# Patient Record
Sex: FEMALE | Race: WHITE | NOT HISPANIC OR LATINO | Employment: FULL TIME | URBAN - METROPOLITAN AREA
[De-identification: names, ages, dates, MRNs, and addresses within clinical notes are randomized per-mention and may not be internally consistent; named-entity substitution may affect disease eponyms.]

---

## 2017-01-09 ENCOUNTER — TELEPHONE (OUTPATIENT)
Dept: FAMILY MEDICINE | Facility: CLINIC | Age: 57
End: 2017-01-09

## 2017-01-09 NOTE — TELEPHONE ENCOUNTER
RN tried to contact patient, but NA. Left message  To please call us back, [phone]. YOu may ask for any triage nurse. ...................MAURY Plummer

## 2017-01-09 NOTE — TELEPHONE ENCOUNTER
Reason for call: Symptom   Symptom or request: lower abdominal pain    Duration (how long have symptoms been present): off and on for months, getting more constant   Have you been treated for this before? No    Additional comments: feels like menstrual cramps     Phone Number Pt can be reached at: Home number on file [phone] (home)  Best Time: any  Can we leave a detailed message on this number? YES

## 2017-01-09 NOTE — TELEPHONE ENCOUNTER
": 1960  PHONE #'s: [phone] (home) [phone] (work)    PRESENTING PROBLEM:  Intermittent abdominal pains for several months, but now getting more constant.     NURSING ASSESSMENT  Description:   Starts in the front, by the ovary area. It gets pretty severe  Right now. It is making me sick to my stomach.  It feels like really bad period cramps. \" Its the whole bottom of my pubic area. \"  Onset/duration:  3 months ago.  Precip. factors:  Etiology,   Assoc. Sx:   Past month to 10 days, pain is getting worse, starting to bother me. I have bumps all over me on my back , stomach and chest.   Improves/worsens Sx:  to 8 /10  Sx specific meds:  Ibuprofen intermittent  LMP/preg/breast feeding: \"   NO monthly periods for years. This feels like really really bad menstrual cramps. \"   Last exam/Tx:  Has NOT been seen for this.     RECOMMENDED DISPOSITION:  See in 24 hours - scheduled to see a female provider, Selena Jacobo CNP at 2:45 PM tomorrow.   Will comply with recommendation: YES   If further questions/concerns or if Sx do not improve, worsen or new Sx develop, call your PCP or [facility] as soon as possible.    NOTES:  Disposition was determined by the first positive assessment question, therefore all previous assessment questions were negative.  Informed to check provider manual or call insurance company to assure coverage.    Guideline used: Rash, Adult   / Abdominal Pain, Adult  Telephone Triage Protocols for Nurses, Fifth Edition, Juju Ordonez RN    "

## 2017-01-10 ENCOUNTER — OFFICE VISIT (OUTPATIENT)
Dept: FAMILY MEDICINE | Facility: CLINIC | Age: 57
End: 2017-01-10
Payer: COMMERCIAL

## 2017-01-10 VITALS
WEIGHT: 168 LBS | TEMPERATURE: 98.2 F | SYSTOLIC BLOOD PRESSURE: 128 MMHG | HEART RATE: 90 BPM | DIASTOLIC BLOOD PRESSURE: 80 MMHG | BODY MASS INDEX: 31.23 KG/M2

## 2017-01-10 DIAGNOSIS — D72.829 LEUKOCYTOSIS, UNSPECIFIED TYPE: ICD-10-CM

## 2017-01-10 DIAGNOSIS — R10.2 PELVIC PAIN IN FEMALE: Primary | ICD-10-CM

## 2017-01-10 LAB
ALBUMIN SERPL-MCNC: 3.8 G/DL (ref 3.4–5)
ALBUMIN UR-MCNC: NEGATIVE MG/DL
ALP SERPL-CCNC: 91 U/L (ref 40–150)
ALT SERPL W P-5'-P-CCNC: 39 U/L (ref 0–50)
ANION GAP SERPL CALCULATED.3IONS-SCNC: 7 MMOL/L (ref 3–14)
APPEARANCE UR: CLEAR
AST SERPL W P-5'-P-CCNC: 37 U/L (ref 0–45)
BASOPHILS # BLD AUTO: 0.1 10E9/L (ref 0–0.2)
BASOPHILS NFR BLD AUTO: 0.3 %
BILIRUB SERPL-MCNC: 0.9 MG/DL (ref 0.2–1.3)
BILIRUB UR QL STRIP: NEGATIVE
BUN SERPL-MCNC: 7 MG/DL (ref 7–30)
CALCIUM SERPL-MCNC: 9 MG/DL (ref 8.5–10.1)
CHLORIDE SERPL-SCNC: 97 MMOL/L (ref 94–109)
CO2 SERPL-SCNC: 30 MMOL/L (ref 20–32)
COLOR UR AUTO: YELLOW
CREAT SERPL-MCNC: 0.71 MG/DL (ref 0.52–1.04)
CRP SERPL-MCNC: 95.2 MG/L (ref 0–8)
DIFFERENTIAL METHOD BLD: ABNORMAL
EOSINOPHIL # BLD AUTO: 0 10E9/L (ref 0–0.7)
EOSINOPHIL NFR BLD AUTO: 0.3 %
ERYTHROCYTE [DISTWIDTH] IN BLOOD BY AUTOMATED COUNT: 12.1 % (ref 10–15)
GFR SERPL CREATININE-BSD FRML MDRD: 86 ML/MIN/1.7M2
GLUCOSE SERPL-MCNC: 132 MG/DL (ref 70–99)
GLUCOSE UR STRIP-MCNC: NEGATIVE MG/DL
HCT VFR BLD AUTO: 40.7 % (ref 35–47)
HGB BLD-MCNC: 13.6 G/DL (ref 11.7–15.7)
HGB UR QL STRIP: NEGATIVE
IMM GRANULOCYTES # BLD: 0.1 10E9/L (ref 0–0.4)
IMM GRANULOCYTES NFR BLD: 0.3 %
KETONES UR STRIP-MCNC: NEGATIVE MG/DL
LEUKOCYTE ESTERASE UR QL STRIP: NEGATIVE
LYMPHOCYTES # BLD AUTO: 2.3 10E9/L (ref 0.8–5.3)
LYMPHOCYTES NFR BLD AUTO: 14.6 %
MCH RBC QN AUTO: 33.2 PG (ref 26.5–33)
MCHC RBC AUTO-ENTMCNC: 33.4 G/DL (ref 31.5–36.5)
MCV RBC AUTO: 99 FL (ref 78–100)
MONOCYTES # BLD AUTO: 1.4 10E9/L (ref 0–1.3)
MONOCYTES NFR BLD AUTO: 8.6 %
NEUTROPHILS # BLD AUTO: 12.1 10E9/L (ref 1.6–8.3)
NEUTROPHILS NFR BLD AUTO: 75.9 %
NITRATE UR QL: NEGATIVE
PH UR STRIP: 6 PH (ref 5–7)
PLATELET # BLD AUTO: 275 10E9/L (ref 150–450)
POTASSIUM SERPL-SCNC: 3.5 MMOL/L (ref 3.4–5.3)
PROT SERPL-MCNC: 8.4 G/DL (ref 6.8–8.8)
RBC # BLD AUTO: 4.1 10E12/L (ref 3.8–5.2)
SODIUM SERPL-SCNC: 134 MMOL/L (ref 133–144)
SP GR UR STRIP: 1.01 (ref 1–1.03)
URN SPEC COLLECT METH UR: NORMAL
UROBILINOGEN UR STRIP-ACNC: 0.2 EU/DL (ref 0.2–1)
WBC # BLD AUTO: 15.9 10E9/L (ref 4–11)

## 2017-01-10 PROCEDURE — 36415 COLL VENOUS BLD VENIPUNCTURE: CPT | Performed by: NURSE PRACTITIONER

## 2017-01-10 PROCEDURE — 85025 COMPLETE CBC W/AUTO DIFF WBC: CPT | Performed by: NURSE PRACTITIONER

## 2017-01-10 PROCEDURE — 99214 OFFICE O/P EST MOD 30 MIN: CPT | Performed by: NURSE PRACTITIONER

## 2017-01-10 PROCEDURE — 80053 COMPREHEN METABOLIC PANEL: CPT | Performed by: NURSE PRACTITIONER

## 2017-01-10 PROCEDURE — 81003 URINALYSIS AUTO W/O SCOPE: CPT | Performed by: NURSE PRACTITIONER

## 2017-01-10 PROCEDURE — 86140 C-REACTIVE PROTEIN: CPT | Performed by: NURSE PRACTITIONER

## 2017-01-10 NOTE — PROGRESS NOTES
SUBJECTIVE:                                                    Caro Palmer is a 56 year old female who presents to clinic today for the following health issues:      ABDOMINAL PAIN     Onset: worse over the last couple weeks     Description:   Character: Cramping, like bad menstrual cramps  Location: suprapubic region  Radiation: None    Intensity: moderate    Progression of Symptoms:  same    Accompanying Signs & Symptoms:  Fever/Chills?: YES, per pt  Gas/Bloating: YES- at first, none now  Nausea: YES  Vomitting: YES  Diarrhea?: no   Constipation:no   Dysuria or Hematuria: no    History:   Trauma: no   Previous similar pain: YES- has been having this for years, but it would be better, now more frequent   Previous tests done: none    Precipitating factors:   Does the pain change with:     Food: no      BM: no     Urination: no     Alleviating factors:  Ibuprofen, helps pain, not sx    Therapies Tried and outcome: none    LMP:  not applicable       Menstrual cramps and would come and go. Over the past 6 weeks of this became more severe, they were much more intense the past 2-3 weeks, and she states over the past week the cramping has become constant. She has been unable to be at work because of this persistent pain.She describes the pain as feeling like menstrual cramps, directly over the suprapubic area, and radiating through to her back. She is postmenopausal for years. She also states this feels like a fullness to the lower abdomen that would be relieved if she could have a bowel movement or pass flatus. She goes into the bathroom, but doesn't really have any results. She does have normal regular bowel movements, no constipation or diarrhea, no blood or mucus in the stools. She denies dysuria, hematuria, urgency or frequency. She states her urine is clear. She denies vaginal discharge or abnormal bleeding. She has been unable to sleep at night because of the persistent cramping that continues through the  "nighttime. The last couple days she states she has felt \"bluish \", and has had chills. She's had no vomiting or diarrhea.    She had a well exam within normal Pap smear last June.  She has a family history of pancreatic cancer and her sister had hysterectomy for an unknown reason. In June she had screening for ovarian and pancreatic cancer, tumor markers were negative. Her history is significant for abnormal Pap smear 1999,, LEEP procedure was performed for severe dysplasia. Subsequent Pap smears, performed annually, have been negative.    She had a colonoscopy performed in 2012  Which revealed diverticulosis.        Problem list and histories reviewed & adjusted, as indicated.  Additional history: as documented    BP Readings from Last 3 Encounters:   01/10/17 128/80   07/19/16 138/85   06/17/16 120/80    Wt Readings from Last 3 Encounters:   01/10/17 168 lb (76.204 kg)   06/17/16 160 lb (72.576 kg)   02/27/12 155 lb (70.308 kg)                  Problem list, Medication list, Allergies, and Medical/Social/Surgical histories reviewed in [facility] and updated as appropriate.    ROS:  Constitutional, HEENT, cardiovascular, pulmonary, gi and gu systems are negative, except as otherwise noted.    OBJECTIVE:                                                    /80 mmHg  Pulse 90  Temp(Src) 98.2  F (36.8  C) (Tympanic)  Wt 168 lb (76.204 kg)  LMP 11/22/2012  Body mass index is 31.23 kg/(m^2).  GENERAL: Well-nourished, well-hydrated. She appears quite anxious.  NECK: no adenopathy, no asymmetry, masses, or scars and thyroid normal to palpation  RESP: lungs clear to auscultation - no rales, rhonchi or wheezes  CV: regular rate and rhythm, normal S1 S2, no S3 or S4, no murmur, click or rub, no peripheral edema and peripheral pulses strong  ABDOMEN: Soft, diffusely tender across the lower abdomen. No epigastric or upper quadrant tenderness to palpation. Bowel sounds present throughout. No guarding or rebound tenderness. No " masses, no organomegaly palpated   (female): Normal external genitalia, BUS. Bimanual exam reveals tenderness directly over the suprapubic area, and into the right lower quadrant of the abdomen. Some tenderness to a lesser extent in the left lower quadrant. No masses palpated. Assessment is somewhat limited due to  SKIN: Dry eczematous type appearing rash on mid back and lumbar region on the left extending toward the buttocks.  NEURO: Normal strength and tone, mentation intact and speech normal    Diagnostic Test Results:  Results for orders placed or performed in visit on 01/10/17 (from the past 24 hour(s))   CBC with platelets and differential   Result Value Ref Range    WBC 15.9 (H) 4.0 - 11.0 10e9/L    RBC Count 4.10 3.8 - 5.2 10e12/L    Hemoglobin 13.6 11.7 - 15.7 g/dL    Hematocrit 40.7 35.0 - 47.0 %    MCV 99 78 - 100 fl    MCH 33.2 (H) 26.5 - 33.0 pg    MCHC 33.4 31.5 - 36.5 g/dL    RDW 12.1 10.0 - 15.0 %    Platelet Count 275 150 - 450 10e9/L    Diff Method Automated Method     % Neutrophils 75.9 %    % Lymphocytes 14.6 %    % Monocytes 8.6 %    % Eosinophils 0.3 %    % Basophils 0.3 %    % Immature Granulocytes 0.3 %    Absolute Neutrophil 12.1 (H) 1.6 - 8.3 10e9/L    Absolute Lymphocytes 2.3 0.8 - 5.3 10e9/L    Absolute Monocytes 1.4 (H) 0.0 - 1.3 10e9/L    Absolute Eosinophils 0.0 0.0 - 0.7 10e9/L    Absolute Basophils 0.1 0.0 - 0.2 10e9/L    Abs Immature Granulocytes 0.1 0 - 0.4 10e9/L   Comprehensive metabolic panel   Result Value Ref Range    Sodium 134 133 - 144 mmol/L    Potassium 3.5 3.4 - 5.3 mmol/L    Chloride 97 94 - 109 mmol/L    Carbon Dioxide 30 20 - 32 mmol/L    Anion Gap 7 3 - 14 mmol/L    Glucose 132 (H) 70 - 99 mg/dL    Urea Nitrogen 7 7 - 30 mg/dL    Creatinine 0.71 0.52 - 1.04 mg/dL    GFR Estimate 86 >60 mL/min/1.7m2    GFR Estimate If Black >90   GFR Calc   >60 mL/min/1.7m2    Calcium 9.0 8.5 - 10.1 mg/dL    Bilirubin Total 0.9 0.2 - 1.3 mg/dL    Albumin 3.8 3.4 -  5.0 g/dL    Protein Total 8.4 6.8 - 8.8 g/dL    Alkaline Phosphatase 91 40 - 150 U/L    ALT 39 0 - 50 U/L    AST 37 0 - 45 U/L   CRP, inflammation   Result Value Ref Range    CRP Inflammation 95.2 (H) 0.0 - 8.0 mg/L   *UA reflex to Microscopic and Culture ([facility] (except [place] and [place])   Result Value Ref Range    Color Urine Yellow     Appearance Urine Clear     Glucose Urine Negative NEG mg/dL    Bilirubin Urine Negative NEG    Ketones Urine Negative NEG mg/dL    Specific Gravity Urine 1.010 1.003 - 1.035    Blood Urine Negative NEG    pH Urine 6.0 5.0 - 7.0 pH    Protein Albumin Urine Negative NEG mg/dL    Urobilinogen Urine 0.2 0.2 - 1.0 EU/dL    Nitrite Urine Negative NEG    Leukocyte Esterase Urine Negative NEG    Source Unspecified Urine         ASSESSMENT/PLAN:                                                      Problem List Items Addressed This Visit     None      Visit Diagnoses     Pelvic pain in female    -  Primary     Relevant Orders     CBC with platelets and differential (Completed)     Comprehensive metabolic panel (Completed)     CRP, inflammation (Completed)     *UA reflex to Microscopic and Culture ([facility] (except [place] and [place]) (Completed)     CT Abdomen Pelvis w Contrast     Elevated WBC count         Relevant Orders     CT Abdomen Pelvis w Contrast            Lab results reviewed with patient. She is scheduled for a CT of Abdomen and pelvis with contrast Early tomorrow morning. Further follow-up pending. If she has increasing abdominal pain or fever, present to ED.    ARIANNA Deluna [facility]

## 2017-01-10 NOTE — NURSING NOTE
"Chief Complaint   Patient presents with     Abdominal Pain     pelvic cramps       Initial /80 mmHg  Pulse 90  Temp(Src) 98.2  F (36.8  C) (Tympanic)  Wt 160 lb (72.576 kg)  LMP 11/22/2012 Estimated body mass index is 29.75 kg/(m^2) as calculated from the following:    Height as of 6/17/16: 5' 1.5\" (1.562 m).    Weight as of this encounter: 160 lb (72.576 kg).  BP completed using cuff size: large  "

## 2017-01-10 NOTE — MR AVS SNAPSHOT
After Visit Summary   1/10/2017    Caro Palmer    MRN: [id number]           Patient Information     Date Of Birth          1960        Visit Information        Provider Department      1/10/2017 2:45 PM Selena Jacobo APRN [facility]        Today's Diagnoses     Pelvic pain in female    -  1     Elevated WBC count            Follow-ups after your visit        Your next 10 appointments already scheduled     Jan 11, 2017  8:30 AM   CT ABDOMEN PELVIS W CONTRAST with PHCT1   [facility] CT Scan ([facility])    [address] [place] [id number] [phone]           Please bring any scans or X-rays taken at other hospitals, if similar tests were done. Also bring a list of your medicines, including vitamins, minerals and over-the-counter drugs. It is safest to leave personal items at home.  Be sure to tell your doctor:   If you have any allergies.   If there s any chance you are pregnant.   If you are breastfeeding.   If you have any special needs.  You may have contrast for this exam. To prepare:   Do not eat or drink for 2 hours before your exam. If you need to take medicine, you may take it with small sips of water. (We may ask you to take liquid medicine as well.)   The day before your exam, drink extra fluids at least six 8-ounce glasses (unless your doctor tells you to restrict your fluids).  Patients over 70 or patients with diabetes or kidney problems:   If you haven t had a blood test (creatinine test) within the last 30 days, go to your clinic or Diagnostic Imaging Department for this test.  If you have diabetes:   If your kidney function is normal, continue taking your metformin (Avandamet, Glucophage, Glucovance, Metaglip) on the day of your exam.   If your kidney function is abnormal, wait 48 hours before restarting this medicine.  You will have oral contrast for this exam:   You will drink the contrast at home. Get this from  "your clinic or Diagnostic Imaging Department. Please follow the directions given.  Please wear loose clothing, such as a sweat suit or jogging clothes. Avoid snaps, zippers and other metal. We may ask you to undress and put on a hospital gown.  If you have any questions, please call the Imaging Department where you will have your exam.              Future tests that were ordered for you today     Open Future Orders        Priority Expected Expires Ordered    CT Abdomen Pelvis w Contrast Routine  1/10/2018 1/10/2017            Who to contact     If you have questions or need follow up information about today's clinic visit or your schedule please contact [facility] directly at [phone].  Normal or non-critical lab and imaging results will be communicated to you by [employer], letter or phone within 4 business days after the clinic has received the results. If you do not hear from us within 7 days, please contact the clinic through [employer] or phone. If you have a critical or abnormal lab result, we will notify you by phone as soon as possible.  Submit refill requests through [facility] or call your pharmacy and they will forward the refill request to us. Please allow 3 business days for your refill to be completed.          Additional Information About Your Visit        [employer] Information     [facility] lets you send messages to your doctor, view your test results, renew your prescriptions, schedule appointments and more. To sign up, go to [email or web address] . Click on \"Log in\" on the left side of the screen, which will take you to the Welcome page. Then click on \"Sign up Now\" on the right side of the page.     You will be asked to enter the access code listed below, as well as some personal information. Please follow the directions to create your username and password.     Your access code is: [email or web address]  Expires: 4/10/2017  4:09 PM     Your access code will  in 90 days. If you need help or a " new code, please call your [facility] clinic or [phone].        Care EveryWhere ID     This is your Care EveryWhere ID. This could be used by other organizations to access your [facility] medical records  [id number]        Your Vitals Were     Pulse Temperature Last Period             90 98.2  F (36.8  C) (Tympanic) 11/22/2012          Blood Pressure from Last 3 Encounters:   01/10/17 128/80   07/19/16 138/85   06/17/16 120/80    Weight from Last 3 Encounters:   01/10/17 168 lb (76.204 kg)   06/17/16 160 lb (72.576 kg)   02/27/12 155 lb (70.308 kg)              We Performed the Following     *UA reflex to Microscopic and Culture ([facility] (except [place] and [place])     CBC with platelets and differential     Comprehensive metabolic panel     CRP, inflammation        Primary Care Provider Office Phone # Fax #    Gregory G Schoen, [phone]       [facility] [address] [place] [address]        Thank you!     Thank you for choosing [facility]  for your care. Our goal is always to provide you with excellent care. Hearing back from our patients is one way we can continue to improve our services. Please take a few minutes to complete the written survey that you may receive in the mail after your visit with us. Thank you!             Your Updated Medication List - Protect others around you: Learn how to safely use, store and throw away your medicines at www.disposemymeds.org.          This list is accurate as of: 1/10/17  4:09 PM.  Always use your most recent med list.                   Brand Name Dispense Instructions for use    ALPRAZolam 0.25 MG tablet    XANAX    30 tablet    Take 1 tablet (0.25 mg) by mouth 3 times daily as needed for anxiety       aspirin 81 MG tablet     100    ONE DAILY       Chlorophyll 10 MG Tabs      15 mg daily       fish oil-omega-3 fatty acids 1000 MG capsule      Take 2 g by mouth 3 times  daily.       flaxseed oil 1000 MG Caps      Take 1,000 mg by mouth 2 times daily.       Gelatin 650 MG Caps      2 daily       GLUCOSAMINE CHONDRO COMPLEX OR      x 2 daily       losartan-hydrochlorothiazide 50-12.5 MG per tablet    HYZAAR    30 tablet    TAKE ONE TABLET BY MOUTH EVERY DAY       Lysine 1000 MG Tabs      Take 1,000 mg by mouth daily.       ONE DAILY PLUS IRON Tabs          OVER-THE-COUNTER      daily. Citracal 500 D and Calcium 600       vitamin E 400 UNIT capsule     3 MONTHS    ONE CAPSULE DAILY

## 2017-01-11 ENCOUNTER — TELEPHONE (OUTPATIENT)
Dept: FAMILY MEDICINE | Facility: CLINIC | Age: 57
End: 2017-01-11

## 2017-01-11 ENCOUNTER — HOSPITAL ENCOUNTER (OUTPATIENT)
Dept: CT IMAGING | Facility: CLINIC | Age: 57
DRG: 392 | End: 2017-01-11
Attending: NURSE PRACTITIONER
Payer: COMMERCIAL

## 2017-01-11 ENCOUNTER — HOSPITAL ENCOUNTER (INPATIENT)
Facility: CLINIC | Age: 57
LOS: 4 days | Discharge: HOME | DRG: 392 | End: 2017-01-15
Attending: FAMILY MEDICINE | Admitting: FAMILY MEDICINE
Payer: COMMERCIAL

## 2017-01-11 DIAGNOSIS — R10.2 PELVIC PAIN IN FEMALE: ICD-10-CM

## 2017-01-11 DIAGNOSIS — K57.20 DIVERTICULITIS OF LARGE INTESTINE WITH ABSCESS WITHOUT BLEEDING: Primary | ICD-10-CM

## 2017-01-11 DIAGNOSIS — D72.829 LEUKOCYTOSIS, UNSPECIFIED TYPE: ICD-10-CM

## 2017-01-11 PROBLEM — K57.80 DIVERTICULITIS OF INTESTINE WITH ABSCESS: Status: ACTIVE | Noted: 2017-01-11

## 2017-01-11 LAB
ALBUMIN SERPL-MCNC: 3.6 G/DL (ref 3.4–5)
ALP SERPL-CCNC: 80 U/L (ref 40–150)
ALT SERPL W P-5'-P-CCNC: 34 U/L (ref 0–50)
ANION GAP SERPL CALCULATED.3IONS-SCNC: 11 MMOL/L (ref 3–14)
AST SERPL W P-5'-P-CCNC: 35 U/L (ref 0–45)
BASOPHILS # BLD AUTO: 0.1 10E9/L (ref 0–0.2)
BASOPHILS NFR BLD AUTO: 0.5 %
BILIRUB SERPL-MCNC: 0.8 MG/DL (ref 0.2–1.3)
BUN SERPL-MCNC: 7 MG/DL (ref 7–30)
CALCIUM SERPL-MCNC: 9 MG/DL (ref 8.5–10.1)
CHLORIDE SERPL-SCNC: 104 MMOL/L (ref 94–109)
CO2 SERPL-SCNC: 28 MMOL/L (ref 20–32)
CREAT SERPL-MCNC: 0.62 MG/DL (ref 0.52–1.04)
CRP SERPL-MCNC: 111 MG/L (ref 0–8)
DIFFERENTIAL METHOD BLD: ABNORMAL
EOSINOPHIL # BLD AUTO: 0.1 10E9/L (ref 0–0.7)
EOSINOPHIL NFR BLD AUTO: 0.8 %
ERYTHROCYTE [DISTWIDTH] IN BLOOD BY AUTOMATED COUNT: 11.9 % (ref 10–15)
GFR SERPL CREATININE-BSD FRML MDRD: ABNORMAL ML/MIN/1.7M2
GLUCOSE SERPL-MCNC: 103 MG/DL (ref 70–99)
HCT VFR BLD AUTO: 37.4 % (ref 35–47)
HGB BLD-MCNC: 12.5 G/DL (ref 11.7–15.7)
IMM GRANULOCYTES # BLD: 0 10E9/L (ref 0–0.4)
IMM GRANULOCYTES NFR BLD: 0.1 %
LACTATE BLD-SCNC: 0.7 MMOL/L (ref 0.7–2.1)
LYMPHOCYTES # BLD AUTO: 1.7 10E9/L (ref 0.8–5.3)
LYMPHOCYTES NFR BLD AUTO: 16.9 %
MCH RBC QN AUTO: 33.3 PG (ref 26.5–33)
MCHC RBC AUTO-ENTMCNC: 33.4 G/DL (ref 31.5–36.5)
MCV RBC AUTO: 100 FL (ref 78–100)
MONOCYTES # BLD AUTO: 1 10E9/L (ref 0–1.3)
MONOCYTES NFR BLD AUTO: 10.4 %
NEUTROPHILS # BLD AUTO: 7 10E9/L (ref 1.6–8.3)
NEUTROPHILS NFR BLD AUTO: 71.3 %
PLATELET # BLD AUTO: 239 10E9/L (ref 150–450)
POTASSIUM SERPL-SCNC: 3.8 MMOL/L (ref 3.4–5.3)
PROT SERPL-MCNC: 8.1 G/DL (ref 6.8–8.8)
RBC # BLD AUTO: 3.75 10E12/L (ref 3.8–5.2)
SODIUM SERPL-SCNC: 143 MMOL/L (ref 133–144)
WBC # BLD AUTO: 9.8 10E9/L (ref 4–11)

## 2017-01-11 PROCEDURE — 12000000 ZZH R&B MED SURG/OB

## 2017-01-11 PROCEDURE — 87081 CULTURE SCREEN ONLY: CPT | Performed by: FAMILY MEDICINE

## 2017-01-11 PROCEDURE — 99254 IP/OBS CNSLTJ NEW/EST MOD 60: CPT | Performed by: SPECIALIST

## 2017-01-11 PROCEDURE — 74177 CT ABD & PELVIS W/CONTRAST: CPT

## 2017-01-11 PROCEDURE — 85025 COMPLETE CBC W/AUTO DIFF WBC: CPT | Performed by: FAMILY MEDICINE

## 2017-01-11 PROCEDURE — 86140 C-REACTIVE PROTEIN: CPT | Performed by: FAMILY MEDICINE

## 2017-01-11 PROCEDURE — 25000125 ZZHC RX 250: Performed by: FAMILY MEDICINE

## 2017-01-11 PROCEDURE — 25500064 ZZH RX 255 OP 636: Performed by: RADIOLOGY

## 2017-01-11 PROCEDURE — 99223 1ST HOSP IP/OBS HIGH 75: CPT | Mod: AI | Performed by: FAMILY MEDICINE

## 2017-01-11 PROCEDURE — 25800025 ZZH RX 258: Performed by: FAMILY MEDICINE

## 2017-01-11 PROCEDURE — 80053 COMPREHEN METABOLIC PANEL: CPT | Performed by: FAMILY MEDICINE

## 2017-01-11 PROCEDURE — 83605 ASSAY OF LACTIC ACID: CPT | Performed by: FAMILY MEDICINE

## 2017-01-11 PROCEDURE — 36415 COLL VENOUS BLD VENIPUNCTURE: CPT | Performed by: FAMILY MEDICINE

## 2017-01-11 PROCEDURE — 25000125 ZZHC RX 250: Performed by: RADIOLOGY

## 2017-01-11 RX ORDER — ONDANSETRON 2 MG/ML
4 INJECTION INTRAMUSCULAR; INTRAVENOUS EVERY 6 HOURS PRN
Status: DISCONTINUED | OUTPATIENT
Start: 2017-01-11 | End: 2017-01-15 | Stop reason: HOSPADM

## 2017-01-11 RX ORDER — KETOROLAC TROMETHAMINE 30 MG/ML
30 INJECTION, SOLUTION INTRAMUSCULAR; INTRAVENOUS EVERY 6 HOURS PRN
Status: DISCONTINUED | OUTPATIENT
Start: 2017-01-11 | End: 2017-01-15 | Stop reason: HOSPADM

## 2017-01-11 RX ORDER — NALOXONE HYDROCHLORIDE 0.4 MG/ML
.1-.4 INJECTION, SOLUTION INTRAMUSCULAR; INTRAVENOUS; SUBCUTANEOUS
Status: DISCONTINUED | OUTPATIENT
Start: 2017-01-11 | End: 2017-01-15 | Stop reason: HOSPADM

## 2017-01-11 RX ORDER — ONDANSETRON 4 MG/1
4 TABLET, ORALLY DISINTEGRATING ORAL EVERY 6 HOURS PRN
Status: DISCONTINUED | OUTPATIENT
Start: 2017-01-11 | End: 2017-01-15 | Stop reason: HOSPADM

## 2017-01-11 RX ORDER — LORAZEPAM 2 MG/ML
0.5 INJECTION INTRAMUSCULAR EVERY 6 HOURS PRN
Status: DISCONTINUED | OUTPATIENT
Start: 2017-01-11 | End: 2017-01-14

## 2017-01-11 RX ORDER — DIPHENHYDRAMINE HYDROCHLORIDE 50 MG/ML
25 INJECTION INTRAMUSCULAR; INTRAVENOUS EVERY 6 HOURS PRN
Status: DISCONTINUED | OUTPATIENT
Start: 2017-01-11 | End: 2017-01-15 | Stop reason: HOSPADM

## 2017-01-11 RX ORDER — PROCHLORPERAZINE MALEATE 5 MG
5-10 TABLET ORAL EVERY 6 HOURS PRN
Status: DISCONTINUED | OUTPATIENT
Start: 2017-01-11 | End: 2017-01-15 | Stop reason: HOSPADM

## 2017-01-11 RX ORDER — PROCHLORPERAZINE 25 MG
25 SUPPOSITORY, RECTAL RECTAL EVERY 12 HOURS PRN
Status: DISCONTINUED | OUTPATIENT
Start: 2017-01-11 | End: 2017-01-15 | Stop reason: HOSPADM

## 2017-01-11 RX ORDER — MORPHINE SULFATE 2 MG/ML
1-2 INJECTION, SOLUTION INTRAMUSCULAR; INTRAVENOUS EVERY 4 HOURS PRN
Status: DISCONTINUED | OUTPATIENT
Start: 2017-01-11 | End: 2017-01-15 | Stop reason: HOSPADM

## 2017-01-11 RX ORDER — DEXTROSE MONOHYDRATE, SODIUM CHLORIDE, AND POTASSIUM CHLORIDE 50; 1.49; 4.5 G/1000ML; G/1000ML; G/1000ML
INJECTION, SOLUTION INTRAVENOUS CONTINUOUS
Status: DISCONTINUED | OUTPATIENT
Start: 2017-01-11 | End: 2017-01-14

## 2017-01-11 RX ORDER — IOPAMIDOL 755 MG/ML
100 INJECTION, SOLUTION INTRAVASCULAR ONCE
Status: COMPLETED | OUTPATIENT
Start: 2017-01-11 | End: 2017-01-11

## 2017-01-11 RX ADMIN — TAZOBACTAM SODIUM AND PIPERACILLIN SODIUM 4.5 G: 500; 4 INJECTION, SOLUTION INTRAVENOUS at 23:40

## 2017-01-11 RX ADMIN — SODIUM CHLORIDE 60 ML: 9 INJECTION, SOLUTION INTRAVENOUS at 08:36

## 2017-01-11 RX ADMIN — POTASSIUM CHLORIDE, DEXTROSE MONOHYDRATE AND SODIUM CHLORIDE: 150; 5; 450 INJECTION, SOLUTION INTRAVENOUS at 16:21

## 2017-01-11 RX ADMIN — KETOROLAC TROMETHAMINE 30 MG: 30 INJECTION, SOLUTION INTRAMUSCULAR at 16:24

## 2017-01-11 RX ADMIN — IOPAMIDOL 82 ML: 755 INJECTION, SOLUTION INTRAVENOUS at 08:37

## 2017-01-11 RX ADMIN — ONDANSETRON HYDROCHLORIDE 4 MG: 2 SOLUTION INTRAMUSCULAR; INTRAVENOUS at 16:24

## 2017-01-11 RX ADMIN — TAZOBACTAM SODIUM AND PIPERACILLIN SODIUM 4.5 G: 500; 4 INJECTION, SOLUTION INTRAVENOUS at 17:04

## 2017-01-11 RX ADMIN — MORPHINE SULFATE 2 MG: 2 INJECTION, SOLUTION INTRAMUSCULAR; INTRAVENOUS at 16:25

## 2017-01-11 ASSESSMENT — ACTIVITIES OF DAILY LIVING (ADL)
AMBULATION: 0-->INDEPENDENT
COGNITION: 0 - NO COGNITION ISSUES REPORTED
TOILETING: 0-->INDEPENDENT
FALL_HISTORY_WITHIN_LAST_SIX_MONTHS: NO
SWALLOWING: 0-->SWALLOWS FOODS/LIQUIDS WITHOUT DIFFICULTY
DRESS: 0-->INDEPENDENT
RETIRED_EATING: 0-->INDEPENDENT
BATHING: 0-->INDEPENDENT
RETIRED_COMMUNICATION: 0-->UNDERSTANDS/COMMUNICATES WITHOUT DIFFICULTY
TRANSFERRING: 0-->INDEPENDENT

## 2017-01-11 NOTE — IP AVS SNAPSHOT
[address] [facility]    [address] [place] [address]    Phone:  [phone]                                       After Visit Summary   1/11/2017    Caro Palmer    MRN: [id number]           After Visit Summary Signature Page     I have received my discharge instructions, and my questions have been answered. I have discussed any challenges I see with this plan with the nurse or doctor.    ..........................................................................................................................................  Patient/Patient Representative Signature      ..........................................................................................................................................  Patient Representative Print Name and Relationship to Patient    ..................................................               ................................................  Date                                            Time    ..........................................................................................................................................  Reviewed by Signature/Title    ...................................................              ..............................................  Date                                                            Time

## 2017-01-11 NOTE — IP AVS SNAPSHOT
"    [address] SURGICAL: [phone]                                              INTERAGENCY TRANSFER FORM - LAB / IMAGING / EKG / EMG RESULTS   2017                    Hospital Admission Date: 2017  GIACOMO MONTELONGO   : 1960  Sex: Female        Attending Provider: Dimple Pemberton MD     Allergies:  No Known Drug Allergies    Infection:  None   Service:  HOSPITALIST    Ht:  1.575 m (5' 2\")   Wt:  68 kg (149 lb 14.6 oz)   Admission Wt:  68 kg (149 lb 14.6 oz)    BMI:  27.41 kg/m 2   BSA:  1.72 m 2            Patient PCP Information     Provider PCP Type    Gregory G. Schoen, MD General         Lab Results - 3 Days (01/15/17 - 17)      WBC count [[id number]]  Resulted: 01/15/17 0818, Result status: Final result    Ordering provider: Dimple Pemberton MD  01/15/17 0747 Resulting lab: [facility]    Specimen Information    Type Source Collected On   Blood  01/15/17 0520          Components       Value Reference Range Flag Lab   WBC 8.5 4.0 - 11.0 10e9/L  [facility]            CRP inflammation [[id number]] (Abnormal)  Resulted: 01/15/17 0550, Result status: Final result    Ordering provider: Dimple Pemberton MD  01/15/17 0000 Resulting lab: [facility]    Specimen Information    Type Source Collected On   Blood  01/15/17 0520          Components       Value Reference Range [facility] Lab   CRP Inflammation 40.7 0.0 - 8.0 mg/L H [facility]            CRP inflammation [[id number]] (Abnormal)  Resulted: 17 0552, Result status: Final result    Ordering provider: Dimple Pemberton MD  17 0000 Resulting lab: [facility]    Specimen Information    Type Source Collected On   Blood  17 0525          Components       Value Reference Range [facility]   CRP Inflammation 31.3 0.0 - 8.0 mg/L H [facility]            WBC count [[id number]]  Resulted: 17 0538, Result status: Final " result    Ordering provider: Dimple Pemberton MD  01/14/17 0000 Resulting lab: [facility]    Specimen Information    Type Source Collected On   Blood  01/14/17 0525          Components       Value Reference Range Flag Lab   WBC 5.6 4.0 - 11.0 10e9/L  [facility]            Methicillin resistant staph aureus cult [[id number]]  Resulted: 01/13/17 0916, Result status: Final result    Ordering provider: Dimple Pemberton MD  01/11/17 1939 Resulting lab: [facility]    Specimen Information    Type Source Collected On   Nose  01/11/17 1942          Components       Value Reference Range Flag Lab   Specimen Description Nares   [facility]   Culture Micro No MRSA isolated   [facility]   Micro Report Status FINAL 01/13/2017   [facility]            CRP inflammation [[id number]] (Abnormal)  Resulted: 01/13/17 0600, Result status: Final result    Ordering provider: Dimple Pemberton MD  01/13/17 0000 Resulting lab: [facility]    Specimen Information    Type Source Collected On   Blood  01/13/17 0520          Components       Value Reference Range [facility]   CRP Inflammation 49.1 0.0 - 8.0 mg/L H [facility]            WBC count [[id number]]  Resulted: 01/13/17 0547, Result status: Final result    Ordering provider: Dimple Pemberton MD  01/13/17 0000 Resulting lab: [facility]    Specimen Information    Type Source Collected On   Blood  01/13/17 0520          Components       Value Reference Range [facility]   WBC 5.3 4.0 - 11.0 10e9/L  [facility]            CRP inflammation [[id number]] (Abnormal)  Resulted: 01/12/17 0614, Result status: Final result    Ordering provider: Dimple Pemberton MD  01/12/17 0000 Resulting lab: [facility]    Specimen Information    Type Source Collected On   Blood  01/12/17 0540          Components       Value Reference Range [facility]   CRP Inflammation 84.8 0.0 -  8.0 mg/L H [facility]            CBC with platelets [[id number]] (Abnormal)  Resulted: 01/12/17 0601, Result status: Final result    Ordering provider: Dimple Pemberton MD  01/12/17 0000 Resulting lab: [facility]    Specimen Information    Type Source Collected On   Blood  01/12/17 0540          Components       Value Reference Range [facility]   WBC 7.2 4.0 - 11.0 10e9/L  [facility]   RBC Count 3.64 3.8 - 5.2 10e12/L L [facility]   Hemoglobin 12.0 11.7 - 15.7 g/dL  [facility]   Hematocrit 36.8 35.0 - 47.0 %  [facility]    78 - 100 fl H [facility]   MCH 33.0 26.5 - 33.0 pg  [facility]   MCHC 32.6 31.5 - 36.5 g/dL  [facility]   RDW 11.9 10.0 - 15.0 %  [facility]   Platelet Count 223 150 - 450 10e9/L  [facility]            Testing Performed By     Lab - Abbreviation Name Director Address Valid Date Range    22 - [facility] Unknown [address] [place] [address] 05/08/15 1057 - Present            Unresulted Labs (24h ago through future)    Start       Ordered    01/16/17 0600  Creatinine   AM DRAW,   Routine     Comments:  Last Lab Result: CREATININE (mg/dL)       Date                     Value                 01/11/2017               0.62             ----------    01/15/17 0829      Encounter-Level Documents:     There are no encounter-level documents.      Order-Level Documents:     There are no order-level documents.

## 2017-01-11 NOTE — PROGRESS NOTES
Pt arrived via ambulation from home as a direct admit for ruptured diverticulitis with abcess formation. A&Ox4. VSS. IV placed and med rec completed. Rest of admission deferred to Swati REYES at shift change.  Jovanny Tracey RN

## 2017-01-11 NOTE — H&P
[facility] History and Physical    Caro Palmer MRN# [id number]   Age: 56 year old YOB: 1960     Date of Admission:  January 11, 2017    Home clinic: [facility]  Primary care provider: Schoen, Gregory G          Assessment and Plan:   Assessment/Plan:   Principal Problem:    Diverticulitis of intestine with abscess    Assessment: With a six-month intermittent history of left lower quadrant abdominal pain which became constant one month ago and severe over the past 4-5 days. CT scan has revealed diverticulitis with perforation and abscess formation of 3.5 x 2.4 cm which unfortunately is not amenable to drain placement per radiology. General surgery has evaluated the patient and does not feel at this time that surgical drain if needed, especially as this may be more chronic than acute in nature and patient is showing no signs of sepsis.  White blood cell count yesterday was 15.9 with CRP of 92    Plan:  We'll repeat CBC with differential, CRP, CMP, and obtain a lactic acid. Patient will be made n.p.o. and admitted to full inpatient status as more than 2 minutes are expected at time of this admission. We'll start IV Zosyn, given IV Toradol and morphine as needed for pain, start weight then IV fluids and IV Zofran as needed for nausea. Appreciate general surgery's insight/consultation.      Active Problems:    Leukocytosis    Assessment:  White blood cell count was 15.9 last evening, likely secondary to perforation as above    Plan:  We'll recheck this afternoon as well as tomorrow morning for close monitoring.      Essential hypertension with goal blood pressure less than 140/90    Assessment:  Patient normally on lisinopril/hydrochlorothiazide with current blood pressure well controlled    Plan:  We'll hold while patient is n.p.o. For now will not start IV agent, however would consider IV Vasotec if blood pressures are trending upward prior to patient being allowed to take oral  medication.      Generalized anxiety disorder    Assessment:  Patient uses p.r.n. Xanax every 2-4 days secondary to significant panic    Plan:  We'll offer IV Ativan for panic attacks while patient is n.p.o.      Chronic rhinitis    Assessment:  Chronic and stable, not on any medication with no acute flare    Plan:  No acute intervention needed      VTE:  SCDs  Code Status:  Full Code     Core Measures   Acute MI, CHF, or stroke core measures do not apply for this admission            Chief Complaint:   Patient is a 56-year-old female who presents with left lower quadrant abdominal pain present intermittently for 6 month but constant for the past month and severely worsening for the past 4-5 days    History is obtained from the patient         History of Present Illness:   This patient is a 56 year old  female with a significant past medical history of hypertension who presents with a six-month history of intermittent left lower quadrant abdominal pain that became constant in nature proximally one month ago and severely worsened over the past 4-5 days associated with chills, subjective fever, body aches, and nausea with vomiting ×2 occurring 2 days ago. Patient presented to the clinic yesterday afternoon and at that time was found to have a leukocytosis of 15.9 as well as a CRP in the 90s. CT scan of the abdomen was performed this morning and does reveal diverticulitis with perforated abscess measuring 3.5 x 2.4 cm that is not amenable to IR drainage at this time. Decision was made to admit the patient for medical management and general surgery consultation of this condition.          Past Medical History:     Past Medical History   Diagnosis Date     Papanicolaou smear of cervix with low grade squamous intraepithelial lesion (LGSIL)      Hypertension              Past Surgical History:     Past Surgical History   Procedure Laterality Date     Colposcopy,bx cervix/endocerv curr  10/19/1999      moderate/severe dysplasia     Conization cervix,loop electrd  1999     Colonoscopy  2012     Procedure:COLONOSCOPY; Colonoscopy ; Surgeon:SHIRA ROWLAND; Location:[facility]            Family History:     Family History   Problem Relation Age of Onset     HEART DISEASE Maternal Grandmother       at age 62     HEART DISEASE Mother      heart attack at age 63     CANCER Mother      Family History Negative No family hx of      has no hx of father or his family     CANCER Father      pancrea     DIABETES Father      insulin control     Respiratory Father      Hypertension Father      CANCER Brother      CANCER Mother      pancrea     CANCER Sister      half            Social History:     Social History     Social History     Marital Status:      Spouse Name: Danie     Number of Children: 3     Years of Education: 14     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[employer]     Social History Main Topics     Smoking status: Never Smoker      Smokeless tobacco: Never Used     Alcohol Use: Yes      Comment: 1-2 drinks monthly     Drug Use: No     Sexual Activity:     Partners: Male     Other Topics Concern      Service No     Blood Transfusions No     Caffeine Concern No     Occupational Exposure No     Hobby Hazards No     Sleep Concern No     Stress Concern No     Weight Concern Yes     Special Diet No     Back Care Yes     improving from      Exercise Yes     Bike Helmet No     Seat Belt Yes     Self-Exams Yes     Social History Narrative             Allergies:     Allergies   Allergen Reactions     No Known Drug Allergies              Medications:   1.  Lisinopril hydrochlorothiazide 50/12.5 one tablet daily  2.  Xanax 0.25 mg as needed, patient uses 1 tablet every 2-4 days          Review of Systems:   CONSTITUTIONAL: Positive for subjective fever, chills, and body aches that have occurred intermittently over the past 4-5 days  I: NEGATIVE for worrisome rashes, moles or lesions  E:  "NEGATIVE for vision changes or irritation  E/M: NEGATIVE for ear, mouth and throat problems  R: NEGATIVE for significant cough or SOB  CV: NEGATIVE for chest pain, palpitations or peripheral edema  GI: Positive for nausea, vomiting, left lower quadrant abdominal pain as above  : NEGATIVE for frequency, dysuria, or hematuria  MUSCULOSKELETAL: Positive for body aches intermittently over the past 4-5 days. No muscle weakness or instability noted  N: NEGATIVE for weakness, dizziness or paresthesias  H: NEGATIVE for bleeding problems  P: NEGATIVE for changes in mood or affect          Physical Exam:   Blood pressure 135/65, pulse 88, temperature 96.3  F (35.7  C), temperature source Oral, resp. rate 20, height 1.575 m (5' 2\"), weight 68 kg (149 lb 14.6 oz), last menstrual period 11/22/2012, SpO2 98 %, not currently breastfeeding.  Constitutional:   awake, alert, cooperative, no apparent distress, and appears stated age     Eyes:   Lids and lashes normal, pupils equal, round and reactive to light, extra ocular muscles intact, sclera clear, conjunctiva normal     ENT:   normocepalic, without obvious abnormality, atramatic, sinuses nontender on palpation     Neck:   supple, symmetrical, trachea midline, skin normal, no stridor and no lymphadenopathy     Lungs:   No increased work of breathing, good air exchange, clear to auscultation bilaterally, no crackles or wheezing     Cardiovascular:   Normal apical impulse, regular rate and rhythm, normal S1 and S2, no S3 or S4, and no murmur noted     Abdomen:   Bowel sounds present, abdomen soft and non-distended, does have point tenderness of moderate severity in the left lower quadrant with mild tenderness surrounding it.  No tenderness in other quadrants or in the suprapubic region.  No guarding or rebound tenderness     Musculoskeletal:   no lower extremity pitting edema present     Neurologic:   Awake, alert, oriented to name, place and time.       Skin:   normal skin color, " texture, turgor             Data:   All laboratory and imaging data in the past 24 hours reviewed     Attestation:    I have reviewed today's vital signs, notes, medications, labs and imaging.    Electronically Signed:  Dimple Pemberton MD    Note: Chart documentation done in part with Dragon Voice Recognition software. Although reviewed after completion, some word and grammatical errors may remain.

## 2017-01-11 NOTE — TELEPHONE ENCOUNTER
Caro was seen late yesterday afternoon with lower abdominal pain. She had elevated WBC and CRP. She was scheduled for an abdominal CT this morning.The results were reviewed with the radiologist. She has acute diverticulitis with a diverticular abscess. He states the abscess is not amenable to drainage because it is too small.  Patient was informed of these results, she was also informed usual treatment involved hospitalization with IV antibiotics,NPO status or clear liquid diet, and rest.  She would need to be observed very closely for response to antibiotic therapy.,Hospitalist was contacted regarding admission and treatment. Surgeon was also contacted as to his feelings regarding drainage. He defers to the radiologist's opinion that it is too small, the recommendation of all physicians is hospital admission for IV antibiotics. Patient was contacted by phone, she was advised to come to hospital immediately, that was approximately 10 AM. She stated she was unable to come in at that time, and wants to wait until tomorrow. I called her back, discussed the potential complications including worsening infection and  perforation of abscess.Impressed upon her the importance of coming in for treatment as soon as possible. She has multiple responsibilities that need to be attended to before she can come in. However, she did say she would come in this afternoon between 1:30 and 2 PM.   She verbalizes understanding, and states she will be in later today.    PADMINI Lebron

## 2017-01-11 NOTE — IP AVS SNAPSHOT
"` `     [address] MEDICAL SURGICAL: [phone]                                              INTERAGENCY TRANSFER FORM - NURSING   2017                    Hospital Admission Date: 2017  GIACOMO MONTELONGO   : 1960  Sex: Female        Attending Provider: Dimple Pemberton MD     Allergies:  No Known Drug Allergies    Infection:  None   Service:  HOSPITALIST    Ht:  1.575 m (5' 2\")   Wt:  68 kg (149 lb 14.6 oz)   Admission Wt:  68 kg (149 lb 14.6 oz)    BMI:  27.41 kg/m 2   BSA:  1.72 m 2            Patient PCP Information     Provider PCP Type    Gregory G. Schoen, MD General      Current Code Status     Date Active Code Status Order ID Comments User Context       2017  3:17 PM Full Code [id number]  Dimple Pemberton MD Inpatient       Code Status History     Date Active Date Inactive Code Status Order ID Comments User Context    This patient has a current code status but no historical code status.      Advance Directives        Does patient have a scanned Advance Directive/ACP document in EPIC?           No        Hospital Problems as of 1/15/2017              Priority Class Noted POA    Generalized anxiety disorder   2009 Yes    Chronic rhinitis   2012 Yes    Essential hypertension with goal blood pressure less than 140/90 Medium  2016 Yes    * (Principal)Diverticulitis of intestine with abscess Medium  2017 Yes    Leukocytosis Medium  2017 Yes      Non-Hospital Problems as of 1/15/2017              Priority Class Noted    S/P LEEP (status post loop electrosurgical excision procedure)   Unknown    CARDIOVASCULAR SCREENING; LDL GOAL LESS THAN 160   10/31/2010    Family history of pancreatic cancer   2013      Immunizations     Name Date      Hepatitis A Vac Ped/Adol-2 Dose 07     Hepatitis A Vac Ped/Adol-2 Dose 07     Influenza (IIV3) 10/23/12     Influenza (IIV3) 11     Influenza (IIV3) 10/05/10     Influenza (IIV3) " "09/24/09     Influenza (IIV3) 11/14/08     Influenza Vaccine IM 3yrs+ 4 Valent IIV4 11/29/16     Influenza Vaccine IM 3yrs+ 4 Valent IIV4 11/10/15     Influenza Vaccine IM 3yrs+ 4 Valent IIV4 10/23/14     Influenza Vaccine IM 3yrs+ 4 Valent IIV4 10/08/13     TD (ADULT, 7+) 03/01/05     TDAP (ADACEL AGES 11-64) 05/04/11          END      ASSESSMENT     Discharge Profile Flowsheet     GASTROINTESTINAL (ADULT,PEDIATRIC,OB)     GI Signs/Symptoms  abdominal discomfort 01/15/17 0229    GI WDL  WDL 01/15/17 0931   Passing flatus  yes 01/15/17 0229    Abdominal Appearance  rounded 01/14/17 0114   COMMUNICATION ASSESSMENT      All Quadrants Bowel Sounds  audible and active in all quadrants 01/15/17 0229   Patient's communication style  spoken language (English or Bilingual) 01/11/17 1758    LUQ Palpation  soft/nontender 01/14/17 0114   SKIN      RUQ Palpation  soft/nontender 01/14/17 0114   Inspection  Full 01/15/17 0229    LLQ Palpation  soft/nontender 01/14/17 0114   Skin WDL  WDL 01/15/17 0931    RLQ Palpation  soft/nontender 01/14/17 0114   SAFETY      Last Bowel Movement  01/15/17 01/15/17 0931   Safety WDL  WDL 01/15/17 0931                 Assessment WDL (Within Defined Limits) Definitions           Safety WDL     Effective: 09/28/15    Row Information: <b>WDL Definition:</b> Bed in low position, wheels locked; call light in reach; upper side rails up x 2; ID band on<br> <font color=\"gray\"><i>Item=AS safety wdl>>List=AS safety wdl>>Version=F14</i></font>      Skin WDL     Effective: 09/28/15    Row Information: <b>WDL Definition:</b> Warm; dry; intact; elastic; without discoloration; pressure points without redness<br> <font color=\"gray\"><i>Item=AS skin wdl>>List=AS skin wdl>>Version=F14</i></font>      Vitals     Vital Signs Flowsheet     VITAL SIGNS     Pain Control  fully effective 01/14/17 1228    Temp  96.4  F (35.8  C) 01/15/17 0704   Functioning  can do everything I need to 01/14/17 1228    Temp src  Oral " "01/15/17 0704   Sleep  normal sleep 01/13/17 1345    Resp  16 01/15/17 0704   ANALGESIA SIDE EFFECTS MONITORING      Pulse  90 01/15/17 0704   Side Effects Monitoring: Respiratory Quality  R 01/14/17 1537    Pulse/Heart Rate Source  Monitor 01/15/17 0704   Side Effects Monitoring: Respiratory Depth  N 01/14/17 1537    BP  (!) 155/105 mmHg 01/15/17 0704   Side Effects Monitoring: Sedation Level  1 01/14/17 1537    BP Location  Right arm 01/15/17 0003   HEIGHT AND WEIGHT      OXYGEN THERAPY     Height  1.575 m (5' 2\") 01/11/17 1433    SpO2  96 % 01/15/17 0704   Weight  68 kg (149 lb 14.6 oz) 01/11/17 1433    O2 Device  None (Room air) 01/15/17 0704   BSA (Calculated - sq m)  1.72 01/11/17 1433    PAIN/COMFORT     BMI (Calculated)  27.48 01/11/17 1433    Patient Currently in Pain  denies 01/14/17 1527   DAILY CARE      Preferred Pain Scale  CAPA (Clinically Aligned Pain Assessment) ([facility] Adults Only) 01/14/17 1537   Activity Type  activity adjusted per tolerance 01/15/17 0931    Pain Intervention(s)  Medication (See eMAR) 01/11/17 1748   Activity Level of Assistance  independent 01/15/17 0931    CLINICALLY ALIGNED PAIN ASSESSMENT (CAPA) ([facility] ADULTS ONLY)     POSITIONING      Comfort  negligible pain 01/14/17 1537   Body Position  independently positioning 01/15/17 0931    Change in Pain  getting better 01/14/17 1228   Head of Bed (HOB)  HOB at 15 degrees 01/14/17 0112            Patient Lines/Drains/Airways Status    Active LINES/DRAINS/AIRWAYS     **None**            Patient Lines/Drains/Airways Status    Active PICC/CVC     **None**            Intake/Output Detail Report     Date Intake     Output    Shift P.O. I.V. IV Piggyback Total Total       Noc 01/13/17 2300 - 01/14/17 0659 -- 1004 -- 1004 -- 1004    Day 01/14/17 0700 - 01/14/17 1459 650 -- -- 650 -- 650    Shy 01/14/17 1500 - 01/14/17 2259 -- -- -- -- -- 0    Noc 01/14/17 2300 - 01/15/17 0659 -- -- -- -- -- 0    Day " 01/15/17 0700 - 01/15/17 1459 -- -- -- -- -- 0      Case Management/Discharge Planning     Case Management/Discharge Planning Flowsheet     LIVING ENVIRONMENT     QUESTION TO PATIENT:  Has a member of your family or a partner(now or in the past) intimidated, hurt, manipulated, or controlled you in any way?  no 01/11/17 1759    Lives With  alone 01/11/17 1758   QUESTION TO PATIENT: Do you feel safe going back to the place where you are living?  yes 01/11/17 1759    COPING/STRESS     OBSERVATION: Is there reason to believe there has been maltreatment of a vulnerable adult (ie. Physical/Sexual/Emotional abuse, self neglect, lack of adequate food, shelter, medical care, or financial exploitation)?  no 01/11/17 1759    Major Change/Loss/Stressor  none 01/11/17 1802   (R) MENTAL HEALTH SUICIDE RISK      ABUSE RISK SCREEN     Are you depressed or being treated for depression?  No 01/11/17 1800

## 2017-01-11 NOTE — IP AVS SNAPSHOT
MRN:[id number]                      After Visit Summary   1/11/2017    Caro Palmer    MRN: [id number]           Thank you!     Thank you for choosing [facility] for your care. Our goal is always to provide you with excellent care. Hearing back from our patients is one way we can continue to improve our services. Please take a few minutes to complete the written survey that you may receive in the mail after you visit with us. Thank you!        Patient Information     Date Of Birth          1960        About your hospital stay     You were admitted on:  January 11, 2017 You last received care in the:  [address] [facility]    You were discharged on:  January 15, 2017        Reason for your hospital stay       Infection of your intestine (one of your diverticula) which caused an abscess to form.  You have improved with the antibiotics given to you during this hospitalization and will continue with ongoing antibiotics twice a day for the next 18 days - please do not stop the antibiotics early even if you feel all the way back to normal.  Enjoy going home!!                  Who to Call     For medical emergencies, please call 911.  For non-urgent questions about your medical care, please call your primary care provider or clinic, [phone]          Attending Provider     Provider    Dimple Pemberton MD       Primary Care Provider Office Phone # Fax #    Gregory G Schoen, [phone]       [facility] [address] [place] [address]        After Care Instructions     Activity       Your activity upon discharge: activity as tolerated but make sure to ease back into activity slowly            Diet       Follow this diet upon discharge: Low residue diet                  Follow-up Appointments     Follow-up and recommended labs and tests        Follow up with Selena Le within 7 days for hospital follow- up.  The following  "labs/tests are recommended: CRP inflammation.  Follow up with Dr. Ryan Dailey in 2-3 weeks for follow up of hospitalization and planning for timing of needed colonoscopy - please call on Monday to schedule this appointment.                  Your next 10 appointments already scheduled     Jan 20, 2017  2:00 PM   Office Visit with ARIANNA Deluna CNP   [facility]    [address] [place] [id number] [phone]           Bring a current list of meds and any records pertaining to this visit.  For Physicals, please bring immunization records and any forms needing to be filled out.  Please arrive 10 minutes early to complete paperwork.              Pending Results     No orders found from 1/10/2017 to 1/12/2017.            Statement of Approval     Ordered          01/15/17 0936  I have reviewed and agree with all the recommendations and orders detailed in this document.   EFFECTIVE NOW     Approved and electronically signed by:  Dimple Pemberton MD             Admission Information        Provider Department Dept Phone    1/11/2017 Dimple Pemberton MD [facility] [phone]      Your Vitals Were     Blood Pressure Pulse Temperature Respirations    155/105 mmHg 90 96.4  F (35.8  C) (Oral) 16    Height Weight BMI (Body Mass Index) Pulse Oximetry    1.575 m (5' 2\") 68 kg (149 lb 14.6 oz) 27.41 kg/m2 96%    Last Period             11/22/2012         [employer] Information     [employer] lets you send messages to your doctor, view your test results, renew your prescriptions, schedule appointments and more. To sign up, go to [email or web address] . Click on \"Log in\" on the left side of the screen, which will take you to the Welcome page. Then click on \"Sign up Now\" on the right side of the page.     You will be asked to enter the access code listed below, as well as some personal information. Please follow the directions to " create your username and password.     Your access code is: [email or web address]  Expires: 4/10/2017  4:09 PM     Your access code will  in 90 days. If you need help or a new code, please call your [facility] or [phone].        Care EveryWhere ID     This is your Care EveryWhere ID. This could be used by other organizations to access your [facility] medical records  [id number]           Review of your medicines      START taking        Dose / Directions    amoxicillin-clavulanate 875-125 MG per tablet   Commonly known as:  AUGMENTIN   Indication:  Infection Within the Abdomen   Used for:  Diverticulitis of large intestine with abscess without bleeding        Dose:  1 tablet   Take 1 tablet by mouth every 12 hours for 35 doses   Quantity:  35 tablet   Refills:  0       ibuprofen 400 MG tablet   Commonly known as:  ADVIL/MOTRIN   Used for:  Diverticulitis of large intestine with abscess without bleeding        Dose:  400 mg   Take 1 tablet (400 mg) by mouth every 6 hours as needed for moderate pain   Quantity:  40 tablet   Refills:  0         CONTINUE these medicines which have NOT CHANGED        Dose / Directions    ALPRAZolam 0.25 MG tablet   Commonly known as:  XANAX   Used for:  Generalized anxiety disorder        Dose:  0.25 mg   Take 1 tablet (0.25 mg) by mouth 3 times daily as needed for anxiety   Quantity:  30 tablet   Refills:  0       aspirin 81 MG tablet   Used for:  Family history of malignant neoplasm of gastrointestinal tract, Other general counseling and advice for contraceptive management        ONE DAILY   Quantity:  100   Refills:  3       CHLOROPHYLL PO        Dose:  15 mL   Take 15 mLs by mouth daily   Refills:  0       fish oil-omega-3 fatty acids 1000 MG capsule        Dose:  1 g   Take 1 g by mouth 2 times daily   Refills:  0       flaxseed oil 1000 MG Caps        Dose:  1000 mg   Take 1,000 mg by mouth 3 times daily   Refills:  0       Gelatin 650 MG Caps   Used for:  Other general  counseling and advice for contraceptive management, Family history of malignant neoplasm of gastrointestinal tract        2 daily   Refills:  0       GLUCOSAMINE CHONDRO COMPLEX OR   Used for:  Other general counseling and advice for contraceptive management, Family history of malignant neoplasm of gastrointestinal tract        2 tablets x 2 daily   Refills:  0       losartan-hydrochlorothiazide 50-12.5 MG per tablet   Commonly known as:  HYZAAR   Used for:  Essential hypertension with goal blood pressure less than 140/90        TAKE ONE TABLET BY MOUTH EVERY DAY   Quantity:  30 tablet   Refills:  10       Lysine 1000 MG Tabs        Dose:  1000 mg   Take 1,000 mg by mouth daily.   Refills:  0       ONE DAILY PLUS IRON Tabs        1 tablet daily   Refills:  0       OVER-THE-COUNTER        Dose:  1 tablet   1 tablet daily Citracal 500 D and Calcium 600   Refills:  0       vitamin E 400 UNIT capsule   Used for:  Other general counseling and advice for contraceptive management, Family history of malignant neoplasm of gastrointestinal tract        ONE CAPSULE DAILY   Quantity:  3 MONTHS   Refills:  1 YEAR            Where to get your medicines      These medications were sent to [facility] [place], [place] - [facility]   [address] , [facility] [address]     Phone:  [phone]    - amoxicillin-clavulanate 875-125 MG per tablet  - ibuprofen 400 MG tablet             Protect others around you: Learn how to safely use, store and throw away your medicines at www.disposemymeds.org.             Medication List: This is a list of all your medications and when to take them. Check marks below indicate your daily home schedule. Keep this list as a reference.      Medications           Morning Afternoon Evening Bedtime As Needed    ALPRAZolam 0.25 MG tablet   Commonly known as:  XANAX   Take 1 tablet (0.25 mg) by mouth 3 times daily as needed for anxiety                                    amoxicillin-clavulanate 875-125 MG per tablet   Commonly known as:  AUGMENTIN   Take 1 tablet by mouth every 12 hours for 35 doses   Last time this was given:  1 tablet on 1/15/2017  8:20 AM                                      aspirin 81 MG tablet   ONE DAILY                                   CHLOROPHYLL PO   Take 15 mLs by mouth daily                                   fish oil-omega-3 fatty acids 1000 MG capsule   Take 1 g by mouth 2 times daily                                      flaxseed oil 1000 MG Caps   Take 1,000 mg by mouth 3 times daily                                         Gelatin 650 MG Caps   2 daily                                   GLUCOSAMINE CHONDRO COMPLEX OR   2 tablets x 2 daily                                      ibuprofen 400 MG tablet   Commonly known as:  ADVIL/MOTRIN   Take 1 tablet (400 mg) by mouth every 6 hours as needed for moderate pain   Last time this was given:  400 mg on 1/15/2017  8:20 AM                                   losartan-hydrochlorothiazide 50-12.5 MG per tablet   Commonly known as:  HYZAAR   TAKE ONE TABLET BY MOUTH EVERY DAY                                   Lysine 1000 MG Tabs   Take 1,000 mg by mouth daily.                                   ONE DAILY PLUS IRON Tabs   1 tablet daily                                   OVER-THE-COUNTER   1 tablet daily Citracal 500 D and Calcium 600                                   vitamin E 400 UNIT capsule   ONE CAPSULE DAILY

## 2017-01-11 NOTE — CONSULTS
[facility] Consultation    1/11/2017    Consult requested by Dr. Pemberton    Reason for consultation - perforated diverticulitis    HPI:  This 56 year white female who developed crampy lower abdominal pain 6 months ago.  She states the pain would was was constant and nonradiating worse with movement but that would resolve for several days and return. Then approximate one month ago the pain became constant and nonradiating worse with movement and there was associated fevers and chills. The pain became so severe she saw her PCP yesterday who ordered a CT scan. The CT revealed diverticulitis with a small localized abscess for which she is now admitted. She denies any nausea vomiting fevers chills diarrhea or constipation.  She states she tries to a healthy diet with plenty of water and fiber.  Her last colonoscopy was 5 years ago and revealed only diverticulosis.  There is no family history of colon cancer or polyps. She denies any prior episodes other than what has been occurring for the past 6 months. Currently she is resting comfortably in a chair.      Past Medical History   Diagnosis Date     Papanicolaou smear of cervix with low grade squamous intraepithelial lesion (LGSIL)      Hypertension      Past Surgical History   Procedure Laterality Date     Colposcopy,bx cervix/endocerv curr  10/19/1999     moderate/severe dysplasia     Conization cervix,loop electrd  11/29/1999     Colonoscopy  2/27/2012     Procedure:COLONOSCOPY; Colonoscopy ; Surgeon:SHIRA ROWLAND; Location: GI     Allergies   Allergen Reactions     No Known Drug Allergies      Medications -  Current Facility-Administered Medications   Medication     piperacillin-tazobactam (ZOSYN) intermittent infusion 4.5 g     dextrose 5% and 0.45% NaCl + KCl 20 mEq/L infusion     ketorolac (TORADOL) injection 30 mg     [START ON 1/12/2017] pantoprazole (PROTONIX) 40 mg IV push injection     morphine injection 1-2 mg     LORazepam (ATIVAN) injection 0.5 mg  "    ondansetron (ZOFRAN-ODT) ODT tab 4 mg    Or     ondansetron (ZOFRAN) injection 4 mg     prochlorperazine (COMPAZINE) injection 5-10 mg    Or     prochlorperazine (COMPAZINE) tablet 5-10 mg    Or     prochlorperazine (COMPAZINE) Suppository 25 mg     naloxone (NARCAN) injection 0.1-0.4 mg           Family History   Problem Relation Age of Onset     HEART DISEASE Maternal Grandmother       at age 62     HEART DISEASE Mother      heart attack at age 63     CANCER Mother      Family History Negative No family hx of      has no hx of father or his family     CANCER Father      pancrea     DIABETES Father      insulin control     Respiratory Father      Hypertension Father      CANCER Brother      CANCER Mother      pancrea     CANCER Sister      half     SOCIAL HISTORY:   Social History     Social History     Marital Status:      Spouse Name: Danie     Number of Children: 3     Years of Education: 14     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[employer]     Social History Main Topics     Smoking status: Never Smoker      Smokeless tobacco: Never Used     Alcohol Use: Yes      Comment: 1-2 drinks monthly     Drug Use: No     Sexual Activity:     Partners: Male     Other Topics Concern      Service No     Blood Transfusions No     Caffeine Concern No     Occupational Exposure No     Hobby Hazards No     Sleep Concern No     Stress Concern No     Weight Concern Yes     Special Diet No     Back Care Yes     improving from      Exercise Yes     Bike Helmet No     Seat Belt Yes     Self-Exams Yes     Social History Narrative      ROS: 10 point ROS neg other than the symptoms noted above in the HPI.    PE:  Filed Vitals:    17 1430   BP: 135/65   Pulse: 88   Temp: 96.3  F (35.7  C)   TempSrc: Oral   Resp: 20   Height: 5' 2\" (1.575 m)   Weight: 149 lb 14.6 oz (68 kg)   SpO2: 98%     General: well developed, well nourished WF who appears her stated age  HEENT: NC/AT, EOMI, (-)icterus, " (-)injection  Neck: Supple, No JVD  Chest: CTA  Heart: S1, S2, (-)m/r/g  Abd: Soft, non distended, lower midline and LLQ tenderness without rebound, guarding or peritoneal signs.  Ext; Warm, no edema  Psych: AAOx3  Neuro: No focal deficits    Results for orders placed or performed during the hospital encounter of 01/11/17 (from the past 24 hour(s))   CBC with platelets differential   Result Value Ref Range    WBC 9.8 4.0 - 11.0 10e9/L    RBC Count 3.75 (L) 3.8 - 5.2 10e12/L    Hemoglobin 12.5 11.7 - 15.7 g/dL    Hematocrit 37.4 35.0 - 47.0 %     78 - 100 fl    MCH 33.3 (H) 26.5 - 33.0 pg    MCHC 33.4 31.5 - 36.5 g/dL    RDW 11.9 10.0 - 15.0 %    Platelet Count 239 150 - 450 10e9/L    Diff Method Automated Method     % Neutrophils 71.3 %    % Lymphocytes 16.9 %    % Monocytes 10.4 %    % Eosinophils 0.8 %    % Basophils 0.5 %    % Immature Granulocytes 0.1 %    Absolute Neutrophil 7.0 1.6 - 8.3 10e9/L    Absolute Lymphocytes 1.7 0.8 - 5.3 10e9/L    Absolute Monocytes 1.0 0.0 - 1.3 10e9/L    Absolute Eosinophils 0.1 0.0 - 0.7 10e9/L    Absolute Basophils 0.1 0.0 - 0.2 10e9/L    Abs Immature Granulocytes 0.0 0 - 0.4 10e9/L   Comprehensive metabolic panel   Result Value Ref Range    Sodium 143 133 - 144 mmol/L    Potassium 3.8 3.4 - 5.3 mmol/L    Chloride 104 94 - 109 mmol/L    Carbon Dioxide 28 20 - 32 mmol/L    Anion Gap 11 3 - 14 mmol/L    Glucose 103 (H) 70 - 99 mg/dL    Urea Nitrogen 7 7 - 30 mg/dL    Creatinine 0.62 0.52 - 1.04 mg/dL    GFR Estimate >90  Non  GFR Calc   >60 mL/min/1.7m2    GFR Estimate If Black >90   GFR Calc   >60 mL/min/1.7m2    Calcium 9.0 8.5 - 10.1 mg/dL    Bilirubin Total 0.8 0.2 - 1.3 mg/dL    Albumin 3.6 3.4 - 5.0 g/dL    Protein Total 8.1 6.8 - 8.8 g/dL    Alkaline Phosphatase 80 40 - 150 U/L    ALT 34 0 - 50 U/L    AST 35 0 - 45 U/L   Lactic acid whole blood   Result Value Ref Range    Lactic Acid 0.7 0.7 - 2.1 mmol/L   CRP inflammation   Result  Value Ref Range    CRP Inflammation 111.0 (H) 0.0 - 8.0 mg/L       CT -   CT ABDOMEN AND PELVIS WITH CONTRAST  1/11/2017 8:45 AM     HISTORY:  Low abdominal pain. History of conization. Elevated white  blood count.     TECHNIQUE: Scans obtained from the diaphragm through the pelvis with  oral and IV contrast, 82 mL Isovue 370. Radiation dose for this scan  was reduced using automated exposure control, adjustment of the mA  and/or kV according to patient size, or iterative reconstruction  technique.     COMPARISON:  Pelvic ultrasound dated 6/30/2011.     FINDINGS: Visualized portions of the lung bases and mediastinal  contents are unremarkable.  There are no aggressive osseous lesions.        There is diffuse fatty infiltration of the liver. The liver,  gallbladder, pancreas, spleen, bilateral adrenal glands and right  kidney enhance normally. Low-attenuation lesion posterior cortex left  kidney (image 26 series 2) measuring up to 1.5 cm is most consistent  with a cyst. Left kidney otherwise enhances normally. No  hydronephrosis, nephrolithiasis, hydroureter, or ureteral calculus is  identified. Urinary bladder is mostly decompressed but is otherwise  unremarkable.     The uterus is grossly normal appearance. Ovaries are not definitely  seen. No adnexal mass is identified.     No adenopathy or free air is seen in the peritoneal cavity. No  significant free fluid collections are identified.     There is inflammatory stranding around the sigmoid colon in a region  of multiple diverticula and mild colonic wall thickening. This is most  consistent with sigmoid diverticulitis. There is a small fluid and gas  collection adjacent to the sigmoid colon (images 30-31 series 3 and  images 57-61 series 2) measuring approximately 3.5 x 2.4 x 2.4 cm.  Mildly thickened enhancing wall around fluid and gas collection is  noted. The colon is otherwise of normal caliber. No other findings of  diverticulitis are seen. Appendix is normal  in appearance. Small bowel  is of normal caliber. The stomach is unremarkable.                                                                       IMPRESSION:  1. Acute sigmoid diverticulitis with small diverticular abscess (3.5 x  2.4 x 2.4 cm).  2. Diffuse fatty infiltration of the liver.     I discussed the findings of acute sigmoid diverticulitis with  diverticular abscess to Selena Jacobo on 1/11/2017 at 9 AM.    All imaging studies reviewed by me.    Reviewed Images with radiologist - abscess not amenable to drainage.    Impression/Plan:  This is a 56-year-old lady presenting with Sigmoid diverticulitis with perforation and contained abscess. I suspect she has had a low-grade diverticulitis for the past few months. Last colonoscopy was 5 years ago. I discussed these findings with the patient and she expressed understanding.  The plan at this time is to keep her nothing by mouth and start her on IV antibiotics to allow for resolution. In view of the long-standing diverticulitis and perforation, I recommended she undergo a repeat colonoscopy in 6 weeks with an elective resection soon thereafter assuming her symptoms resolve with antibiotics. Should she not improve she may require a procedure done on this admission. She again expressed understanding. This case was discussed with Dr. Pemberton.    Ryan Dailey MD, FACS

## 2017-01-11 NOTE — IP AVS SNAPSHOT
` `           [address] SURGICAL: [phone]                 INTERAGENCY TRANSFER FORM - NOTES (H&P, Discharge Summary, Consults, Procedures, Therapies)   2017                    Hospital Admission Date: 2017  CARO PALMER   : 1960  Sex: Female        Patient PCP Information     Provider PCP Type    Gregory G. Schoen, MD General         History & Physicals      H&P by Dimple Pemberton MD at 2017  3:59 PM     Author:  Dimple Pemberton MD Service:  Family Medicine Author Type:  Physician    Filed:  2017  6:10 PM Note Time:  2017  3:59 PM Status:  Signed    :  Dimple Pemberton MD (Physician)           [facility] History and Physical    Caro Palmer MRN# [id number]   Age: 56 year old YOB: 1960     Date of Admission:  2017    Home clinic: [facility]  Primary care provider: Schoen, Gregory G          Assessment and Plan:   Assessment/Plan:   Principal Problem:    Diverticulitis of intestine with abscess    Assessment: With a six-month intermittent history of left lower quadrant abdominal pain which became constant one month ago and severe over the past 4-5 days. CT scan has revealed diverticulitis with perforation and abscess formation of 1.5 x 2.4 cm which unfortunately is not amenable to drain placement per radiology. General surgery has evaluated the patient and does not feel at this time that surgical drain if needed, especially as this may be more chronic than acute in nature and patient is showing no signs of sepsis.  White blood cell count yesterday was 15.9 with CRP of 92    Plan:  We'll repeat CBC with differential, CRP, CMP, and obtain a lactic acid. Patient will be made n.p.o. and admitted to full inpatient status as more than 2 minutes are expected at time of this admission. We'll start IV Zosyn, given IV Toradol and morphine as needed for pain, start weight then IV  fluids and IV Zofran as needed for nausea. Appreciate general surgery's insight/consultation.      Active Problems:    Leukocytosis    Assessment:  White blood cell count was 15.9 last evening, likely secondary to perforation as above    Plan:  We'll recheck this afternoon as well as tomorrow morning for close monitoring.      Essential hypertension with goal blood pressure less than 140/90    Assessment:  Patient normally on lisinopril/hydrochlorothiazide with current blood pressure well controlled    Plan:  We'll hold while patient is n.p.o. For now will not start IV agent, however would consider IV Vasotec if blood pressures are trending upward prior to patient being allowed to take oral medication.      Generalized anxiety disorder    Assessment:  Patient uses p.r.n. Xanax every 2-4 days secondary to significant panic    Plan:  We'll offer IV Ativan for panic attacks while patient is n.p.o.      Chronic rhinitis    Assessment:  Chronic and stable, not on any medication with no acute flare    Plan:  No acute intervention needed      VTE:  SCDs  Code Status:  Full Code     Core Measures   Acute MI, CHF, or stroke core measures do not apply for this admission            Chief Complaint:   Patient is a 56-year-old female who presents with left lower quadrant abdominal pain present intermittently for 6 month but constant for the past month and severely worsening for the past 4-5 days    History is obtained from the patient         History of Present Illness:   This patient is a 56 year old  female with a significant past medical history of hypertension who presents with a six-month history of intermittent left lower quadrant abdominal pain that became constant in nature proximally one month ago and severely worsened over the past 4-5 days associated with chills, subjective fever, body aches, and nausea with vomiting ×2 occurring 2 days ago. Patient presented to the clinic yesterday afternoon and at that time  was found to have a leukocytosis of 15.9 as well as a CRP in the 90s. CT scan of the abdomen was performed this morning and does reveal diverticulitis with perforated abscess measuring 3.5 x 2.4 cm that is not amenable to IR drainage at this time. Decision was made to admit the patient for medical management and general surgery consultation of this condition.          Past Medical History:     Past Medical History   Diagnosis Date     Papanicolaou smear of cervix with low grade squamous intraepithelial lesion (LGSIL)      Hypertension              Past Surgical History:     Past Surgical History   Procedure Laterality Date     Colposcopy,bx cervix/endocerv curr  10/19/1999     moderate/severe dysplasia     Conization cervix,loop electrd  1999     Colonoscopy  2012     Procedure:COLONOSCOPY; Colonoscopy ; Surgeon:SHIRA ROWLAND; Location:[facility]            Family History:     Family History   Problem Relation Age of Onset     HEART DISEASE Maternal Grandmother       at age 62     HEART DISEASE Mother      heart attack at age 63     CANCER Mother      Family History Negative No family hx of      has no hx of father or his family     CANCER Father      pancrea     DIABETES Father      insulin control     Respiratory Father      Hypertension Father      CANCER Brother      CANCER Mother      pancrea     CANCER Sister      half            Social History:     Social History     Social History     Marital Status:      Spouse Name: Danie     Number of Children: 3     Years of Education: 14     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[employer]     Social History Main Topics     Smoking status: Never Smoker      Smokeless tobacco: Never Used     Alcohol Use: Yes      Comment: 1-2 drinks monthly     Drug Use: No     Sexual Activity:     Partners: Male     Other Topics Concern      Service No     Blood Transfusions No     Caffeine Concern No     Occupational Exposure No     Hobby Hazards No      "Sleep Concern No     Stress Concern No     Weight Concern Yes     Special Diet No     Back Care Yes     improving from Jan,2011     Exercise Yes     Bike Helmet No     Seat Belt Yes     Self-Exams Yes     Social History Narrative             Allergies:     Allergies   Allergen Reactions     No Known Drug Allergies              Medications:   1.  Lisinopril hydrochlorothiazide 50/12.5 one tablet daily  2.  Xanax 0.25 mg as needed, patient uses 1 tablet every 2-4 days          Review of Systems:   CONSTITUTIONAL: Positive for subjective fever, chills, and body aches that have occurred intermittently over the past 4-5 days  I: NEGATIVE for worrisome rashes, moles or lesions  E: NEGATIVE for vision changes or irritation  E/M: NEGATIVE for ear, mouth and throat problems  R: NEGATIVE for significant cough or SOB  CV: NEGATIVE for chest pain, palpitations or peripheral edema  GI: Positive for nausea, vomiting, left lower quadrant abdominal pain as above  : NEGATIVE for frequency, dysuria, or hematuria  MUSCULOSKELETAL: Positive for body aches intermittently over the past 4-5 days. No muscle weakness or instability noted  N: NEGATIVE for weakness, dizziness or paresthesias  H: NEGATIVE for bleeding problems  P: NEGATIVE for changes in mood or affect          Physical Exam:   Blood pressure 135/65, pulse 88, temperature 96.3  F (35.7  C), temperature source Oral, resp. rate 20, height 1.575 m (5' 2\"), weight 68 kg (149 lb 14.6 oz), last menstrual period 11/22/2012, SpO2 98 %, not currently breastfeeding.  Constitutional:   awake, alert, cooperative, no apparent distress, and appears stated age     Eyes:   Lids and lashes normal, pupils equal, round and reactive to light, extra ocular muscles intact, sclera clear, conjunctiva normal     ENT:   normocepalic, without obvious abnormality, atramatic, sinuses nontender on palpation     Neck:   supple, symmetrical, trachea midline, skin normal, no stridor and no lymphadenopathy "     Lungs:   No increased work of breathing, good air exchange, clear to auscultation bilaterally, no crackles or wheezing     Cardiovascular:   Normal apical impulse, regular rate and rhythm, normal S1 and S2, no S3 or S4, and no murmur noted     Abdomen:   Bowel sounds present, abdomen soft and non-distended, does have point tenderness of moderate severity in the left lower quadrant with mild tenderness surrounding it.  No tenderness in other quadrants or in the suprapubic region.  No guarding or rebound tenderness     Musculoskeletal:   no lower extremity pitting edema present     Neurologic:   Awake, alert, oriented to name, place and time.       Skin:   normal skin color, texture, turgor             Data:   All laboratory and imaging data in the past 24 hours reviewed     Attestation:    I have reviewed today's vital signs, notes, medications, labs and imaging.    Electronically Signed:  Dimple Pemberton MD    Note: Chart documentation done in part with Dragon Voice Recognition software. Although reviewed after completion, some word and grammatical errors may remain.                    Discharge Summaries     No notes of this type exist for this encounter.         Consult Notes      Consults by Ryan Dailey MD at 1/11/2017  4:31 PM     Author:  Ryan Dailey MD Service:  Surgery Author Type:  Physician    Filed:  1/11/2017  4:42 PM Note Time:  1/11/2017  4:31 PM Status:  Signed    :  Ryan Dailey MD (Physician)       Consult Orders:    1. Surgery General IP Consult: Patient to be seen: Routine - within 24 hours; diverticulitis with abscess formation; Consultant may enter orders: Yes [[id number]] ordered by Dimple Pemberton MD at 01/11/17 1517                [facility] Consultation    1/11/2017    Consult requested by Dr. Pemberton    Reason for consultation - perforated diverticulitis    HPI:  This 56 year white female who developed crampy lower abdominal pain 6 months  ago.  She states the pain would was was constant and nonradiating worse with movement but that would resolve for several days and return. Then approximate one month ago the pain became constant and nonradiating worse with movement and there was associated fevers and chills. The pain became so severe she saw her PCP yesterday who ordered a CT scan. The CT revealed diverticulitis with a small localized abscess for which she is now admitted. She denies any nausea vomiting fevers chills diarrhea or constipation.  She states she tries to a healthy diet with plenty of water and fiber.  Her last colonoscopy was 5 years ago and revealed only diverticulosis.  There is no family history of colon cancer or polyps. She denies any prior episodes other than what has been occurring for the past 6 months. Currently she is resting comfortably in a chair.      Past Medical History   Diagnosis Date     Papanicolaou smear of cervix with low grade squamous intraepithelial lesion (LGSIL)      Hypertension      Past Surgical History   Procedure Laterality Date     Colposcopy,bx cervix/endocerv curr  10/19/1999     moderate/severe dysplasia     Conization cervix,loop electrd  11/29/1999     Colonoscopy  2/27/2012     Procedure:COLONOSCOPY; Colonoscopy ; Surgeon:SHIRA ROWLAND; Location: [facility]     Allergies   Allergen Reactions     No Known Drug Allergies      Medications -  Current Facility-Administered Medications   Medication     piperacillin-tazobactam (ZOSYN) intermittent infusion 4.5 g     dextrose 5% and 0.45% NaCl + KCl 20 mEq/L infusion     ketorolac (TORADOL) injection 30 mg     [START ON 1/12/2017] pantoprazole (PROTONIX) 40 mg IV push injection     morphine injection 1-2 mg     LORazepam (ATIVAN) injection 0.5 mg     ondansetron (ZOFRAN-ODT) ODT tab 4 mg    Or     ondansetron (ZOFRAN) injection 4 mg     prochlorperazine (COMPAZINE) injection 5-10 mg    Or     prochlorperazine (COMPAZINE) tablet 5-10 mg    Or     prochlorperazine  "(COMPAZINE) Suppository 25 mg     naloxone (NARCAN) injection 0.1-0.4 mg           Family History   Problem Relation Age of Onset     HEART DISEASE Maternal Grandmother       at age 62     HEART DISEASE Mother      heart attack at age 63     CANCER Mother      Family History Negative No family hx of      has no hx of father or his family     CANCER Father      pancrea     DIABETES Father      insulin control     Respiratory Father      Hypertension Father      CANCER Brother      CANCER Mother      pancrea     CANCER Sister      half     SOCIAL HISTORY:   Social History     Social History     Marital Status:      Spouse Name: Danie     Number of Children: 3     Years of Education: 14     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[employer]     Social History Main Topics     Smoking status: Never Smoker      Smokeless tobacco: Never Used     Alcohol Use: Yes      Comment: 1-2 drinks monthly     Drug Use: No     Sexual Activity:     Partners: Male     Other Topics Concern      Service No     Blood Transfusions No     Caffeine Concern No     Occupational Exposure No     Hobby Hazards No     Sleep Concern No     Stress Concern No     Weight Concern Yes     Special Diet No     Back Care Yes     improving from      Exercise Yes     Bike Helmet No     Seat Belt Yes     Self-Exams Yes     Social History Narrative      ROS: 10 point ROS neg other than the symptoms noted above in the HPI.    PE:  Filed Vitals:    17 1430   BP: 135/65   Pulse: 88   Temp: 96.3  F (35.7  C)   TempSrc: Oral   Resp: 20   Height: 5' 2\" (1.575 m)   Weight: 149 lb 14.6 oz (68 kg)   SpO2: 98%     General: well developed, well nourished WF who appears her stated age  HEENT: NC/AT, EOMI, (-)icterus, (-)injection  Neck: Supple, No JVD  Chest: CTA  Heart: S1, S2, (-)m/r/g  Abd: Soft, non distended, lower midline and LLQ tenderness without rebound, guarding or peritoneal signs.  Ext; Warm, no edema  Psych: AAOx3  Neuro: No " focal deficits    Results for orders placed or performed during the hospital encounter of 01/11/17 (from the past 24 hour(s))   CBC with platelets differential   Result Value Ref Range    WBC 9.8 4.0 - 11.0 10e9/L    RBC Count 3.75 (L) 3.8 - 5.2 10e12/L    Hemoglobin 12.5 11.7 - 15.7 g/dL    Hematocrit 37.4 35.0 - 47.0 %     78 - 100 fl    MCH 33.3 (H) 26.5 - 33.0 pg    MCHC 33.4 31.5 - 36.5 g/dL    RDW 11.9 10.0 - 15.0 %    Platelet Count 239 150 - 450 10e9/L    Diff Method Automated Method     % Neutrophils 71.3 %    % Lymphocytes 16.9 %    % Monocytes 10.4 %    % Eosinophils 0.8 %    % Basophils 0.5 %    % Immature Granulocytes 0.1 %    Absolute Neutrophil 7.0 1.6 - 8.3 10e9/L    Absolute Lymphocytes 1.7 0.8 - 5.3 10e9/L    Absolute Monocytes 1.0 0.0 - 1.3 10e9/L    Absolute Eosinophils 0.1 0.0 - 0.7 10e9/L    Absolute Basophils 0.1 0.0 - 0.2 10e9/L    Abs Immature Granulocytes 0.0 0 - 0.4 10e9/L   Comprehensive metabolic panel   Result Value Ref Range    Sodium 143 133 - 144 mmol/L    Potassium 3.8 3.4 - 5.3 mmol/L    Chloride 104 94 - 109 mmol/L    Carbon Dioxide 28 20 - 32 mmol/L    Anion Gap 11 3 - 14 mmol/L    Glucose 103 (H) 70 - 99 mg/dL    Urea Nitrogen 7 7 - 30 mg/dL    Creatinine 0.62 0.52 - 1.04 mg/dL    GFR Estimate >90  Non  GFR Calc   >60 mL/min/1.7m2    GFR Estimate If Black >90   GFR Calc   >60 mL/min/1.7m2    Calcium 9.0 8.5 - 10.1 mg/dL    Bilirubin Total 0.8 0.2 - 1.3 mg/dL    Albumin 3.6 3.4 - 5.0 g/dL    Protein Total 8.1 6.8 - 8.8 g/dL    Alkaline Phosphatase 80 40 - 150 U/L    ALT 34 0 - 50 U/L    AST 35 0 - 45 U/L   Lactic acid whole blood   Result Value Ref Range    Lactic Acid 0.7 0.7 - 2.1 mmol/L   CRP inflammation   Result Value Ref Range    CRP Inflammation 111.0 (H) 0.0 - 8.0 mg/L       CT -   CT ABDOMEN AND PELVIS WITH CONTRAST  1/11/2017 8:45 AM     HISTORY:  Low abdominal pain. History of conization. Elevated white  blood  count.     TECHNIQUE: Scans obtained from the diaphragm through the pelvis with  oral and IV contrast, 82 mL Isovue 370. Radiation dose for this scan  was reduced using automated exposure control, adjustment of the mA  and/or kV according to patient size, or iterative reconstruction  technique.     COMPARISON:  Pelvic ultrasound dated 6/30/2011.     FINDINGS: Visualized portions of the lung bases and mediastinal  contents are unremarkable.  There are no aggressive osseous lesions.        There is diffuse fatty infiltration of the liver. The liver,  gallbladder, pancreas, spleen, bilateral adrenal glands and right  kidney enhance normally. Low-attenuation lesion posterior cortex left  kidney (image 26 series 2) measuring up to 1.5 cm is most consistent  with a cyst. Left kidney otherwise enhances normally. No  hydronephrosis, nephrolithiasis, hydroureter, or ureteral calculus is  identified. Urinary bladder is mostly decompressed but is otherwise  unremarkable.     The uterus is grossly normal appearance. Ovaries are not definitely  seen. No adnexal mass is identified.     No adenopathy or free air is seen in the peritoneal cavity. No  significant free fluid collections are identified.     There is inflammatory stranding around the sigmoid colon in a region  of multiple diverticula and mild colonic wall thickening. This is most  consistent with sigmoid diverticulitis. There is a small fluid and gas  collection adjacent to the sigmoid colon (images 30-31 series 3 and  images 57-61 series 2) measuring approximately 3.5 x 2.4 x 2.4 cm.  Mildly thickened enhancing wall around fluid and gas collection is  noted. The colon is otherwise of normal caliber. No other findings of  diverticulitis are seen. Appendix is normal in appearance. Small bowel  is of normal caliber. The stomach is unremarkable.                                                                       IMPRESSION:  1. Acute sigmoid diverticulitis with small  diverticular abscess (3.5 x  2.4 x 2.4 cm).  2. Diffuse fatty infiltration of the liver.     I discussed the findings of acute sigmoid diverticulitis with  diverticular abscess to Selena Jacobo on 1/11/2017 at 9 AM.    All imaging studies reviewed by me.    Reviewed Images with radiologist - abscess not amenable to drainage.    Impression/Plan:  This is a 56-year-old lady presenting with Sigmoid diverticulitis with perforation and contained abscess. I suspect she has had a low-grade diverticulitis for the past few months. Last colonoscopy was 5 years ago. I discussed these findings with the patient and she expressed understanding.  The plan at this time is to keep her nothing by mouth and start her on IV antibiotics to allow for resolution. In view of the long-standing diverticulitis and perforation, I recommended she undergo a repeat colonoscopy in 6 weeks with an elective resection soon thereafter assuming her symptoms resolve with antibiotics. Should she not improve she may require a procedure done on this admission. She again expressed understanding. This case was discussed with Dr. Pemberton.    Ryan Dailey MD, FACS                      Progress Notes - Physician (Notes from 01/12/17 through 01/15/17)      Progress Notes by Radha Gonzalez MD at 1/14/2017  2:23 PM     Author:  Radha Gonzalez MD Service:  Surgery Author Type:  Physician    Filed:  1/14/2017  3:15 PM Note Time:  1/14/2017  2:23 PM Status:  Signed    :  Radha Gonzalez MD (Physician)           Caro Palmer is a 56 year old female patient admitted with perforated diverticulitis with small abscess. She has been treated with IV Zosyn. Earlier today she was switched to oral Augmentin because of marked improvement in her overall course. The patient's diet has been advanced to low residue. She complains of some bloating. She is passing must yellow stool and flatus. She denies significant abdominal pain. She is just  "using ibuprofen for pain at this time. Her CRP continues to improve but is not yet normal.    No diagnosis found.  Past Medical History   Diagnosis Date     Papanicolaou smear of cervix with low grade squamous intraepithelial lesion (LGSIL)      Hypertension      No current outpatient prescriptions on file.     Allergies   Allergen Reactions     No Known Drug Allergies      Principal Problem:    Diverticulitis of intestine with abscess  Active Problems:    Essential hypertension with goal blood pressure less than 140/90    Leukocytosis    Generalized anxiety disorder    Chronic rhinitis    Blood pressure 144/77, pulse 79, temperature 97.1  F (36.2  C), temperature source Oral, resp. rate 18, height 1.575 m (5' 2\"), weight 68 kg (149 lb 14.6 oz), last menstrual period 11/22/2012, SpO2 98 %, not currently breastfeeding.    Subjective : Tolerating diet and advance to low residue. Minimal pain.  Objective: /77 mmHg  Pulse 79  Temp(Src) 97.1  F (36.2  C) (Oral)  Resp 18  Ht 1.575 m (5' 2\")  Wt 68 kg (149 lb 14.6 oz)  BMI 27.41 kg/m2  SpO2 98%  LMP 11/22/2012      Abdomen is distended but soft and nontender and without guarding rebound or rigidity.    Assessment & Plan: Improving diverticulitis with perforation. Agree with switch to Augmentin and oral analgesics. If patient tolerates, then plan discharge to home tomorrow on low residue diet and follow up with Dr. Dailey.  Patient is aware that she will need a follow-up colonoscopy. She has no further questions at this time.    Radha Gonzalez  1/14/2017           Progress Notes by Dimple Pemberton MD at 1/14/2017  9:26 AM     Author:  Dimple Pemberton MD Service:  Family Medicine Author Type:  Physician    Filed:  1/14/2017  9:38 AM Note Time:  1/14/2017  9:26 AM Status:  Signed    :  Dimple Pemberton MD (Physician)           [facility] Progress Note          Assessment and Plan:   Assessment:   Principal " "Problem:    Diverticulitis of intestine with abscess    Assessment: patient continues to improve.  Tolerated introduction of clear liquids without difficulty, has been taking ibuprofen but more for headache than abdominal pain and when pain is present in the abdomen is very dull and mild in nature.     Plan: Will advance diet as tolerated to low residual diet and start PO Augmentin for antibiotic - patient will need 3 week total course completion secondary to abscess presence.  Possible discharge later today vs tomorrow depending on patient's progression throughout the day.      Active Problems:    Leukocytosis    Assessment: secondary to diverticulitis with abscess as above, now resolved    Plan: no further monitoring needed      Essential hypertension with goal blood pressure less than 140/90    Assessment: blood pressures trending upward - has been in the 140 systolic range for the last two readings    Plan: Will restart patient's home losartan-HCTZ medication.  Continue to monitor blood pressures for stabilization.       Generalized anxiety disorder    Assessment: chronic and stable    Plan: restart home prn xanax      Chronic rhinitis    Assessment: chronic and stable    Plan: no acute intervention needed         VTE:  SCDs  Code Status:  Full code        Interval History:   Continues to improve.  Vital signs stable.  CRP continues to improve.  Eating clear liquid diet without difficulty and voiding well.  Tolerating medications without significant side effects.  No new concerns today.            Significant Problems:     Past Medical History   Diagnosis Date     Papanicolaou smear of cervix with low grade squamous intraepithelial lesion (LGSIL)      Hypertension             Physical Exam:   Blood pressure 144/77, pulse 79, temperature 97.1  F (36.2  C), temperature source Oral, resp. rate 18, height 1.575 m (5' 2\"), weight 68 kg (149 lb 14.6 oz), last menstrual period 11/22/2012, SpO2 98 %, not currently " breastfeeding.  Constitutional:   awake, alert, cooperative, no apparent distress, and appears stated age     Lungs:   No increased work of breathing, good air exchange, clear to auscultation bilaterally, no crackles or wheezing     Cardiovascular:   Normal apical impulse, regular rate and rhythm, normal S1 and S2, no S3 or S4, and no murmur noted     Abdomen:   Bowel sounds present and normal, abdomen soft and non-tender even with deep palpation.       Musculoskeletal:   no lower extremity pitting edema present     Neurologic:   Awake, alert, oriented to name, place and time.       Skin:   normal skin color, texture, turgor             Data:   All laboratory data reviewed    Attestation:  I have reviewed today's vital signs, notes, medications, labs and imaging.     Electronically Signed:  Dimple Pemberton MD    Note: Chart documentation done in part with Dragon Voice Recognition software. Although reviewed after completion, some word and grammatical errors may remain.          Progress Notes by Radha Gonzalez MD at 1/13/2017 10:02 AM     Author:  Radha Gonzalez MD Service:  Surgery Author Type:  Physician    Filed:  1/13/2017 10:06 AM Note Time:  1/13/2017 10:02 AM Status:  Addendum    :  Radha Gonzalez MD (Physician)      Related Notes: Original Note by Radha Gonzalez MD (Physician) filed at 1/13/2017 10:05 AM         Caro Palmer is a 56 year old female patient admitted with perforated diverticulitis with abscess.  No diagnosis found.  Past Medical History   Diagnosis Date     Papanicolaou smear of cervix with low grade squamous intraepithelial lesion (LGSIL)      Hypertension      No current outpatient prescriptions on file.     Allergies   Allergen Reactions     No Known Drug Allergies      Principal Problem:    Diverticulitis of intestine with abscess  Active Problems:    Essential hypertension with goal blood pressure less than 140/90    Leukocytosis     "Generalized anxiety disorder    Chronic rhinitis    Blood pressure 136/76, pulse 63, temperature 96.4  F (35.8  C), temperature source Oral, resp. rate 18, height 1.575 m (5' 2\"), weight 68 kg (149 lb 14.6 oz), last menstrual period 11/22/2012, SpO2 99 %, not currently breastfeeding.    Subjective: patient states she is feeling better today.  She is passing flatus and normal stool except it is smaller caliber and more frequent. There is no blood or black tarry component.  She is ambulating in hallways.    Objective: Abdomen is soft without guarding, rebound or tenderness today.  There is no mass effect.   CRP is is improved to 49.1  WBC is 5.3  Assessment & Plan:  Improving on IV Zosyn.  Start clear liquids.      Radha Gonzalez  1/13/2017           Progress Notes by Dimple Pemberton MD at 1/13/2017  9:35 AM     Author:  Dmiple Pemberton MD Service:  Family Medicine Author Type:  Physician    Filed:  1/13/2017  9:40 AM Note Time:  1/13/2017  9:35 AM Status:  Signed    :  Dimple Pemberton MD (Physician)           [facility] Progress Note          Assessment and Plan:   Assessment:   Principal Problem:    Diverticulitis of intestine with abscess    Assessment: Patient having ongoing improvement with CRP trending down from 84 to 49 in the past 24 hours. White blood cell count continues to be normal and improved. Patient has had almost complete resolution of her pain other than a dull achy sensation where the sharp pain used to be.    Plan:  Will initiate patient on ice chips this morning and if that goes well will transition to small sips of clears this afternoon. Gen. surgery will see patient this afternoon to see if further dietary advancement is advisable.  Continue with IV Zosyn.  Will recheck CRP tomorrow morning    Active Problems:    Leukocytosis    Assessment:  Secondary to diverticulitis and abscess as above, now resolved    Plan:  Continue to monitor      " "Essential hypertension with goal blood pressure less than 140/90    Assessment:  Blood pressure continues to be well controlled despite oral blood pressure medications being held    Plan:  Continue to monitor, would resume home regimen once patient is tolerating p.o. well.      Generalized anxiety disorder    Assessment:  Stable with patient not requiring any p.r.n. Ativan    Plan:  Continue with p.r.n. IV Ativan and consider transition to p.o. Xanax home dosing as patient is tolerating p.o. intake well      Chronic rhinitis    Assessment:  Chronic and stable, minimal symptoms at this time without medication    Plan:  No further intervention needed         VTE:  SCDs  Code Status:  Full Code        Interval History:   Continues to improve.  Pain is minimal and when it is present it is a very dull ache and patient has not had any further sharp pain - did require some ibuprofen for this dull achy pain as well as a headache.  Vital signs stable.  Voiding well.  Tolerating medications without significant side effects.  No new concerns today.            Significant Problems:     Past Medical History   Diagnosis Date     Papanicolaou smear of cervix with low grade squamous intraepithelial lesion (LGSIL)      Hypertension             Physical Exam:   Blood pressure 136/76, pulse 63, temperature 96.4  F (35.8  C), temperature source Oral, resp. rate 18, height 1.575 m (5' 2\"), weight 68 kg (149 lb 14.6 oz), last menstrual period 11/22/2012, SpO2 99 %, not currently breastfeeding.  Constitutional:   awake, alert, cooperative, no apparent distress, and appears stated age     Lungs:   No increased work of breathing, good air exchange, clear to auscultation bilaterally, no crackles or wheezing     Cardiovascular:   Normal apical impulse, regular rate and rhythm, normal S1 and S2, no S3 or S4, and no murmur noted     Abdomen:    bowel sounds present, abdomen is soft. No tenderness on palpation even with deep palpation in the " "affected area. No guarding or rebound tenderness noted.      Musculoskeletal:   no lower extremity pitting edema present     Neurologic:   Awake, alert, oriented to name, place and time.       Skin:   normal skin color, texture, turgor             Data:   All laboratory data reviewed    Attestation:  I have reviewed today's vital signs, notes, medications, labs and imaging.     Electronically Signed:  Dimple Pemberton MD    Note: Chart documentation done in part with Dragon Voice Recognition software. Although reviewed after completion, some word and grammatical errors may remain.          Progress Notes by Radha Gonzalez MD at 1/12/2017  4:14 PM     Author:  Radha Gonzalez MD Service:  Surgery Author Type:  Physician    Filed:  1/12/2017  4:19 PM Note Time:  1/12/2017  4:14 PM Status:  Addendum    :  Radha Gonzalez MD (Physician)      Related Notes: Original Note by Radha Gonzalez MD (Physician) filed at 1/12/2017  4:17 PM         Caro Palmer is a 56 year old female patient admitted with perforated diverticulitis with small abscess.  .  No diagnosis found.  Past Medical History   Diagnosis Date     Papanicolaou smear of cervix with low grade squamous intraepithelial lesion (LGSIL)      Hypertension      No current outpatient prescriptions on file.     Allergies   Allergen Reactions     No Known Drug Allergies      Principal Problem:    Diverticulitis of intestine with abscess  Active Problems:    Essential hypertension with goal blood pressure less than 140/90    Leukocytosis    Generalized anxiety disorder    Chronic rhinitis    Blood pressure 136/80, pulse 73, temperature 96.7  F (35.9  C), temperature source Oral, resp. rate 19, height 1.575 m (5' 2\"), weight 68 kg (149 lb 14.6 oz), last menstrual period 11/22/2012, SpO2 97 %, not currently breastfeeding.    Subjective: Patient relates that her pain is much improved. She is passing flatus and small stool " smear this am.    Objective: abdomen is soft with minimal tenderness at present.  No rebound guarding or rigidity.  Assessment & Plan:  Improving diverticulitis and abscess on IV Zosyn.  CRP still elevated but WBC normal.  Continue NPO.  May start sips of clears in am if pain continues to improve.  Ambulate in halls.      Radha Gonzalez  1/12/2017    I spent extra time answering questions from patient and family.       Progress Notes by Dimple Pemberton MD at 1/12/2017  3:27 PM     Author:  Dimple Pemberton MD Service:  Family Medicine Author Type:  Physician    Filed:  1/12/2017  4:14 PM Note Time:  1/12/2017  3:27 PM Status:  Signed    :  Dimple Pemberton MD (Physician)           [facility] Progress Note          Assessment and Plan:   Assessment:   Principal Problem:    Diverticulitis of intestine with abscess    Assessment: Patient has having clinical improvement with significant reduction in left lower quadrant abdominal pain. Has also had resolution of leukocytosis and CRP is trending downward from 111 at time of admission down to 84 this morning.     Plan:  We'll continue with n.p.o. status until pain has completely resolved, continue with IV Zosyn and p.r.n. pain medication. Appreciate general surgery's ongoing assistance in management of patient with perforation. Possible ice chips and sips of clears tomorrow if patient continues to improve and leukocytosis remains resolved with CRP showing ongoing improvement.    Active Problems:    Leukocytosis    Assessment: secondary to diverticulitis, resolved    Plan: continue to monitor and ensure ongoing resolution      Essential hypertension with goal blood pressure less than 140/90    Assessment: blood pressures stable, tolerating PO home medication being held    Plan: Will continue to hold lisinopril/HCTZ and monitor.  Restart when patient able to take PO medications.  If needed would consider IV vasotec       "Generalized anxiety disorder    Assessment: stable    Plan: continue prn IV Ativan      Chronic rhinitis    Assessment: chronic and stable - symptoms minimal currently    Plan: no acute intervention needed         VTE:  SCDs  Code Status:  Full code        Interval History:   Starting to improve - left lower quadrant pain much improved, energy better, patient feeling much better.  Vital signs stable.  Voiding well.  Tolerating medications without significant side effects.  No new concerns today.            Significant Problems:     Past Medical History   Diagnosis Date     Papanicolaou smear of cervix with low grade squamous intraepithelial lesion (LGSIL)      Hypertension             Physical Exam:   Blood pressure 124/71, pulse 68, temperature 96.7  F (35.9  C), temperature source Oral, resp. rate 18, height 1.575 m (5' 2\"), weight 68 kg (149 lb 14.6 oz), last menstrual period 11/22/2012, SpO2 98 %, not currently breastfeeding.  Constitutional:   awake, alert, cooperative, no apparent distress, and appears stated age     Lungs:   No increased work of breathing, good air exchange, clear to auscultation bilaterally, no crackles or wheezing     Cardiovascular:   Normal apical impulse, regular rate and rhythm, normal S1 and S2, no S3 or S4, and no murmur noted     Abdomen:   Bowel sounds present, abdomen soft.  Patient does have mild point tenderness on deep palpation of the left lower quadrant but much improved from previous.  No guarding or rebound tenderness noted.     Musculoskeletal:   no lower extremity pitting edema present     Neurologic:   Awake, alert, oriented to name, place and time.       Skin:   normal skin color, texture, turgor             Data:   All laboratory and imaging data in the past 24 hours reviewed    Attestation:  I have reviewed today's vital signs, notes, medications, labs and imaging.     Electronically Signed:  Dimple Pemberton MD    Note: Chart documentation done in part with " [employer] Voice Recognition software. Although reviewed after completion, some word and grammatical errors may remain.                Procedure Notes     No notes of this type exist for this encounter.      Progress Notes - Therapies (Notes from 01/12/17 through 01/15/17)     No notes of this type exist for this encounter.

## 2017-01-11 NOTE — IP AVS SNAPSHOT
` `     [address] MEDICAL SURGICAL: [phone]            Medication Administration Report for Caro Palmer as of 01/15/17 0951   Legend:    Given Hold Not Given Due Canceled Entry Other Actions    Time Time (Time) Time  Time-Action       Inactive    Active    Linked        Medications 01/09/17 01/10/17 01/11/17 01/12/17 01/13/17 01/14/17 01/15/17    ALPRAZolam (XANAX) tablet 0.25 mg  Dose: 0.25 mg Freq: 3 TIMES DAILY PRN Route: PO  PRN Reason: anxiety  Start: 01/14/17 0934   Admin Instructions: Avoid taking with grapefruit juice               amoxicillin-clavulanate (AUGMENTIN) 875-125 MG per tablet 1 tablet  Dose: 1 tablet Freq: EVERY 12 HOURS SCHEDULED Route: PO  Indications of Use: INTRA-ABDOMINAL INFECTION  Start: 01/14/17 0915         0956 (1 tablet)-Given       2005 (1 tablet)-Given        0820 (1 tablet)-Given       [ ] 2000           diphenhydrAMINE (BENADRYL) injection 25 mg  Dose: 25 mg Freq: EVERY 6 HOURS PRN Route: IV  PRN Reason: itching  Start: 01/11/17 1806              hydrochlorothiazide (MICROZIDE) capsule 12.5 mg  Dose: 12.5 mg Freq: DAILY Route: PO  Start: 01/14/17 1000         0956 (12.5 mg)-Given        0820 (12.5 mg)-Given           ibuprofen (ADVIL/MOTRIN) tablet 400 mg  Dose: 400 mg Freq: EVERY 6 HOURS PRN Route: PO  PRN Reasons: moderate pain,other  PRN Comment: headache  Start: 01/13/17 1646        1652 (400 mg)-Given       2246 (400 mg)-Given        0822 (400 mg)-Given       1728 (400 mg)-Given        0005 (400 mg)-Given       0820 (400 mg)-Given           ketorolac (TORADOL) injection 30 mg  Dose: 30 mg Freq: EVERY 6 HOURS PRN Route: IV  PRN Reason: moderate to severe pain  Start: 01/11/17 1553   End: 01/16/17 1552      1624 (30 mg)-Given        0014 (30 mg)-Given       0729 (30 mg)-Given       1322 (30 mg)-Given       1847 (30 mg)-Given        0115 (30 mg)-Given       0650 (30 mg)-Given             losartan (COZAAR) tablet 50 mg  Dose: 50 mg Freq: DAILY Route:  PO  Start: 01/14/17 1000         0956 (50 mg)-Given        0820 (50 mg)-Given           morphine injection 1-2 mg  Dose: 1-2 mg Freq: EVERY 4 HOURS PRN Route: IV  PRN Reason: moderate to severe pain  Start: 01/11/17 1554      1625 (2 mg)-Given               naloxone (NARCAN) injection 0.1-0.4 mg  Dose: 0.1-0.4 mg Freq: EVERY 2 MIN PRN Route: IV  PRN Reason: opioid reversal  Start: 01/11/17 1559   Admin Instructions: For respiratory rate LESS than or EQUAL to 8.  Partial reversal dose:  0.1 mg titrated q 2 minutes for Analgesia Side Effects Monitoring Sedation Level of 3 (frequently drowsy, arousable, drifts to sleep during conversation).Full reversal dose:  0.4 mg bolus for Analgesia Side Effects Monitoring Sedation Level of 4 (somnolent, minimal or no response to stimulation).               ondansetron (ZOFRAN-ODT) ODT tab 4 mg  Dose: 4 mg Freq: EVERY 6 HOURS PRN Route: PO  PRN Reason: nausea  Start: 01/11/17 1557   Admin Instructions: This is Step 1 of nausea and vomiting management.  If nausea not resolved in 15 minutes, go to Step 2 prochlorperazine (COMPAZINE). Do not push through foil backing. Peel back foil and gently remove. Place on tongue immediately. Administration with liquid unnecessary                     Or  ondansetron (ZOFRAN) injection 4 mg  Dose: 4 mg Freq: EVERY 6 HOURS PRN Route: IV  PRN Reasons: nausea,vomiting  Start: 01/11/17 1557   Admin Instructions: This is Step 1 of nausea and vomiting management.  If nausea not resolved in 15 minutes, go to Step 2 prochlorperazine (COMPAZINE).       1624 (4 mg)-Given               pantoprazole (PROTONIX) 40 mg IV push injection  Dose: 40 mg Freq: EVERY MORNING BEFORE BREAKFAST Route: IV  Start: 01/12/17 0730   Admin Instructions: Reconstitute vial with 10mLs Saline and administer IV Push        0652 (40 mg)-Given        0650 (40 mg)-Given        0634 (40 mg)-Given        0820 (40 mg)-Given           prochlorperazine (COMPAZINE) injection 5-10 mg  Dose:  5-10 mg Freq: EVERY 6 HOURS PRN Route: IV  PRN Reasons: nausea,vomiting  Start: 01/11/17 1557   Admin Instructions: This is Step 2 of nausea and vomiting management.   If nausea not resolved in 15 minutes, give metoclopramide (REGLAN) if ordered (step 3 of nausea and vomiting management)              Or  prochlorperazine (COMPAZINE) tablet 5-10 mg  Dose: 5-10 mg Freq: EVERY 6 HOURS PRN Route: PO  PRN Reason: vomiting  Start: 01/11/17 1557   Admin Instructions: This is Step 2 of nausea and vomiting management.   If nausea not resolved in 15 minutes, give metoclopramide (REGLANI) if ordered (step 3 of nausea and vomiting management)              Or  prochlorperazine (COMPAZINE) Suppository 25 mg  Dose: 25 mg Freq: EVERY 12 HOURS PRN Route: RE  PRN Reasons: nausea,vomiting  Start: 01/11/17 1557   Admin Instructions: This is Step 2 of nausea and vomiting management.   If nausea not resolved in 15 minutes, give metoclopramide (REGLAN) if ordered (step 3 of nausea and vomiting management)              Discontinued Medications  Medications 01/09/17 01/10/17 01/11/17 01/12/17 01/13/17 01/14/17 01/15/17         Rate: 50 mL/hr Freq: CONTINUOUS Route: IV  Last Dose: Stopped (01/14/17 0957)  Start: 01/11/17 1530   End: 01/14/17 0934      1621 ( )-New Bag        0014 ( )-New Bag       0802 ( )-New Bag       1643 ( )-New Bag        1005 ( )-New Bag       2246 ( )-New Bag        0635 ( )-New Bag       0934-Med Discontinued  0957-Stopped              Dose: 0.5 mg Freq: EVERY 6 HOURS PRN Route: IV  PRN Reason: other  PRN Comment: anxiety attack  Start: 01/11/17 1557   End: 01/14/17 0934   Admin Instructions: For IV PUSH: Dilute with equal volume of NS.          0934-Med Discontinued          Dose: 1 tablet Freq: DAILY Route: PO  Start: 01/14/17 0945   End: 01/14/17 0944         0944-Med Discontinued          Dose: 4.5 g Freq: EVERY 6 HOURS Route: IV  Indications of Use: INTRA-ABDOMINAL INFECTION  Last Dose: 4.5 g (01/14/17  0402)  Start: 01/11/17 1600   End: 01/14/17 0911      1704 (4.5 g)-New Bag       2340 (4.5 g)-New Bag        0434 (4.5 g)-New Bag       1009 (4.5 g)-New Bag       1708 (4.5 g)-New Bag       2157 (4.5 g)-New Bag        0414 (4.5 g)-New Bag       1005 (4.5 g)-New Bag       1554 (4.5 g)-New Bag       2115 (4.5 g)-New Bag        0402 (4.5 g)-New Bag       0911-Med Discontinued

## 2017-01-12 LAB
CRP SERPL-MCNC: 84.8 MG/L (ref 0–8)
ERYTHROCYTE [DISTWIDTH] IN BLOOD BY AUTOMATED COUNT: 11.9 % (ref 10–15)
HCT VFR BLD AUTO: 36.8 % (ref 35–47)
HGB BLD-MCNC: 12 G/DL (ref 11.7–15.7)
MCH RBC QN AUTO: 33 PG (ref 26.5–33)
MCHC RBC AUTO-ENTMCNC: 32.6 G/DL (ref 31.5–36.5)
MCV RBC AUTO: 101 FL (ref 78–100)
PLATELET # BLD AUTO: 223 10E9/L (ref 150–450)
RBC # BLD AUTO: 3.64 10E12/L (ref 3.8–5.2)
WBC # BLD AUTO: 7.2 10E9/L (ref 4–11)

## 2017-01-12 PROCEDURE — 12000000 ZZH R&B MED SURG/OB

## 2017-01-12 PROCEDURE — 86140 C-REACTIVE PROTEIN: CPT | Performed by: FAMILY MEDICINE

## 2017-01-12 PROCEDURE — 85027 COMPLETE CBC AUTOMATED: CPT | Performed by: FAMILY MEDICINE

## 2017-01-12 PROCEDURE — 36415 COLL VENOUS BLD VENIPUNCTURE: CPT | Performed by: FAMILY MEDICINE

## 2017-01-12 PROCEDURE — 99232 SBSQ HOSP IP/OBS MODERATE 35: CPT | Performed by: FAMILY MEDICINE

## 2017-01-12 PROCEDURE — 25800025 ZZH RX 258: Performed by: FAMILY MEDICINE

## 2017-01-12 PROCEDURE — 25000125 ZZHC RX 250: Performed by: FAMILY MEDICINE

## 2017-01-12 PROCEDURE — 99231 SBSQ HOSP IP/OBS SF/LOW 25: CPT | Performed by: SURGERY

## 2017-01-12 RX ADMIN — TAZOBACTAM SODIUM AND PIPERACILLIN SODIUM 4.5 G: 500; 4 INJECTION, SOLUTION INTRAVENOUS at 04:34

## 2017-01-12 RX ADMIN — KETOROLAC TROMETHAMINE 30 MG: 30 INJECTION, SOLUTION INTRAMUSCULAR at 07:29

## 2017-01-12 RX ADMIN — TAZOBACTAM SODIUM AND PIPERACILLIN SODIUM 4.5 G: 500; 4 INJECTION, SOLUTION INTRAVENOUS at 21:57

## 2017-01-12 RX ADMIN — POTASSIUM CHLORIDE, DEXTROSE MONOHYDRATE AND SODIUM CHLORIDE: 150; 5; 450 INJECTION, SOLUTION INTRAVENOUS at 00:14

## 2017-01-12 RX ADMIN — TAZOBACTAM SODIUM AND PIPERACILLIN SODIUM 4.5 G: 500; 4 INJECTION, SOLUTION INTRAVENOUS at 10:09

## 2017-01-12 RX ADMIN — POTASSIUM CHLORIDE, DEXTROSE MONOHYDRATE AND SODIUM CHLORIDE: 150; 5; 450 INJECTION, SOLUTION INTRAVENOUS at 08:02

## 2017-01-12 RX ADMIN — POTASSIUM CHLORIDE, DEXTROSE MONOHYDRATE AND SODIUM CHLORIDE: 150; 5; 450 INJECTION, SOLUTION INTRAVENOUS at 16:43

## 2017-01-12 RX ADMIN — TAZOBACTAM SODIUM AND PIPERACILLIN SODIUM 4.5 G: 500; 4 INJECTION, SOLUTION INTRAVENOUS at 17:08

## 2017-01-12 RX ADMIN — KETOROLAC TROMETHAMINE 30 MG: 30 INJECTION, SOLUTION INTRAMUSCULAR at 00:14

## 2017-01-12 RX ADMIN — PANTOPRAZOLE SODIUM 40 MG: 40 INJECTION, POWDER, FOR SOLUTION INTRAVENOUS at 06:52

## 2017-01-12 RX ADMIN — KETOROLAC TROMETHAMINE 30 MG: 30 INJECTION, SOLUTION INTRAMUSCULAR at 13:22

## 2017-01-12 RX ADMIN — KETOROLAC TROMETHAMINE 30 MG: 30 INJECTION, SOLUTION INTRAMUSCULAR at 18:47

## 2017-01-12 NOTE — PROGRESS NOTES
"Caro Palmer is a 56 year old female patient admitted with perforated diverticulitis with small abscess.  .  No diagnosis found.  Past Medical History   Diagnosis Date     Papanicolaou smear of cervix with low grade squamous intraepithelial lesion (LGSIL)      Hypertension      No current outpatient prescriptions on file.     Allergies   Allergen Reactions     No Known Drug Allergies      Principal Problem:    Diverticulitis of intestine with abscess  Active Problems:    Essential hypertension with goal blood pressure less than 140/90    Leukocytosis    Generalized anxiety disorder    Chronic rhinitis    Blood pressure 136/80, pulse 73, temperature 96.7  F (35.9  C), temperature source Oral, resp. rate 19, height 1.575 m (5' 2\"), weight 68 kg (149 lb 14.6 oz), last menstrual period 11/22/2012, SpO2 97 %, not currently breastfeeding.    Subjective: Patient relates that her pain is much improved. She is passing flatus and small stool smear this am.    Objective: abdomen is soft with minimal tenderness at present.  No rebound guarding or rigidity.  Assessment & Plan:  Improving diverticulitis and abscess on IV Zosyn.  CRP still elevated but WBC normal.  Continue NPO.  May start sips of clears in am if pain continues to improve.  Ambulate in halls.      Radha Gonzalez  1/12/2017    I spent extra time answering questions from patient and family.  "

## 2017-01-12 NOTE — PLAN OF CARE
Problem: Goal Outcome Summary  Goal: Goal Outcome Summary  Outcome: No Change  VSS. Alert and oriented x 4. Morphine x 1 for pain along with toradol. Pt will remain NPO. IV antibiotics given. Pt up independently. Will continue with plan of care.

## 2017-01-12 NOTE — PLAN OF CARE
Problem: Goal Outcome Summary  Goal: Goal Outcome Summary  Outcome: Improving  VSS on RA.  Afebrile.  A&O x4.  Receiving Toradol for pain control, Pt states tolerable declines Morphine.  NPO.  Zosyn admin every 6hrs.  Independent in room.  SCDs placed.  Will continue POC.

## 2017-01-12 NOTE — PROGRESS NOTES
[facility] Progress Note          Assessment and Plan:   Assessment:   Principal Problem:    Diverticulitis of intestine with abscess    Assessment: Patient has having clinical improvement with significant reduction in left lower quadrant abdominal pain. Has also had resolution of leukocytosis and CRP is trending downward from 111 at time of admission down to 84 this morning.     Plan:  We'll continue with n.p.o. status until pain has completely resolved, continue with IV Zosyn and p.r.n. pain medication. Appreciate general surgery's ongoing assistance in management of patient with perforation. Possible ice chips and sips of clears tomorrow if patient continues to improve and leukocytosis remains resolved with CRP showing ongoing improvement.    Active Problems:    Leukocytosis    Assessment: secondary to diverticulitis, resolved    Plan: continue to monitor and ensure ongoing resolution      Essential hypertension with goal blood pressure less than 140/90    Assessment: blood pressures stable, tolerating PO home medication being held    Plan: Will continue to hold lisinopril/HCTZ and monitor.  Restart when patient able to take PO medications.  If needed would consider IV vasotec      Generalized anxiety disorder    Assessment: stable    Plan: continue prn IV Ativan      Chronic rhinitis    Assessment: chronic and stable - symptoms minimal currently    Plan: no acute intervention needed         VTE:  SCDs  Code Status:  Full code        Interval History:   Starting to improve - left lower quadrant pain much improved, energy better, patient feeling much better.  Vital signs stable.  Voiding well.  Tolerating medications without significant side effects.  No new concerns today.            Significant Problems:     Past Medical History   Diagnosis Date     Papanicolaou smear of cervix with low grade squamous intraepithelial lesion (LGSIL)      Hypertension             Physical Exam:   Blood pressure 124/71,  "pulse 68, temperature 96.7  F (35.9  C), temperature source Oral, resp. rate 18, height 1.575 m (5' 2\"), weight 68 kg (149 lb 14.6 oz), last menstrual period 11/22/2012, SpO2 98 %, not currently breastfeeding.  Constitutional:   awake, alert, cooperative, no apparent distress, and appears stated age     Lungs:   No increased work of breathing, good air exchange, clear to auscultation bilaterally, no crackles or wheezing     Cardiovascular:   Normal apical impulse, regular rate and rhythm, normal S1 and S2, no S3 or S4, and no murmur noted     Abdomen:   Bowel sounds present, abdomen soft.  Patient does have mild point tenderness on deep palpation of the left lower quadrant but much improved from previous.  No guarding or rebound tenderness noted.     Musculoskeletal:   no lower extremity pitting edema present     Neurologic:   Awake, alert, oriented to name, place and time.       Skin:   normal skin color, texture, turgor             Data:   All laboratory and imaging data in the past 24 hours reviewed    Attestation:  I have reviewed today's vital signs, notes, medications, labs and imaging.     Electronically Signed:  Dimple Pemberton MD    Note: Chart documentation done in part with Dragon Voice Recognition software. Although reviewed after completion, some word and grammatical errors may remain.     "

## 2017-01-12 NOTE — PLAN OF CARE
Problem: Goal Outcome Summary  Goal: Goal Outcome Summary  Outcome: Therapy, progress toward functional goals as expected  Pt feeling better.  VSS.  Afebrile. No nausea this shift.  Having lower abdominal pain.  Torodol has been effective to keep this in check.  Pt states does have a very slight H/A but attributes this to her lack of caffeine.  Independent in moving and walking.  Voiding in good amounts.  Passing gqas.  Hypo bowel sounds and is tender in the lower 2 quads.

## 2017-01-13 LAB
BACTERIA SPEC CULT: NORMAL
CRP SERPL-MCNC: 49.1 MG/L (ref 0–8)
MICRO REPORT STATUS: NORMAL
SPECIMEN SOURCE: NORMAL
WBC # BLD AUTO: 5.3 10E9/L (ref 4–11)

## 2017-01-13 PROCEDURE — 99231 SBSQ HOSP IP/OBS SF/LOW 25: CPT | Performed by: SURGERY

## 2017-01-13 PROCEDURE — 86140 C-REACTIVE PROTEIN: CPT | Performed by: FAMILY MEDICINE

## 2017-01-13 PROCEDURE — 36415 COLL VENOUS BLD VENIPUNCTURE: CPT | Performed by: FAMILY MEDICINE

## 2017-01-13 PROCEDURE — 85048 AUTOMATED LEUKOCYTE COUNT: CPT | Performed by: FAMILY MEDICINE

## 2017-01-13 PROCEDURE — 12000000 ZZH R&B MED SURG/OB

## 2017-01-13 PROCEDURE — 99232 SBSQ HOSP IP/OBS MODERATE 35: CPT | Performed by: FAMILY MEDICINE

## 2017-01-13 PROCEDURE — 25000132 ZZH RX MED GY IP 250 OP 250 PS 637: Performed by: FAMILY MEDICINE

## 2017-01-13 PROCEDURE — 25000125 ZZHC RX 250: Performed by: FAMILY MEDICINE

## 2017-01-13 PROCEDURE — 25800025 ZZH RX 258: Performed by: FAMILY MEDICINE

## 2017-01-13 RX ORDER — IBUPROFEN 400 MG/1
400 TABLET, FILM COATED ORAL EVERY 6 HOURS PRN
Status: DISCONTINUED | OUTPATIENT
Start: 2017-01-13 | End: 2017-01-15 | Stop reason: HOSPADM

## 2017-01-13 RX ADMIN — POTASSIUM CHLORIDE, DEXTROSE MONOHYDRATE AND SODIUM CHLORIDE: 150; 5; 450 INJECTION, SOLUTION INTRAVENOUS at 22:46

## 2017-01-13 RX ADMIN — TAZOBACTAM SODIUM AND PIPERACILLIN SODIUM 4.5 G: 500; 4 INJECTION, SOLUTION INTRAVENOUS at 10:05

## 2017-01-13 RX ADMIN — KETOROLAC TROMETHAMINE 30 MG: 30 INJECTION, SOLUTION INTRAMUSCULAR at 01:15

## 2017-01-13 RX ADMIN — KETOROLAC TROMETHAMINE 30 MG: 30 INJECTION, SOLUTION INTRAMUSCULAR at 06:50

## 2017-01-13 RX ADMIN — PANTOPRAZOLE SODIUM 40 MG: 40 INJECTION, POWDER, FOR SOLUTION INTRAVENOUS at 06:50

## 2017-01-13 RX ADMIN — TAZOBACTAM SODIUM AND PIPERACILLIN SODIUM 4.5 G: 500; 4 INJECTION, SOLUTION INTRAVENOUS at 21:15

## 2017-01-13 RX ADMIN — TAZOBACTAM SODIUM AND PIPERACILLIN SODIUM 4.5 G: 500; 4 INJECTION, SOLUTION INTRAVENOUS at 04:14

## 2017-01-13 RX ADMIN — POTASSIUM CHLORIDE, DEXTROSE MONOHYDRATE AND SODIUM CHLORIDE: 150; 5; 450 INJECTION, SOLUTION INTRAVENOUS at 10:05

## 2017-01-13 RX ADMIN — TAZOBACTAM SODIUM AND PIPERACILLIN SODIUM 4.5 G: 500; 4 INJECTION, SOLUTION INTRAVENOUS at 15:54

## 2017-01-13 RX ADMIN — IBUPROFEN 400 MG: 400 TABLET ORAL at 22:46

## 2017-01-13 RX ADMIN — IBUPROFEN 400 MG: 400 TABLET ORAL at 16:52

## 2017-01-13 NOTE — PROGRESS NOTES
Progress Note          Assessment and Plan:   Assessment:   Principal Problem:    Diverticulitis of intestine with abscess    Assessment: Patient having ongoing improvement with CRP trending down from 84 to 49 in the past 24 hours. White blood cell count continues to be normal and improved. Patient has had almost complete resolution of her pain other than a dull achy sensation where the sharp pain used to be.    Plan:  Will initiate patient on ice chips this morning and if that goes well will transition to small sips of clears this afternoon. Gen. surgery will see patient this afternoon to see if further dietary advancement is advisable.  Continue with IV Zosyn.  Will recheck CRP tomorrow morning    Active Problems:    Leukocytosis    Assessment:  Secondary to diverticulitis and abscess as above, now resolved    Plan:  Continue to monitor      Essential hypertension with goal blood pressure less than 140/90    Assessment:  Blood pressure continues to be well controlled despite oral blood pressure medications being held    Plan:  Continue to monitor, would resume home regimen once patient is tolerating p.o. well.      Generalized anxiety disorder    Assessment:  Stable with patient not requiring any p.r.n. Ativan    Plan:  Continue with p.r.n. IV Ativan and consider transition to p.o. Xanax home dosing as patient is tolerating p.o. intake well      Chronic rhinitis    Assessment:  Chronic and stable, minimal symptoms at this time without medication    Plan:  No further intervention needed         VTE:  SCDs  Code Status:  Full Code        Interval History:   Continues to improve.  Pain is minimal and when it is present it is a very dull ache and patient has not had any further sharp pain - did require some ibuprofen for this dull achy pain as well as a headache.  Vital signs stable.  Voiding well.  Tolerating medications without significant side effects.  No new concerns today.             "Significant Problems:     Past Medical History   Diagnosis Date     Papanicolaou smear of cervix with low grade squamous intraepithelial lesion (LGSIL)      Hypertension             Physical Exam:   Blood pressure 136/76, pulse 63, temperature 96.4  F (35.8  C), temperature source Oral, resp. rate 18, height 1.575 m (5' 2\"), weight 68 kg (149 lb 14.6 oz), last menstrual period 11/22/2012, SpO2 99 %, not currently breastfeeding.  Constitutional:   awake, alert, cooperative, no apparent distress, and appears stated age     Lungs:   No increased work of breathing, good air exchange, clear to auscultation bilaterally, no crackles or wheezing     Cardiovascular:   Normal apical impulse, regular rate and rhythm, normal S1 and S2, no S3 or S4, and no murmur noted     Abdomen:    bowel sounds present, abdomen is soft. No tenderness on palpation even with deep palpation in the affected area. No guarding or rebound tenderness noted.      Musculoskeletal:   no lower extremity pitting edema present     Neurologic:   Awake, alert, oriented to name, place and time.       Skin:   normal skin color, texture, turgor             Data:   All laboratory data reviewed    Attestation:  I have reviewed today's vital signs, notes, medications, labs and imaging.     Electronically Signed:  Dimple Pemberton MD    Note: Chart documentation done in part with Dragon Voice Recognition software. Although reviewed after completion, some word and grammatical errors may remain.     "

## 2017-01-13 NOTE — PLAN OF CARE
Problem: Goal Outcome Summary  Goal: Goal Outcome Summary  Outcome: Improving  Vital signs stable, afebrile. Pt reports pain has significantly improved. Toradol given prn. Abd is soft and non-tender. Pt has been ambulating in the hallway independently. Pt is passing gas and had a small BM this am.

## 2017-01-13 NOTE — PLAN OF CARE
Problem: Goal Outcome Summary  Goal: Goal Outcome Summary  Outcome: Improving  VSS on RA.  Afebrile.  A&O x4.  Receiving Toradol for pain control.  Abd soft non tender.  NPO.  Bowel sounds normoactive x4Q.  Zosyn admin every 6hrs.  Independent in room.  SCDs refused tonight.  Will continue POC.

## 2017-01-13 NOTE — PROGRESS NOTES
"Caro Palmer is a 56 year old female patient admitted with perforated diverticulitis with abscess.  No diagnosis found.  Past Medical History   Diagnosis Date     Papanicolaou smear of cervix with low grade squamous intraepithelial lesion (LGSIL)      Hypertension      No current outpatient prescriptions on file.     Allergies   Allergen Reactions     No Known Drug Allergies      Principal Problem:    Diverticulitis of intestine with abscess  Active Problems:    Essential hypertension with goal blood pressure less than 140/90    Leukocytosis    Generalized anxiety disorder    Chronic rhinitis    Blood pressure 136/76, pulse 63, temperature 96.4  F (35.8  C), temperature source Oral, resp. rate 18, height 1.575 m (5' 2\"), weight 68 kg (149 lb 14.6 oz), last menstrual period 11/22/2012, SpO2 99 %, not currently breastfeeding.    Subjective: patient states she is feeling better today.  She is passing flatus and normal stool except it is smaller caliber and more frequent. There is no blood or black tarry component.  She is ambulating in hallways.    Objective: Abdomen is soft without guarding, rebound or tenderness today.  There is no mass effect.   CRP is is improved to 49.1  WBC is 5.3  Assessment & Plan:  Improving on IV Zosyn.  Start clear liquids.      Radha Gonzalez  1/13/2017      "

## 2017-01-13 NOTE — PLAN OF CARE
Problem: Goal Outcome Summary  Goal: Goal Outcome Summary  Outcome: Improving  Patient improving today, diet was advanced to clear liquids and patient tolerating well. Patient has no pain and has not needed any pain medication. Bowel sounds active, patient also had a bowel movement. Abdomen soft and non-tender. Patient took shower and up ambulating halls independently.

## 2017-01-14 LAB
CRP SERPL-MCNC: 31.3 MG/L (ref 0–8)
WBC # BLD AUTO: 5.6 10E9/L (ref 4–11)

## 2017-01-14 PROCEDURE — 25000132 ZZH RX MED GY IP 250 OP 250 PS 637: Performed by: FAMILY MEDICINE

## 2017-01-14 PROCEDURE — 86140 C-REACTIVE PROTEIN: CPT | Performed by: FAMILY MEDICINE

## 2017-01-14 PROCEDURE — 36415 COLL VENOUS BLD VENIPUNCTURE: CPT | Performed by: FAMILY MEDICINE

## 2017-01-14 PROCEDURE — 25000125 ZZHC RX 250: Performed by: FAMILY MEDICINE

## 2017-01-14 PROCEDURE — 12000000 ZZH R&B MED SURG/OB

## 2017-01-14 PROCEDURE — 85048 AUTOMATED LEUKOCYTE COUNT: CPT | Performed by: FAMILY MEDICINE

## 2017-01-14 PROCEDURE — 99231 SBSQ HOSP IP/OBS SF/LOW 25: CPT | Performed by: SURGERY

## 2017-01-14 PROCEDURE — 99232 SBSQ HOSP IP/OBS MODERATE 35: CPT | Performed by: FAMILY MEDICINE

## 2017-01-14 PROCEDURE — 25800025 ZZH RX 258: Performed by: FAMILY MEDICINE

## 2017-01-14 RX ORDER — LOSARTAN POTASSIUM AND HYDROCHLOROTHIAZIDE 12.5; 5 MG/1; MG/1
1 TABLET ORAL DAILY
Status: DISCONTINUED | OUTPATIENT
Start: 2017-01-14 | End: 2017-01-14

## 2017-01-14 RX ORDER — HYDROCHLOROTHIAZIDE 12.5 MG/1
12.5 CAPSULE ORAL DAILY
Status: DISCONTINUED | OUTPATIENT
Start: 2017-01-14 | End: 2017-01-15 | Stop reason: HOSPADM

## 2017-01-14 RX ORDER — LOSARTAN POTASSIUM 50 MG/1
50 TABLET ORAL DAILY
Status: DISCONTINUED | OUTPATIENT
Start: 2017-01-14 | End: 2017-01-15 | Stop reason: HOSPADM

## 2017-01-14 RX ORDER — ALPRAZOLAM 0.25 MG
0.25 TABLET ORAL 3 TIMES DAILY PRN
Status: DISCONTINUED | OUTPATIENT
Start: 2017-01-14 | End: 2017-01-15 | Stop reason: HOSPADM

## 2017-01-14 RX ADMIN — LOSARTAN POTASSIUM 50 MG: 50 TABLET, FILM COATED ORAL at 09:56

## 2017-01-14 RX ADMIN — IBUPROFEN 400 MG: 400 TABLET ORAL at 17:28

## 2017-01-14 RX ADMIN — POTASSIUM CHLORIDE, DEXTROSE MONOHYDRATE AND SODIUM CHLORIDE: 150; 5; 450 INJECTION, SOLUTION INTRAVENOUS at 06:35

## 2017-01-14 RX ADMIN — PANTOPRAZOLE SODIUM 40 MG: 40 INJECTION, POWDER, FOR SOLUTION INTRAVENOUS at 06:34

## 2017-01-14 RX ADMIN — TAZOBACTAM SODIUM AND PIPERACILLIN SODIUM 4.5 G: 500; 4 INJECTION, SOLUTION INTRAVENOUS at 04:02

## 2017-01-14 RX ADMIN — IBUPROFEN 400 MG: 400 TABLET ORAL at 08:22

## 2017-01-14 RX ADMIN — AMOXICILLIN AND CLAVULANATE POTASSIUM 1 TABLET: 875; 125 TABLET, FILM COATED ORAL at 20:05

## 2017-01-14 RX ADMIN — HYDROCHLOROTHIAZIDE 12.5 MG: 12.5 CAPSULE ORAL at 09:56

## 2017-01-14 RX ADMIN — AMOXICILLIN AND CLAVULANATE POTASSIUM 1 TABLET: 875; 125 TABLET, FILM COATED ORAL at 09:56

## 2017-01-14 NOTE — PROGRESS NOTES
"[facility] Progress Note          Assessment and Plan:   Assessment:   Principal Problem:    Diverticulitis of intestine with abscess    Assessment: patient continues to improve.  Tolerated introduction of clear liquids without difficulty, has been taking ibuprofen but more for headache than abdominal pain and when pain is present in the abdomen is very dull and mild in nature.     Plan: Will advance diet as tolerated to low residual diet and start PO Augmentin for antibiotic - patient will need 3 week total course completion secondary to abscess presence.  Possible discharge later today vs tomorrow depending on patient's progression throughout the day.      Active Problems:    Leukocytosis    Assessment: secondary to diverticulitis with abscess as above, now resolved    Plan: no further monitoring needed      Essential hypertension with goal blood pressure less than 140/90    Assessment: blood pressures trending upward - has been in the 140 systolic range for the last two readings    Plan: Will restart patient's home losartan-HCTZ medication.  Continue to monitor blood pressures for stabilization.       Generalized anxiety disorder    Assessment: chronic and stable    Plan: restart home prn xanax      Chronic rhinitis    Assessment: chronic and stable    Plan: no acute intervention needed         VTE:  SCDs  Code Status:  Full code        Interval History:   Continues to improve.  Vital signs stable.  CRP continues to improve.  Eating clear liquid diet without difficulty and voiding well.  Tolerating medications without significant side effects.  No new concerns today.            Significant Problems:     Past Medical History   Diagnosis Date     Papanicolaou smear of cervix with low grade squamous intraepithelial lesion (LGSIL)      Hypertension             Physical Exam:   Blood pressure 144/77, pulse 79, temperature 97.1  F (36.2  C), temperature source Oral, resp. rate 18, height 1.575 m (5' 2\"), weight " 68 kg (149 lb 14.6 oz), last menstrual period 11/22/2012, SpO2 98 %, not currently breastfeeding.  Constitutional:   awake, alert, cooperative, no apparent distress, and appears stated age     Lungs:   No increased work of breathing, good air exchange, clear to auscultation bilaterally, no crackles or wheezing     Cardiovascular:   Normal apical impulse, regular rate and rhythm, normal S1 and S2, no S3 or S4, and no murmur noted     Abdomen:   Bowel sounds present and normal, abdomen soft and non-tender even with deep palpation.       Musculoskeletal:   no lower extremity pitting edema present     Neurologic:   Awake, alert, oriented to name, place and time.       Skin:   normal skin color, texture, turgor             Data:   All laboratory data reviewed    Attestation:  I have reviewed today's vital signs, notes, medications, labs and imaging.     Electronically Signed:  Dimple Pemberton MD    Note: Chart documentation done in part with Dragon Voice Recognition software. Although reviewed after completion, some word and grammatical errors may remain.

## 2017-01-14 NOTE — PROGRESS NOTES
"Caro Palmer is a 56 year old female patient admitted with perforated diverticulitis with small abscess. She has been treated with IV Zosyn. Earlier today she was switched to oral Augmentin because of marked improvement in her overall course. The patient's diet has been advanced to low residue. She complains of some bloating. She is passing must yellow stool and flatus. She denies significant abdominal pain. She is just using ibuprofen for pain at this time. Her CRP continues to improve but is not yet normal.    No diagnosis found.  Past Medical History   Diagnosis Date     Papanicolaou smear of cervix with low grade squamous intraepithelial lesion (LGSIL)      Hypertension      No current outpatient prescriptions on file.     Allergies   Allergen Reactions     No Known Drug Allergies      Principal Problem:    Diverticulitis of intestine with abscess  Active Problems:    Essential hypertension with goal blood pressure less than 140/90    Leukocytosis    Generalized anxiety disorder    Chronic rhinitis    Blood pressure 144/77, pulse 79, temperature 97.1  F (36.2  C), temperature source Oral, resp. rate 18, height 1.575 m (5' 2\"), weight 68 kg (149 lb 14.6 oz), last menstrual period 11/22/2012, SpO2 98 %, not currently breastfeeding.    Subjective : Tolerating diet and advance to low residue. Minimal pain.  Objective: /77 mmHg  Pulse 79  Temp(Src) 97.1  F (36.2  C) (Oral)  Resp 18  Ht 1.575 m (5' 2\")  Wt 68 kg (149 lb 14.6 oz)  BMI 27.41 kg/m2  SpO2 98%  LMP 11/22/2012      Abdomen is distended but soft and nontender and without guarding rebound or rigidity.    Assessment & Plan: Improving diverticulitis with perforation. Agree with switch to Augmentin and oral analgesics. If patient tolerates, then plan discharge to home tomorrow on low residue diet and follow up with Dr. Dailey.  Patient is aware that she will need a follow-up colonoscopy. She has no further questions at this time.    Radha MARTINES" Carlos  1/14/2017

## 2017-01-14 NOTE — PLAN OF CARE
Problem: Goal Outcome Summary  Goal: Goal Outcome Summary  Outcome: No Change  Patients vitals stable on room air. Patient ambulating the halls independently. Patient is passing gas. Patient had a BM today. Patients bowels are active and present in all quadrants. Patients lungs clear. Alert and oriented x4. Denies pain except for a slight headache. Ibuprofen administered to help this; good relief after medication administration.

## 2017-01-14 NOTE — PLAN OF CARE
Problem: Goal Outcome Summary  Goal: Goal Outcome Summary  S-(situation): end of shift summary    B-(background): diverticulitis with abscess    A-(assessment): Pt A&Ox4, denies pain, up independently in room to use bathroom and walk around.  Bowel sounds active, did have a stool on evening shift.    R-(recommendations): Continue to watch for pain and monitor VS.

## 2017-01-14 NOTE — PLAN OF CARE
Problem: Goal Outcome Summary  Goal: Goal Outcome Summary  Outcome: Improving  Pt having mild discomfort.  Taking IB and this has been effective.  Pt is A&O.  VSS.  Afebrile.  Diet changed and will assess pain following diet.  Has not had any increase in pain on her clear liquids.  Has bowel sounds in all quads and tederness had decreased.

## 2017-01-15 VITALS
SYSTOLIC BLOOD PRESSURE: 155 MMHG | WEIGHT: 149.91 LBS | DIASTOLIC BLOOD PRESSURE: 105 MMHG | RESPIRATION RATE: 16 BRPM | HEART RATE: 90 BPM | HEIGHT: 62 IN | TEMPERATURE: 96.4 F | OXYGEN SATURATION: 96 % | BODY MASS INDEX: 27.59 KG/M2

## 2017-01-15 LAB
CRP SERPL-MCNC: 40.7 MG/L (ref 0–8)
WBC # BLD AUTO: 8.5 10E9/L (ref 4–11)

## 2017-01-15 PROCEDURE — 36415 COLL VENOUS BLD VENIPUNCTURE: CPT | Performed by: FAMILY MEDICINE

## 2017-01-15 PROCEDURE — 86140 C-REACTIVE PROTEIN: CPT | Performed by: FAMILY MEDICINE

## 2017-01-15 PROCEDURE — 25000125 ZZHC RX 250: Performed by: FAMILY MEDICINE

## 2017-01-15 PROCEDURE — 25000132 ZZH RX MED GY IP 250 OP 250 PS 637: Performed by: FAMILY MEDICINE

## 2017-01-15 PROCEDURE — 99239 HOSP IP/OBS DSCHRG MGMT >30: CPT | Performed by: FAMILY MEDICINE

## 2017-01-15 PROCEDURE — 85048 AUTOMATED LEUKOCYTE COUNT: CPT | Performed by: FAMILY MEDICINE

## 2017-01-15 RX ORDER — IBUPROFEN 400 MG/1
400 TABLET, FILM COATED ORAL EVERY 6 HOURS PRN
Qty: 40 TABLET | Refills: 0 | Status: SHIPPED | OUTPATIENT
Start: 2017-01-15 | End: 2021-09-29

## 2017-01-15 RX ADMIN — LOSARTAN POTASSIUM 50 MG: 50 TABLET, FILM COATED ORAL at 08:20

## 2017-01-15 RX ADMIN — HYDROCHLOROTHIAZIDE 12.5 MG: 12.5 CAPSULE ORAL at 08:20

## 2017-01-15 RX ADMIN — IBUPROFEN 400 MG: 400 TABLET ORAL at 08:20

## 2017-01-15 RX ADMIN — IBUPROFEN 400 MG: 400 TABLET ORAL at 00:05

## 2017-01-15 RX ADMIN — PANTOPRAZOLE SODIUM 40 MG: 40 INJECTION, POWDER, FOR SOLUTION INTRAVENOUS at 08:20

## 2017-01-15 RX ADMIN — AMOXICILLIN AND CLAVULANATE POTASSIUM 1 TABLET: 875; 125 TABLET, FILM COATED ORAL at 08:20

## 2017-01-15 NOTE — PLAN OF CARE
Problem: Goal Outcome Summary  Goal: Goal Outcome Summary  Outcome: Improving  Tolerating diet without pain in abd.  Has had ibuprofen per request for slight ache in abd. Ambulated briskly in halls last evening.

## 2017-01-15 NOTE — PROGRESS NOTES
S-(situation): Patient discharged to home via ambulatory    B-(background): diverticulitis with abscess    A-(assessment): tolerating low residue diet without nausea or increase in pain, pain controlled on ibuprofen, moves independently, multiple BM's    R-(recommendations): Discharge instructions reviewed with patient. Listed belongings gathered and returned to patient.          Discharge Nursing Criteria:     Care Plan and Patient education resolved: Yes    New Medications- pt has been educated about purpose and side effects: Yes    Vaccines  Pneumonia Vaccine verified at discharge: Yes  Influenza status verified at discharge:  Yes    MISC  Prescriptions if needed, hard copies sent with patient  NA  Home and hospital aquired medications returned to patient: Yes  Medication Bin checked and emptied on discharge Yes  Patient reports post-discharge pain management plan is effective: Yes

## 2017-01-15 NOTE — DISCHARGE SUMMARY
[facility] Discharge Summary    Caro Palmer MRN# [id number]   Age: 56 year old YOB: 1960     Date of Admission:  1/11/2017  Date of Discharge::  1/15/2017  Admitting Physician:  Dimple Pemberton MD  Discharge Physician:  Dimple Pemberton MD    Home clinic: [facility]          Admission Diagnoses:   Diverticulitis with abcess  Diverticulitis of intestine with abscess          Discharge Diagnosis:   Principal Problem:    Diverticulitis of intestine with abscess    Assessment:  Patient presented with a approximate 6 month history of intermittent left lower quadrant abdominal pain, which had progressively worse over the past month with discovery of diverticulitis with abscess formation that was not amenable to draining. Patient has responded well to IV antibiotics and n.p.o. status during this hospitalization and her diet has been advanced without difficulty with leukocytosis resolved, pain resolved, and patient tolerating oral antibiotic without side effect.    Plan:  Patient will be discharged with ongoing Augmentin. Given the chronicity of patient's symptoms as well as abscess formation, general surgery has recommended at least a three-week course of total antibiotics as well as close follow-up. Patient will need to have a colonoscopy performed following resolution of this infection.  Patient will continue to use ibuprofen as needed for Augusto E Suarez discomfort in the affected region and will follow up with her primary care provider within one week.    Active Problems:    Leukocytosis    Assessment:  Secondary to infection as above, resolved    Plan:  No further monitoring is needed      Essential hypertension with goal blood pressure less than 140/90    Assessment:  Oral antihypertensives were held during this hospitalization initially secondary to n.p.o. status. These were restarted when able. Patient's blood pressures have been slightly elevated but  trending downward on the day of discharge    Plan:  Continue home regimen without change at time of discharge      Generalized anxiety disorder    Assessment:  Chronic and stable    Plan:  Discharge without changed home regimen      Chronic rhinitis    Assessment:  Chronic and currently overall asymptomatic    Plan:  No treatment is needed at time of discharge            Procedures:   No procedures performed during this admission          Medications Prior to Admission:     Prescriptions prior to admission   Medication Sig Dispense Refill Last Dose     CHLOROPHYLL PO Take 15 mLs by mouth daily        ALPRAZolam (XANAX) 0.25 MG tablet Take 1 tablet (0.25 mg) by mouth 3 times daily as needed for anxiety 30 tablet 0 Past Week at Unknown time     losartan-hydrochlorothiazide (HYZAAR) 50-12.5 MG per tablet TAKE ONE TABLET BY MOUTH EVERY DAY 30 tablet 10 1/11/2017 at Unknown time     Lysine 1000 MG TABS Take 1,000 mg by mouth daily.   1/11/2017 at Unknown time     fish oil-omega-3 fatty acids (FISH OIL) 1000 MG capsule Take 1 g by mouth 2 times daily    1/11/2017 at Unknown time     Flaxseed, Linseed, (FLAXSEED OIL) 1000 MG CAPS Take 1,000 mg by mouth 3 times daily    1/11/2017 at Unknown time     OVER-THE-COUNTER 1 tablet daily Citracal 500 D and Calcium 600   1/11/2017 at Unknown time     ONE DAILY PLUS IRON PO TABS 1 tablet daily   1/11/2017 at Unknown time     GLUCOSAMINE CHONDRO COMPLEX OR 2 tablets x 2 daily  0 1/11/2017 at Unknown time     VITAMIN E 400 UNIT OR CAPS ONE CAPSULE DAILY 3 MONTHS 1 YEAR 1/11/2017 at Unknown time     GELATIN 650 MG OR CAPS 2 daily  0 1/11/2017 at Unknown time     ASPIRIN 81 MG OR TABS ONE DAILY 100 3 1/11/2017 at 0800             Discharge Medications:     Current Discharge Medication List      START taking these medications    Details   ibuprofen (ADVIL/MOTRIN) 400 MG tablet Take 1 tablet (400 mg) by mouth every 6 hours as needed for moderate pain  Qty: 40 tablet, Refills: 0     Associated Diagnoses: Diverticulitis of large intestine with abscess without bleeding      amoxicillin-clavulanate (AUGMENTIN) 875-125 MG per tablet Take 1 tablet by mouth every 12 hours for 35 doses  Qty: 35 tablet, Refills: 0    Associated Diagnoses: Diverticulitis of large intestine with abscess without bleeding         CONTINUE these medications which have NOT CHANGED    Details   CHLOROPHYLL PO Take 15 mLs by mouth daily      ALPRAZolam (XANAX) 0.25 MG tablet Take 1 tablet (0.25 mg) by mouth 3 times daily as needed for anxiety  Qty: 30 tablet, Refills: 0    Comments: [facility]  Associated Diagnoses: Generalized anxiety disorder      losartan-hydrochlorothiazide (HYZAAR) 50-12.5 MG per tablet TAKE ONE TABLET BY MOUTH EVERY DAY  Qty: 30 tablet, Refills: 10    Associated Diagnoses: Essential hypertension with goal blood pressure less than 140/90      Lysine 1000 MG TABS Take 1,000 mg by mouth daily.      fish oil-omega-3 fatty acids (FISH OIL) 1000 MG capsule Take 1 g by mouth 2 times daily       Flaxseed, Linseed, (FLAXSEED OIL) 1000 MG CAPS Take 1,000 mg by mouth 3 times daily       OVER-THE-COUNTER 1 tablet daily Citracal 500 D and Calcium 600      ONE DAILY PLUS IRON PO TABS 1 tablet daily      GLUCOSAMINE CHONDRO COMPLEX OR 2 tablets x 2 daily  Refills: 0    Associated Diagnoses: Other general counseling and advice for contraceptive management; Family history of malignant neoplasm of gastrointestinal tract      VITAMIN E 400 UNIT OR CAPS ONE CAPSULE DAILY  Qty: 3 MONTHS, Refills: 1 YEAR    Associated Diagnoses: Other general counseling and advice for contraceptive management; Family history of malignant neoplasm of gastrointestinal tract      GELATIN 650 MG OR CAPS 2 daily  Refills: 0    Associated Diagnoses: Other general counseling and advice for contraceptive management; Family history of malignant neoplasm of gastrointestinal tract      ASPIRIN 81 MG OR TABS ONE DAILY  Qty: 100, Refills: 3    Associated  Diagnoses: Family history of malignant neoplasm of gastrointestinal tract; Other general counseling and advice for contraceptive management                   Consultations:   Consultation during this admission received from general surgery, Dr. Dailey and Dr. Gonzalez          Brief History of Illness:   Patient is a 56-year-old female who presents with a six-month history of intermittent left lower quadrant abdominal pain which became constant in nature one month prior to admission and became severe in the 4-5 days prior to presentation to the clinic. In the clinic the patient was found to have a white blood cell count of 15.9 with a CRP of 92 and CT scan revealing diverticulitis with perforation and abscess formation at 3.5 x 2.4 x 2.4 centimeters that was not amenable to draining.  Decision was made to admit the patient for IV antibiotics and ongoing monitoring            Hospital Course:   During the patient's hospital course she was placed on IV Zosyn, N.p.o. and monitored closely. She did have resolution of her leukocytosis and significant improvement in her CRP. General surgery was involved and assisted with management during this hospitalization secondary to abscess formation. As patient improved her diet was reinitiated and patient tolerated advancement without worsening symptoms. She is transitioned to oral Augmentin without difficulty and is discharged home in stable condition.         Physical Exam:   Vitals were reviewed  All vitals stable  Constitutional:   awake, alert, cooperative, no apparent distress, and appears stated age     Lungs:   No increased work of breathing, good air exchange, clear to auscultation bilaterally, no crackles or wheezing     Cardiovascular:   Normal apical impulse, regular rate and rhythm, normal S1 and S2, no S3 or S4, and no murmur noted     Abdomen:   Normal bowel sounds, soft, non-distended, non-tender even on deep palpation, no masses palpated, no hepatosplenomegally      Musculoskeletal:   no lower extremity pitting edema present     Neurologic:   Awake, alert, oriented to name, place and time.       Skin:   normal skin color, texture, turgor              Discharge Instructions and Follow-Up:   Discharge diet: Low residue   Discharge activity: Activity as tolerated   Discharge follow-up: Follow up with PCP or partner within 7 days  Follow up with Dr. Dailey in 2-3 weeks to discuss timing of future colonoscopy           Discharge Disposition:   Discharged to home      Attestation:  I have reviewed today's vital signs, notes, medications, labs and imaging.    More than 30 minutes was spent on this discharge.      Dimple Pemberton MD    Note: Chart documentation done in part with Dragon Voice Recognition software. Although reviewed after completion, some word and grammatical errors may remain.

## 2017-01-17 ENCOUNTER — TELEPHONE (OUTPATIENT)
Dept: FAMILY MEDICINE | Facility: CLINIC | Age: 57
End: 2017-01-17

## 2017-01-19 NOTE — PROGRESS NOTES
"Caro Palmer  Gender: female  : 1960  [address]   [facility] [address] [phone] (home) [phone] (work)    Medical Record: [id number]  Pharmacy:    [facility] [place] - [place], [place] - [address] [place]  - [place], [place] - [address]  Primary Care Provider: Schoen, Gregory G    Parent's names are: Data Unavailable (mother) and Data Unavailable (father).      [facility]      Discharge Phone Call:  Key Words/Key Times      Introduction - AIDET (Acknowledge, Introduce, Duration, Explanation)      Empathy-   We are calling to see how you are since your recent stay in the hospital?     Call back COMMENTS:       Clinical Questions -  (f/u appts, medication side effects/purpose, ability to care for self at home) \"For your safety, it is important to us that you understand the purpose and side effects of your medications, can you tell me what your new medications are?\"     Call back COMMENTS:       Staff Recognition -  We like to recognize staff and physicians who have done an excellent job.  Do you remember any people from your care team that you would like recognize?     Call back COMMENTS:       Very Good Care -  We want to provide very good care to all patients.  How was your care?     Call back COMMENTS:       Opportunities for Improvement -  Our goal is to be the best.  Do you have any suggestions for things that we could improve upon?     Call back COMMENTS:       Thank You       17 1630: 1st attempt discharge call back. No answer, no message left.        "

## 2017-01-20 ENCOUNTER — OFFICE VISIT (OUTPATIENT)
Dept: FAMILY MEDICINE | Facility: CLINIC | Age: 57
End: 2017-01-20
Payer: COMMERCIAL

## 2017-01-20 VITALS — DIASTOLIC BLOOD PRESSURE: 80 MMHG | TEMPERATURE: 98.2 F | HEART RATE: 80 BPM | SYSTOLIC BLOOD PRESSURE: 136 MMHG

## 2017-01-20 DIAGNOSIS — K57.20 DIVERTICULITIS OF LARGE INTESTINE WITH ABSCESS WITHOUT BLEEDING: Primary | ICD-10-CM

## 2017-01-20 DIAGNOSIS — F41.1 GENERALIZED ANXIETY DISORDER: Primary | ICD-10-CM

## 2017-01-20 PROBLEM — Z71.89 ADVANCED CARE PLANNING/COUNSELING DISCUSSION: Status: ACTIVE | Noted: 2017-01-20

## 2017-01-20 LAB
ALBUMIN UR-MCNC: NEGATIVE MG/DL
APPEARANCE UR: CLEAR
BASOPHILS # BLD AUTO: 0.1 10E9/L (ref 0–0.2)
BASOPHILS NFR BLD AUTO: 1 %
BILIRUB UR QL STRIP: NEGATIVE
COLOR UR AUTO: YELLOW
CRP SERPL-MCNC: 18.5 MG/L (ref 0–8)
DIFFERENTIAL METHOD BLD: NORMAL
EOSINOPHIL # BLD AUTO: 0.1 10E9/L (ref 0–0.7)
EOSINOPHIL NFR BLD AUTO: 1.5 %
GLUCOSE UR STRIP-MCNC: NEGATIVE MG/DL
HGB UR QL STRIP: NEGATIVE
IMM GRANULOCYTES # BLD: 0 10E9/L (ref 0–0.4)
IMM GRANULOCYTES NFR BLD: 0.1 %
KETONES UR STRIP-MCNC: NEGATIVE MG/DL
LEUKOCYTE ESTERASE UR QL STRIP: ABNORMAL
LYMPHOCYTES # BLD AUTO: 1.8 10E9/L (ref 0.8–5.3)
LYMPHOCYTES NFR BLD AUTO: 18.6 %
MONOCYTES # BLD AUTO: 0.9 10E9/L (ref 0–1.3)
MONOCYTES NFR BLD AUTO: 9.3 %
NEUTROPHILS # BLD AUTO: 6.5 10E9/L (ref 1.6–8.3)
NEUTROPHILS NFR BLD AUTO: 69.5 %
NITRATE UR QL: NEGATIVE
NON-SQ EPI CELLS #/AREA URNS LPF: ABNORMAL /LPF
PH UR STRIP: 5.5 PH (ref 5–7)
RBC #/AREA URNS AUTO: ABNORMAL /HPF (ref 0–2)
SP GR UR STRIP: 1.01 (ref 1–1.03)
URN SPEC COLLECT METH UR: ABNORMAL
UROBILINOGEN UR STRIP-ACNC: 0.2 EU/DL (ref 0.2–1)
WBC # BLD AUTO: 9.4 10E9/L (ref 4–11)
WBC #/AREA URNS AUTO: ABNORMAL /HPF (ref 0–2)

## 2017-01-20 PROCEDURE — 36415 COLL VENOUS BLD VENIPUNCTURE: CPT | Performed by: NURSE PRACTITIONER

## 2017-01-20 PROCEDURE — 85048 AUTOMATED LEUKOCYTE COUNT: CPT | Performed by: NURSE PRACTITIONER

## 2017-01-20 PROCEDURE — 85004 AUTOMATED DIFF WBC COUNT: CPT | Performed by: NURSE PRACTITIONER

## 2017-01-20 PROCEDURE — 81001 URINALYSIS AUTO W/SCOPE: CPT | Performed by: NURSE PRACTITIONER

## 2017-01-20 PROCEDURE — 99495 TRANSJ CARE MGMT MOD F2F 14D: CPT | Performed by: NURSE PRACTITIONER

## 2017-01-20 PROCEDURE — 86140 C-REACTIVE PROTEIN: CPT | Performed by: NURSE PRACTITIONER

## 2017-01-20 RX ORDER — ALPRAZOLAM 0.25 MG
0.25 TABLET ORAL 3 TIMES DAILY PRN
Qty: 30 TABLET | Refills: 0 | Status: SHIPPED | OUTPATIENT
Start: 2017-01-20 | End: 2017-04-13

## 2017-01-20 NOTE — NURSING NOTE
"Chief Complaint   Patient presents with     Hospital F/U       Initial /80 mmHg  Pulse 80  Temp(Src) 98.2  F (36.8  C) (Tympanic)  Wt   LMP 11/22/2012 Estimated body mass index is 27.41 kg/(m^2) as calculated from the following:    Height as of 1/11/17: 5' 2\" (1.575 m).    Weight as of 1/11/17: 149 lb 14.6 oz (68 kg).  BP completed using cuff size: large  "

## 2017-01-20 NOTE — MR AVS SNAPSHOT
"              After Visit Summary   1/20/2017    Caro Palmer    MRN: [id number]           Patient Information     Date Of Birth          1960        Visit Information        Provider Department      1/20/2017 2:00 PM Selena Jacobo APRN CNP [facility]        Today's Diagnoses     Diverticulitis of large intestine with abscess without bleeding    -  1        Follow-ups after your visit        Your next 10 appointments already scheduled     Jan 30, 2017  2:00 PM   New Visit with Ryan Dailey MD   [facility] ([facility])    [address] [place] [address] [phone]              Who to contact     If you have questions or need follow up information about today's clinic visit or your schedule please contact [facility] directly at [phone].  Normal or non-critical lab and imaging results will be communicated to you by [facility] [place] [facility] [place], letter or phone within 4 business days after the clinic has received the results. If you do not hear from us within 7 days, please contact the clinic through MyChart or phone. If you have a critical or abnormal lab result, we will notify you by phone as soon as possible.  Submit refill requests through [employer] or call your pharmacy and they will forward the refill request to us. Please allow 3 business days for your refill to be completed.          Additional Information About Your Visit        MyChart Information     [employer] lets you send messages to your doctor, view your test results, renew your prescriptions, schedule appointments and more. To sign up, go to [email or web address] . Click on \"Log in\" on the left side of the screen, which will take you to the Welcome page. Then click on \"Sign up Now\" on the right side of the page.     You will be asked to enter the access code listed below, as well as some personal information. Please follow the directions to create your username and " password.     Your access code is: [email or web address]  Expires: 4/10/2017  4:09 PM     Your access code will  in 90 days. If you need help or a new code, please call your [facility] clinic or [phone].        Care EveryWhere ID     This is your Care EveryWhere ID. This could be used by other organizations to access your [facility] medical records  [id number]        Your Vitals Were     Pulse Temperature Last Period             80 98.2  F (36.8  C) (Tympanic) 2012          Blood Pressure from Last 3 Encounters:   17 136/80   01/15/17 155/105   01/10/17 128/80    Weight from Last 3 Encounters:   17 149 lb 14.6 oz (68 kg)   01/10/17 168 lb (76.204 kg)   16 160 lb (72.576 kg)              We Performed the Following     *UA reflex to Microscopic and Culture ([facility] (except [place] and [place])     CRP, inflammation     Urine Microscopic     WBC with Diff          Where to get your medicines      Some of these will need a paper prescription and others can be bought over the counter.  Ask your nurse if you have questions.     Bring a paper prescription for each of these medications    - ALPRAZolam 0.25 MG tablet       Primary Care Provider Office Phone # Fax #    Gregory G Schoen, [phone]       [facility] [address] [place] [address]        Thank you!     Thank you for choosing [facility]  for your care. Our goal is always to provide you with excellent care. Hearing back from our patients is one way we can continue to improve our services. Please take a few minutes to complete the written survey that you may receive in the mail after your visit with us. Thank you!             Your Updated Medication List - Protect others around you: Learn how to safely use, store and throw away your medicines at www.disposemymeds.org.          This list is accurate as of: 17  3:56 PM.  Always use your most  recent med list.                   Brand Name Dispense Instructions for use    ALPRAZolam 0.25 MG tablet    XANAX    30 tablet    Take 1 tablet (0.25 mg) by mouth 3 times daily as needed for anxiety       amoxicillin-clavulanate 875-125 MG per tablet    AUGMENTIN    35 tablet    Take 1 tablet by mouth every 12 hours for 35 doses       aspirin 81 MG tablet     100    ONE DAILY       CHLOROPHYLL PO      Take 15 mLs by mouth daily       fish oil-omega-3 fatty acids 1000 MG capsule      Take 1 g by mouth 2 times daily       flaxseed oil 1000 MG Caps      Take 1,000 mg by mouth 3 times daily       Gelatin 650 MG Caps      2 daily       GLUCOSAMINE CHONDRO COMPLEX OR      2 tablets x 2 daily       ibuprofen 400 MG tablet    ADVIL/MOTRIN    40 tablet    Take 1 tablet (400 mg) by mouth every 6 hours as needed for moderate pain       losartan-hydrochlorothiazide 50-12.5 MG per tablet    HYZAAR    30 tablet    TAKE ONE TABLET BY MOUTH EVERY DAY       Lysine 1000 MG Tabs      Take 1,000 mg by mouth daily.       ONE DAILY PLUS IRON Tabs      1 tablet daily       OVER-THE-COUNTER      1 tablet daily Citracal 500 D and Calcium 600       vitamin E 400 UNIT capsule     3 MONTHS    ONE CAPSULE DAILY

## 2017-01-20 NOTE — PROGRESS NOTES
"  SUBJECTIVE:                                                    Caro Palmer is a 56 year old female who presents to clinic today for the following health issues:          Hospital Follow-up Visit:    Hospital/Nursing Home/IP Rehab Facility: [facility]  Date of Admission: 1/11/17  Date of Discharge: 1/15/17  Reason(s) for Admission: diverticulitis, abcess            Problems taking medications regularly:  None       Medication changes since discharge: None.        Problems adhering to non-medication therapy:  None    Summary of hospitalization:  [facility] discharge summary reviewed  Caro was hospitalized 1/11 for diverticulitis with abscess. She responded well to IV antibiotics and n.p.o. Status. She was discharged on oral Augmentin. WBC was normal at discharge, CRP  Was still elevated at 40, But improved from initial reading  Diagnostic Tests/Treatments reviewed.  Follow up needed: Primary care, will need colonoscopy in 2-3 weeks  Other Healthcare Providers Involved in Patient s Care:         Specialist appointment - 1/30 with Dr. Dailey to discuss scheduling colonoscopy  Update since discharge: She states she has been feeling much better. She had a very busy week, she did go back to work. 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 This morning she has noted some lower abdominal cramping, more of the \"gnawing  \"discomfort. She states this is new. She also reports having some problems with her hemorrhoids, and is using an over-the-counter product. She developed cold sores, states she typically gets these at times of illness or stress. She treated those with over-the-counter lysine and they are much better.  She is following a low residue diet, has been taking her Augmentin every 12 hours. She has ibuprofen which she can take every 6 hours, had been taking it every 8, but  With this cramping she feels she may need to increase it again.Stools have been " soft, regular. She's had no diarrhea,, no blood or mucus in the stools.     Post Discharge Medication Reconciliation: discharge medications reconciled, continue medications without change.  Plan of care communicated with patient     Coding guidelines for this visit:  Type of Medical   Decision Making Face-to-Face Visit       within 7 Days of discharge Face-to-Face Visit        within 14 days of discharge   Moderate Complexity [id number]   High Complexity 35135 [id number]              Problem list and histories reviewed & adjusted, as indicated.  Additional history: as documented    BP Readings from Last 3 Encounters:   01/20/17 136/80   01/15/17 155/105   01/10/17 128/80    Wt Readings from Last 3 Encounters:   01/11/17 149 lb 14.6 oz (68 kg)   01/10/17 168 lb (76.204 kg)   06/17/16 160 lb (72.576 kg)                  Labs reviewed in EPIC  Problem list, Medication list, Allergies, and Medical/Social/Surgical histories reviewed in [facility] and updated as appropriate.    ROS:  Constitutional, HEENT, cardiovascular, pulmonary, gi and gu systems are negative, except as otherwise noted.    OBJECTIVE:                                                    /80 mmHg  Pulse 80  Temp(Src) 98.2  F (36.8  C) (Tympanic)  Wt   LMP 11/22/2012  There is no weight on file to calculate BMI.  GENERAL: healthy, alert and no distress  EYES: Eyes grossly normal to inspection, PERRL and conjunctivae and sclerae normal  NECK: no adenopathy, no asymmetry, masses, or scars and thyroid normal to palpation  RESP: lungs clear to auscultation - no rales, rhonchi or wheezes  CV: regular rate and rhythm, normal S1 S2, no S3 or S4, no murmur, click or rub, no peripheral edema and peripheral pulses strong  ABDOMEN: soft, nontender, no hepatosplenomegaly, no masses and bowel sounds normal  MS: no gross musculoskeletal defects noted, no edema  SKIN: no suspicious lesions or rashes  NEURO: Normal strength and tone, mentation intact and speech  normal  PSYCH: mentation appears normal, affect normal/bright and anxious    Diagnostic Test Results:  Results for orders placed or performed in visit on 01/20/17 (from the past 24 hour(s))   WBC with Diff   Result Value Ref Range    WBC 9.4 4.0 - 11.0 10e9/L    Diff Method Automated Method     % Neutrophils 69.5 %    % Lymphocytes 18.6 %    % Monocytes 9.3 %    % Eosinophils 1.5 %    % Basophils 1.0 %    % Immature Granulocytes 0.1 %    Absolute Neutrophil 6.5 1.6 - 8.3 10e9/L    Absolute Lymphocytes 1.8 0.8 - 5.3 10e9/L    Absolute Monocytes 0.9 0.0 - 1.3 10e9/L    Absolute Eosinophils 0.1 0.0 - 0.7 10e9/L    Absolute Basophils 0.1 0.0 - 0.2 10e9/L    Abs Immature Granulocytes 0.0 0 - 0.4 10e9/L   CRP, inflammation   Result Value Ref Range    CRP Inflammation 18.5 (H) 0.0 - 8.0 mg/L   *UA reflex to Microscopic and Culture ([facility] (except [place] and [place])   Result Value Ref Range    Color Urine Yellow     Appearance Urine Clear     Glucose Urine Negative NEG mg/dL    Bilirubin Urine Negative NEG    Ketones Urine Negative NEG mg/dL    Specific Gravity Urine 1.015 1.003 - 1.035    Blood Urine Negative NEG    pH Urine 5.5 5.0 - 7.0 pH    Protein Albumin Urine Negative NEG mg/dL    Urobilinogen Urine 0.2 0.2 - 1.0 EU/dL    Nitrite Urine Negative NEG    Leukocyte Esterase Urine Trace (A) NEG    Source Unspecified Urine    Urine Microscopic   Result Value Ref Range    WBC Urine 2-5 (A) 0 - 2 /HPF    RBC Urine O - 2 0 - 2 /HPF    Squamous Epithelial /LPF Urine Few FEW /LPF        ASSESSMENT/PLAN:                                                      Problem List Items Addressed This Visit        Medium    Diverticulitis of intestine with abscess - Primary    Relevant Orders    *UA reflex to Microscopic and Culture ([facility] (except [place] and [place]) (Completed)    WBC with Diff (Completed)    CRP, inflammation (Completed)    Urine Microscopic  (Completed)           Urinalysis was obtained to rule out possible UTI as recent for the lower abdominal cramping. This is negative  WBC was checked, and it remains well within normal limits  CRP has decreased from 40-18.5    I suspect her cramping may be related to her excessive activity this week. Advised to rest more, avoid lifting, avoid standing for extended periods of time.  Continue low residue diet,  Drink adequate fluids  Continue Augmentin, she will be staying on  Antibiotic therapy for 2-3 weeks  Has appointment scheduled 1/30  To discuss colonoscopy  Follow-up in clinic with primary care in 2 weeks, sooner  If having any concerns    ARIANNA Deluna [facility]

## 2017-01-20 NOTE — TELEPHONE ENCOUNTER
Xanax 0.25mg      Last Written Prescription Date:  12/08/16  Last Fill Quantity: 30,   # refills: 0  Last Office Visit with [facility], [facility] or [facility] prescribing provider: 01/10/17  Future Office visit:    Next 5 appointments (look out 90 days)     Jan 20, 2017  2:00 PM   Office Visit with ARIANNA Deluna CNP   [facility]    [address] [place] [id number] [phone]                   Routing refill request to provider for review/approval because:  Drug not on the [facility], [facility] or [facility] refill protocol or controlled substance    Thank you -  Kelli Ang, Pharmacy Technician  [facility]  On Behalf Of [facility]

## 2017-01-24 NOTE — PROGRESS NOTES
"Caro Palmer  Gender: female  : 1960  [address]   [facility] [address] [phone] (home) [phone] (work)    Medical Record: [id number]  Pharmacy:    [facility] - [place], [place] - [address] [place]  - [place], [place] - [address]  Primary Care Provider: Schoen, Gregory G    Parent's names are: Data Unavailable (mother) and Data Unavailable (father).      [facility]      Discharge Phone Call:  Key Words/Key Times      Introduction - AIDET (Acknowledge, Introduce, Duration, Explanation)      Empathy-   We are calling to see how you are since your recent stay in the hospital?     Call back COMMENTS:       Clinical Questions -  (f/u appts, medication side effects/purpose, ability to care for self at home) \"For your safety, it is important to us that you understand the purpose and side effects of your medications, can you tell me what your new medications are?\"     Call back COMMENTS:       Staff Recognition -  We like to recognize staff and physicians who have done an excellent job.  Do you remember any people from your care team that you would like recognize?     Call back COMMENTS:       Very Good Care -  We want to provide very good care to all patients.  How was your care?     Call back COMMENTS:       Opportunities for Improvement -  Our goal is to be the best.  Do you have any suggestions for things that we could improve upon?     Call back COMMENTS:       Thank You       1st attempt made, no answer. Antonieta Patrick RN  "

## 2017-01-27 NOTE — PROGRESS NOTES
"Caro Palmer  Gender: female  : 1960  [address]   [facility] [address] [phone] (home) [phone] (work)    Medical Record: [id number]  Pharmacy:    [facility] - [place], [place] - [address] [place]  - [place], [place] - [address]  Primary Care Provider: Schoen, Gregory G    Parent's names are: Data Unavailable (mother) and Data Unavailable (father).      [facility]      Discharge Phone Call:  Key Words/Key Times      Introduction - AIDET (Acknowledge, Introduce, Duration, Explanation)      Empathy-   We are calling to see how you are since your recent stay in the hospital?     Call back COMMENTS:       Clinical Questions -  (f/u appts, medication side effects/purpose, ability to care for self at home) \"For your safety, it is important to us that you understand the purpose and side effects of your medications, can you tell me what your new medications are?\"     Call back COMMENTS:       Staff Recognition -  We like to recognize staff and physicians who have done an excellent job.  Do you remember any people from your care team that you would like recognize?     Call back COMMENTS:       Very Good Care -  We want to provide very good care to all patients.  How was your care?     Call back COMMENTS:       Opportunities for Improvement -  Our goal is to be the best.  Do you have any suggestions for things that we could improve upon?     Call back COMMENTS:       Thank You       Call back 2017 1st attempt no answer and no message left.   "

## 2017-01-30 ENCOUNTER — OFFICE VISIT (OUTPATIENT)
Dept: SURGERY | Facility: CLINIC | Age: 57
End: 2017-01-30
Payer: COMMERCIAL

## 2017-01-30 VITALS — TEMPERATURE: 93.9 F | HEART RATE: 119 BPM | OXYGEN SATURATION: 96 %

## 2017-01-30 DIAGNOSIS — K57.21 DIVERTICULITIS OF LARGE INTESTINE WITH ABSCESS WITH BLEEDING: Primary | ICD-10-CM

## 2017-01-30 PROCEDURE — 99213 OFFICE O/P EST LOW 20 MIN: CPT | Performed by: SPECIALIST

## 2017-01-30 RX ORDER — METRONIDAZOLE 500 MG/1
1000 TABLET ORAL 3 TIMES DAILY
Qty: 6 TABLET | Refills: 0 | Status: SHIPPED | OUTPATIENT
Start: 2017-01-30 | End: 2017-02-28

## 2017-01-30 RX ORDER — NEOMYCIN SULFATE 500 MG/1
1000 TABLET ORAL 3 TIMES DAILY
Qty: 6 TABLET | Refills: 0 | Status: ON HOLD | OUTPATIENT
Start: 2017-01-30 | End: 2017-03-28

## 2017-01-30 NOTE — PROGRESS NOTES
F/U from hospital      Subjective:  Pt is feeling much better. Pain resolved. She is finishing abx course.    Objective:  B/P: Data Unavailable, T: 93.9, P: 119, R: Data Unavailable  Abd: soft, non tender, non distended    WBC      9.4   1/20/2017  RBC     3.64   1/12/2017  HGB     12.0   1/12/2017  HCT     36.8   1/12/2017  No components found with this name: mct  MCV      101   1/12/2017  MCH     33.0   1/12/2017  MCHC     32.6   1/12/2017  RDW     11.9   1/12/2017  PLT      223   1/12/2017    Assessment/Plan:  Pt is a 55 yo lady with multiple episodes of diverticulitis.  Recently she had a localized perforation with small abscess.  She is much improved.  The plan is for a repeat colonoscopy followed by a lap assisted sigmoid resection.  The procedure, risks, benefits and alternatives were discussed and she agrees to proceed.  She will be schedule in the near future.    Ryan Dailey MD, FACS

## 2017-01-30 NOTE — NURSING NOTE
[facility] Surgical Services    Caro Palmer has been given the following teaching information:  Before Your Surgery booklet  [place]ilan: Understanding Laproscopic Colorectal Surgery  Instructions for Showering or Bathing before Surgery  Request for surgery faxed to Fern at [facility]  Pre-op antibotics sent to Fern at [facility] to mail to patient with pre-op packet

## 2017-01-30 NOTE — PROGRESS NOTES
"Caro Palmer  Gender: female  : 1960  [address]   [facility] [address] [phone] (home) [phone] (work)    Medical Record: [id number]  Pharmacy:    [facility] - [place], [place] - [address] [place]  - [place], [place] - [address]  Primary Care Provider: Schoen, Gregory G    Parent's names are: Data Unavailable (mother) and Data Unavailable (father).      [facility]      Discharge Phone Call:  Key Words/Key Times      Introduction - AIDET (Acknowledge, Introduce, Duration, Explanation)      Empathy-   We are calling to see how you are since your recent stay in the hospital?     Call back COMMENTS:       Clinical Questions -  (f/u appts, medication side effects/purpose, ability to care for self at home) \"For your safety, it is important to us that you understand the purpose and side effects of your medications, can you tell me what your new medications are?\"     Call back COMMENTS:       Staff Recognition -  We like to recognize staff and physicians who have done an excellent job.  Do you remember any people from your care team that you would like recognize?     Call back COMMENTS:       Very Good Care -  We want to provide very good care to all patients.  How was your care?     Call back COMMENTS:       Opportunities for Improvement -  Our goal is to be the best.  Do you have any suggestions for things that we could improve upon?     Call back COMMENTS:       Thank You           2nd attempt, no answer, message left.  Jeffery 17 @1403        "

## 2017-01-30 NOTE — NURSING NOTE
"    Chief Complaint   Patient presents with     RECHECK     follow-up from hospital       Initial Pulse 119  Temp(Src) 93.9  F (34.4  C) (Temporal)  SpO2 96%  LMP 11/22/2012 Estimated body mass index is 27.41 kg/(m^2) as calculated from the following:    Height as of 1/11/17: 5' 2\" (1.575 m).    Weight as of 1/11/17: 149 lb 14.6 oz (68 kg)..  BP completed using cuff size: NA (Not Taken)    Patient declined weight      Caity Lucero CMA  (AAMA)      "

## 2017-02-02 ENCOUNTER — TELEPHONE (OUTPATIENT)
Dept: SURGERY | Facility: CLINIC | Age: 57
End: 2017-02-02

## 2017-02-02 NOTE — TELEPHONE ENCOUNTER
Surgery Scheduled    Date of Surgery 03/01/17 Time of Surgery 9:00am  Procedure: Colonoscopy  Hospital/Surgical Facility: [facility]  Surgeon: Dr Dailey  Type of Anesthesia Anticipated: MAC  Pre-Op: 02/28/17 with Selena Jacobo   Post-Op: patient to schedule with Dr Dailey  Pre-Certification -to be completed  Consent Signed -to be completed  Hospital Stay -same day procedure    Surgery Packet (and/or) Colonscopy Prep (was given/or mailed) to patient. Patient was also instructed to arrive 1 hour(s) prior to surgery.  Patient understood and agrees to the plan.      Fern Esquivel  Specialty

## 2017-02-02 NOTE — TELEPHONE ENCOUNTER
Surgery Scheduled    Date of Surgery 03/23/17 Time of Surgery 7:30am  Procedure: Laparoscopic Assisted Sigmoid Resection  Hospital/Surgical Facility: Dr Dailey  Surgeon: Dr Dailey w/Dr Devyn Linn assisting  Type of Anesthesia Anticipated: General  Pre-Op: 02/28/17 with Selena Jacobo   Post-Op: 03/30/17 with Dr Dailey  Pre-Certification -to be completed  Consent Signed -to be completed  Hospital Stay -AM admit    Surgery Packet,Colonscopy Prep & Antibiotic Rx's mailed to patient. Patient was also instructed to arrive 1 1/2 hour(s) prior to surgery.  Patient understood and agrees to the plan.      Fern Esquivel  Specialty

## 2017-02-28 ENCOUNTER — OFFICE VISIT (OUTPATIENT)
Dept: FAMILY MEDICINE | Facility: CLINIC | Age: 57
End: 2017-02-28
Payer: COMMERCIAL

## 2017-02-28 ENCOUNTER — ANESTHESIA EVENT (OUTPATIENT)
Dept: GASTROENTEROLOGY | Facility: CLINIC | Age: 57
DRG: 330 | End: 2017-02-28
Payer: COMMERCIAL

## 2017-02-28 VITALS
HEART RATE: 102 BPM | RESPIRATION RATE: 16 BRPM | SYSTOLIC BLOOD PRESSURE: 132 MMHG | DIASTOLIC BLOOD PRESSURE: 74 MMHG | WEIGHT: 169.4 LBS | BODY MASS INDEX: 30.98 KG/M2 | OXYGEN SATURATION: 98 % | TEMPERATURE: 97.8 F

## 2017-02-28 DIAGNOSIS — Z01.818 PREOP GENERAL PHYSICAL EXAM: Primary | ICD-10-CM

## 2017-02-28 DIAGNOSIS — K57.80 DIVERTICULITIS OF INTESTINE WITH ABSCESS WITHOUT BLEEDING, UNSPECIFIED PART OF INTESTINAL TRACT: ICD-10-CM

## 2017-02-28 PROCEDURE — 93000 ELECTROCARDIOGRAM COMPLETE: CPT | Performed by: NURSE PRACTITIONER

## 2017-02-28 PROCEDURE — 99214 OFFICE O/P EST MOD 30 MIN: CPT | Performed by: NURSE PRACTITIONER

## 2017-02-28 ASSESSMENT — PAIN SCALES - GENERAL: PAINLEVEL: NO PAIN (0)

## 2017-02-28 NOTE — PROGRESS NOTES
[address] [address]  [phone]  Dept: [phone]    PRE-OP EVALUATION:  Today's date: 2017    Caro Palmer (: 1960) presents for pre-operative evaluation assessment as requested by Dr. Dailey.  She requires evaluation and anesthesia risk assessment prior to undergoing surgery/procedure for treatment of Diverticulitis.  Proposed procedure: Colonoscopy 3/1/17       laparoscopic assisted sigmoid resection, ureteral stent placement 3/23/17                                                                                                                                                                                       Dr. Ashlie Linn  Date of Surgery/ Procedure: 3/1/17 19   and 3/23/17  Time of Surgery/ Procedure: 1130 a.m.   And    7:30 a.m.  Hospital/Surgical Facility: [facility]  Primary Physician: Schoen, Gregory G  Type of Anesthesia Anticipated: to be determined    Patient has a Health Care Directive or Living Will:  NO    1. NO - Do you have a history of heart attack, stroke, stent, bypass or surgery on an artery in the head, neck, heart or legs?  2. NO - Do you ever have any pain or discomfort in your chest?  3. NO - Do you have a history of  Heart Failure?  4. NO - Are you troubled by shortness of breath when: walking on the level, up a slight hill or at night?  5. NO - Do you currently have a cold, bronchitis or other respiratory infection?  6. NO - Do you have a cough, shortness of breath or wheezing?  7. NO - Do you sometimes get pains in the calves of your legs when you walk?  8. NO - Do you or anyone in your family have previous history of blood clots?  9. NO - Do you or does anyone in your family have a serious bleeding problem such as prolonged bleeding following surgeries or cuts?  10. YES - Have you ever had problems with anemia or been told  to take iron pills? When she was pregnant  11. NO - Have you had any abnormal blood loss such as black, tarry or bloody stools, or abnormal vaginal bleeding?  12. NO - Have you ever had a blood transfusion?  13. NO - Have you or any of your relatives ever had problems with anesthesia?  14. NO - Do you have sleep apnea, excessive snoring or daytime drowsiness?  15. NO - Do you have any prosthetic heart valves?  16. NO - Do you have prosthetic joints?  17. NO - Is there any chance that you may be pregnant?      HPI:                                                      Brief HPI related to upcoming procedure: History of diverticulitis with abscess in January.      See problem list for active medical problems.  Problems all longstanding and stable, except as noted/documented.  See ROS for pertinent symptoms related to these conditions.                                                                                                  .    MEDICAL HISTORY:                                                      Patient Active Problem List    Diagnosis Date Noted     Advanced care planning/counseling discussion 01/20/2017     Priority: Medium     Diverticulitis of intestine with abscess 01/11/2017     Priority: Medium     Leukocytosis 01/11/2017     Priority: Medium     Essential hypertension with goal blood pressure less than 140/90 06/17/2016     Priority: Medium     Family history of pancreatic cancer 05/21/2013     Priority: Medium     Chronic rhinitis 11/08/2012     Priority: Medium     CARDIOVASCULAR SCREENING; LDL GOAL LESS THAN 160 10/31/2010     Priority: Medium     Generalized anxiety disorder 04/27/2009     Priority: Medium     S/P LEEP (status post loop electrosurgical excision procedure)      Priority: Medium     1999 LEEP Mod/severe dysplasia  Plan: cotest at 12 and 24 mo.  NIL paps annually from  [id number] 4/24/08 NIL/neg HR HPV  NIL paps annually from 2009 - 2013 5/22/14 NIL/neg HR HPV.   6/17/16 NIL/neg HR  HPV. Plan: cotest in 3 years        Past Medical History   Diagnosis Date     Diverticulitis      Hypertension      Papanicolaou smear of cervix with low grade squamous intraepithelial lesion (LGSIL)      Past Surgical History   Procedure Laterality Date     Colposcopy,bx cervix/endocerv curr  10/19/1999     moderate/severe dysplasia     Conization cervix,loop electrd  11/29/1999     Colonoscopy  2/27/2012     Procedure:COLONOSCOPY; Colonoscopy ; Surgeon:SHIRA ROWLAND; Location: GI     Colonoscopy N/A 3/1/2017     Procedure: COLONOSCOPY;  Surgeon: Ryan Dailey MD;  Location:  GI     Current Outpatient Prescriptions   Medication Sig Dispense Refill     ALPRAZolam (XANAX) 0.25 MG tablet Take 1 tablet (0.25 mg) by mouth 3 times daily as needed for anxiety 30 tablet 0     CHLOROPHYLL PO Take 15 mLs by mouth daily       losartan-hydrochlorothiazide (HYZAAR) 50-12.5 MG per tablet TAKE ONE TABLET BY MOUTH EVERY DAY 30 tablet 10     Lysine 1000 MG TABS Take 1,000 mg by mouth daily.       fish oil-omega-3 fatty acids (FISH OIL) 1000 MG capsule Take 1 g by mouth 2 times daily        Flaxseed, Linseed, (FLAXSEED OIL) 1000 MG CAPS Take 1,000 mg by mouth 3 times daily        OVER-THE-COUNTER 1 tablet daily Citracal 500 D and Calcium 600       ONE DAILY PLUS IRON PO TABS 1 tablet daily       GLUCOSAMINE CHONDRO COMPLEX OR 2 tablets x 2 daily  0     VITAMIN E 400 UNIT OR CAPS ONE CAPSULE DAILY 3 MONTHS 1 YEAR     GELATIN 650 MG OR CAPS 2 daily  0     ASPIRIN 81 MG OR TABS ONE DAILY 100 3     neomycin (MYCIFRADIN) 500 MG tablet Take 2 tablets (1,000 mg) by mouth 3 times daily Take 1000 mg 3 times daily.  Take at 1700, 1800 and 2200 day before surgery (Patient not taking: Reported on 2/28/2017) 6 tablet 0     ibuprofen (ADVIL/MOTRIN) 400 MG tablet Take 1 tablet (400 mg) by mouth every 6 hours as needed for moderate pain (Patient not taking: Reported on 2/28/2017) 40 tablet 0     OTC products: None, except as noted  above    Allergies   Allergen Reactions     No Known Drug Allergies       Latex Allergy: NO    Social History   Substance Use Topics     Smoking status: Never Smoker     Smokeless tobacco: Never Used     Alcohol use 0.0 oz/week     0 Standard drinks or equivalent per week      Comment: 1-2 drinks monthly     History   Drug Use No       REVIEW OF SYSTEMS:                                                    C: NEGATIVE for fever, chills, change in weight  I: NEGATIVE for worrisome rashes, moles or lesions  E: NEGATIVE for vision changes or irritation  E/M: NEGATIVE for ear, mouth and throat problems  R: NEGATIVE for significant cough or SOB  CV: NEGATIVE for chest pain, palpitations or peripheral edema  GI: POSITIVE for mild diffuse abdominal discomfort. NEGATIVE for significant pain, nausea or vomiting, diarrhea or constipation, bloody stools  : NEGATIVE for frequency, dysuria, or hematuria  M: NEGATIVE for significant arthralgias or myalgia  N: NEGATIVE for weakness, dizziness or paresthesias  E: NEGATIVE for temperature intolerance, skin/hair changes  H: NEGATIVE for bleeding problems  P: NEGATIVE for changes in mood or affect    EXAM:                                                    /74 (BP Location: Left arm, Patient Position: Chair, Cuff Size: Adult Regular)  Pulse 102  Temp 97.8  F (36.6  C) (Temporal)  Resp 16  Wt 169 lb 6.4 oz (76.8 kg)  LMP 11/22/2012  SpO2 98%  Breastfeeding? No  BMI 30.98 kg/m2    GENERAL APPEARANCE: healthy, alert and no distress     EYES: EOMI, PERRL     HENT: ear canals and TM's normal and nose and mouth without ulcers or lesions     NECK: no adenopathy, no asymmetry, masses, or scars and thyroid normal to palpation     RESP: lungs clear to auscultation - no rales, rhonchi or wheezes     CV: regular rates and rhythm, normal S1 S2, no S3 or S4 and no murmur, click or rub     ABDOMEN: Bowel sounds present in all quadrants. No bruit auscultated. No masses, no hepatosplenomegaly.  Mild generalized tenderness to palpation     MS: extremities normal- no gross deformities noted, no evidence of inflammation in joints, FROM in all extremities.     SKIN: no suspicious lesions or rashes     NEURO: Normal strength and tone, sensory exam grossly normal, mentation intact and speech normal     PSYCH: mentation appears normal. and affect normal/bright     LYMPHATICS: No axillary, cervical, or supraclavicular nodes    DIAGNOSTICS:                                                    EKG: appears normal, NSR, normal axis, normal intervals, no acute ST/T changes c/w ischemia, no LVH by voltage criteria, prolonged QT    Recent Labs   Lab Test  01/12/17   0540  01/11/17   1520  01/10/17   1516   08/27/13   1337   HGB  12.0  12.5  13.6   < >  12.7   PLT  223  239  275   < >  233   INR   --    --    --    --   1.01   NA   --   143  134   < >   --    POTASSIUM   --   3.8  3.5   < >   --    CR   --   0.62  0.71   < >   --     < > = values in this interval not displayed.        IMPRESSION:                                                    Reason for surgery/procedure: Colonoscopy 3/1/17       laparoscopic assisted sigmoid resection, ureteral stent placement 3/23/17                                                                                                              Dr. Ashlie Linn    Diagnosis/reason for consult: No medical contraindication noted to proceeding with procedures as planned     The proposed surgical procedure is considered INTERMEDIATE risk.    REVISED CARDIAC RISK INDEX  The patient has the following serious cardiovascular risks for perioperative complications such as (MI, PE, VFib and 3  AV Block):  No serious cardiac risks  INTERPRETATION: 0 risks: Class I (very low risk - 0.4% complication rate)    The patient has the following additional risks for perioperative complications:  No identified additional risks      ICD-10-CM    1.  Preop general physical exam Z01.818 EKG 12-lead complete w/read - Clinics   2. Diverticulitis of intestine with abscess without bleeding, unspecified part of intestinal tract K57.80        RECOMMENDATIONS:                                                      --Patient is to take all scheduled medications on the day of surgery EXCEPT for modifications listed below.    Anticoagulant or Antiplatelet Medication Use  ASPIRIN: Discontinue ASA 7-10 days prior to procedure to reduce bleeding risk.  It should be resumed post-operatively.  Over-the-counter supplements: Discontinue supplements 10 days prior to procedure        ACE Inhibitor or Angiotensin Receptor Blocker (ARB) Use  Ace inhibitor or Angiotensin Receptor Blocker (ARB) and should HOLD this medication for the 24 hours prior to surgery.      APPROVAL GIVEN to proceed with proposed procedure, without further diagnostic evaluation       Signed Electronically by: ARIANNA Deluna CNP    Copy of this evaluation report is provided to requesting physician.    [facility] Preop Guidelines

## 2017-02-28 NOTE — PATIENT INSTRUCTIONS
Before Your Surgery      Call your surgeon if there is any change in your health. This includes signs of a cold or flu (such as a sore throat, runny nose, cough, rash or fever).    Do not smoke, drink alcohol or take over the counter medicine (unless your surgeon or primary care doctor tells you to) for the 24 hours before and after surgery.    If you take prescribed drugs: Follow your doctor s orders about which medicines to take and which to stop until after surgery.    Eating and drinking prior to surgery: follow the instructions from your surgeon    Take a shower or bath the night before surgery. Use the soap your surgeon gave you to gently clean your skin. If you do not have soap from your surgeon, use your regular soap. Do not shave or scrub the surgery site.  Wear clean pajamas and have clean sheets on your bed.     Hold supplements 10 days prior to surgery  Hold aspirin 10 days prior to surgery  Hold BP medication day of surgery    May resume all post-op

## 2017-02-28 NOTE — MR AVS SNAPSHOT
After Visit Summary   2/28/2017    Caro Palmer    MRN: [id number]           Patient Information     Date Of Birth          1960        Visit Information        Provider Department      2/28/2017 3:45 PM Selena Jacobo APRN [facility]        Today's Diagnoses     Preop general physical exam    -  1      Care Instructions      Before Your Surgery      Call your surgeon if there is any change in your health. This includes signs of a cold or flu (such as a sore throat, runny nose, cough, rash or fever).    Do not smoke, drink alcohol or take over the counter medicine (unless your surgeon or primary care doctor tells you to) for the 24 hours before and after surgery.    If you take prescribed drugs: Follow your doctor s orders about which medicines to take and which to stop until after surgery.    Eating and drinking prior to surgery: follow the instructions from your surgeon    Take a shower or bath the night before surgery. Use the soap your surgeon gave you to gently clean your skin. If you do not have soap from your surgeon, use your regular soap. Do not shave or scrub the surgery site.  Wear clean pajamas and have clean sheets on your bed.     Hold supplements 10 days prior to surgery  Hold aspirin 10 days prior to surgery  Hold BP medication day of surgery    May resume all post-op        Follow-ups after your visit        Your next 10 appointments already scheduled     Mar 01, 2017   Procedure with Ryan Dailey MD   [facility] Endoscopy ([facility])    [address] [place] [address]   [phone]            Mar 23, 2017   Procedure with Ryan Dailey MD   [facility] Periop Services ([facility])    [address] [place] [address]   [phone]           From [address]: Exit at [address] on [address] side of [place]. Turn right on [address]. Turn left at stoplight on [address]  [address]. [facility] will be in view two blocks ahead            Mar 30, 2017 11:00 AM CDT   Return Visit with Ryan Dailey MD   [facility] ([facility])    [address]  [place] [address] [phone]              Who to contact     If you have questions or need follow up information about today's clinic visit or your schedule please contact [facility] directly at [phone].  Normal or non-critical lab and imaging results will be communicated to you by MyChart, letter or phone within 4 business days after the clinic has received the results. If you do not hear from us within 7 days, please contact the clinic through [employer] or phone. If you have a critical or abnormal lab result, we will notify you by phone as soon as possible.  Submit refill requests through [employer] or call your pharmacy and they will forward the refill request to us. Please allow 3 business days for your refill to be completed.          Additional Information About Your Visit        [employer] Information     [employer] lets you send messages to your doctor, view your test results, renew your prescriptions, schedule appointments and more. To sign up, go to [email or web address] . Click on \"Log in\" on the left side of the screen, which will take you to the Welcome page. Then click on \"Sign up Now\" on the right side of the page.     You will be asked to enter the access code listed below, as well as some personal information. Please follow the directions to create your username and password.     Your access code is: [email or web address]  Expires: 4/10/2017  4:09 PM     Your access code will  in 90 days. If you need help or a new code, please call your [facility] or [phone].        Care EveryWhere ID     This is your Care EveryWhere ID. This could be used by other organizations to access your [place] medical records  [id number]        Your Vitals Were     Pulse " Temperature Respirations Last Period Pulse Oximetry Breastfeeding?    102 97.8  F (36.6  C) (Temporal) 16 11/22/2012 98% No    BMI (Body Mass Index)                   30.98 kg/m2            Blood Pressure from Last 3 Encounters:   02/28/17 132/74   01/20/17 136/80   01/15/17 (!) 155/105    Weight from Last 3 Encounters:   02/28/17 169 lb 6.4 oz (76.8 kg)   01/11/17 149 lb 14.6 oz (68 kg)   01/10/17 168 lb (76.2 kg)              Today, you had the following     No orders found for display       Primary Care Provider Office Phone # Fax #    Gregory G Schoen, [phone]       [facility] [address] [place] [address]        Thank you!     Thank you for choosing [facility]  for your care. Our goal is always to provide you with excellent care. Hearing back from our patients is one way we can continue to improve our services. Please take a few minutes to complete the written survey that you may receive in the mail after your visit with us. Thank you!             Your Updated Medication List - Protect others around you: Learn how to safely use, store and throw away your medicines at www.disposemymeds.org.          This list is accurate as of: 2/28/17  4:28 PM.  Always use your most recent med list.                   Brand Name Dispense Instructions for use    ALPRAZolam 0.25 MG tablet    XANAX    30 tablet    Take 1 tablet (0.25 mg) by mouth 3 times daily as needed for anxiety       aspirin 81 MG tablet     100    ONE DAILY       CHLOROPHYLL PO      Take 15 mLs by mouth daily       fish oil-omega-3 fatty acids 1000 MG capsule      Take 1 g by mouth 2 times daily       flaxseed oil 1000 MG Caps      Take 1,000 mg by mouth 3 times daily       Gelatin 650 MG Caps      2 daily       GLUCOSAMINE CHONDRO COMPLEX OR      2 tablets x 2 daily       ibuprofen 400 MG tablet    ADVIL/MOTRIN    40 tablet    Take 1 tablet (400 mg) by mouth every 6 hours as needed for moderate  pain       losartan-hydrochlorothiazide 50-12.5 MG per tablet    HYZAAR    30 tablet    TAKE ONE TABLET BY MOUTH EVERY DAY       Lysine 1000 MG Tabs      Take 1,000 mg by mouth daily.       neomycin 500 MG tablet    MYCIFRADIN    6 tablet    Take 2 tablets (1,000 mg) by mouth 3 times daily Take 1000 mg 3 times daily.  Take at 1700, 1800 and 2200 day before surgery       ONE DAILY PLUS IRON Tabs      1 tablet daily       OVER-THE-COUNTER      1 tablet daily Citracal 500 D and Calcium 600       vitamin E 400 UNIT capsule     3 MONTHS    ONE CAPSULE DAILY

## 2017-02-28 NOTE — NURSING NOTE
"Chief Complaint   Patient presents with     Pre-Op Exam       Initial /74 (BP Location: Left arm, Patient Position: Chair, Cuff Size: Adult Regular)  Pulse 102  Temp 97.8  F (36.6  C) (Temporal)  Resp 16  Wt 169 lb 6.4 oz (76.8 kg)  LMP 11/22/2012  SpO2 98%  Breastfeeding? No  BMI 30.98 kg/m2 Estimated body mass index is 30.98 kg/(m^2) as calculated from the following:    Height as of 1/11/17: 5' 2\" (1.575 m).    Weight as of this encounter: 169 lb 6.4 oz (76.8 kg).  Medication Reconciliation: complete     Health Maintenance Due   Topic Date Due     HEPATITIS C SCREENING  08/24/1979     Haider Whipple CMA      "

## 2017-03-01 ENCOUNTER — ANESTHESIA (OUTPATIENT)
Dept: GASTROENTEROLOGY | Facility: CLINIC | Age: 57
DRG: 330 | End: 2017-03-01
Payer: COMMERCIAL

## 2017-03-01 ENCOUNTER — HOSPITAL ENCOUNTER (OUTPATIENT)
Facility: CLINIC | Age: 57
Discharge: HOME | End: 2017-03-01
Attending: SPECIALIST | Admitting: SPECIALIST
Payer: COMMERCIAL

## 2017-03-01 ENCOUNTER — HOSPITAL ENCOUNTER (OUTPATIENT)
Dept: GENERAL RADIOLOGY | Facility: CLINIC | Age: 57
DRG: 330 | End: 2017-03-01
Attending: SPECIALIST | Admitting: SPECIALIST
Payer: COMMERCIAL

## 2017-03-01 VITALS
RESPIRATION RATE: 16 BRPM | OXYGEN SATURATION: 98 % | DIASTOLIC BLOOD PRESSURE: 79 MMHG | TEMPERATURE: 97.7 F | SYSTOLIC BLOOD PRESSURE: 138 MMHG

## 2017-03-01 DIAGNOSIS — K57.32 DIVERTICULITIS OF COLON: ICD-10-CM

## 2017-03-01 DIAGNOSIS — Z98.890 S/P COLONOSCOPY: ICD-10-CM

## 2017-03-01 DIAGNOSIS — K57.32 DIVERTICULITIS OF COLON: Primary | ICD-10-CM

## 2017-03-01 LAB — COLONOSCOPY: NORMAL

## 2017-03-01 PROCEDURE — 45378 DIAGNOSTIC COLONOSCOPY: CPT | Performed by: SPECIALIST

## 2017-03-01 PROCEDURE — 25800025 ZZH RX 258: Performed by: NURSE ANESTHETIST, CERTIFIED REGISTERED

## 2017-03-01 PROCEDURE — 25000128 H RX IP 250 OP 636: Performed by: NURSE ANESTHETIST, CERTIFIED REGISTERED

## 2017-03-01 PROCEDURE — 40000940 XR ABDOMEN 1 VW

## 2017-03-01 PROCEDURE — 40000296 ZZH STATISTIC ENDO RECOVERY CLASS 1:2 FIRST HOUR: Performed by: SPECIALIST

## 2017-03-01 PROCEDURE — 40000297 ZZH STATISTIC ENDO RECOVERY CLASS 1:2 EACH ADDL HOUR: Performed by: SPECIALIST

## 2017-03-01 PROCEDURE — 25000125 ZZHC RX 250: Performed by: NURSE ANESTHETIST, CERTIFIED REGISTERED

## 2017-03-01 PROCEDURE — 45378 DIAGNOSTIC COLONOSCOPY: CPT | Mod: 53 | Performed by: SPECIALIST

## 2017-03-01 PROCEDURE — 45330 DIAGNOSTIC SIGMOIDOSCOPY: CPT | Performed by: SPECIALIST

## 2017-03-01 RX ORDER — SODIUM CHLORIDE 9 MG/ML
INJECTION, SOLUTION INTRAVENOUS CONTINUOUS
Status: DISCONTINUED | OUTPATIENT
Start: 2017-03-01 | End: 2017-03-01 | Stop reason: HOSPADM

## 2017-03-01 RX ORDER — SODIUM CHLORIDE, SODIUM LACTATE, POTASSIUM CHLORIDE, CALCIUM CHLORIDE 600; 310; 30; 20 MG/100ML; MG/100ML; MG/100ML; MG/100ML
INJECTION, SOLUTION INTRAVENOUS CONTINUOUS
Status: DISCONTINUED | OUTPATIENT
Start: 2017-03-01 | End: 2017-03-01 | Stop reason: HOSPADM

## 2017-03-01 RX ORDER — LIDOCAINE 40 MG/G
CREAM TOPICAL
Status: DISCONTINUED | OUTPATIENT
Start: 2017-03-01 | End: 2017-03-01 | Stop reason: HOSPADM

## 2017-03-01 RX ORDER — NALOXONE HYDROCHLORIDE 0.4 MG/ML
.1-.4 INJECTION, SOLUTION INTRAMUSCULAR; INTRAVENOUS; SUBCUTANEOUS
Status: DISCONTINUED | OUTPATIENT
Start: 2017-03-01 | End: 2017-03-01 | Stop reason: HOSPADM

## 2017-03-01 RX ORDER — FLUMAZENIL 0.1 MG/ML
0.2 INJECTION, SOLUTION INTRAVENOUS
Status: DISCONTINUED | OUTPATIENT
Start: 2017-03-01 | End: 2017-03-01 | Stop reason: HOSPADM

## 2017-03-01 RX ORDER — PROPOFOL 10 MG/ML
INJECTION, EMULSION INTRAVENOUS PRN
Status: DISCONTINUED | OUTPATIENT
Start: 2017-03-01 | End: 2017-03-01

## 2017-03-01 RX ADMIN — PROPOFOL 30 MG: 10 INJECTION, EMULSION INTRAVENOUS at 12:51

## 2017-03-01 RX ADMIN — PROPOFOL 30 MG: 10 INJECTION, EMULSION INTRAVENOUS at 12:41

## 2017-03-01 RX ADMIN — PROPOFOL 40 MG: 10 INJECTION, EMULSION INTRAVENOUS at 12:30

## 2017-03-01 RX ADMIN — GLUCAGON HYDROCHLORIDE 1 MG: KIT at 12:43

## 2017-03-01 RX ADMIN — PROPOFOL 30 MG: 10 INJECTION, EMULSION INTRAVENOUS at 12:38

## 2017-03-01 RX ADMIN — PROPOFOL 30 MG: 10 INJECTION, EMULSION INTRAVENOUS at 12:32

## 2017-03-01 RX ADMIN — PROPOFOL 50 MG: 10 INJECTION, EMULSION INTRAVENOUS at 12:35

## 2017-03-01 RX ADMIN — SODIUM CHLORIDE, POTASSIUM CHLORIDE, SODIUM LACTATE AND CALCIUM CHLORIDE: 600; 310; 30; 20 INJECTION, SOLUTION INTRAVENOUS at 10:45

## 2017-03-01 NOTE — IP AVS SNAPSHOT
[facility] Endoscopy    [address] [address]    Phone:  [phone]                                       After Visit Summary   3/1/2017    Caro Palmer    MRN: [id number]           After Visit Summary Signature Page     I have received my discharge instructions, and my questions have been answered. I have discussed any challenges I see with this plan with the nurse or doctor.    ..........................................................................................................................................  Patient/Patient Representative Signature      ..........................................................................................................................................  Patient Representative Print Name and Relationship to Patient    ..................................................               ................................................  Date                                            Time    ..........................................................................................................................................  Reviewed by Signature/Title    ...................................................              ..............................................  Date                                                            Time

## 2017-03-01 NOTE — ANESTHESIA POSTPROCEDURE EVALUATION
Patient: Caro Palmer    Procedure(s):  limited colonoscopy to 30-40 centimeters - Wound Class: II-Clean Contaminated    Diagnosis:diverticulitus with perforation  Diagnosis Additional Information: No value filed.    Anesthesia Type:  MAC    Note:  Anesthesia Post Evaluation    Patient location during evaluation: Bedside  Patient participation: Able to fully participate in evaluation  Level of consciousness: awake and alert  Pain management: adequate  Airway patency: patent  Cardiovascular status: acceptable  Respiratory status: acceptable  Hydration status: acceptable  PONV: none     Anesthetic complications: None          Last vitals:  Vitals:    03/01/17 1047   BP: 145/84   Resp: 16   Temp: 97.7  F (36.5  C)   SpO2: 97%         Electronically Signed By: ARIANNA Cerrato CRNA  March 1, 2017  1:03 PM

## 2017-03-01 NOTE — DISCHARGE INSTRUCTIONS
Endoscopy Discharge Instructions  Dr. Dailey    The procedure you have just completed was: Colonoscopy    TO AVOID COMPLICATIONS, TAKE SPECIAL NOTE THE ITEMS BELOW.     Do not drive a car or use mechanized equipment for 24 hours, because medications may cause drowsiness or slow reflexes.  You may resume normal activity tomorrow.  Do not drink alcoholic beverages for 24 hours.       Call  at [phone] if:     ? Any bleeding exceeding one tablespoon occurs.     ? If any unusual chest or abdominal pain develops.     ? You develop a fever of 101   or more.     ? You develop shortness of breath.     ? You have any further questions.      Diet: Regular (after Barium enema)     Medications: Usual           Signed:__________________________ Signed: ________________________ 3/1/2017   (Patient or responsible party)    (Witness)      _________________________________________________________  Physician Signature     3/1/2017 1:55 PM

## 2017-03-01 NOTE — ANESTHESIA CARE TRANSFER NOTE
Patient: Caro Palmer    Procedure(s):  limited colonoscopy to 30-40 centimeters - Wound Class: II-Clean Contaminated    Diagnosis: diverticulitus with perforation  Diagnosis Additional Information: No value filed.    Anesthesia Type:   MAC     Note:  Airway :Room Air  Patient transferred to:Phase II        Vitals: (Last set prior to Anesthesia Care Transfer)    CRNA VITALS  3/1/2017 1226 - 3/1/2017 1302      3/1/2017             SpO2: 100 %                Electronically Signed By: ARIANNA Cerrato CRNA  March 1, 2017  1:02 PM

## 2017-03-01 NOTE — ADDENDUM NOTE
Addendum  created 03/01/17 1419 by Genaro Walker APRN CRNA    Anesthesia Review and Sign - Ready for Procedure

## 2017-03-01 NOTE — IP AVS SNAPSHOT
MRN:[id number]                      After Visit Summary   3/1/2017    Caro Palmer    MRN: [id number]           Thank you!     Thank you for choosing [facility] for your care. Our goal is always to provide you with excellent care. Hearing back from our patients is one way we can continue to improve our services. Please take a few minutes to complete the written survey that you may receive in the mail after you visit with us. Thank you!        Patient Information     Date Of Birth          1960        About your hospital stay     You were admitted on:  March 1, 2017 You last received care in the:  [facility]    You were discharged on:  March 1, 2017       Who to Call     For medical emergencies, please call 911.  For non-urgent questions about your medical care, please call your primary care provider or clinic, [phone]  For questions related to your surgery, please call your surgery clinic        Attending Provider     Provider Specialty    Ryan Dailey MD General Surgery       Primary Care Provider Office Phone # Fax #    Gregory G Schoen, [phone]       [address] [address]        After Care Instructions     Discharge Instructions       Resume pre procedure diet            Discharge Instructions       Restart home medications.                  Your next 10 appointments already scheduled     Mar 01, 2017  2:00 PM CST   XR COLON AIR CONTRAST with PHXR1, PH RAD   [facility] ([facility])    [address] [place] [address] [phone]           Your exam will take about one hour. If you think you might be pregnant, tell your doctor before your exam.  Your bowel (insides) must be empty to get a clear picture. Follow these guidelines:   The night before your exam:Take 10 ounces magnesium citrate at 6 p.m. the night before your exam.   Take 3 Dulcolax tablets  at 8 p.m. Swallow the tablets whole, do not chew or crush them. Do not take within 1 hour of antacids.   1 Dulcolax rectal suppository. Take this the morning of your exam if your stools are not clear by this time. If your stools are clear, you don t need to take this. Do not use a suppository if you are having an air contrast colon.   Follow a  non-residue diet  for at least 48 hours. This means no fruits, vegetables, nuts, seeds or whole grains.   Do not eat, chew gum or smoke for 8 hours before your exam.   Keep drinking clear liquids until 2 hours before your exam. Clear liquids include water, clear juice, black coffee or clear tea without milk, Gatorade, clear soda.   Please bring a list of your current medicines to your exam. (Include vitamins, minerals and over-the-counter medicines.) Leave your valuables at home.  Please call the Imaging Department at your exam site with any questions.            Mar 23, 2017   Procedure with Ryan Dailey MD   [facility] ([facility]    [address] [place] [address]   [phone]           From [facility] [address]: Exit at [address] on south side of [place]. Turn right on [address]. Turn left at stoplight on [address]. [facility] will be in view two blocks ahead            Mar 30, 2017 11:00 AM CDT   Return Visit with Ryan Dailey MD   [facility] ([facility])    [address]  [facility] [address] [phone]              Future tests that were ordered for you     XR Colon Air contrast                 Further instructions from your care team           Endoscopy Discharge Instructions  Dr. Dailey    The procedure you have just completed was: Colonoscopy    TO AVOID COMPLICATIONS, TAKE SPECIAL NOTE THE ITEMS BELOW.     Do not drive a car or use mechanized equipment for 24 hours, because medications may cause drowsiness or slow reflexes.  You may resume  "normal activity tomorrow.  Do not drink alcoholic beverages for 24 hours.       Call  at [phone] if:     ? Any bleeding exceeding one tablespoon occurs.     ? If any unusual chest or abdominal pain develops.     ? You develop a fever of 101   or more.     ? You develop shortness of breath.     ? You have any further questions.      Diet: Regular (after Barium enema)     Medications: Usual           Signed:__________________________ Signed: ________________________ 3/1/2017   (Patient or responsible party)    (Witness)      _________________________________________________________  Physician Signature     3/1/2017 1:55 PM           Pending Results     No orders found from 2017 to 3/2/2017.            Admission Information     Date & Time Provider Department Dept. Phone    3/1/2017 Ryan Dailey MD [facility] [phone]      Your Vitals Were     Blood Pressure Temperature Respirations Last Period Pulse Oximetry       138/79 97.7  F (36.5  C) (Oral) 16 2012 98%       MyChart Information     iChart[facility]art lets you send messages to your doctor, view your test results, renew your prescriptions, schedule appointments and more. To sign up, go to [email or web address] . Click on \"Log in\" on the left side of the screen, which will take you to the Welcome page. Then click on \"Sign up Now\" on the right side of the page.     You will be asked to enter the access code listed below, as well as some personal information. Please follow the directions to create your username and password.     Your access code is: [email or web address]  Expires: 4/10/2017  4:09 PM     Your access code will  in 90 days. If you need help or a new code, please call your [facility] clinic or [phone].        Care EveryWhere ID     This is your Care EveryWhere ID. This could be used by other organizations to access your [facility] medical records  [id number]           Review of your medicines      CONTINUE these " medicines which have NOT CHANGED        Dose / Directions    ALPRAZolam 0.25 MG tablet   Commonly known as:  XANAX   Used for:  Generalized anxiety disorder        Dose:  0.25 mg   Take 1 tablet (0.25 mg) by mouth 3 times daily as needed for anxiety   Quantity:  30 tablet   Refills:  0       aspirin 81 MG tablet   Used for:  Family history of malignant neoplasm of gastrointestinal tract, Other general counseling and advice for contraceptive management        ONE DAILY   Quantity:  100   Refills:  3       CHLOROPHYLL PO        Dose:  15 mL   Take 15 mLs by mouth daily   Refills:  0       fish oil-omega-3 fatty acids 1000 MG capsule        Dose:  1 g   Take 1 g by mouth 2 times daily   Refills:  0       flaxseed oil 1000 MG Caps        Dose:  1000 mg   Take 1,000 mg by mouth 3 times daily   Refills:  0       Gelatin 650 MG Caps   Used for:  Other general counseling and advice for contraceptive management, Family history of malignant neoplasm of gastrointestinal tract        2 daily   Refills:  0       GLUCOSAMINE CHONDRO COMPLEX OR   Used for:  Other general counseling and advice for contraceptive management, Family history of malignant neoplasm of gastrointestinal tract        2 tablets x 2 daily   Refills:  0       ibuprofen 400 MG tablet   Commonly known as:  ADVIL/MOTRIN   Used for:  Diverticulitis of large intestine with abscess without bleeding        Dose:  400 mg   Take 1 tablet (400 mg) by mouth every 6 hours as needed for moderate pain   Quantity:  40 tablet   Refills:  0       losartan-hydrochlorothiazide 50-12.5 MG per tablet   Commonly known as:  HYZAAR   Used for:  Essential hypertension with goal blood pressure less than 140/90        TAKE ONE TABLET BY MOUTH EVERY DAY   Quantity:  30 tablet   Refills:  10       Lysine 1000 MG Tabs        Dose:  1000 mg   Take 1,000 mg by mouth daily.   Refills:  0       neomycin 500 MG tablet   Commonly known as:  MYCIFRADIN   Used for:  Diverticulitis of large  intestine with abscess with bleeding        Dose:  1000 mg   Take 2 tablets (1,000 mg) by mouth 3 times daily Take 1000 mg 3 times daily.  Take at 1700, 1800 and 2200 day before surgery   Quantity:  6 tablet   Refills:  0       ONE DAILY PLUS IRON Tabs        1 tablet daily   Refills:  0       OVER-THE-COUNTER        Dose:  1 tablet   1 tablet daily Citracal 500 D and Calcium 600   Refills:  0       vitamin E 400 UNIT capsule   Used for:  Other general counseling and advice for contraceptive management, Family history of malignant neoplasm of gastrointestinal tract        ONE CAPSULE DAILY   Quantity:  3 MONTHS   Refills:  1 YEAR                Protect others around you: Learn how to safely use, store and throw away your medicines at [email or web address].             Medication List: This is a list of all your medications and when to take them. Check marks below indicate your daily home schedule. Keep this list as a reference.      Medications           Morning Afternoon Evening Bedtime As Needed    ALPRAZolam 0.25 MG tablet   Commonly known as:  XANAX   Take 1 tablet (0.25 mg) by mouth 3 times daily as needed for anxiety                                aspirin 81 MG tablet   ONE DAILY                                CHLOROPHYLL PO   Take 15 mLs by mouth daily                                fish oil-omega-3 fatty acids 1000 MG capsule   Take 1 g by mouth 2 times daily                                flaxseed oil 1000 MG Caps   Take 1,000 mg by mouth 3 times daily                                Gelatin 650 MG Caps   2 daily                                GLUCOSAMINE CHONDRO COMPLEX OR   2 tablets x 2 daily                                ibuprofen 400 MG tablet   Commonly known as:  ADVIL/MOTRIN   Take 1 tablet (400 mg) by mouth every 6 hours as needed for moderate pain                                losartan-hydrochlorothiazide 50-12.5 MG per tablet   Commonly known as:  HYZAAR   TAKE ONE TABLET BY MOUTH EVERY DAY                                 Lysine 1000 MG Tabs   Take 1,000 mg by mouth daily.                                neomycin 500 MG tablet   Commonly known as:  MYCIFRADIN   Take 2 tablets (1,000 mg) by mouth 3 times daily Take 1000 mg 3 times daily.  Take at 1700, 1800 and 2200 day before surgery                                ONE DAILY PLUS IRON Tabs   1 tablet daily                                OVER-THE-COUNTER   1 tablet daily Citracal 500 D and Calcium 600                                vitamin E 400 UNIT capsule   ONE CAPSULE DAILY

## 2017-03-01 NOTE — PROVIDER NOTIFICATION
Discussed discharge instructions.  Patient taken to x-ray via wheelchair for Barium Enema.  Patient and Kulwant MARY stated understanding for further testing and denied having any questions.

## 2017-03-01 NOTE — H&P (VIEW-ONLY)
[address] [address]  [phone]  Dept: [phone]    PRE-OP EVALUATION:  Today's date: 2017    Caro Palmer (: 1960) presents for pre-operative evaluation assessment as requested by Dr. Dailey.  She requires evaluation and anesthesia risk assessment prior to undergoing surgery/procedure for treatment of Diverticulitis.  Proposed procedure: Colonoscopy 3/1/17       laparoscopic assisted sigmoid resection, ureteral stent placement 3/23/17                                                                                                                                                                                       Dr. Ashlie Linn  Date of Surgery/ Procedure: 3/1/17 19   and 3/23/17  Time of Surgery/ Procedure: 1130 a.m.   And    7:30 a.m.  Hospital/Surgical Facility: [facility]  Primary Physician: Schoen, Gregory G  Type of Anesthesia Anticipated: to be determined    Patient has a Health Care Directive or Living Will:  NO    1. NO - Do you have a history of heart attack, stroke, stent, bypass or surgery on an artery in the head, neck, heart or legs?  2. NO - Do you ever have any pain or discomfort in your chest?  3. NO - Do you have a history of  Heart Failure?  4. NO - Are you troubled by shortness of breath when: walking on the level, up a slight hill or at night?  5. NO - Do you currently have a cold, bronchitis or other respiratory infection?  6. NO - Do you have a cough, shortness of breath or wheezing?  7. NO - Do you sometimes get pains in the calves of your legs when you walk?  8. NO - Do you or anyone in your family have previous history of blood clots?  9. NO - Do you or does anyone in your family have a serious bleeding problem such as prolonged bleeding following surgeries or cuts?  10. YES - Have you ever had problems with anemia or been told  to take iron pills? When she was pregnant  11. NO - Have you had any abnormal blood loss such as black, tarry or bloody stools, or abnormal vaginal bleeding?  12. NO - Have you ever had a blood transfusion?  13. NO - Have you or any of your relatives ever had problems with anesthesia?  14. NO - Do you have sleep apnea, excessive snoring or daytime drowsiness?  15. NO - Do you have any prosthetic heart valves?  16. NO - Do you have prosthetic joints?  17. NO - Is there any chance that you may be pregnant?      HPI:                                                      Brief HPI related to upcoming procedure: History of diverticulitis with abscess in January.      See problem list for active medical problems.  Problems all longstanding and stable, except as noted/documented.  See ROS for pertinent symptoms related to these conditions.                                                                                                  .    MEDICAL HISTORY:                                                      Patient Active Problem List    Diagnosis Date Noted     Advanced care planning/counseling discussion 01/20/2017     Priority: Medium     Diverticulitis of intestine with abscess 01/11/2017     Priority: Medium     Leukocytosis 01/11/2017     Priority: Medium     Essential hypertension with goal blood pressure less than 140/90 06/17/2016     Priority: Medium     Family history of pancreatic cancer 05/21/2013     Priority: Medium     Chronic rhinitis 11/08/2012     Priority: Medium     CARDIOVASCULAR SCREENING; LDL GOAL LESS THAN 160 10/31/2010     Priority: Medium     Generalized anxiety disorder 04/27/2009     Priority: Medium     S/P LEEP (status post loop electrosurgical excision procedure)      Priority: Medium     1999 LEEP Mod/severe dysplasia  Plan: cotest at 12 and 24 mo.  NIL paps annually from  [id number] 4/24/08 NIL/neg HR HPV  NIL paps annually from 2009 - 2013 5/22/14 NIL/neg HR HPV.   6/17/16 NIL/neg HR  HPV. Plan: cotest in 3 years        Past Medical History   Diagnosis Date     Hypertension      Papanicolaou smear of cervix with low grade squamous intraepithelial lesion (LGSIL)      Past Surgical History   Procedure Laterality Date     Colposcopy,bx cervix/endocerv curr  10/19/1999     moderate/severe dysplasia     Conization cervix,loop electrd  11/29/1999     Colonoscopy  2/27/2012     Procedure:COLONOSCOPY; Colonoscopy ; Surgeon:SHIRA ROWLAND; Location: GI     Current Outpatient Prescriptions   Medication Sig Dispense Refill     metroNIDAZOLE (FLAGYL) 500 MG tablet Take 2 tablets (1,000 mg) by mouth 3 times daily Take 1000 mg 3 times daily.  Take at 1700, 1800 and 2200 day before surgery 6 tablet 0     ALPRAZolam (XANAX) 0.25 MG tablet Take 1 tablet (0.25 mg) by mouth 3 times daily as needed for anxiety 30 tablet 0     CHLOROPHYLL PO Take 15 mLs by mouth daily       losartan-hydrochlorothiazide (HYZAAR) 50-12.5 MG per tablet TAKE ONE TABLET BY MOUTH EVERY DAY 30 tablet 10     Lysine 1000 MG TABS Take 1,000 mg by mouth daily.       fish oil-omega-3 fatty acids (FISH OIL) 1000 MG capsule Take 1 g by mouth 2 times daily        Flaxseed, Linseed, (FLAXSEED OIL) 1000 MG CAPS Take 1,000 mg by mouth 3 times daily        OVER-THE-COUNTER 1 tablet daily Citracal 500 D and Calcium 600       ONE DAILY PLUS IRON PO TABS 1 tablet daily       GLUCOSAMINE CHONDRO COMPLEX OR 2 tablets x 2 daily  0     VITAMIN E 400 UNIT OR CAPS ONE CAPSULE DAILY 3 MONTHS 1 YEAR     GELATIN 650 MG OR CAPS 2 daily  0     ASPIRIN 81 MG OR TABS ONE DAILY 100 3     neomycin (MYCIFRADIN) 500 MG tablet Take 2 tablets (1,000 mg) by mouth 3 times daily Take 1000 mg 3 times daily.  Take at 1700, 1800 and 2200 day before surgery (Patient not taking: Reported on 2/28/2017) 6 tablet 0     ibuprofen (ADVIL/MOTRIN) 400 MG tablet Take 1 tablet (400 mg) by mouth every 6 hours as needed for moderate pain (Patient not taking: Reported on 2/28/2017) 40 tablet  0     OTC products: None, except as noted above    Allergies   Allergen Reactions     No Known Drug Allergies       Latex Allergy: NO    Social History   Substance Use Topics     Smoking status: Never Smoker     Smokeless tobacco: Never Used     Alcohol use 0.0 oz/week     0 Standard drinks or equivalent per week      Comment: 1-2 drinks monthly     History   Drug Use No       REVIEW OF SYSTEMS:                                                    C: NEGATIVE for fever, chills, change in weight  I: NEGATIVE for worrisome rashes, moles or lesions  E: NEGATIVE for vision changes or irritation  E/M: NEGATIVE for ear, mouth and throat problems  R: NEGATIVE for significant cough or SOB  CV: NEGATIVE for chest pain, palpitations or peripheral edema  GI: POSITIVE for mild diffuse abdominal discomfort. NEGATIVE for significant pain, nausea or vomiting, diarrhea or constipation, bloody stools  : NEGATIVE for frequency, dysuria, or hematuria  M: NEGATIVE for significant arthralgias or myalgia  N: NEGATIVE for weakness, dizziness or paresthesias  E: NEGATIVE for temperature intolerance, skin/hair changes  H: NEGATIVE for bleeding problems  P: NEGATIVE for changes in mood or affect    EXAM:                                                    /74 (BP Location: Left arm, Patient Position: Chair, Cuff Size: Adult Regular)  Pulse 102  Temp 97.8  F (36.6  C) (Temporal)  Resp 16  Wt 169 lb 6.4 oz (76.8 kg)  LMP 11/22/2012  SpO2 98%  Breastfeeding? No  BMI 30.98 kg/m2    GENERAL APPEARANCE: healthy, alert and no distress     EYES: EOMI, PERRL     HENT: ear canals and TM's normal and nose and mouth without ulcers or lesions     NECK: no adenopathy, no asymmetry, masses, or scars and thyroid normal to palpation     RESP: lungs clear to auscultation - no rales, rhonchi or wheezes     CV: regular rates and rhythm, normal S1 S2, no S3 or S4 and no murmur, click or rub     ABDOMEN: Bowel sounds present in all quadrants. No bruit  auscultated. No masses, no hepatosplenomegaly. Mild generalized tenderness to palpation     MS: extremities normal- no gross deformities noted, no evidence of inflammation in joints, FROM in all extremities.     SKIN: no suspicious lesions or rashes     NEURO: Normal strength and tone, sensory exam grossly normal, mentation intact and speech normal     PSYCH: mentation appears normal. and affect normal/bright     LYMPHATICS: No axillary, cervical, or supraclavicular nodes    DIAGNOSTICS:                                                    EKG: appears normal, NSR, normal axis, normal intervals, no acute ST/T changes c/w ischemia, no LVH by voltage criteria, prolonged QT    Recent Labs   Lab Test  01/12/17   0540  01/11/17   1520  01/10/17   1516   08/27/13   1337   HGB  12.0  12.5  13.6   < >  12.7   PLT  223  239  275   < >  233   INR   --    --    --    --   1.01   NA   --   143  134   < >   --    POTASSIUM   --   3.8  3.5   < >   --    CR   --   0.62  0.71   < >   --     < > = values in this interval not displayed.        IMPRESSION:                                                    Reason for surgery/procedure: Colonoscopy 3/1/17       laparoscopic assisted sigmoid resection, ureteral stent placement 3/23/17                                                                                                              Dr. Ashlie Linn    Diagnosis/reason for consult: No medical contraindication noted to proceeding with procedures as planned     The proposed surgical procedure is considered INTERMEDIATE risk.    REVISED CARDIAC RISK INDEX  The patient has the following serious cardiovascular risks for perioperative complications such as (MI, PE, VFib and 3  AV Block):  No serious cardiac risks  INTERPRETATION: 0 risks: Class I (very low risk - 0.4% complication rate)    The patient has the following additional risks for perioperative complications:  No  identified additional risks    No diagnosis found.    RECOMMENDATIONS:                                                      --Patient is to take all scheduled medications on the day of surgery EXCEPT for modifications listed below.    Anticoagulant or Antiplatelet Medication Use  ASPIRIN: Discontinue ASA 7-10 days prior to procedure to reduce bleeding risk.  It should be resumed post-operatively.  Over-the-counter supplements: Discontinue supplements 10 days prior to procedure        ACE Inhibitor or Angiotensin Receptor Blocker (ARB) Use  Ace inhibitor or Angiotensin Receptor Blocker (ARB) and should HOLD this medication for the 24 hours prior to surgery.      APPROVAL GIVEN to proceed with proposed procedure, without further diagnostic evaluation       Signed Electronically by: ARIANNA Deluna CNP    Copy of this evaluation report is provided to requesting physician.    [facility] Preop Guidelines

## 2017-03-01 NOTE — ANESTHESIA PREPROCEDURE EVALUATION
Anesthesia Evaluation     . Pt has had prior anesthetic. Type: General and MAC      ROS/MED HX    ENT/Pulmonary: Comment: Chronic rhinitis      Neurologic:  - neg neurologic ROS     Cardiovascular:     (+) hypertension-range: goal < 130/80, ---. : . . . :. .       METS/Exercise Tolerance:     Hematologic:  - neg hematologic  ROS       Musculoskeletal:  - neg musculoskeletal ROS       GI/Hepatic: Comment: Mild abdominal pain without N/V    (+) bowel prep,       Renal/Genitourinary:  - ROS Renal section negative       Endo:  - neg endo ROS       Psychiatric:     (+) psychiatric history anxiety      Infectious Disease:  - neg infectious disease ROS       Malignancy:      - no malignancy   Other: Comment: Menopause    (+) No chance of pregnancy C-spine cleared: N/A, no H/O Chronic Pain,no other significant disability   - neg other ROS           Physical Exam  Normal systems: cardiovascular, pulmonary and dental    Airway   Mallampati: II  TM distance: <3 FB  Neck ROM: full    Dental     Cardiovascular   Rhythm and rate: regular and normal      Pulmonary    breath sounds clear to auscultation                    Anesthesia Plan      History & Physical Review  History and physical reviewed and following examination; no interval change.    ASA Status:  2 .    NPO Status:  > 8 hours    Plan for MAC with Propofol induction. Maintenance will be TIVA.  Reason for MAC:  Deep or markedly invasive procedure (G8)  PONV prophylaxis:  Ondansetron (or other 5HT-3) and Dexamethasone or Solumedrol       Postoperative Care  Postoperative pain management:  IV analgesics.      Consents  Anesthetic plan, risks, benefits and alternatives discussed with:  Patient.  Use of blood products discussed: No .   .                          .

## 2017-03-14 ENCOUNTER — HOSPITAL ENCOUNTER (OUTPATIENT)
Dept: GENERAL RADIOLOGY | Facility: CLINIC | Age: 57
Discharge: HOME | End: 2017-03-14
Attending: SPECIALIST | Admitting: SPECIALIST
Payer: COMMERCIAL

## 2017-03-14 DIAGNOSIS — K57.32 DIVERTICULITIS OF COLON: ICD-10-CM

## 2017-03-14 DIAGNOSIS — Z98.890 S/P COLONOSCOPY: ICD-10-CM

## 2017-03-14 PROCEDURE — 74280 X-RAY XM COLON 2CNTRST STD: CPT | Mod: TC

## 2017-03-21 NOTE — H&P (VIEW-ONLY)
[address] [address]  [phone]  Dept: [phone]    PRE-OP EVALUATION:  Today's date: 2017    Caro Palmer (: 1960) presents for pre-operative evaluation assessment as requested by Dr. Dailey.  She requires evaluation and anesthesia risk assessment prior to undergoing surgery/procedure for treatment of Diverticulitis.  Proposed procedure: Colonoscopy 3/1/17       laparoscopic assisted sigmoid resection, ureteral stent placement 3/23/17                                                                                                                                                                                       Dr. Ashlie Linn  Date of Surgery/ Procedure: 3/1/17 19   and 3/23/17  Time of Surgery/ Procedure: 1130 a.m.   And    7:30 a.m.  Hospital/Surgical Facility: [facility]  Primary Physician: Schoen, Gregory G  Type of Anesthesia Anticipated: to be determined    Patient has a Health Care Directive or Living Will:  NO    1. NO - Do you have a history of heart attack, stroke, stent, bypass or surgery on an artery in the head, neck, heart or legs?  2. NO - Do you ever have any pain or discomfort in your chest?  3. NO - Do you have a history of  Heart Failure?  4. NO - Are you troubled by shortness of breath when: walking on the level, up a slight hill or at night?  5. NO - Do you currently have a cold, bronchitis or other respiratory infection?  6. NO - Do you have a cough, shortness of breath or wheezing?  7. NO - Do you sometimes get pains in the calves of your legs when you walk?  8. NO - Do you or anyone in your family have previous history of blood clots?  9. NO - Do you or does anyone in your family have a serious bleeding problem such as prolonged bleeding following surgeries or cuts?  10. YES - Have you ever had problems with anemia or been told  to take iron pills? When she was pregnant  11. NO - Have you had any abnormal blood loss such as black, tarry or bloody stools, or abnormal vaginal bleeding?  12. NO - Have you ever had a blood transfusion?  13. NO - Have you or any of your relatives ever had problems with anesthesia?  14. NO - Do you have sleep apnea, excessive snoring or daytime drowsiness?  15. NO - Do you have any prosthetic heart valves?  16. NO - Do you have prosthetic joints?  17. NO - Is there any chance that you may be pregnant?      HPI:                                                      Brief HPI related to upcoming procedure: History of diverticulitis with abscess in January.      See problem list for active medical problems.  Problems all longstanding and stable, except as noted/documented.  See ROS for pertinent symptoms related to these conditions.                                                                                                  .    MEDICAL HISTORY:                                                      Patient Active Problem List    Diagnosis Date Noted     Advanced care planning/counseling discussion 01/20/2017     Priority: Medium     Diverticulitis of intestine with abscess 01/11/2017     Priority: Medium     Leukocytosis 01/11/2017     Priority: Medium     Essential hypertension with goal blood pressure less than 140/90 06/17/2016     Priority: Medium     Family history of pancreatic cancer 05/21/2013     Priority: Medium     Chronic rhinitis 11/08/2012     Priority: Medium     CARDIOVASCULAR SCREENING; LDL GOAL LESS THAN 160 10/31/2010     Priority: Medium     Generalized anxiety disorder 04/27/2009     Priority: Medium     S/P LEEP (status post loop electrosurgical excision procedure)      Priority: Medium     1999 LEEP Mod/severe dysplasia  Plan: cotest at 12 and 24 mo.  NIL paps annually from  [id number] 4/24/08 NIL/neg HR HPV  NIL paps annually from 2009 - 2013 5/22/14 NIL/neg HR HPV.   6/17/16 NIL/neg HR  HPV. Plan: cotest in 3 years        Past Medical History   Diagnosis Date     Diverticulitis      Hypertension      Papanicolaou smear of cervix with low grade squamous intraepithelial lesion (LGSIL)      Past Surgical History   Procedure Laterality Date     Colposcopy,bx cervix/endocerv curr  10/19/1999     moderate/severe dysplasia     Conization cervix,loop electrd  11/29/1999     Colonoscopy  2/27/2012     Procedure:COLONOSCOPY; Colonoscopy ; Surgeon:SHIRA ROWLAND; Location: GI     Colonoscopy N/A 3/1/2017     Procedure: COLONOSCOPY;  Surgeon: Ryan Dailey MD;  Location:  GI     Current Outpatient Prescriptions   Medication Sig Dispense Refill     ALPRAZolam (XANAX) 0.25 MG tablet Take 1 tablet (0.25 mg) by mouth 3 times daily as needed for anxiety 30 tablet 0     CHLOROPHYLL PO Take 15 mLs by mouth daily       losartan-hydrochlorothiazide (HYZAAR) 50-12.5 MG per tablet TAKE ONE TABLET BY MOUTH EVERY DAY 30 tablet 10     Lysine 1000 MG TABS Take 1,000 mg by mouth daily.       fish oil-omega-3 fatty acids (FISH OIL) 1000 MG capsule Take 1 g by mouth 2 times daily        Flaxseed, Linseed, (FLAXSEED OIL) 1000 MG CAPS Take 1,000 mg by mouth 3 times daily        OVER-THE-COUNTER 1 tablet daily Citracal 500 D and Calcium 600       ONE DAILY PLUS IRON PO TABS 1 tablet daily       GLUCOSAMINE CHONDRO COMPLEX OR 2 tablets x 2 daily  0     VITAMIN E 400 UNIT OR CAPS ONE CAPSULE DAILY 3 MONTHS 1 YEAR     GELATIN 650 MG OR CAPS 2 daily  0     ASPIRIN 81 MG OR TABS ONE DAILY 100 3     neomycin (MYCIFRADIN) 500 MG tablet Take 2 tablets (1,000 mg) by mouth 3 times daily Take 1000 mg 3 times daily.  Take at 1700, 1800 and 2200 day before surgery (Patient not taking: Reported on 2/28/2017) 6 tablet 0     ibuprofen (ADVIL/MOTRIN) 400 MG tablet Take 1 tablet (400 mg) by mouth every 6 hours as needed for moderate pain (Patient not taking: Reported on 2/28/2017) 40 tablet 0     OTC products: None, except as noted  above    Allergies   Allergen Reactions     No Known Drug Allergies       Latex Allergy: NO    Social History   Substance Use Topics     Smoking status: Never Smoker     Smokeless tobacco: Never Used     Alcohol use 0.0 oz/week     0 Standard drinks or equivalent per week      Comment: 1-2 drinks monthly     History   Drug Use No       REVIEW OF SYSTEMS:                                                    C: NEGATIVE for fever, chills, change in weight  I: NEGATIVE for worrisome rashes, moles or lesions  E: NEGATIVE for vision changes or irritation  E/M: NEGATIVE for ear, mouth and throat problems  R: NEGATIVE for significant cough or SOB  CV: NEGATIVE for chest pain, palpitations or peripheral edema  GI: POSITIVE for mild diffuse abdominal discomfort. NEGATIVE for significant pain, nausea or vomiting, diarrhea or constipation, bloody stools  : NEGATIVE for frequency, dysuria, or hematuria  M: NEGATIVE for significant arthralgias or myalgia  N: NEGATIVE for weakness, dizziness or paresthesias  E: NEGATIVE for temperature intolerance, skin/hair changes  H: NEGATIVE for bleeding problems  P: NEGATIVE for changes in mood or affect    EXAM:                                                    /74 (BP Location: Left arm, Patient Position: Chair, Cuff Size: Adult Regular)  Pulse 102  Temp 97.8  F (36.6  C) (Temporal)  Resp 16  Wt 169 lb 6.4 oz (76.8 kg)  LMP 11/22/2012  SpO2 98%  Breastfeeding? No  BMI 30.98 kg/m2    GENERAL APPEARANCE: healthy, alert and no distress     EYES: EOMI, PERRL     HENT: ear canals and TM's normal and nose and mouth without ulcers or lesions     NECK: no adenopathy, no asymmetry, masses, or scars and thyroid normal to palpation     RESP: lungs clear to auscultation - no rales, rhonchi or wheezes     CV: regular rates and rhythm, normal S1 S2, no S3 or S4 and no murmur, click or rub     ABDOMEN: Bowel sounds present in all quadrants. No bruit auscultated. No masses, no hepatosplenomegaly.  Mild generalized tenderness to palpation     MS: extremities normal- no gross deformities noted, no evidence of inflammation in joints, FROM in all extremities.     SKIN: no suspicious lesions or rashes     NEURO: Normal strength and tone, sensory exam grossly normal, mentation intact and speech normal     PSYCH: mentation appears normal. and affect normal/bright     LYMPHATICS: No axillary, cervical, or supraclavicular nodes    DIAGNOSTICS:                                                    EKG: appears normal, NSR, normal axis, normal intervals, no acute ST/T changes c/w ischemia, no LVH by voltage criteria, prolonged QT    Recent Labs   Lab Test  01/12/17   0540  01/11/17   1520  01/10/17   1516   08/27/13   1337   HGB  12.0  12.5  13.6   < >  12.7   PLT  223  239  275   < >  233   INR   --    --    --    --   1.01   NA   --   143  134   < >   --    POTASSIUM   --   3.8  3.5   < >   --    CR   --   0.62  0.71   < >   --     < > = values in this interval not displayed.        IMPRESSION:                                                    Reason for surgery/procedure: Colonoscopy 3/1/17       laparoscopic assisted sigmoid resection, ureteral stent placement 3/23/17                                                                                                              Dr. Ashlie Linn    Diagnosis/reason for consult: No medical contraindication noted to proceeding with procedures as planned     The proposed surgical procedure is considered INTERMEDIATE risk.    REVISED CARDIAC RISK INDEX  The patient has the following serious cardiovascular risks for perioperative complications such as (MI, PE, VFib and 3  AV Block):  No serious cardiac risks  INTERPRETATION: 0 risks: Class I (very low risk - 0.4% complication rate)    The patient has the following additional risks for perioperative complications:  No identified additional risks      ICD-10-CM    1.  Preop general physical exam Z01.818 EKG 12-lead complete w/read - Clinics   2. Diverticulitis of intestine with abscess without bleeding, unspecified part of intestinal tract K57.80        RECOMMENDATIONS:                                                      --Patient is to take all scheduled medications on the day of surgery EXCEPT for modifications listed below.    Anticoagulant or Antiplatelet Medication Use  ASPIRIN: Discontinue ASA 7-10 days prior to procedure to reduce bleeding risk.  It should be resumed post-operatively.  Over-the-counter supplements: Discontinue supplements 10 days prior to procedure        ACE Inhibitor or Angiotensin Receptor Blocker (ARB) Use  Ace inhibitor or Angiotensin Receptor Blocker (ARB) and should HOLD this medication for the 24 hours prior to surgery.      APPROVAL GIVEN to proceed with proposed procedure, without further diagnostic evaluation       Signed Electronically by: ARIANNA Deluna CNP    Copy of this evaluation report is provided to requesting physician.    [facility] Preop Guidelines

## 2017-03-23 ENCOUNTER — ANESTHESIA (OUTPATIENT)
Dept: SURGERY | Facility: CLINIC | Age: 57
DRG: 330 | End: 2017-03-23
Payer: COMMERCIAL

## 2017-03-23 ENCOUNTER — ANESTHESIA EVENT (OUTPATIENT)
Dept: SURGERY | Facility: CLINIC | Age: 57
DRG: 330 | End: 2017-03-23
Payer: COMMERCIAL

## 2017-03-23 ENCOUNTER — HOSPITAL ENCOUNTER (INPATIENT)
Facility: CLINIC | Age: 57
LOS: 5 days | Discharge: HOME | DRG: 330 | End: 2017-03-28
Attending: SPECIALIST | Admitting: SPECIALIST
Payer: COMMERCIAL

## 2017-03-23 DIAGNOSIS — R11.0 NAUSEA: ICD-10-CM

## 2017-03-23 DIAGNOSIS — K57.20 DIVERTICULITIS OF LARGE INTESTINE WITH ABSCESS WITHOUT BLEEDING: Primary | ICD-10-CM

## 2017-03-23 DIAGNOSIS — G89.18 ACUTE POST-OPERATIVE PAIN: ICD-10-CM

## 2017-03-23 PROBLEM — Z90.49 S/P PARTIAL RESECTION OF COLON: Status: ACTIVE | Noted: 2017-03-23

## 2017-03-23 LAB
CREAT SERPL-MCNC: 0.64 MG/DL (ref 0.52–1.04)
GFR SERPL CREATININE-BSD FRML MDRD: NORMAL ML/MIN/1.7M2
PLATELET # BLD AUTO: 232 10E9/L (ref 150–450)

## 2017-03-23 PROCEDURE — 25000128 H RX IP 250 OP 636: Performed by: SPECIALIST

## 2017-03-23 PROCEDURE — 36415 COLL VENOUS BLD VENIPUNCTURE: CPT | Performed by: SPECIALIST

## 2017-03-23 PROCEDURE — 44145 PARTIAL REMOVAL OF COLON: CPT | Performed by: SPECIALIST

## 2017-03-23 PROCEDURE — 44145 PARTIAL REMOVAL OF COLON: CPT | Mod: 80 | Performed by: SURGERY

## 2017-03-23 PROCEDURE — 88307 TISSUE EXAM BY PATHOLOGIST: CPT | Performed by: SPECIALIST

## 2017-03-23 PROCEDURE — 25000125 ZZHC RX 250: Performed by: SPECIALIST

## 2017-03-23 PROCEDURE — 44139 MOBILIZATION OF COLON: CPT | Mod: 80 | Performed by: SURGERY

## 2017-03-23 PROCEDURE — 25000128 H RX IP 250 OP 636

## 2017-03-23 PROCEDURE — 44139 MOBILIZATION OF COLON: CPT | Performed by: SPECIALIST

## 2017-03-23 PROCEDURE — 85049 AUTOMATED PLATELET COUNT: CPT | Performed by: SPECIALIST

## 2017-03-23 PROCEDURE — 0DJD4ZZ INSPECTION OF LOWER INTESTINAL TRACT, PERCUTANEOUS ENDOSCOPIC APPROACH: ICD-10-PCS | Performed by: SPECIALIST

## 2017-03-23 PROCEDURE — S0074 INJECTION, CEFOTETAN DISODIU: HCPCS | Performed by: SPECIALIST

## 2017-03-23 PROCEDURE — 25000125 ZZHC RX 250

## 2017-03-23 PROCEDURE — 0DJD8ZZ INSPECTION OF LOWER INTESTINAL TRACT, VIA NATURAL OR ARTIFICIAL OPENING ENDOSCOPIC: ICD-10-PCS | Performed by: SPECIALIST

## 2017-03-23 PROCEDURE — 71000014 ZZH RECOVERY PHASE 1 LEVEL 2 FIRST HR: Performed by: SPECIALIST

## 2017-03-23 PROCEDURE — 25800025 ZZH RX 258: Performed by: SPECIALIST

## 2017-03-23 PROCEDURE — C1875 STENT, COATED/COV W/O DEL SY: HCPCS | Performed by: SPECIALIST

## 2017-03-23 PROCEDURE — 40000306 ZZH STATISTIC PRE PROC ASSESS II: Performed by: SPECIALIST

## 2017-03-23 PROCEDURE — 82565 ASSAY OF CREATININE: CPT | Performed by: SPECIALIST

## 2017-03-23 PROCEDURE — 36000093 ZZH SURGERY LEVEL 4 1ST 30 MIN: Performed by: SPECIALIST

## 2017-03-23 PROCEDURE — C1769 GUIDE WIRE: HCPCS | Performed by: SPECIALIST

## 2017-03-23 PROCEDURE — 25000564 ZZH DESFLURANE, EA 15 MIN: Performed by: SPECIALIST

## 2017-03-23 PROCEDURE — 25000132 ZZH RX MED GY IP 250 OP 250 PS 637: Performed by: SPECIALIST

## 2017-03-23 PROCEDURE — 27210794 ZZH OR GENERAL SUPPLY STERILE: Performed by: SPECIALIST

## 2017-03-23 PROCEDURE — 37000009 ZZH ANESTHESIA TECHNICAL FEE, EACH ADDTL 15 MIN: Performed by: SPECIALIST

## 2017-03-23 PROCEDURE — 36000063 ZZH SURGERY LEVEL 4 EA 15 ADDTL MIN: Performed by: SPECIALIST

## 2017-03-23 PROCEDURE — 0DBN0ZZ EXCISION OF SIGMOID COLON, OPEN APPROACH: ICD-10-PCS | Performed by: SPECIALIST

## 2017-03-23 PROCEDURE — 27110028 ZZH OR GENERAL SUPPLY NON-STERILE: Performed by: SPECIALIST

## 2017-03-23 PROCEDURE — 88307 TISSUE EXAM BY PATHOLOGIST: CPT | Mod: 26 | Performed by: SPECIALIST

## 2017-03-23 PROCEDURE — 25800025 ZZH RX 258: Performed by: NURSE ANESTHETIST, CERTIFIED REGISTERED

## 2017-03-23 PROCEDURE — 37000008 ZZH ANESTHESIA TECHNICAL FEE, 1ST 30 MIN: Performed by: SPECIALIST

## 2017-03-23 PROCEDURE — 12000007 ZZH R&B INTERMEDIATE

## 2017-03-23 RX ORDER — SODIUM CHLORIDE, SODIUM LACTATE, POTASSIUM CHLORIDE, CALCIUM CHLORIDE 600; 310; 30; 20 MG/100ML; MG/100ML; MG/100ML; MG/100ML
500 INJECTION, SOLUTION INTRAVENOUS CONTINUOUS
Status: DISCONTINUED | OUTPATIENT
Start: 2017-03-23 | End: 2017-03-23 | Stop reason: HOSPADM

## 2017-03-23 RX ORDER — ONDANSETRON 4 MG/1
4 TABLET, ORALLY DISINTEGRATING ORAL EVERY 30 MIN PRN
Status: DISCONTINUED | OUTPATIENT
Start: 2017-03-23 | End: 2017-03-23 | Stop reason: HOSPADM

## 2017-03-23 RX ORDER — BUPIVACAINE HYDROCHLORIDE AND EPINEPHRINE 2.5; 5 MG/ML; UG/ML
INJECTION, SOLUTION INFILTRATION; PERINEURAL PRN
Status: DISCONTINUED | OUTPATIENT
Start: 2017-03-23 | End: 2017-03-23 | Stop reason: HOSPADM

## 2017-03-23 RX ORDER — ONDANSETRON 2 MG/ML
4 INJECTION INTRAMUSCULAR; INTRAVENOUS EVERY 6 HOURS PRN
Status: DISCONTINUED | OUTPATIENT
Start: 2017-03-23 | End: 2017-03-28 | Stop reason: HOSPADM

## 2017-03-23 RX ORDER — SODIUM CHLORIDE, SODIUM LACTATE, POTASSIUM CHLORIDE, CALCIUM CHLORIDE 600; 310; 30; 20 MG/100ML; MG/100ML; MG/100ML; MG/100ML
INJECTION, SOLUTION INTRAVENOUS CONTINUOUS
Status: DISCONTINUED | OUTPATIENT
Start: 2017-03-23 | End: 2017-03-23 | Stop reason: HOSPADM

## 2017-03-23 RX ORDER — ACETAMINOPHEN 325 MG/1
975 TABLET ORAL ONCE
Status: COMPLETED | OUTPATIENT
Start: 2017-03-23 | End: 2017-03-23

## 2017-03-23 RX ORDER — PROPOFOL 10 MG/ML
INJECTION, EMULSION INTRAVENOUS PRN
Status: DISCONTINUED | OUTPATIENT
Start: 2017-03-23 | End: 2017-03-23

## 2017-03-23 RX ORDER — NALOXONE HYDROCHLORIDE 0.4 MG/ML
.1-.4 INJECTION, SOLUTION INTRAMUSCULAR; INTRAVENOUS; SUBCUTANEOUS
Status: DISCONTINUED | OUTPATIENT
Start: 2017-03-23 | End: 2017-03-28 | Stop reason: HOSPADM

## 2017-03-23 RX ORDER — DEXTROSE MONOHYDRATE, SODIUM CHLORIDE, AND POTASSIUM CHLORIDE 50; 1.49; 4.5 G/1000ML; G/1000ML; G/1000ML
INJECTION, SOLUTION INTRAVENOUS CONTINUOUS
Status: DISCONTINUED | OUTPATIENT
Start: 2017-03-23 | End: 2017-03-28 | Stop reason: HOSPADM

## 2017-03-23 RX ORDER — MEPERIDINE HYDROCHLORIDE 50 MG/ML
12.5 INJECTION INTRAMUSCULAR; INTRAVENOUS; SUBCUTANEOUS EVERY 5 MIN PRN
Status: DISCONTINUED | OUTPATIENT
Start: 2017-03-23 | End: 2017-03-23 | Stop reason: HOSPADM

## 2017-03-23 RX ORDER — DIPHENHYDRAMINE HYDROCHLORIDE 50 MG/ML
25 INJECTION INTRAMUSCULAR; INTRAVENOUS EVERY 6 HOURS PRN
Status: DISCONTINUED | OUTPATIENT
Start: 2017-03-23 | End: 2017-03-28 | Stop reason: HOSPADM

## 2017-03-23 RX ORDER — LIDOCAINE 40 MG/G
CREAM TOPICAL
Status: DISCONTINUED | OUTPATIENT
Start: 2017-03-23 | End: 2017-03-28 | Stop reason: HOSPADM

## 2017-03-23 RX ORDER — CEFOTETAN DISODIUM 1 G/10ML
1 INJECTION, POWDER, FOR SOLUTION INTRAMUSCULAR; INTRAVENOUS SEE ADMIN INSTRUCTIONS
Status: DISCONTINUED | OUTPATIENT
Start: 2017-03-23 | End: 2017-03-23 | Stop reason: HOSPADM

## 2017-03-23 RX ORDER — DEXAMETHASONE SODIUM PHOSPHATE 10 MG/ML
INJECTION, SOLUTION INTRAMUSCULAR; INTRAVENOUS PRN
Status: DISCONTINUED | OUTPATIENT
Start: 2017-03-23 | End: 2017-03-23

## 2017-03-23 RX ORDER — HYDRALAZINE HYDROCHLORIDE 20 MG/ML
10 INJECTION INTRAMUSCULAR; INTRAVENOUS EVERY 4 HOURS PRN
Status: DISCONTINUED | OUTPATIENT
Start: 2017-03-23 | End: 2017-03-28 | Stop reason: HOSPADM

## 2017-03-23 RX ORDER — ONDANSETRON 2 MG/ML
INJECTION INTRAMUSCULAR; INTRAVENOUS PRN
Status: DISCONTINUED | OUTPATIENT
Start: 2017-03-23 | End: 2017-03-23

## 2017-03-23 RX ORDER — LIDOCAINE 40 MG/G
CREAM TOPICAL
Status: DISCONTINUED | OUTPATIENT
Start: 2017-03-23 | End: 2017-03-23 | Stop reason: HOSPADM

## 2017-03-23 RX ORDER — ONDANSETRON 2 MG/ML
4 INJECTION INTRAMUSCULAR; INTRAVENOUS EVERY 30 MIN PRN
Status: DISCONTINUED | OUTPATIENT
Start: 2017-03-23 | End: 2017-03-23 | Stop reason: HOSPADM

## 2017-03-23 RX ORDER — CEFOTETAN DISODIUM 1 G/10ML
1 INJECTION, POWDER, FOR SOLUTION INTRAMUSCULAR; INTRAVENOUS
Status: COMPLETED | OUTPATIENT
Start: 2017-03-23 | End: 2017-03-23

## 2017-03-23 RX ORDER — LIDOCAINE HYDROCHLORIDE 20 MG/ML
INJECTION, SOLUTION INFILTRATION; PERINEURAL PRN
Status: DISCONTINUED | OUTPATIENT
Start: 2017-03-23 | End: 2017-03-23

## 2017-03-23 RX ORDER — FENTANYL CITRATE 50 UG/ML
INJECTION, SOLUTION INTRAMUSCULAR; INTRAVENOUS PRN
Status: DISCONTINUED | OUTPATIENT
Start: 2017-03-23 | End: 2017-03-23

## 2017-03-23 RX ORDER — ALBUTEROL SULFATE 0.83 MG/ML
2.5 SOLUTION RESPIRATORY (INHALATION) EVERY 4 HOURS PRN
Status: DISCONTINUED | OUTPATIENT
Start: 2017-03-23 | End: 2017-03-23 | Stop reason: HOSPADM

## 2017-03-23 RX ORDER — FENTANYL CITRATE 50 UG/ML
25-50 INJECTION, SOLUTION INTRAMUSCULAR; INTRAVENOUS
Status: DISCONTINUED | OUTPATIENT
Start: 2017-03-23 | End: 2017-03-23 | Stop reason: HOSPADM

## 2017-03-23 RX ADMIN — FENTANYL CITRATE 50 MCG: 50 INJECTION, SOLUTION INTRAMUSCULAR; INTRAVENOUS at 08:17

## 2017-03-23 RX ADMIN — FENTANYL CITRATE 50 MCG: 50 INJECTION, SOLUTION INTRAMUSCULAR; INTRAVENOUS at 07:33

## 2017-03-23 RX ADMIN — HYDROMORPHONE HYDROCHLORIDE 0.5 MG: 1 INJECTION, SOLUTION INTRAMUSCULAR; INTRAVENOUS; SUBCUTANEOUS at 09:05

## 2017-03-23 RX ADMIN — CEFOTETAN DISODIUM 1 G: 1 INJECTION, POWDER, FOR SOLUTION INTRAMUSCULAR; INTRAVENOUS at 07:50

## 2017-03-23 RX ADMIN — PROPOFOL 150 MG: 10 INJECTION, EMULSION INTRAVENOUS at 07:44

## 2017-03-23 RX ADMIN — LIDOCAINE HYDROCHLORIDE 80 MG: 20 INJECTION, SOLUTION INFILTRATION; PERINEURAL at 07:44

## 2017-03-23 RX ADMIN — HYDROMORPHONE HYDROCHLORIDE 0.5 MG: 1 INJECTION, SOLUTION INTRAMUSCULAR; INTRAVENOUS; SUBCUTANEOUS at 09:11

## 2017-03-23 RX ADMIN — FENTANYL CITRATE 50 MCG: 50 INJECTION, SOLUTION INTRAMUSCULAR; INTRAVENOUS at 07:54

## 2017-03-23 RX ADMIN — FENTANYL CITRATE 50 MCG: 50 INJECTION, SOLUTION INTRAMUSCULAR; INTRAVENOUS at 07:44

## 2017-03-23 RX ADMIN — POTASSIUM CHLORIDE, DEXTROSE MONOHYDRATE AND SODIUM CHLORIDE: 150; 5; 450 INJECTION, SOLUTION INTRAVENOUS at 13:29

## 2017-03-23 RX ADMIN — DEXAMETHASONE SODIUM PHOSPHATE 10 MG: 10 INJECTION, SOLUTION INTRAMUSCULAR; INTRAVENOUS at 07:55

## 2017-03-23 RX ADMIN — PANTOPRAZOLE SODIUM 40 MG: 40 INJECTION, POWDER, FOR SOLUTION INTRAVENOUS at 13:29

## 2017-03-23 RX ADMIN — SODIUM CHLORIDE, SODIUM LACTATE, POTASSIUM CHLORIDE, CALCIUM CHLORIDE: 600; 310; 30; 20 INJECTION, SOLUTION INTRAVENOUS at 07:33

## 2017-03-23 RX ADMIN — SODIUM CHLORIDE, SODIUM LACTATE, POTASSIUM CHLORIDE, CALCIUM CHLORIDE: 600; 310; 30; 20 INJECTION, SOLUTION INTRAVENOUS at 09:30

## 2017-03-23 RX ADMIN — SODIUM CHLORIDE, SODIUM LACTATE, POTASSIUM CHLORIDE, CALCIUM CHLORIDE: 600; 310; 30; 20 INJECTION, SOLUTION INTRAVENOUS at 08:02

## 2017-03-23 RX ADMIN — ACETAMINOPHEN 975 MG: 325 TABLET ORAL at 06:31

## 2017-03-23 RX ADMIN — HYDROMORPHONE HYDROCHLORIDE: 10 INJECTION, SOLUTION INTRAMUSCULAR; INTRAVENOUS; SUBCUTANEOUS at 12:30

## 2017-03-23 RX ADMIN — ROCURONIUM BROMIDE 50 MG: 10 INJECTION INTRAVENOUS at 07:44

## 2017-03-23 RX ADMIN — POTASSIUM CHLORIDE, DEXTROSE MONOHYDRATE AND SODIUM CHLORIDE: 150; 5; 450 INJECTION, SOLUTION INTRAVENOUS at 21:12

## 2017-03-23 RX ADMIN — ROCURONIUM BROMIDE 10 MG: 10 INJECTION INTRAVENOUS at 09:18

## 2017-03-23 RX ADMIN — MIDAZOLAM HYDROCHLORIDE 2 MG: 1 INJECTION, SOLUTION INTRAMUSCULAR; INTRAVENOUS at 07:33

## 2017-03-23 RX ADMIN — FENTANYL CITRATE 50 MCG: 50 INJECTION, SOLUTION INTRAMUSCULAR; INTRAVENOUS at 08:44

## 2017-03-23 RX ADMIN — SODIUM CHLORIDE, SODIUM LACTATE, POTASSIUM CHLORIDE, CALCIUM CHLORIDE: 600; 310; 30; 20 INJECTION, SOLUTION INTRAVENOUS at 10:55

## 2017-03-23 RX ADMIN — PHENYLEPHRINE HYDROCHLORIDE 200 MCG: 10 INJECTION, SOLUTION INTRAMUSCULAR; INTRAVENOUS; SUBCUTANEOUS at 10:35

## 2017-03-23 RX ADMIN — ONDANSETRON 4 MG: 2 INJECTION INTRAMUSCULAR; INTRAVENOUS at 07:55

## 2017-03-23 NOTE — OP NOTE
DATE OF PROCEDURE:  3/23/2017      PREOPERATIVE DIAGNOSIS:  Multiple attacks of diverticulitis with colonic stricture.      POSTOPERATIVE DIAGNOSIS:  Multiple attacks of diverticulitis with colonic stricture.      PROCEDURE:   1.  Attempted laparoscopic sigmoid colectomy.   2.  Open low anterior resection.   3.  Mobilization of splenic flexure.      SURGEON:  Ryan Dailey M.D.      FIRST ASSISTANT:  Mo Shepard M.D.      SECOND ASSISTANT:  Maggi Lamb MS3.      ANESTHESIA:  General by endotracheal tube.      INDICATIONS FOR PROCEDURE:  Ms. Caro Palmer is a 56-year-old lady who originally presented to the hospital about 2-3 months ago after having 5 attacks of diverticulitis.  She had a perforation on her most recent admission which was managed nonoperatively.  An attempt preop was made at colonoscopy which was unsuccessful.  Subsequent  reattempt at barium enema revealed a colonic stricture.  She did have a normal colonoscopy 5 years ago other than diverticulosis and for this reason, it was elected to take her to the operating room for a sigmoid resection.      OPERATIVE FINDINGS:  A thickened sigmoid colon down to near the peritoneal reflection.  A shortened mesentery with adhesions.  The stents were palpated in the ureters.      DETAILS OF PROCEDURE:  The patient was taken to the operating room and placed on the table in supine position.  After induction of general anesthesia, the abdomen was prepped and draped in sterile fashion.  A small supraumbilical incision was then made; a Veress needle was inserted in the abdomen and the abdomen was insufflated to 15 mmHg pressure.  A 5 mm supraumbilical trocar was then placed and generalized inspection of the abdomen; a thickened sigmoid colon right at the pelvic rim was readily seen.  We then placed a right lower quadrant 5 mm trocar and a left upper quadrant 5 mm trocar.  The colon was grasped.  We attempted to create a plane going down to free up the colon from  the pelvic wall; it was found to be densely adhesed and thickened.  We also placed a fourth left lower quadrant trocar in an effort to aid in mobilization.  The flashing ureteral stent was seen during this dissection, we did mobilize all superior all the way to the splenic flexure laparoscopically.  At this point it was felt the colon could not be safely dissected off the surrounding tissue so it was elected to convert to an open procedure.        This incision was made from just above the umbilicus at the superior port site all the way inferiorly to just above the pubis.  Subcutaneous tissue was opened using cautery.  Fascia was opened using cautery.  An Omni retractor was then placed.  The bowel was packed out of the way.  We then using a LigaSure device began mobilizing the colon with a combination of blunt and finger dissection inferiorly, we found an area proximally that was healthy colon and this was stapled off with a MARYELLEN 80 stapler and we continued to take the colon off its mesentery all the way down to the pelvic brim.  Some of the peritoneal reflection at the pelvic brim was taken down.  At that point we arrived at healthy colon; a contour stapler was then fired across this and the colon was then removed as specimen and submitted.  We also identified some further proximal disease and another approximately 15 cm of colon was then taken off using a MARYELLEN stapler and a LigaSure device and submitted as specimen.  We then mobilized the colon up around the splenic flexure, freeing it up and it came down to the pelvis in a tension-free fashion.  After adequately mobilizing the colon and freeing it up from the gastric colonic ligament this was done with LigaSure device.  Again we rechecked and the colon came right down to the rectal stump again with no tension. The bowel clamp was then placed across the colon; the staple line was removed using a combination of cautery and Ocampo scissors.  The 29 EEA was selected;   the anvil was placed into the colon and sewn in place using a 2-0 Prolene in a pursestring fashion.  After securing the anvil in place, Dr. Shepard went below and passed the stapler up through the rectum and the point was then passed through the staple line and connected to the anvil; it was then closed the stapler was fired as per the instructions and removed.  Both rings of the colon were intact.  The proximal bowel was then clamped and a rigid sigmoidoscopy was then carried out and some gas was insufflated into the colon.  There was no evidence of any leak with fluid in the pelvis and Dr. Shepard was able to visualize the staple line and it looked intact.  All retractors were removed.  The abdomen was then copiously irrigated out.  All fluid was suctioned out the fascia was then closed using a running #1 PDS.  The subcutaneous tissue was reapproximated with 3-0 Vicryl.  The skin was closed using a running 4-0 Monocryl subcuticular stitch.  Steri-Strips and sterile dressings were applied followed by removal of ureteral stents.       The patient was then taken from the operating room to the recovery room in stable condition to be admitted to the floor.         JASON BOWMAN MD             D: 2017 10:58   T: 2017 12:19   MT: [id number]      Name:     GIACOMO MONTELONGO   MRN:      [id number]        Account:        [id number]   :      1960           Procedure Date: 2017      Document: [id number]

## 2017-03-23 NOTE — OR NURSING
Transfer from  PACU to Room 253  Transferred to bed via glyder mat    S: 55 y/o female  S/P open colectomy       Anesthesia Type:  General       Surgeon:  Dr. Dailey       Allergies:  See Medication Reconciliation Record       DNR: no    B:  Pertinent Medical History: HTN, anxiety          Surgical History:  See EHR    A:  EBL: 50        IVF:  3100        UOP:  About 100cc not emptied in PACU.  Red in OR, clearing in tubing, still dark pink        NPO:  __x_Yes except ice chips ___No         Vomiting:  ___Yes _x__No         Drainage: none        Skin Integrity: abd dressing CDI        RFO: __x_Yes___No Jackman catheter and NG tube        SSI Patient?  _x__Yes___No         Brace/sling/equipment:  _x__Yes___No Abdominal binder         See PACU record for ongoing assessment, vital signs and pain assessment.    R: Post-Op vitals and assessments as ordered/indicated per patient's condition.       Follow Post-Op orders and notify Physician prn.       Continue to involve patient/family in plan of care and discharge planning.       Reinforce Pre-Operative education.       Implement skin safety interventions as appropriate.    Report given to YOLIS Carolina/S MAURY

## 2017-03-23 NOTE — ANESTHESIA CARE TRANSFER NOTE
Patient: Caro Palmer    Procedure(s):  ATTEMPTED laparoscopic assisted sigmoid resection, Converted to Open Low anterior Colectomy ureteral stent placement, Cystoscopy - Wound Class: II-Clean Contaminated   - Wound Class: II-Clean Contaminated   - Wound Class: II-Clean Contaminated    Diagnosis: diverticulitis  Diagnosis Additional Information: No value filed.    Anesthesia Type:   General, ETT     Note:  Airway :Room Air  Patient transferred to:PACU        Vitals: (Last set prior to Anesthesia Care Transfer)    CRNA VITALS  3/23/2017 1125 - 3/23/2017 1206      3/23/2017             SpO2: 97 %                Electronically Signed By: Lenard Messer  March 23, 2017  12:06 PM

## 2017-03-23 NOTE — OP NOTE
PROCEDURE:  Cystoscopy, placement of bilateral lighted ureteral catheters.      DATE OF PROCEDURE:  2017      SURGEON:   Eamon Goodwin M.D.      PREOPERATIVE DIAGNOSIS:  Diverticulitis and colonic stenosis.      POSTOPERATIVE DIAGNOSIS:  Diverticulitis and colonic stenosis.      OPERATIVE NOTE:  Informed consent was obtained from Ms. Giacomo Palmer for all procedures.   She was brought to the operating room and given general anesthesia, placed in lithotomy position.  Sterile prep and drape were applied and surgical timeout was taken.        Cystoscope was introduced into the bladder and revealed no unusual findings.  Right and left lighted ureteral catheters were placed without difficulty.  Scope was removed leaving the catheters in place.  Jackman catheter was then placed into the bladder; balloon was inflated with 10 mL sterile water.  The ureteral catheters were secured to the Jackman catheter using tape and then the fiberoptic portion of the catheter was placed up the catheter without difficulty and these were secured with tape as well.  She tolerated this portion of the procedure well.  The ureteral catheters can be removed at the end of the case if all goes well.      ESTIMATED BLOOD LOSS:  No blood loss and no counts.  No specimens.         EAMON GOODWIN MD             D: 2017 08:35   T: 2017 10:34   MT: [id number]      Name:     GIACOMO PALMER   MRN:      [id number]        Account:        [id number]   :      1960           Procedure Date: 2017      Document: [id number]       cc: Eamon Goodwin MD

## 2017-03-23 NOTE — IP AVS SNAPSHOT
MRN:[id number]                      After Visit Summary   3/23/2017    Caro Palmer    MRN: [id number]           Thank you!     Thank you for choosing [facility] for your care. Our goal is always to provide you with excellent care. Hearing back from our patients is one way we can continue to improve our services. Please take a few minutes to complete the written survey that you may receive in the mail after you visit with us. Thank you!        Patient Information     Date Of Birth          1960        About your hospital stay     You were admitted on:  March 23, 2017 You last received care in the:  [address] [facility]    You were discharged on:  March 28, 2017        Reason for your hospital stay       Pt is s/p open colon resection                  Who to Call     For medical emergencies, please call 911.  For non-urgent questions about your medical care, please call your primary care provider or clinic, [phone]  For questions related to your surgery, please call your surgery clinic        Attending Provider     Provider Specialty    Ryan Dailey MD General Surgery       Primary Care Provider Office Phone # Fax #    Gregory G Schoen, [phone]       [facility] [address] [place] [address]         When to contact your care team       Call your primary doctor if you have any of the following: temperature greater than 101 or increasing pain.                  After Care Instructions     Activity       Your activity upon discharge: no lifting, or strenuous exercise for 2 weeks.  No lifiting over 20 lbs.  May shower.            Diet       Follow this diet upon discharge: Orders Placed regular            Wound care and dressings       Instructions to care for your wound at home: ice to area for comfort, keep wound clean and dry and may get incision wet in shower but do not soak or scrub.                  Follow-up  Appointments     Follow-up and recommended labs and tests        Follow up with me,  Ryan Dailey, within 7-10 days. to evaluate after surgery.  No follow up labs or test are needed.                  Your next 10 appointments already scheduled     Apr 10, 2017  3:00 PM CDT   [facility]    [address] [place] [id number]   [phone]              Pending Results     No orders found from 3/21/2017 to 3/24/2017.            Statement of Approval     Ordered          03/28/17 1213  I have reviewed and agree with all the recommendations and orders detailed in this document.  EFFECTIVE NOW     Approved and electronically signed by:  Ryan Dailey MD             Admission Information     Date & Time Provider Department Dept. Phone    3/23/2017 Ryan Dailey MD [address] [facility] [phone]      Your Vitals Were     Blood Pressure Pulse Temperature Respirations Last Period Pulse Oximetry    119/74 (BP Location: Left arm) 73 96.7  F (35.9  C) (Oral) 14 11/22/2012 95%      MyChart Information     [employer] gives you secure access to your electronic health record. If you see a primary care provider, you can also send messages to your care team and make appointments. If you have questions, please call your primary care clinic.  If you do not have a primary care provider, please call [phone] and they will assist you.        Care EveryWhere ID     This is your Care EveryWhere ID. This could be used by other organizations to access your [facility] medical records  [id number]           Review of your medicines      START taking        Dose / Directions    ondansetron 4 MG tablet   Commonly known as:  ZOFRAN   Used for:  Nausea        Dose:  4 mg   Take 1 tablet (4 mg) by mouth every 8 hours as needed for nausea   Quantity:  20 tablet   Refills:  1       oxyCODONE-acetaminophen 5-325 MG per tablet    Commonly known as:  PERCOCET   Used for:  Acute post-operative pain        Dose:  1-2 tablet   Take 1-2 tablets by mouth every 4 hours as needed for moderate to severe pain   Quantity:  30 tablet   Refills:  0         CONTINUE these medicines which have NOT CHANGED        Dose / Directions    ALPRAZolam 0.25 MG tablet   Commonly known as:  XANAX   Used for:  Generalized anxiety disorder        Dose:  0.25 mg   Take 1 tablet (0.25 mg) by mouth 3 times daily as needed for anxiety   Quantity:  30 tablet   Refills:  0       aspirin 81 MG tablet   Used for:  Family history of malignant neoplasm of gastrointestinal tract, Other general counseling and advice for contraceptive management        ONE DAILY   Quantity:  100   Refills:  3       CHLOROPHYLL PO        Dose:  15 mL   Take 15 mLs by mouth daily   Refills:  0       fish oil-omega-3 fatty acids 1000 MG capsule        Dose:  1 g   Take 1 g by mouth 2 times daily   Refills:  0       flaxseed oil 1000 MG Caps        Dose:  1000 mg   Take 1,000 mg by mouth 3 times daily   Refills:  0       Gelatin 650 MG Caps   Used for:  Other general counseling and advice for contraceptive management, Family history of malignant neoplasm of gastrointestinal tract        2 daily   Refills:  0       GLUCOSAMINE CHONDRO COMPLEX OR   Used for:  Other general counseling and advice for contraceptive management, Family history of malignant neoplasm of gastrointestinal tract        2 tablets x 2 daily   Refills:  0       ibuprofen 400 MG tablet   Commonly known as:  ADVIL/MOTRIN   Used for:  Diverticulitis of large intestine with abscess without bleeding        Dose:  400 mg   Take 1 tablet (400 mg) by mouth every 6 hours as needed for moderate pain   Quantity:  40 tablet   Refills:  0       losartan-hydrochlorothiazide 50-12.5 MG per tablet   Commonly known as:  HYZAAR   Used for:  Essential hypertension with goal blood pressure less than 140/90        TAKE ONE TABLET BY MOUTH EVERY DAY    Quantity:  30 tablet   Refills:  10       Lysine 1000 MG Tabs        Dose:  1000 mg   Take 1,000 mg by mouth daily.   Refills:  0       OVER-THE-COUNTER        Dose:  1 tablet   1 tablet daily Citracal 500 D and Calcium 600   Refills:  0       vitamin E 400 UNIT capsule   Used for:  Other general counseling and advice for contraceptive management, Family history of malignant neoplasm of gastrointestinal tract        ONE CAPSULE DAILY   Quantity:  3 MONTHS   Refills:  1 YEAR         STOP taking     neomycin 500 MG tablet   Commonly known as:  MYCIFRADIN                Where to get your medicines      Some of these will need a paper prescription and others can be bought over the counter. Ask your nurse if you have questions.     Bring a paper prescription for each of these medications     ondansetron 4 MG tablet    oxyCODONE-acetaminophen 5-325 MG per tablet                Protect others around you: Learn how to safely use, store and throw away your medicines at www.disposemymeds.org.             Medication List: This is a list of all your medications and when to take them. Check marks below indicate your daily home schedule. Keep this list as a reference.      Medications           Morning Afternoon Evening Bedtime As Needed    ALPRAZolam 0.25 MG tablet   Commonly known as:  XANAX   Take 1 tablet (0.25 mg) by mouth 3 times daily as needed for anxiety                                   aspirin 81 MG tablet   ONE DAILY                                   CHLOROPHYLL PO   Take 15 mLs by mouth daily                                   fish oil-omega-3 fatty acids 1000 MG capsule   Take 1 g by mouth 2 times daily                                      flaxseed oil 1000 MG Caps   Take 1,000 mg by mouth 3 times daily                                         Gelatin 650 MG Caps   2 daily                                GLUCOSAMINE CHONDRO COMPLEX OR   2 tablets x 2 daily                                      ibuprofen 400 MG tablet    Commonly known as:  ADVIL/MOTRIN   Take 1 tablet (400 mg) by mouth every 6 hours as needed for moderate pain                                   losartan-hydrochlorothiazide 50-12.5 MG per tablet   Commonly known as:  HYZAAR   TAKE ONE TABLET BY MOUTH EVERY DAY                                   Lysine 1000 MG Tabs   Take 1,000 mg by mouth daily.                                   ondansetron 4 MG tablet   Commonly known as:  ZOFRAN   Take 1 tablet (4 mg) by mouth every 8 hours as needed for nausea                                   OVER-THE-COUNTER   1 tablet daily Citracal 500 D and Calcium 600                                   oxyCODONE-acetaminophen 5-325 MG per tablet   Commonly known as:  PERCOCET   Take 1-2 tablets by mouth every 4 hours as needed for moderate to severe pain   Last time this was given:  2 tablets on 3/28/2017 12:48 PM                                   vitamin E 400 UNIT capsule   ONE CAPSULE DAILY                                             More Information        Patient Education    Oxycodone Hydrochloride Oral capsule    Oxycodone Hydrochloride Oral solution    Oxycodone Hydrochloride Oral tablet    Oxycodone Hydrochloride Oral tablet [Abuse Deterrent]    Oxycodone Hydrochloride Oral tablet, extended-release  Oxycodone Hydrochloride Oral tablet  What is this medicine?  OXYCODONE (ox i KOE done) is a pain reliever. It is used to treat moderate to severe pain.  This medicine may be used for other purposes; ask your health care provider or pharmacist if you have questions.  What should I tell my health care provider before I take this medicine?  They need to know if you have any of these conditions:    [place]'s disease    brain tumor    drug abuse or addiction    head injury    heart disease    if you frequently drink alcohol containing drinks    kidney disease or problems going to the bathroom    liver disease    lung disease, asthma, or breathing problems    mental problems    an unusual or  allergic reaction to oxycodone, codeine, hydrocodone, morphine, other medicines, foods, dyes, or preservatives    pregnant or trying to get pregnant    breast-feeding  How should I use this medicine?  Take this medicine by mouth with a glass of water. Follow the directions on the prescription label. You can take it with or without food. If it upsets your stomach, take it with food. Take your medicine at regular intervals. Do not take it more often than directed. Do not stop taking except on your doctor's advice.  Some brands of this medicine, like Oxecta, have special instructions. Ask your doctor or pharmacist if these directions are for you: Do not cut, crush or chew this medicine. Swallow only one tablet at a time. Do not wet, soak, or lick the tablet before you take it.  Talk to your pediatrician regarding the use of this medicine in children. Special care may be needed.  Overdosage: If you think you have taken too much of this medicine contact a poison control center or emergency room at once.  NOTE: This medicine is only for you. Do not share this medicine with others.  What if I miss a dose?  If you miss a dose, take it as soon as you can. If it is almost time for your next dose, take only that dose. Do not take double or extra doses.  What may interact with this medicine?    alcohol    antihistamines    certain medicines used for nausea like chlorpromazine, droperidol    erythromycin    ketoconazole    medicines for depression, anxiety, or psychotic disturbances    medicines for sleep    muscle relaxants    naloxone    naltrexone    narcotic medicines (opiates) for pain    nilotinib    phenobarbital    phenytoin    rifampin    ritonavir    voriconazole  This list may not describe all possible interactions. Give your health care provider a list of all the medicines, herbs, non-prescription drugs, or dietary supplements you use. Also tell them if you smoke, drink alcohol, or use illegal drugs. Some items may  interact with your medicine.  What should I watch for while using this medicine?  Tell your doctor or health care professional if your pain does not go away, if it gets worse, or if you have new or a different type of pain. You may develop tolerance to the medicine. Tolerance means that you will need a higher dose of the medicine for pain relief. Tolerance is normal and is expected if you take this medicine for a long time.  Do not suddenly stop taking your medicine because you may develop a severe reaction. Your body becomes used to the medicine. This does NOT mean you are addicted. Addiction is a behavior related to getting and using a drug for a non-medical reason. If you have pain, you have a medical reason to take pain medicine. Your doctor will tell you how much medicine to take. If your doctor wants you to stop the medicine, the dose will be slowly lowered over time to avoid any side effects.  You may get drowsy or dizzy when you first start taking this medicine or change doses. Do not drive, use machinery, or do anything that may be dangerous until you know how the medicine affects you. Stand or sit up slowly.  There are different types of narcotic medicines (opiates) for pain. If you take more than one type at the same time, you may have more side effects. Give your health care provider a list of all medicines you use. Your doctor will tell you how much medicine to take. Do not take more medicine than directed. Call emergency for help if you have problems breathing.  This medicine will cause constipation. Try to have a bowel movement at least every 2 to 3 days. If you do not have a bowel movement for 3 days, call your doctor or health care professional.  Your mouth may get dry. Drinking water, chewing sugarless gum, or sucking on hard candy may help. See your dentist every 6 months.  What side effects may I notice from receiving this medicine?  Side effects that you should report to your doctor or health  care professional as soon as possible:    allergic reactions like skin rash, itching or hives, swelling of the face, lips, or tongue    breathing problems    confusion    feeling faint or lightheaded, falls    trouble passing urine or change in the amount of urine    unusually weak or tired  Side effects that usually do not require medical attention (report to your doctor or health care professional if they continue or are bothersome):    constipation    dry mouth    itching    nausea, vomiting    upset stomach  This list may not describe all possible side effects. Call your doctor for medical advice about side effects. You may report side effects to FDA at 1-439-FDA-7946.  Where should I keep my medicine?  Keep out of the reach of children. This medicine can be abused. Keep your medicine in a safe place to protect it from theft. Do not share this medicine with anyone. Selling or giving away this medicine is dangerous and against the law.  Store at room temperature between 15 and 30 degrees C (59 and 86 degrees F). Protect from light. Keep container tightly closed.  This medicine may cause accidental overdose and death if it is taken by other adults, children, or pets. Flush any unused medicine down the toilet to reduce the chance of harm. Do not use the medicine after the expiration date.  NOTE: This sheet is a summary. It may not cover all possible information. If you have questions about this medicine, talk to your doctor, pharmacist, or health care provider.  NOTE:This sheet is a summary. It may not cover all possible information. If you have questions about this medicine, talk to your doctor, pharmacist, or health care provider. Copyright  2016 Gold Standard                Patient Education    Dextrose, Ondansetron Hydrochloride Solution for injection    Ondansetron Hydrochloride Oral solution    Ondansetron Hydrochloride Oral tablet    Ondansetron Hydrochloride Solution for injection    Ondansetron  Hydrochloride, Sodium Chloride Solution for injection    Ondansetron Oral disintegrating tablet    Ondansetron Oral dissolving film  Ondansetron Hydrochloride Oral tablet  What is this medicine?  ONDANSETRON (on DAN se annette) is used to treat nausea and vomiting caused by chemotherapy. It is also used to prevent or treat nausea and vomiting after surgery.  This medicine may be used for other purposes; ask your health care provider or pharmacist if you have questions.  What should I tell my health care provider before I take this medicine?  They need to know if you have any of these conditions:    heart disease    history of irregular heartbeat    liver disease    low levels of magnesium or potassium in the blood    an unusual or allergic reaction to ondansetron, granisetron, other medicines, foods, dyes, or preservatives    pregnant or trying to get pregnant    breast-feeding  How should I use this medicine?  Take this medicine by mouth with a glass of water. Follow the directions on your prescription label. Take your doses at regular intervals. Do not take your medicine more often than directed.  Talk to your pediatrician regarding the use of this medicine in children. Special care may be needed.  Overdosage: If you think you have taken too much of this medicine contact a poison control center or emergency room at once.  NOTE: This medicine is only for you. Do not share this medicine with others.  What if I miss a dose?  If you miss a dose, take it as soon as you can. If it is almost time for your next dose, take only that dose. Do not take double or extra doses.  What may interact with this medicine?  Do not take this medicine with any of the following medications:    apomorphine    certain medicines for fungal infections like fluconazole, itraconazole, ketoconazole, posaconazole, voriconazole    cisapride    dofetilide    dronedarone    pimozide    thioridazine    ziprasidone  This medicine may also interact with  the following medications:    carbamazepine    certain medicines for depression, anxiety, or psychotic disturbances    fentanyl    linezolid    MAOIs like Carbex, Eldepryl, Marplan, Nardil, and Parnate    methylene blue (injected into a vein)    other medicines that prolong the QT interval (cause an abnormal heart rhythm)    phenytoin    rifampicin    tramadol  This list may not describe all possible interactions. Give your health care provider a list of all the medicines, herbs, non-prescription drugs, or dietary supplements you use. Also tell them if you smoke, drink alcohol, or use illegal drugs. Some items may interact with your medicine.  What should I watch for while using this medicine?  Check with your doctor or health care professional right away if you have any sign of an allergic reaction.  What side effects may I notice from receiving this medicine?  Side effects that you should report to your doctor or health care professional as soon as possible:    allergic reactions like skin rash, itching or hives, swelling of the face, lips or tongue    breathing problems    confusion    dizziness    fast or irregular heartbeat    feeling faint or lightheaded, falls    fever and chills    loss of balance or coordination    seizures    sweating    swelling of the hands or feet    tightness in the chest    tremors    unusually weak or tired  Side effects that usually do not require medical attention (report to your doctor or health care professional if they continue or are bothersome):    constipation or diarrhea    headache  This list may not describe all possible side effects. Call your doctor for medical advice about side effects. You may report side effects to FDA at 6-014-FDA-9430.  Where should I keep my medicine?  Keep out of the reach of children.  Store between 2 and 30 degrees C (36 and 86 degrees F). Throw away any unused medicine after the expiration date.  NOTE:This sheet is a summary. It may not cover  all possible information. If you have questions about this medicine, talk to your doctor, pharmacist, or health care provider. Copyright  2016 Gold Standard

## 2017-03-23 NOTE — ANESTHESIA PREPROCEDURE EVALUATION
Anesthesia Evaluation     . Pt has had prior anesthetic. Type: General and MAC    No history of anesthetic complications          ROS/MED HX    ENT/Pulmonary: Comment: Chronic rhinitis - neg pulmonary ROS     Neurologic:  - neg neurologic ROS     Cardiovascular:     (+) hypertension-range: goal < 130/80, ---. : . . . :. .       METS/Exercise Tolerance:     Hematologic:  - neg hematologic  ROS       Musculoskeletal:  - neg musculoskeletal ROS       GI/Hepatic: Comment: Mild abdominal pain without N/V    (+) bowel prep,       Renal/Genitourinary:  - ROS Renal section negative       Endo:  - neg endo ROS       Psychiatric:     (+) psychiatric history anxiety      Infectious Disease:  - neg infectious disease ROS       Malignancy:      - no malignancy   Other: Comment: Menopause    (+) No chance of pregnancy C-spine cleared: N/A, no H/O Chronic Pain,no other significant disability   - neg other ROS                 Physical Exam  Normal systems: cardiovascular and pulmonary    Airway   Mallampati: I  TM distance: >3 FB  Neck ROM: full    Dental   Comment: Upper front caps and posterior crowns.  All intact    Cardiovascular   Rhythm and rate: regular and normal      Pulmonary    breath sounds clear to auscultation                    Anesthesia Plan      History & Physical Review  History and physical reviewed and following examination; no interval change.    ASA Status:  2 .    NPO Status:  > 8 hours    Plan for General, ETT and Periph. Nerve Block for postop pain with Intravenous induction. Maintenance will be Inhalation.    PONV prophylaxis:  Ondansetron (or other 5HT-3) and Dexamethasone or Solumedrol  Will do bilateral post-op TAP block      Postoperative Care  Postoperative pain management:  IV analgesics and Oral pain medications.      Consents  Anesthetic plan, risks, benefits and alternatives discussed with:  Patient.  Use of blood products discussed: Yes.   Use of blood products discussed with Patient.   Consented to blood products.  .                          .

## 2017-03-23 NOTE — BRIEF OP NOTE
[facility] Brief Operative Note    Pre-operative diagnosis: Multiple attacks of diverticulitis with colonic stricture   Post-operative diagnosis Same   Procedure: 1) Attempted laparoscopic sigmoid colectomy  2) Open Low anterior resection  3) Mobilization of splenic flexure   Surgeon: Ryan Dailey MD, FACS   Assistants(s): Teresa Campo MD, FACS  Maggi Lamb MS3   Estimated blood loss: Less than 100 ml    Specimens: Sigmoid colon   Findings: Thickened sigmoid mariusz to peritoneal reflection, shortened mesentery with adhesions.   Stents palpated in the ureters       Ryan Dailey MD, FACS    #[id number]

## 2017-03-23 NOTE — BRIEF OP NOTE
[facility] Brief Operative Note    Pre-operative diagnosis: diverticulitis   Post-operative diagnosis: Same   Procedure: Cystoscopy, placement bilateral lighted ureteral catheters   Surgeon: Eamon Linn MD, MD   Assistant(s): None   Anesthesia: General endotracheal anesthesia   Estimated blood loss: None   Total IV fluids: (See anesthesia record)   Blood transfusion: No transfusion was given during surgery   Total urine output: (See anesthesia record)   Drains: Jackman catheter   Specimens: None   Implants: None   Findings: See dictation   Complications: None   Condition: Stable   Comments: See dictated operative report for full details.    Eamon Linn MD

## 2017-03-24 LAB
ANION GAP SERPL CALCULATED.3IONS-SCNC: 10 MMOL/L (ref 3–14)
BUN SERPL-MCNC: 5 MG/DL (ref 7–30)
CALCIUM SERPL-MCNC: 8.1 MG/DL (ref 8.5–10.1)
CHLORIDE SERPL-SCNC: 108 MMOL/L (ref 94–109)
CO2 SERPL-SCNC: 25 MMOL/L (ref 20–32)
CREAT SERPL-MCNC: 0.54 MG/DL (ref 0.52–1.04)
ERYTHROCYTE [DISTWIDTH] IN BLOOD BY AUTOMATED COUNT: 12.2 % (ref 10–15)
GFR SERPL CREATININE-BSD FRML MDRD: ABNORMAL ML/MIN/1.7M2
GLUCOSE SERPL-MCNC: 214 MG/DL (ref 70–99)
HCT VFR BLD AUTO: 33.2 % (ref 35–47)
HGB BLD-MCNC: 10.7 G/DL (ref 11.7–15.7)
MCH RBC QN AUTO: 31.8 PG (ref 26.5–33)
MCHC RBC AUTO-ENTMCNC: 32.2 G/DL (ref 31.5–36.5)
MCV RBC AUTO: 99 FL (ref 78–100)
PLATELET # BLD AUTO: 220 10E9/L (ref 150–450)
POTASSIUM SERPL-SCNC: 4.2 MMOL/L (ref 3.4–5.3)
RBC # BLD AUTO: 3.36 10E12/L (ref 3.8–5.2)
SODIUM SERPL-SCNC: 143 MMOL/L (ref 133–144)
WBC # BLD AUTO: 13.6 10E9/L (ref 4–11)

## 2017-03-24 PROCEDURE — 25000128 H RX IP 250 OP 636: Performed by: SPECIALIST

## 2017-03-24 PROCEDURE — 25000125 ZZHC RX 250: Performed by: SPECIALIST

## 2017-03-24 PROCEDURE — 25800025 ZZH RX 258: Performed by: SPECIALIST

## 2017-03-24 PROCEDURE — 36415 COLL VENOUS BLD VENIPUNCTURE: CPT | Performed by: SPECIALIST

## 2017-03-24 PROCEDURE — 12000000 ZZH R&B MED SURG/OB

## 2017-03-24 PROCEDURE — 85027 COMPLETE CBC AUTOMATED: CPT | Performed by: SPECIALIST

## 2017-03-24 PROCEDURE — 80048 BASIC METABOLIC PNL TOTAL CA: CPT | Performed by: SPECIALIST

## 2017-03-24 RX ADMIN — METOPROLOL TARTRATE 5 MG: 5 INJECTION INTRAVENOUS at 09:49

## 2017-03-24 RX ADMIN — POTASSIUM CHLORIDE, DEXTROSE MONOHYDRATE AND SODIUM CHLORIDE: 150; 5; 450 INJECTION, SOLUTION INTRAVENOUS at 19:44

## 2017-03-24 RX ADMIN — ENOXAPARIN SODIUM 40 MG: 40 INJECTION SUBCUTANEOUS at 09:49

## 2017-03-24 RX ADMIN — METOPROLOL TARTRATE 5 MG: 5 INJECTION INTRAVENOUS at 13:41

## 2017-03-24 RX ADMIN — HYDROMORPHONE HYDROCHLORIDE: 10 INJECTION, SOLUTION INTRAMUSCULAR; INTRAVENOUS; SUBCUTANEOUS at 14:06

## 2017-03-24 RX ADMIN — HYDROMORPHONE HYDROCHLORIDE: 10 INJECTION, SOLUTION INTRAMUSCULAR; INTRAVENOUS; SUBCUTANEOUS at 06:21

## 2017-03-24 RX ADMIN — HYDROMORPHONE HYDROCHLORIDE: 10 INJECTION, SOLUTION INTRAMUSCULAR; INTRAVENOUS; SUBCUTANEOUS at 21:50

## 2017-03-24 RX ADMIN — DIPHENHYDRAMINE HYDROCHLORIDE 25 MG: 50 INJECTION, SOLUTION INTRAMUSCULAR; INTRAVENOUS at 22:03

## 2017-03-24 RX ADMIN — POTASSIUM CHLORIDE, DEXTROSE MONOHYDRATE AND SODIUM CHLORIDE: 150; 5; 450 INJECTION, SOLUTION INTRAVENOUS at 11:44

## 2017-03-24 RX ADMIN — PANTOPRAZOLE SODIUM 40 MG: 40 INJECTION, POWDER, FOR SOLUTION INTRAVENOUS at 13:41

## 2017-03-24 RX ADMIN — METOPROLOL TARTRATE 5 MG: 5 INJECTION INTRAVENOUS at 19:35

## 2017-03-24 RX ADMIN — POTASSIUM CHLORIDE, DEXTROSE MONOHYDRATE AND SODIUM CHLORIDE: 150; 5; 450 INJECTION, SOLUTION INTRAVENOUS at 04:22

## 2017-03-24 NOTE — PLAN OF CARE
Problem: Goal Outcome Summary  Goal: Goal Outcome Summary  Outcome: Improving  Pt.had 600ccs of bloody urine with clots.Her lungs are clear and she is independent with doing her incentive spirometer and is able to get up to 1500ml.Pt.had 200ccs of clear drainage.Pt.has some shadowed drainage on her dressing that has been outlined.Pt.dangled and stood at bedside tonight did have some dizziness.Her pain is under good control with the dilaudid PCA.Her blood pressure initially on my shift was elevated but has been fine now.

## 2017-03-24 NOTE — PROGRESS NOTES
Surgery POD #1      Subjective:  Pt is feeling ok.  Pain controlled, ambulating.    No flatus      Objective:  Vitals:    03/24/17 0300 03/24/17 0410 03/24/17 0619 03/24/17 0726   BP:    112/62   Pulse:  78 90 78   Resp: 15 15 15 16   Temp:    96.5  F (35.8  C)   TempSrc:    Oral   SpO2: 95% 94% 96% 96%       Intake/Output Summary (Last 24 hours) at 03/24/17 1252  Last data filed at 03/24/17 0829   Gross per 24 hour   Intake          2336.58 ml   Output             2600 ml   Net          -263.42 ml     U/O 2050 - clearing    Abd: soft, non distended    Results for orders placed or performed during the hospital encounter of 03/23/17 (from the past 24 hour(s))   Platelet count   Result Value Ref Range    Platelet Count 232 150 - 450 10e9/L   Creatinine   Result Value Ref Range    Creatinine 0.64 0.52 - 1.04 mg/dL    GFR Estimate >90  Non  GFR Calc   >60 mL/min/1.7m2    GFR Estimate If Black >90   GFR Calc   >60 mL/min/1.7m2   CBC with platelets   Result Value Ref Range    WBC 13.6 (H) 4.0 - 11.0 10e9/L    RBC Count 3.36 (L) 3.8 - 5.2 10e12/L    Hemoglobin 10.7 (L) 11.7 - 15.7 g/dL    Hematocrit 33.2 (L) 35.0 - 47.0 %    MCV 99 78 - 100 fl    MCH 31.8 26.5 - 33.0 pg    MCHC 32.2 31.5 - 36.5 g/dL    RDW 12.2 10.0 - 15.0 %    Platelet Count 220 150 - 450 10e9/L   Basic metabolic panel   Result Value Ref Range    Sodium 143 133 - 144 mmol/L    Potassium 4.2 3.4 - 5.3 mmol/L    Chloride 108 94 - 109 mmol/L    Carbon Dioxide 25 20 - 32 mmol/L    Anion Gap 10 3 - 14 mmol/L    Glucose 214 (H) 70 - 99 mg/dL    Urea Nitrogen 5 (L) 7 - 30 mg/dL    Creatinine 0.54 0.52 - 1.04 mg/dL    GFR Estimate >90  Non  GFR Calc   >60 mL/min/1.7m2    GFR Estimate If Black >90   GFR Calc   >60 mL/min/1.7m2    Calcium 8.1 (L) 8.5 - 10.1 mg/dL     Assessment/Plan:  Pt s/p open LAR - doing well.   Still with ileus.  Cont OOB, Ambulate.  Jackman out tomorrow.  Await resolution of  ileus.    Ryan Dailey MD, FACS

## 2017-03-24 NOTE — ANESTHESIA POSTPROCEDURE EVALUATION
Patient: Caro Palmer    Procedure(s):  ATTEMPTED laparoscopic assisted sigmoid resection, Converted to Open Low anterior Colectomy ureteral stent placement, Cystoscopy - Wound Class: II-Clean Contaminated   - Wound Class: II-Clean Contaminated   - Wound Class: II-Clean Contaminated    Diagnosis:diverticulitis  Diagnosis Additional Information: No value filed.    Anesthesia Type:  General, ETT    Note:  Anesthesia Post Evaluation    Patient location during evaluation: Floor  Patient participation: Able to fully participate in evaluation  Level of consciousness: awake and alert  Pain management: adequate  Airway patency: patent  Cardiovascular status: blood pressure returned to baseline  Respiratory status: nasal cannula  Hydration status: balanced  PONV: none     Anesthetic complications: None    Comments: Patient was resting in bed. Stated her pain was well controlled throughout the evening.  Is feeling well and plans on walking today with physical therapy.  She denies any anesthesia concerns at this time.         Last vitals:  Vitals:    03/24/17 0410 03/24/17 0619 03/24/17 0726   BP:   112/62   Pulse: 78 90 78   Resp: 15 15 16   Temp:   96.5  F (35.8  C)   SpO2: 94% 96% 96%         Electronically Signed By: ARIANNA Esparza CRNA  March 24, 2017  8:39 AM

## 2017-03-24 NOTE — PROGRESS NOTES
"Reason for Assessment:  Nutrition education regarding low fiber/ low residue diet.   Diet History:   Pt has been following a low fiber diet for ~2 1/2 months (since January 14th) so she is familiar with the diet. She stated she is really missing the raw fruit and vegetables though. Pt knows that she must adhere to this diet for her body to properly heal. Pt asked how long she will need to be on this diet and if it is a \"forever thing.\" I said usually this diet is temporary, but does depend on the individual person on the timeframe for following it.  Nutrition Diagnosis:  Food- and nutrition-related knowledge deficit r/t limited adherence to past nutrition-related recommendations AEB need for low fiber diet education.  Interventions:  Provided instruction on low fiber/low residue diet.  Provided handouts low fiber diet.  Goals:   Patient will verbalize understanding of low fiber diet.  Follow-up:    Patient to ask any further nutrition-related questions before discharge.  In addition, pt may request outpatient RD appointment.      Radha Alicea RD, LD  Clinical Dietitian  [phone]    "

## 2017-03-24 NOTE — PLAN OF CARE
Problem: Goal Outcome Summary  Goal: Goal Outcome Summary  Outcome: Improving  Pt is alert and oriented. Vitals stable, afebrile. Pain is controlled with Dilaudid PCA. Pt is using IS independently. Pt ambulated in hallway x3. Bowel sounds present x4. No flatus. Abd is soft. Dressing shows minimal drainage, drainage marked. Abdominal binder on. Pt has good urine output. Urine is light red and clear. Pt is tolerating ice chips well. No nausea. NG showing clear output.

## 2017-03-24 NOTE — PROGRESS NOTES
Patient is alert, oriented, and cooperative.   Repositions herself in bed, requires little assistance.   NG remains to LIS, 150 cc out overnight, mostly clear fluids, patient is tolerating ICE Chips. LUQ/RUQ with very faint tones.  Denies nausea.    Remains on RA, Capnography stable and WNL through the night  . IS [id number] independent.  UOP adequate, small clots noted, color is deep orange.   Patient's VS WNL.  She offers no questions concerns or complaints.

## 2017-03-25 LAB
ANION GAP SERPL CALCULATED.3IONS-SCNC: 8 MMOL/L (ref 3–14)
BUN SERPL-MCNC: 4 MG/DL (ref 7–30)
CALCIUM SERPL-MCNC: 7.7 MG/DL (ref 8.5–10.1)
CHLORIDE SERPL-SCNC: 108 MMOL/L (ref 94–109)
CO2 SERPL-SCNC: 27 MMOL/L (ref 20–32)
CREAT SERPL-MCNC: 0.58 MG/DL (ref 0.52–1.04)
ERYTHROCYTE [DISTWIDTH] IN BLOOD BY AUTOMATED COUNT: 12.4 % (ref 10–15)
GFR SERPL CREATININE-BSD FRML MDRD: ABNORMAL ML/MIN/1.7M2
GLUCOSE SERPL-MCNC: 124 MG/DL (ref 70–99)
HCT VFR BLD AUTO: 31 % (ref 35–47)
HGB BLD-MCNC: 9.9 G/DL (ref 11.7–15.7)
MCH RBC QN AUTO: 32.5 PG (ref 26.5–33)
MCHC RBC AUTO-ENTMCNC: 31.9 G/DL (ref 31.5–36.5)
MCV RBC AUTO: 102 FL (ref 78–100)
PLATELET # BLD AUTO: 185 10E9/L (ref 150–450)
POTASSIUM SERPL-SCNC: 4.1 MMOL/L (ref 3.4–5.3)
RBC # BLD AUTO: 3.05 10E12/L (ref 3.8–5.2)
SODIUM SERPL-SCNC: 143 MMOL/L (ref 133–144)
WBC # BLD AUTO: 9.9 10E9/L (ref 4–11)

## 2017-03-25 PROCEDURE — 25000128 H RX IP 250 OP 636: Performed by: SPECIALIST

## 2017-03-25 PROCEDURE — 25800025 ZZH RX 258: Performed by: SPECIALIST

## 2017-03-25 PROCEDURE — 12000000 ZZH R&B MED SURG/OB

## 2017-03-25 PROCEDURE — 85027 COMPLETE CBC AUTOMATED: CPT | Performed by: SPECIALIST

## 2017-03-25 PROCEDURE — 36415 COLL VENOUS BLD VENIPUNCTURE: CPT | Performed by: SPECIALIST

## 2017-03-25 PROCEDURE — 25000125 ZZHC RX 250: Performed by: SPECIALIST

## 2017-03-25 PROCEDURE — 80048 BASIC METABOLIC PNL TOTAL CA: CPT | Performed by: SPECIALIST

## 2017-03-25 RX ADMIN — METOPROLOL TARTRATE 5 MG: 5 INJECTION INTRAVENOUS at 15:14

## 2017-03-25 RX ADMIN — POTASSIUM CHLORIDE, DEXTROSE MONOHYDRATE AND SODIUM CHLORIDE: 150; 5; 450 INJECTION, SOLUTION INTRAVENOUS at 11:20

## 2017-03-25 RX ADMIN — POTASSIUM CHLORIDE, DEXTROSE MONOHYDRATE AND SODIUM CHLORIDE: 150; 5; 450 INJECTION, SOLUTION INTRAVENOUS at 19:35

## 2017-03-25 RX ADMIN — METOPROLOL TARTRATE 5 MG: 5 INJECTION INTRAVENOUS at 19:57

## 2017-03-25 RX ADMIN — HYDROMORPHONE HYDROCHLORIDE: 10 INJECTION, SOLUTION INTRAMUSCULAR; INTRAVENOUS; SUBCUTANEOUS at 06:26

## 2017-03-25 RX ADMIN — METOPROLOL TARTRATE 5 MG: 5 INJECTION INTRAVENOUS at 02:33

## 2017-03-25 RX ADMIN — METOPROLOL TARTRATE 5 MG: 5 INJECTION INTRAVENOUS at 07:51

## 2017-03-25 RX ADMIN — DIPHENHYDRAMINE HYDROCHLORIDE 25 MG: 50 INJECTION, SOLUTION INTRAMUSCULAR; INTRAVENOUS at 13:09

## 2017-03-25 RX ADMIN — HYDROMORPHONE HYDROCHLORIDE: 10 INJECTION, SOLUTION INTRAMUSCULAR; INTRAVENOUS; SUBCUTANEOUS at 15:17

## 2017-03-25 RX ADMIN — PANTOPRAZOLE SODIUM 40 MG: 40 INJECTION, POWDER, FOR SOLUTION INTRAVENOUS at 15:14

## 2017-03-25 RX ADMIN — HYDROMORPHONE HYDROCHLORIDE: 10 INJECTION, SOLUTION INTRAMUSCULAR; INTRAVENOUS; SUBCUTANEOUS at 22:36

## 2017-03-25 RX ADMIN — ENOXAPARIN SODIUM 40 MG: 40 INJECTION SUBCUTANEOUS at 10:09

## 2017-03-25 RX ADMIN — DIPHENHYDRAMINE HYDROCHLORIDE 25 MG: 50 INJECTION, SOLUTION INTRAMUSCULAR; INTRAVENOUS at 20:08

## 2017-03-25 RX ADMIN — DIPHENHYDRAMINE HYDROCHLORIDE 25 MG: 50 INJECTION, SOLUTION INTRAMUSCULAR; INTRAVENOUS at 06:38

## 2017-03-25 NOTE — PLAN OF CARE
Problem: Goal Outcome Summary  Goal: Goal Outcome Summary  Outcome: Improving  VSS on RA. Afebrile.  A&O x4.  Pain is controlled with Dilaudid PCA.  Pt turning in bed independently, continues to complain of some abdominal discomfort.  Abdomen is soft.  Dressing is marked with scant drainage, marked with no change.  Abd binder on.  Bowel sounds present x4, faint in the bilat lower quadrants.  NG to low-intermittent suction, yielding 75ml clear output on evenings.  Lung sounds are clear, using IS independently and often.  Pt has good urine output from ruvalcaba. Urine is pink/clear. Pt is tolerating ice chips well. No nausea. Pt did complain of feeling itchy, two doses of IV Benadryl admin.

## 2017-03-25 NOTE — PLAN OF CARE
Problem: Pain, Acute (Adult)  Goal: Identify Related Risk Factors and Signs and Symptoms  Related risk factors and signs and symptoms are identified upon initiation of Human Response Clinical Practice Guideline (CPG)  Outcome: Improving  Note that pt has had a good day,  Up walking in the hallway with SBA, minimal discomfort and uses occasional doses of dilaudid/PCA.  Jackman discontinued and pt is voiding, NG clamped this moring at 10am and discontine at 3pm this afternoon.  Pt denies nausea.  Pt has been up in recliner chair most of the day.  Abd binder in place.  Pt is using the IS as directed.  BS hypo but present, no flatus at this time.  Javier ice chips.

## 2017-03-25 NOTE — PROGRESS NOTES
POD #2 S/P open LAR    Pt doing well, pain controlled with PCA, pt ambulating. Jackman d/c'd this morning and pt has voided 350cc since its removal. Urine is clear yellow per nursing. Pt denies any N/V. NGT output 75 cc overnight so NGT clamped. Pt had no N/V with NGT clamped and after 5 hrs NGT output when placed back on suction was less than 10cc so NGT removed.     Physical exam:  Vitals: /87 (BP Location: Left arm)  Pulse 82  Temp 98  F (36.7  C) (Oral)  Resp 16  LMP 11/22/2012  SpO2 96%  BMI= There is no height or weight on file to calculate BMI.    Constitutional: healthy, alert and no distress  Eyes: Pupils round and equal, no icterus   ENT: Mucous membranes moist  Respiratory:  Non-labored respiration  Gastrointestinal: Abdomen soft, BS hypoactive but present in all 4 quadrants No masses, organomegaly, dressing in place along with abdominal binder  Musculoskeletal: No gross deformity  Neurologic: No gross focal deficits  Psychiatric: mentation appears normal and affect normal/bright  Hematologic/Lymphatic/Immunologic: No bruising noted  Skin: No lesions, rashes, erythema or jaundice noted    Labs show:  Hemoglobin 9.9  Ca 7.7    Assessment:  Doing well post operatively    ICD-10-CM    1. Diverticulitis of large intestine with abscess without bleeding K57.20 Platelet count     Creatinine     CBC with platelets     Basic metabolic panel     CBC with platelets     Basic metabolic panel     CANCELED: Glucose     Plan: Cont NPO  Cont PCA (use is minimal)  Cont ambulation and IS use  Repeat labs in am      Justin Garcia,

## 2017-03-26 LAB
ANION GAP SERPL CALCULATED.3IONS-SCNC: 9 MMOL/L (ref 3–14)
BUN SERPL-MCNC: 3 MG/DL (ref 7–30)
CALCIUM SERPL-MCNC: 8.2 MG/DL (ref 8.5–10.1)
CHLORIDE SERPL-SCNC: 105 MMOL/L (ref 94–109)
CO2 SERPL-SCNC: 28 MMOL/L (ref 20–32)
CREAT SERPL-MCNC: 0.65 MG/DL (ref 0.52–1.04)
ERYTHROCYTE [DISTWIDTH] IN BLOOD BY AUTOMATED COUNT: 12.1 % (ref 10–15)
GFR SERPL CREATININE-BSD FRML MDRD: ABNORMAL ML/MIN/1.7M2
GLUCOSE SERPL-MCNC: 128 MG/DL (ref 70–99)
HCT VFR BLD AUTO: 32.2 % (ref 35–47)
HGB BLD-MCNC: 10.5 G/DL (ref 11.7–15.7)
MCH RBC QN AUTO: 32.4 PG (ref 26.5–33)
MCHC RBC AUTO-ENTMCNC: 32.6 G/DL (ref 31.5–36.5)
MCV RBC AUTO: 99 FL (ref 78–100)
PLATELET # BLD AUTO: 213 10E9/L (ref 150–450)
POTASSIUM SERPL-SCNC: 4 MMOL/L (ref 3.4–5.3)
RBC # BLD AUTO: 3.24 10E12/L (ref 3.8–5.2)
SODIUM SERPL-SCNC: 142 MMOL/L (ref 133–144)
WBC # BLD AUTO: 9.5 10E9/L (ref 4–11)

## 2017-03-26 PROCEDURE — 25000125 ZZHC RX 250: Performed by: SPECIALIST

## 2017-03-26 PROCEDURE — 85027 COMPLETE CBC AUTOMATED: CPT | Performed by: SURGERY

## 2017-03-26 PROCEDURE — 25000132 ZZH RX MED GY IP 250 OP 250 PS 637: Performed by: SPECIALIST

## 2017-03-26 PROCEDURE — 25800025 ZZH RX 258: Performed by: SPECIALIST

## 2017-03-26 PROCEDURE — 80048 BASIC METABOLIC PNL TOTAL CA: CPT | Performed by: SURGERY

## 2017-03-26 PROCEDURE — 25000128 H RX IP 250 OP 636: Performed by: SPECIALIST

## 2017-03-26 PROCEDURE — 12000000 ZZH R&B MED SURG/OB

## 2017-03-26 RX ORDER — PANTOPRAZOLE SODIUM 40 MG/1
40 TABLET, DELAYED RELEASE ORAL EVERY MORNING
Status: DISCONTINUED | OUTPATIENT
Start: 2017-03-27 | End: 2017-03-28 | Stop reason: HOSPADM

## 2017-03-26 RX ORDER — HYDROCHLOROTHIAZIDE 12.5 MG/1
12.5 CAPSULE ORAL DAILY
Status: DISCONTINUED | OUTPATIENT
Start: 2017-03-26 | End: 2017-03-28 | Stop reason: HOSPADM

## 2017-03-26 RX ORDER — LOSARTAN POTASSIUM AND HYDROCHLOROTHIAZIDE 12.5; 5 MG/1; MG/1
1 TABLET ORAL DAILY
Status: DISCONTINUED | OUTPATIENT
Start: 2017-03-26 | End: 2017-03-26

## 2017-03-26 RX ORDER — LOSARTAN POTASSIUM 50 MG/1
50 TABLET ORAL DAILY
Status: DISCONTINUED | OUTPATIENT
Start: 2017-03-26 | End: 2017-03-28 | Stop reason: HOSPADM

## 2017-03-26 RX ADMIN — METOPROLOL TARTRATE 5 MG: 5 INJECTION INTRAVENOUS at 08:59

## 2017-03-26 RX ADMIN — LOSARTAN POTASSIUM 50 MG: 50 TABLET, FILM COATED ORAL at 14:20

## 2017-03-26 RX ADMIN — METOPROLOL TARTRATE 5 MG: 5 INJECTION INTRAVENOUS at 02:04

## 2017-03-26 RX ADMIN — POTASSIUM CHLORIDE, DEXTROSE MONOHYDRATE AND SODIUM CHLORIDE: 150; 5; 450 INJECTION, SOLUTION INTRAVENOUS at 02:56

## 2017-03-26 RX ADMIN — POTASSIUM CHLORIDE, DEXTROSE MONOHYDRATE AND SODIUM CHLORIDE: 150; 5; 450 INJECTION, SOLUTION INTRAVENOUS at 11:15

## 2017-03-26 RX ADMIN — HYDROMORPHONE HYDROCHLORIDE: 10 INJECTION, SOLUTION INTRAMUSCULAR; INTRAVENOUS; SUBCUTANEOUS at 23:30

## 2017-03-26 RX ADMIN — HYDROCHLOROTHIAZIDE 12.5 MG: 12.5 CAPSULE ORAL at 14:20

## 2017-03-26 RX ADMIN — HYDROMORPHONE HYDROCHLORIDE: 10 INJECTION, SOLUTION INTRAMUSCULAR; INTRAVENOUS; SUBCUTANEOUS at 06:24

## 2017-03-26 RX ADMIN — ENOXAPARIN SODIUM 40 MG: 40 INJECTION SUBCUTANEOUS at 11:03

## 2017-03-26 RX ADMIN — DIPHENHYDRAMINE HYDROCHLORIDE 25 MG: 50 INJECTION, SOLUTION INTRAMUSCULAR; INTRAVENOUS at 03:02

## 2017-03-26 RX ADMIN — HYDROMORPHONE HYDROCHLORIDE: 10 INJECTION, SOLUTION INTRAMUSCULAR; INTRAVENOUS; SUBCUTANEOUS at 14:21

## 2017-03-26 RX ADMIN — POTASSIUM CHLORIDE, DEXTROSE MONOHYDRATE AND SODIUM CHLORIDE: 150; 5; 450 INJECTION, SOLUTION INTRAVENOUS at 20:17

## 2017-03-26 RX ADMIN — DIPHENHYDRAMINE HYDROCHLORIDE 25 MG: 50 INJECTION, SOLUTION INTRAMUSCULAR; INTRAVENOUS at 20:17

## 2017-03-26 NOTE — PROGRESS NOTES
POD #3 S/P open LAR    Pt doing well, pain controlled with PCA, pt ambulating. Pt voiding, pt denies any N/V. No BM or flatus, small amount of rectal discharge this am. Pt had an episode of feeling light headed earlier today after taking IV metoprolol.   Physical exam:  Vitals: /74 (BP Location: Left arm)  Pulse 72  Temp 97  F (36.1  C) (Oral)  Resp 16  LMP 11/22/2012  SpO2 95%  BMI= There is no height or weight on file to calculate BMI.    Constitutional: healthy, alert and no distress  Eyes: Pupils round and equal, no icterus   ENT: Mucous membranes moist  Respiratory:  Non-labored respiration  Gastrointestinal: Abdomen soft, BS hypoactive but present in all 4 quadrants No masses, organomegaly, dressing in place along with abdominal binder  Musculoskeletal: No gross deformity  Neurologic: No gross focal deficits  Psychiatric: mentation appears normal and affect normal/bright  Hematologic/Lymphatic/Immunologic: No bruising noted  Skin: No lesions, rashes, erythema or jaundice noted    Labs show:  Hemoglobin 10.5  Ca 8.2    Assessment:  Doing well post operatively    ICD-10-CM    1. Diverticulitis of large intestine with abscess without bleeding K57.20 Platelet count     Creatinine     CBC with platelets     Basic metabolic panel     CBC with platelets     Basic metabolic panel     CBC with platelets     Basic metabolic panel     CANCELED: Glucose     CANCELED: Platelet count     Plan: Advance to sips, may take PO medications  Cont PCA (use is minimal)  Cont ambulation and IS use  Repeat labs in am      Justin Garcia,

## 2017-03-26 NOTE — PLAN OF CARE
Problem: Goal Outcome Summary  Goal: Goal Outcome Summary  Outcome: Improving  VSS on RA. Afebrile. A&O x4. Pain is controlled with Dilaudid PCA. Pt turning in bed independently, continues to complain of some abdominal discomfort, states its getting better.  Abdomen is soft. Dressing is marked with scant drainage, marked with no change. Abd binder on. Bowel sounds present x4, faint in the bilat lower quadrants.  Lung sounds are clear, using IS independently and often. Pt up ambulating in halls x1 and to/from BR frequently, voiding clear yellow urine. Pt is tolerating ice chips well. No nausea. Pt did complain of feeling itchy, IV Benadryl admin with relief.

## 2017-03-26 NOTE — PLAN OF CARE
Problem: Pain, Acute (Adult)  Goal: Identify Related Risk Factors and Signs and Symptoms  Related risk factors and signs and symptoms are identified upon initiation of Human Response Clinical Practice Guideline (CPG)   Outcome: Improving  Note that pt states that she is feeling better today.  Up in the hallways, denies nausea, vdg and did have some discolored liquid out rectally-sm amts.   Pt did have an episode of dizziness early today during one of her walks VSS.  Drsg removed, no drainage and pt is in the shower.  Good pain control with PCA.  Javier sips of clear liquid.

## 2017-03-26 NOTE — PLAN OF CARE
Problem: Goal Outcome Summary  Goal: Goal Outcome Summary  Outcome: Improving  Pain controlled with dilaudid PCA. Ambulating in hallway. Tolerating sips of clear liquids. Active bowel sounds, not passing gas.

## 2017-03-27 PROCEDURE — 25000132 ZZH RX MED GY IP 250 OP 250 PS 637: Performed by: SPECIALIST

## 2017-03-27 PROCEDURE — 12000000 ZZH R&B MED SURG/OB

## 2017-03-27 PROCEDURE — 25800025 ZZH RX 258: Performed by: SPECIALIST

## 2017-03-27 PROCEDURE — 25000128 H RX IP 250 OP 636: Performed by: SPECIALIST

## 2017-03-27 PROCEDURE — 25000132 ZZH RX MED GY IP 250 OP 250 PS 637: Performed by: SURGERY

## 2017-03-27 RX ORDER — HYDROMORPHONE HYDROCHLORIDE 1 MG/ML
.3-.5 INJECTION, SOLUTION INTRAMUSCULAR; INTRAVENOUS; SUBCUTANEOUS
Status: DISCONTINUED | OUTPATIENT
Start: 2017-03-27 | End: 2017-03-28 | Stop reason: HOSPADM

## 2017-03-27 RX ORDER — OXYCODONE AND ACETAMINOPHEN 5; 325 MG/1; MG/1
1-2 TABLET ORAL EVERY 4 HOURS PRN
Status: DISCONTINUED | OUTPATIENT
Start: 2017-03-27 | End: 2017-03-28 | Stop reason: HOSPADM

## 2017-03-27 RX ADMIN — PANTOPRAZOLE SODIUM 40 MG: 40 TABLET, DELAYED RELEASE ORAL at 07:00

## 2017-03-27 RX ADMIN — OXYCODONE HYDROCHLORIDE AND ACETAMINOPHEN 1 TABLET: 5; 325 TABLET ORAL at 23:19

## 2017-03-27 RX ADMIN — OXYCODONE HYDROCHLORIDE AND ACETAMINOPHEN 2 TABLET: 5; 325 TABLET ORAL at 12:04

## 2017-03-27 RX ADMIN — HYDROCHLOROTHIAZIDE 12.5 MG: 12.5 CAPSULE ORAL at 11:29

## 2017-03-27 RX ADMIN — OXYCODONE HYDROCHLORIDE AND ACETAMINOPHEN 1 TABLET: 5; 325 TABLET ORAL at 18:40

## 2017-03-27 RX ADMIN — LOSARTAN POTASSIUM 50 MG: 50 TABLET, FILM COATED ORAL at 07:51

## 2017-03-27 RX ADMIN — POTASSIUM CHLORIDE, DEXTROSE MONOHYDRATE AND SODIUM CHLORIDE: 150; 5; 450 INJECTION, SOLUTION INTRAVENOUS at 13:03

## 2017-03-27 RX ADMIN — ENOXAPARIN SODIUM 40 MG: 40 INJECTION SUBCUTANEOUS at 11:28

## 2017-03-27 RX ADMIN — LOSARTAN POTASSIUM 50 MG: 50 TABLET, FILM COATED ORAL at 11:28

## 2017-03-27 RX ADMIN — HYDROMORPHONE HYDROCHLORIDE: 10 INJECTION, SOLUTION INTRAMUSCULAR; INTRAVENOUS; SUBCUTANEOUS at 06:12

## 2017-03-27 RX ADMIN — DIPHENHYDRAMINE HYDROCHLORIDE 25 MG: 50 INJECTION, SOLUTION INTRAMUSCULAR; INTRAVENOUS at 15:36

## 2017-03-27 RX ADMIN — HYDROCHLOROTHIAZIDE 12.5 MG: 12.5 CAPSULE ORAL at 07:51

## 2017-03-27 RX ADMIN — DIPHENHYDRAMINE HYDROCHLORIDE 25 MG: 50 INJECTION, SOLUTION INTRAMUSCULAR; INTRAVENOUS at 23:20

## 2017-03-27 NOTE — PLAN OF CARE
Problem: Goal Outcome Summary  Goal: Goal Outcome Summary  Outcome: Improving  Patient has had a fairly good day, has been up in halls walking at least three times, switched over from PCA pain pump to oral percocet.  Did initially try 2 tabs of percocet, she did get a hot flash with it so suggested next time to try 1 tablet and see if that helps with not having a hot flash.  Steri-strips on incisions, dried drainage noted but nothing fresh.  BM and passing gas today.  Tolerating clear liquids, advance as tolerated per order.  Plan to possibly discharge to home tomorrow if the rest of today goes well.

## 2017-03-27 NOTE — PLAN OF CARE
Problem: Goal Outcome Summary  Goal: Goal Outcome Summary  Outcome: Improving  VSS on RA. Afebrile. A&O x4. Pain is controlled with Dilaudid PCA. Pt turning in bed independently, continues to complain of some incisional pain. Abdomen is soft. Incision has scant dried drainage on steri strips.  Abd binder on. Bowel sounds present x4 normoactive. Pt reports 2 small bms today- loose.  Lung sounds are clear, using IS independently and often. Pt up ambulating in halls x1 and to/from BR frequently, voiding clear yellow urine. Pt is tolerating clear liquids well. No nausea. Pt did complain of feeling itchy, IV Benadryl admin with relief.

## 2017-03-27 NOTE — PROGRESS NOTES
Surgery POD #4      Subjective:  Pt feels good.  (+)BM and (+)Flatus.  Javier clears      Objective:    Vitals:    03/26/17 1057 03/26/17 1555 03/26/17 2333 03/27/17 0745   BP: 137/74 113/75 109/59 130/66   BP Location: Left arm  Left arm Left arm   Pulse: 72 73 70 74   Resp: 16 20 16 16   Temp: 97  F (36.1  C) 96.6  F (35.9  C) 96.5  F (35.8  C) 97  F (36.1  C)   TempSrc: Oral Oral Oral Oral   SpO2: 95% 95% 94% 96%       Intake/Output Summary (Last 24 hours) at 03/27/17 1205  Last data filed at 03/27/17 0900   Gross per 24 hour   Intake             3227 ml   Output              600 ml   Net             2627 ml     Abd: soft, non distended, incision clean    No results found for this or any previous visit (from the past 24 hour(s)).      Assessment/Plan:  Pt s/p LAR - doing well.  Ileus resolving.  Transition to oral pain meds.  Decrease IVF.  OOB, Ambulate.  Advance diet as tolerated.  Poss DC tomorrow.    Ryan Dailey MD, FACS

## 2017-03-28 VITALS
OXYGEN SATURATION: 95 % | RESPIRATION RATE: 14 BRPM | TEMPERATURE: 96.7 F | DIASTOLIC BLOOD PRESSURE: 74 MMHG | HEART RATE: 73 BPM | SYSTOLIC BLOOD PRESSURE: 119 MMHG

## 2017-03-28 LAB — COPATH REPORT: NORMAL

## 2017-03-28 PROCEDURE — 25000132 ZZH RX MED GY IP 250 OP 250 PS 637: Performed by: SPECIALIST

## 2017-03-28 PROCEDURE — 25000132 ZZH RX MED GY IP 250 OP 250 PS 637: Performed by: SURGERY

## 2017-03-28 PROCEDURE — 25000128 H RX IP 250 OP 636: Performed by: SPECIALIST

## 2017-03-28 RX ORDER — OXYCODONE AND ACETAMINOPHEN 5; 325 MG/1; MG/1
1-2 TABLET ORAL EVERY 4 HOURS PRN
Qty: 30 TABLET | Refills: 0 | Status: SHIPPED | OUTPATIENT
Start: 2017-03-28 | End: 2017-08-08

## 2017-03-28 RX ORDER — ONDANSETRON 4 MG/1
4 TABLET, FILM COATED ORAL EVERY 8 HOURS PRN
Qty: 20 TABLET | Refills: 1 | Status: SHIPPED | OUTPATIENT
Start: 2017-03-28 | End: 2017-08-08

## 2017-03-28 RX ADMIN — OXYCODONE HYDROCHLORIDE AND ACETAMINOPHEN 2 TABLET: 5; 325 TABLET ORAL at 08:06

## 2017-03-28 RX ADMIN — OXYCODONE HYDROCHLORIDE AND ACETAMINOPHEN 2 TABLET: 5; 325 TABLET ORAL at 12:48

## 2017-03-28 RX ADMIN — ENOXAPARIN SODIUM 40 MG: 40 INJECTION SUBCUTANEOUS at 10:43

## 2017-03-28 RX ADMIN — HYDROCHLOROTHIAZIDE 12.5 MG: 12.5 CAPSULE ORAL at 08:05

## 2017-03-28 RX ADMIN — OXYCODONE HYDROCHLORIDE AND ACETAMINOPHEN 2 TABLET: 5; 325 TABLET ORAL at 03:24

## 2017-03-28 RX ADMIN — LOSARTAN POTASSIUM 50 MG: 50 TABLET, FILM COATED ORAL at 08:03

## 2017-03-28 RX ADMIN — PANTOPRAZOLE SODIUM 40 MG: 40 TABLET, DELAYED RELEASE ORAL at 06:39

## 2017-03-28 NOTE — PLAN OF CARE
Problem: Goal Outcome Summary  Goal: Goal Outcome Summary  Outcome: No Change  VSS. Afebrile. Pt c/o pain to mid abdomen incision. PRN pain medication given per MAR which has been effective. Incision remains intact with steri-strips over incision no drainage noted. LS clear throughout. No cough noted. No other complaints this shift.

## 2017-03-28 NOTE — PLAN OF CARE
Problem: Goal Outcome Summary  Goal: Goal Outcome Summary  Outcome: Improving  Advance to full liquid diet for supper then had toast this evening tolerated well.   LS clear, SL, up independently in room.  + BS, abd incision steris loose, no drng. Requesting just one percocet at a time for abdominal discomfort, 2 make her dizzy.  Benadryl given for itching.  Declined a shower this evening, wants to shower in the morning before going home.  Anticipates discharge tomorrow to home.

## 2017-03-28 NOTE — PROGRESS NOTES
Surgery POD #5      Subjective:  Pt feels good.  No c/o. Wants to go home.  (+)BM/Flatus.  Javier reg diet.      Objective:    Vitals:    03/27/17 1533 03/27/17 2332 03/28/17 0730 03/28/17 1200   BP: 119/68 115/68 142/70 92/57   BP Location: Left arm Left arm Left arm Left arm   Pulse: 71 80 63 73   Resp: 17 16 14 14   Temp: 96.5  F (35.8  C) 95.8  F (35.4  C) 97.5  F (36.4  C) 96.7  F (35.9  C)   TempSrc: Oral Oral Oral Oral   SpO2: 96% 97% 95% 95%       Intake/Output Summary (Last 24 hours) at 03/28/17 1204  Last data filed at 03/28/17 0846   Gross per 24 hour   Intake              230 ml   Output                0 ml   Net              230 ml     Abd; soft, non distended.  Incision clean    No results found for this or any previous visit (from the past 24 hour(s)).      Assessment/Plan:  Pt s/p open LAR.  Doing well.  Ileus resolved.  Will DC home.  F/U with me 1 week.    Ryan Dailey MD, FACS

## 2017-03-28 NOTE — PROGRESS NOTES
S-(situation): Patient discharged to home via personal vehical with significant other    B-(background): Colon resection    A-(assessment):Vitals stable, afebrile. Abd soft.  Bowel sounds present x4. Pt passing flatus. Pain controlled with Percocet.     R-(recommendations): Discharge instructions reviewed with pt and significant other. Listed belongings gathered and returned to patient.          Discharge Nursing Criteria:     Care Plan and Patient education resolved: Yes    New Medications- pt has been educated about purpose and side effects: Yes    Vaccines  Pneumonia Vaccine verified at discharge: Yes  Influenza status verified at discharge:  Yes      I    MISC  Prescriptions if needed, hard copies sent with patient  Yes  Home and hospital aquired medications returned to patient: Yes  Medication Bin checked and emptied on discharge Yes  Patient reports post-discharge pain management plan is effective: Yes

## 2017-03-28 NOTE — PROGRESS NOTES
S-(situation): Discharge Note    B-(background): Diverticulitis and Colonic Stenosis, laparoscopic assisted sigmoid resection, ureteral stent- post op day 5    A-(assessment): Skin pale and warm to touch. Incision is intact with steri-strips over incision. No drainage noted. Patient complains of mild pain in abdomen with incision, rates it a 4. Managing pain with PRN Percocet. Patient will continue to take PRN med per order. Patient is voiding without any symptoms. Last bowel movement 0500 on 03/26. Stool was loose and brown per patient. Patient has been moving independently from bed to chair and hallways. Patient is on a full liquid diet, may advance as tolerated. Appetite good. Been consuming 50-75% of food.    R-(recommendations): Discharge to home via  with belongings. 1200 vital signs- temperature: 96.7, Pulse: 73, RR: 14, BP: 119/72, O2 stats: 95. Alert and oriented.    SN Robert

## 2017-03-29 ENCOUNTER — TELEPHONE (OUTPATIENT)
Dept: FAMILY MEDICINE | Facility: CLINIC | Age: 57
End: 2017-03-29

## 2017-03-29 NOTE — TELEPHONE ENCOUNTER
"Hospital/TCU/ED for chronic condition Discharge Protocol    \"Hi, my name is Tamy Wang, a registered nurse, and I am calling from [facility].  I am calling to follow up and see how things are going for you after your recent emergency visit/hospital/TCU stay.\"    Tell me how you are doing now that you are home?\" RESTING      Discharge Instructions    \"Let's review your discharge instructions.  What is/are the follow-up recommendations?  Pt. Response: Follow low residue diet    \"Has an appointment with your primary care provider been scheduled?\"   No (schedule appointment)    \"When you see the provider, I would recommend that you bring your medications with you.\"    Medications    \"Tell me what changed about your medicines when you discharged?\"    Changes to chronic meds?    2 or more - Epic MTM referral needed    \"What questions do you have about your medications?\"    None     New diagnoses of heart failure, COPD, diabetes, or MI?    No              Medication reconciliation completed? Yes  Was MTM referral placed (*Make sure to put transitions as reason for referral)?   No    Call Summary    \"What questions or concerns do you have about your recent visit and your follow-up care?\"     none    \"If you have questions or things don't continue to improve, we encourage you contact us through the main clinic number (give number).  Even if the clinic is not open, triage nurses are available 24/7 to help you.     We would like you to know that our clinic has extended hours (provide information).  We also have urgent care (provide details on closest location and hours/contact info)\"      \"Thank you for your time and take care!\"             "

## 2017-03-29 NOTE — TELEPHONE ENCOUNTER
Inpatient Visit Date: 03/29/17  Diagnosis / Reason for Visit: Diverticulitis, S/P Partial Resection Of Colon

## 2017-03-30 NOTE — PROGRESS NOTES
"Caro Palmer  Gender: female  : 1960  [address]   [facility] [id number] [phone] (home) [phone] (work)    Medical Record: [id number]  Pharmacy:    [facility] [place] - [place], [place] - [address] [place]  - [place], [place] - [address]  Primary Care Provider: Schoen, Gregory G    Parent's names are: Data Unavailable (mother) and Data Unavailable (father).      [facility]  2017    Discharge Phone Call:  Key Words/Key Times      Introduction - AIDET (Acknowledge, Introduce, Duration, Explanation)      Empathy-   We are calling to see how you are since your recent stay in the hospital?     Call back COMMENTS: pretty sore today but doing ok       Clinical Questions -  (f/u appts, medication side effects/purpose, ability to care for self at home) \"For your safety, it is important to us that you understand the purpose and side effects of your medications, can you tell me what your new medications are?\"     Call back COMMENTS: pain medications nausea, tired.       Staff Recognition -  We like to recognize staff and physicians who have done an excellent job.  Do you remember any people from your care team that you would like recognize?     Call back COMMENTS: everyone      Very Good Care -  We want to provide very good care to all patients.  How was your care?     Call back COMMENTS: i hade absolutely great care       Opportunities for Improvement -  Our goal is to be the best.  Do you have any suggestions for things that we could improve upon?     Call back COMMENTS: no      Thank You         "

## 2017-04-10 ENCOUNTER — OFFICE VISIT (OUTPATIENT)
Dept: SURGERY | Facility: CLINIC | Age: 57
End: 2017-04-10
Payer: COMMERCIAL

## 2017-04-10 VITALS — TEMPERATURE: 96.9 F | WEIGHT: 167 LBS | OXYGEN SATURATION: 98 % | BODY MASS INDEX: 30.54 KG/M2 | HEART RATE: 127 BPM

## 2017-04-10 DIAGNOSIS — Z98.890 POST-OPERATIVE STATE: Primary | ICD-10-CM

## 2017-04-10 PROCEDURE — 99024 POSTOP FOLLOW-UP VISIT: CPT | Performed by: SPECIALIST

## 2017-04-10 NOTE — NURSING NOTE
Per Dr. Dailey patient doesn't need colonoscopy until 5 2020. Will shred request for surgery....................................................................Caity Lucero CMA  ([facility])

## 2017-04-10 NOTE — NURSING NOTE
[facility]    Request for surgery sheet faxed to Fern at [facility]

## 2017-04-10 NOTE — MR AVS SNAPSHOT
After Visit Summary   4/10/2017    Caro Palmer    MRN: [id number]           Patient Information     Date Of Birth          1960        Visit Information        Provider Department      4/10/2017 3:00 PM Ryan Dailey MD [facility]         Follow-ups after your visit        Who to contact     If you have questions or need follow up information about today's clinic visit or your schedule please contact [facility] directly at [phone].  Normal or non-critical lab and imaging results will be communicated to you by Manyetahart, letter or phone within 4 business days after the clinic has received the results. If you do not hear from us within 7 days, please contact the clinic through ArcSoftt or phone. If you have a critical or abnormal lab result, we will notify you by phone as soon as possible.  Submit refill requests through Easy Vino or call your pharmacy and they will forward the refill request to us. Please allow 3 business days for your refill to be completed.          Additional Information About Your Visit        MyChart Information     Easy Vino gives you secure access to your electronic health record. If you see a primary care provider, you can also send messages to your care team and make appointments. If you have questions, please call your primary care clinic.  If you do not have a primary care provider, please call [phone] and they will assist you.        Care EveryWhere ID     This is your Care EveryWhere ID. This could be used by other organizations to access your [facility] medical records  [id number]        Your Vitals Were     Pulse Temperature Last Period Pulse Oximetry BMI (Body Mass Index)       127 96.9  F (36.1  C) (Temporal) 11/22/2012 98% 30.54 kg/m2        Blood Pressure from Last 3 Encounters:   03/28/17 119/74   03/01/17 138/79   02/28/17 132/74    Weight from Last 3 Encounters:   04/10/17 167 lb (75.8 kg)   02/28/17 169 lb 6.4 oz (76.8  kg)   01/11/17 149 lb 14.6 oz (68 kg)              Today, you had the following     No orders found for display       Primary Care Provider Office Phone # Fax #    Gregory G Schoen, [phone]       [facility] [address] [place] [address]        Thank you!     Thank you for choosing [facility]  for your care. Our goal is always to provide you with excellent care. Hearing back from our patients is one way we can continue to improve our services. Please take a few minutes to complete the written survey that you may receive in the mail after your visit with us. Thank you!             Your Updated Medication List - Protect others around you: Learn how to safely use, store and throw away your medicines at www.disposemymeds.org.          This list is accurate as of: 4/10/17  3:26 PM.  Always use your most recent med list.                   Brand Name Dispense Instructions for use    ALPRAZolam 0.25 MG tablet    XANAX    30 tablet    Take 1 tablet (0.25 mg) by mouth 3 times daily as needed for anxiety       aspirin 81 MG tablet     100    ONE DAILY       CHLOROPHYLL PO      Take 15 mLs by mouth daily       fish oil-omega-3 fatty acids 1000 MG capsule      Take 1 g by mouth 2 times daily       flaxseed oil 1000 MG Caps      Take 1,000 mg by mouth 3 times daily       Gelatin 650 MG Caps      2 daily       GLUCOSAMINE CHONDRO COMPLEX OR      2 tablets x 2 daily       ibuprofen 400 MG tablet    ADVIL/MOTRIN    40 tablet    Take 1 tablet (400 mg) by mouth every 6 hours as needed for moderate pain       losartan-hydrochlorothiazide 50-12.5 MG per tablet    HYZAAR    30 tablet    TAKE ONE TABLET BY MOUTH EVERY DAY       Lysine 1000 MG Tabs      Take 1,000 mg by mouth daily.       ondansetron 4 MG tablet    ZOFRAN    20 tablet    Take 1 tablet (4 mg) by mouth every 8 hours as needed for nausea       OVER-THE-COUNTER      1 tablet daily Citracal 500 D and Calcium 600        oxyCODONE-acetaminophen 5-325 MG per tablet    PERCOCET    30 tablet    Take 1-2 tablets by mouth every 4 hours as needed for moderate to severe pain       vitamin E 400 UNIT capsule     3 MONTHS    ONE CAPSULE DAILY

## 2017-04-10 NOTE — NURSING NOTE
"Chief Complaint   Patient presents with     Surgical Followup     ATTEMPTED laparoscopic assisted sigmoid resection, Converted to Open Low anterior Colectomy ureteral stent placement, Cystoscopy       Initial Pulse 127  Temp 96.9  F (36.1  C) (Temporal)  Wt 167 lb (75.8 kg)  LMP 11/22/2012  SpO2 98%  BMI 30.54 kg/m2 Estimated body mass index is 30.54 kg/(m^2) as calculated from the following:    Height as of 1/11/17: 5' 2\" (1.575 m).    Weight as of this encounter: 167 lb (75.8 kg).  Medication Reconciliation: complete      "

## 2017-04-11 NOTE — DISCHARGE SUMMARY
[facility] Discharge Summary    Caro Palmer MRN# [id number]   Age: 56 year old YOB: 1960     Date of Admission:  3/23/2017  Date of Discharge::  3/28/2017  2:30 PM  Admitting Physician:  Ryan Dailey MD  Discharge Physician:  Ryan Dailey MD     Home clinic: [facility]          Admission Diagnoses:   diverticulitis  S/P partial resection of colon            Discharge Diagnosis:   Diverticulitis  S/p sigmoid resection          Procedures:   Procedure(s): Attempted lap sigmoid resection, Open LAR with mobilization of splenic flexure       No other procedures performed during this admission           Medications Prior to Admission:     No prescriptions prior to admission.             Discharge Medications:     Discharge Medication List as of 3/28/2017  1:53 PM      START taking these medications    Details   oxyCODONE-acetaminophen (PERCOCET) 5-325 MG per tablet Take 1-2 tablets by mouth every 4 hours as needed for moderate to severe pain, Disp-30 tablet, R-0, Local Print      ondansetron (ZOFRAN) 4 MG tablet Take 1 tablet (4 mg) by mouth every 8 hours as needed for nausea, Disp-20 tablet, R-1, Local Print         CONTINUE these medications which have NOT CHANGED    Details   ALPRAZolam (XANAX) 0.25 MG tablet Take 1 tablet (0.25 mg) by mouth 3 times daily as needed for anxiety, Disp-30 tablet, R-0, Local Print      ibuprofen (ADVIL/MOTRIN) 400 MG tablet Take 1 tablet (400 mg) by mouth every 6 hours as needed for moderate pain, Disp-40 tablet, R-0, E-Prescribe      CHLOROPHYLL PO Take 15 mLs by mouth daily, Historical      losartan-hydrochlorothiazide (HYZAAR) 50-12.5 MG per tablet TAKE ONE TABLET BY MOUTH EVERY DAY, Disp-30 tablet, R-10, E-Prescribe      Lysine 1000 MG TABS Take 1,000 mg by mouth daily., Historical      fish oil-omega-3 fatty acids (FISH OIL) 1000 MG capsule Take 1 g by mouth 2 times daily , Historical      Flaxseed, Linseed, (FLAXSEED OIL) 1000 MG CAPS  Take 1,000 mg by mouth 3 times daily , Historical      OVER-THE-COUNTER 1 tablet daily Citracal 500 D and Calcium 600, Historical      GLUCOSAMINE CHONDRO COMPLEX OR 2 tablets x 2 daily, R-0, Historical      VITAMIN E 400 UNIT OR CAPS ONE CAPSULE DAILY, Disp-3 MONTHS, R-1 YEAR, Historical      GELATIN 650 MG OR CAPS 2 daily, R-0, Historical      ASPIRIN 81 MG OR TABS ONE DAILY, Disp-100, R-3, Historical         STOP taking these medications       neomycin (MYCIFRADIN) 500 MG tablet Comments:   Reason for Stopping:                     Consultations:   No consultations were requested during this admission          Brief History of Illness:   Reason for admission requiring a surgical or invasive procedure:   diverticulitis   The patient underwent the following procedure(s):   Attempted lap sigmoid resection, Open LAR with mobilization of splenic flexure   There were no immediate complications during this procedure.  Please refer to the full operative summary for details.             Hospital Course:   The patient's hospital course was unremarkable.  She recovered as anticipated and experienced no post-operative complications.           Discharge Instructions and Follow-Up:   Discharge diet: Regular   Discharge activity: Lifting restricted to 20 pounds  No heavy lifting, pushing, pulling for 2 week(s)   Discharge follow-up: Follow up with me in 7-10 days   Wound care: Daily dressing changes  Ice to area for comfort           Discharge Disposition:   Discharged to home      Ryan Dailey MD, FACS

## 2017-04-13 DIAGNOSIS — F41.1 GENERALIZED ANXIETY DISORDER: ICD-10-CM

## 2017-04-13 RX ORDER — ALPRAZOLAM 0.25 MG
0.25 TABLET ORAL 3 TIMES DAILY PRN
Qty: 30 TABLET | Refills: 0 | Status: SHIPPED | OUTPATIENT
Start: 2017-04-13 | End: 2017-05-23

## 2017-04-13 NOTE — TELEPHONE ENCOUNTER
Alprazolam       Last Written Prescription Date: 01/20/2017  Last Fill Quantity: 30,  # refills: 0   Last Office Visit with [facility], [facility] or [facility] prescribing provider: 06/17/2016    Thanks  Amy Fraser [facility]   [phone]

## 2017-05-23 DIAGNOSIS — F41.1 GENERALIZED ANXIETY DISORDER: ICD-10-CM

## 2017-05-23 NOTE — TELEPHONE ENCOUNTER
Alprazolam       Last Written Prescription Date: 04/13/2017  Last Fill Quantity: 30,  # refills: 0   Last Office Visit with [facility], [facility] or [facility] prescribing provider: 06/17/2016    Thanks  Amy Fraser [facility]   [phone]

## 2017-05-24 RX ORDER — ALPRAZOLAM 0.25 MG
0.25 TABLET ORAL 3 TIMES DAILY PRN
Qty: 30 TABLET | Refills: 0 | Status: SHIPPED | OUTPATIENT
Start: 2017-05-24 | End: 2017-07-05

## 2017-06-13 ENCOUNTER — HOSPITAL ENCOUNTER (OUTPATIENT)
Dept: MAMMOGRAPHY | Facility: CLINIC | Age: 57
Discharge: HOME | End: 2017-06-13
Attending: FAMILY MEDICINE | Admitting: FAMILY MEDICINE
Payer: COMMERCIAL

## 2017-06-13 DIAGNOSIS — Z12.31 VISIT FOR SCREENING MAMMOGRAM: ICD-10-CM

## 2017-06-13 PROCEDURE — G0202 SCR MAMMO BI INCL CAD: HCPCS

## 2017-06-19 ENCOUNTER — TELEPHONE (OUTPATIENT)
Dept: FAMILY MEDICINE | Facility: CLINIC | Age: 57
End: 2017-06-19

## 2017-06-19 ENCOUNTER — OFFICE VISIT (OUTPATIENT)
Dept: FAMILY MEDICINE | Facility: CLINIC | Age: 57
End: 2017-06-19
Payer: COMMERCIAL

## 2017-06-19 VITALS
WEIGHT: 168 LBS | SYSTOLIC BLOOD PRESSURE: 166 MMHG | HEART RATE: 108 BPM | OXYGEN SATURATION: 94 % | HEIGHT: 62 IN | TEMPERATURE: 97.2 F | DIASTOLIC BLOOD PRESSURE: 100 MMHG | RESPIRATION RATE: 14 BRPM | BODY MASS INDEX: 30.91 KG/M2

## 2017-06-19 DIAGNOSIS — I10 ESSENTIAL HYPERTENSION WITH GOAL BLOOD PRESSURE LESS THAN 140/90: ICD-10-CM

## 2017-06-19 DIAGNOSIS — Z00.00 ROUTINE GENERAL MEDICAL EXAMINATION AT A HEALTH CARE FACILITY: Primary | ICD-10-CM

## 2017-06-19 DIAGNOSIS — Z90.49 S/P PARTIAL RESECTION OF COLON: ICD-10-CM

## 2017-06-19 DIAGNOSIS — F41.1 GENERALIZED ANXIETY DISORDER: ICD-10-CM

## 2017-06-19 DIAGNOSIS — Z80.0 FAMILY HISTORY OF PANCREATIC CANCER: ICD-10-CM

## 2017-06-19 DIAGNOSIS — Z98.890 S/P LEEP (STATUS POST LOOP ELECTROSURGICAL EXCISION PROCEDURE): ICD-10-CM

## 2017-06-19 DIAGNOSIS — Z11.59 NEED FOR HEPATITIS C SCREENING TEST: ICD-10-CM

## 2017-06-19 DIAGNOSIS — K57.80 DIVERTICULITIS OF INTESTINE WITH ABSCESS WITHOUT BLEEDING, UNSPECIFIED PART OF INTESTINAL TRACT: ICD-10-CM

## 2017-06-19 LAB
ANION GAP SERPL CALCULATED.3IONS-SCNC: 9 MMOL/L (ref 3–14)
BUN SERPL-MCNC: 12 MG/DL (ref 7–30)
CALCIUM SERPL-MCNC: 9.5 MG/DL (ref 8.5–10.1)
CHLORIDE SERPL-SCNC: 105 MMOL/L (ref 94–109)
CHOLEST SERPL-MCNC: 192 MG/DL
CO2 SERPL-SCNC: 28 MMOL/L (ref 20–32)
CREAT SERPL-MCNC: 0.7 MG/DL (ref 0.52–1.04)
GFR SERPL CREATININE-BSD FRML MDRD: 87 ML/MIN/1.7M2
GLUCOSE SERPL-MCNC: 112 MG/DL (ref 70–99)
HDLC SERPL-MCNC: 71 MG/DL
LDLC SERPL CALC-MCNC: 101 MG/DL
NONHDLC SERPL-MCNC: 121 MG/DL
POTASSIUM SERPL-SCNC: 3.7 MMOL/L (ref 3.4–5.3)
SODIUM SERPL-SCNC: 142 MMOL/L (ref 133–144)
TRIGL SERPL-MCNC: 101 MG/DL

## 2017-06-19 PROCEDURE — G0476 HPV COMBO ASSAY CA SCREEN: HCPCS | Performed by: FAMILY MEDICINE

## 2017-06-19 PROCEDURE — 86301 IMMUNOASSAY TUMOR CA 19-9: CPT | Mod: 90 | Performed by: FAMILY MEDICINE

## 2017-06-19 PROCEDURE — 86304 IMMUNOASSAY TUMOR CA 125: CPT | Performed by: FAMILY MEDICINE

## 2017-06-19 PROCEDURE — 86803 HEPATITIS C AB TEST: CPT | Performed by: FAMILY MEDICINE

## 2017-06-19 PROCEDURE — 99000 SPECIMEN HANDLING OFFICE-LAB: CPT | Performed by: FAMILY MEDICINE

## 2017-06-19 PROCEDURE — 36415 COLL VENOUS BLD VENIPUNCTURE: CPT | Performed by: FAMILY MEDICINE

## 2017-06-19 PROCEDURE — 80061 LIPID PANEL: CPT | Performed by: FAMILY MEDICINE

## 2017-06-19 PROCEDURE — 99396 PREV VISIT EST AGE 40-64: CPT | Performed by: FAMILY MEDICINE

## 2017-06-19 PROCEDURE — 80048 BASIC METABOLIC PNL TOTAL CA: CPT | Performed by: FAMILY MEDICINE

## 2017-06-19 PROCEDURE — G0145 SCR C/V CYTO,THINLAYER,RESCR: HCPCS | Performed by: FAMILY MEDICINE

## 2017-06-19 ASSESSMENT — ANXIETY QUESTIONNAIRES
2. NOT BEING ABLE TO STOP OR CONTROL WORRYING: SEVERAL DAYS
6. BECOMING EASILY ANNOYED OR IRRITABLE: NOT AT ALL
1. FEELING NERVOUS, ANXIOUS, OR ON EDGE: MORE THAN HALF THE DAYS
GAD7 TOTAL SCORE: 4
IF YOU CHECKED OFF ANY PROBLEMS ON THIS QUESTIONNAIRE, HOW DIFFICULT HAVE THESE PROBLEMS MADE IT FOR YOU TO DO YOUR WORK, TAKE CARE OF THINGS AT HOME, OR GET ALONG WITH OTHER PEOPLE: SOMEWHAT DIFFICULT
3. WORRYING TOO MUCH ABOUT DIFFERENT THINGS: NOT AT ALL
5. BEING SO RESTLESS THAT IT IS HARD TO SIT STILL: SEVERAL DAYS
7. FEELING AFRAID AS IF SOMETHING AWFUL MIGHT HAPPEN: NOT AT ALL

## 2017-06-19 ASSESSMENT — PAIN SCALES - GENERAL: PAINLEVEL: NO PAIN (0)

## 2017-06-19 ASSESSMENT — PATIENT HEALTH QUESTIONNAIRE - PHQ9: 5. POOR APPETITE OR OVEREATING: NOT AT ALL

## 2017-06-19 NOTE — NURSING NOTE
"Chief Complaint   Patient presents with     Physical     Gyn Exam       Initial BP (!) 138/98 (BP Location: Left arm, Patient Position: Chair, Cuff Size: Adult Regular)  Pulse 108  Temp 97.2  F (36.2  C) (Temporal)  Resp 14  Ht 5' 2\" (1.575 m)  Wt 168 lb (76.2 kg)  LMP 11/22/2012  SpO2 94%  BMI 30.73 kg/m2 Estimated body mass index is 30.73 kg/(m^2) as calculated from the following:    Height as of this encounter: 5' 2\" (1.575 m).    Weight as of this encounter: 168 lb (76.2 kg).  Medication Reconciliation: complete     Kassandra Wilson MA 6/19/2017        "

## 2017-06-19 NOTE — PROGRESS NOTES
SUBJECTIVE:     CC: Caro Palmer is an 56 year old woman who presents for preventive health visit.     Healthy Habits:    Do you get at least three servings of calcium containing foods daily (dairy, green leafy vegetables, etc.)? yes    Amount of exercise or daily activities, outside of work: 3-4 day(s) per week    Problems taking medications regularly No    Medication side effects: No    Have you had an eye exam in the past two years? yes    Do you see a dentist twice per year? Once per year insurance    Do you have sleep apnea, excessive snoring or daytime drowsiness?no    No concerns today. Doing well post sigmoid resection from perforated diverticulitis. She required an open exploration due to scar tissue and has recovered well.  Bowels are moving without issues of constipation or diarrhea.  Due to family history of pancreatic and ovarian cancer wishes to monitor tumor markers annually as she has been doing.      Overall states her anxiety has been under good control, using xanax as needed.        Today's PHQ-2 Score:   PHQ-2 ( 1999 Pfizer) 2/28/2017 1/20/2017   Q1: Little interest or pleasure in doing things 0 0   Q2: Feeling down, depressed or hopeless 0 0   PHQ-2 Score 0 0       Abuse: Current or Past(Physical, Sexual or Emotional)- No  Do you feel safe in your environment - Yes    Social History   Substance Use Topics     Smoking status: Never Smoker     Smokeless tobacco: Never Used     Alcohol use 0.0 oz/week     0 Standard drinks or equivalent per week      Comment: 1-2 drinks monthly     The patient does not drink >3 drinks per day nor >7 drinks per week.    Recent Labs   Lab Test  05/22/14   1633  05/21/13   1426   CHOL  153  182   HDL  52  61   LDL  86  56   TRIG  74  323*   CHOLHDLRATIO  3.0  3.0       Reviewed orders with patient.  Reviewed health maintenance and updated orders accordingly - Yes    Mammo Decision Support:Mammogram last week without findings.   Pertinent mammograms are reviewed  "under the imaging tab.  History of abnormal Pap smear: History of LEEP for TYSON.  This was about 18 years ago and current recommendations are to monitory annually.     Reviewed and updated as needed this visit by clinical staff  Tobacco  Allergies  Meds  Soc Hx        Reviewed and updated as needed this visit by Provider            ROS:  C: NEGATIVE for fever, chills, change in weight  I: NEGATIVE for worrisome rashes, moles or lesions  E: NEGATIVE for vision changes or irritation  ENT: NEGATIVE for ear, mouth and throat problems  R: NEGATIVE for significant cough or SOB  B: NEGATIVE for masses, tenderness or discharge  CV: NEGATIVE for chest pain, palpitations or peripheral edema  GI: NEGATIVE for nausea, abdominal pain, heartburn, or change in bowel habits  : NEGATIVE for unusual urinary or vaginal symptoms. No vaginal bleeding.  M: NEGATIVE for significant arthralgias or myalgia  N: NEGATIVE for weakness, dizziness or paresthesias  P: anxiety as noted.    OBJECTIVE:     BP (!) 166/100 (BP Location: Left arm, Patient Position: Chair, Cuff Size: Adult Regular)  Pulse 108  Temp 97.2  F (36.2  C) (Temporal)  Resp 14  Ht 5' 2\" (1.575 m)  Wt 168 lb (76.2 kg)  LMP 11/22/2012  SpO2 94%  BMI 30.73 kg/m2  EXAM:  GENERAL: healthy, alert and no distress  EYES: Eyes grossly normal to inspection, PERRL and conjunctivae and sclerae normal  HENT: ear canals and TM's normal, nose and mouth without ulcers or lesions  NECK: no adenopathy, no asymmetry, masses, or scars and thyroid normal to palpation  RESP: lungs clear to auscultation - no rales, rhonchi or wheezes  CV: regular rate and rhythm, normal S1 S2, no S3 or S4, no murmur, click or rub, no peripheral edema and peripheral pulses strong  ABDOMEN: Large midline incision,infraumbilical, well-healed. soft, nontender, no hepatosplenomegaly, no masses and bowel sounds normal  Pelvic - Normal external genitalia and BUS without lesions.  Vaginal mucosa estrogen " "deficient.  Cervix appears normal.  Pap Smear was performed using Thinprep brush and broom.   MS: no gross musculoskeletal defects noted, no edema  SKIN: no suspicious lesions or rashes  NEURO: Normal strength and tone, mentation intact and speech normal  PSYCH: mentation appears normal, affect normal/bright    ASSESSMENT/PLAN:        Routine general medical examination at a health care facility   Up to date on HM needs; recent mammogram normal.    Family history of pancreatic cancer   Will check CA 19-9; also CA-125 for ovarian screen.   Essential hypertension with goal blood pressure less than 140/90   BP was elevated today. Chart reviewed and no prior issues on current meds and was normotensive during her hospital stay. She will check pressures at home/pharmacy and report those back in a couple of weeks. Will check basic profile and lipid profile.     Generalized anxiety disorder   Stable with prn use of xanax; will continue to prescribe and monitor use.   S/P LEEP (status post loop electrosurgical excision procedure)   Follow up pap done today and continue annual paps.   Diverticulitis of intestine with abscess without bleeding, unspecified part of intestinal tract  S/P partial resection of colon   Has healed well and normal bowel function. No changes in diet indicated.    Need for hepatitis C screening test   Test ordered; will follow up with results.       COUNSELING:   Reviewed preventive health counseling, as reflected in patient instructions       Regular exercise       Healthy diet/nutrition dd       reports that she has never smoked. She has never used smokeless tobacco.    Estimated body mass index is 30.73 kg/(m^2) as calculated from the following:    Height as of this encounter: 5' 2\" (1.575 m).    Weight as of this encounter: 168 lb (76.2 kg).       Counseling Resources:  ATP IV Guidelines  Pooled Cohorts Equation Calculator  Breast Cancer Risk Calculator  FRAX Risk Assessment  ICSI Preventive " Guidelines  Dietary Guidelines for Americans, 2010  USDA's MyPlate  ASA Prophylaxis  Lung CA Screening    Gregory G. Schoen, MD  [facility]

## 2017-06-19 NOTE — TELEPHONE ENCOUNTER
Patient was called and explained it was a blood draw and that she can get it done after her appt today if she would like. She will be doing that. And no further questions.    Kassandra Wilson MA 6/19/2017

## 2017-06-19 NOTE — MR AVS SNAPSHOT
After Visit Summary   6/19/2017    Caro Palmer    MRN: [id number]           Patient Information     Date Of Birth          1960        Visit Information        Provider Department      6/19/2017 4:30 PM Schoen, Gregory G, MD [facility]        Today's Diagnoses     Routine general medical examination at a health care facility    -  1    Family history of pancreatic cancer        Essential hypertension with goal blood pressure less than 140/90        Generalized anxiety disorder        S/P LEEP (status post loop electrosurgical excision procedure)        Diverticulitis of intestine with abscess without bleeding, unspecified part of intestinal tract        S/P partial resection of colon        Need for hepatitis C screening test          Care Instructions      Preventive Health Recommendations  Female Ages 50 - 64    Yearly exam: See your health care provider every year in order to  o Review health changes.   o Discuss preventive care.    o Review your medicines if your doctor has prescribed any.      Get a Pap test every three years (unless you have an abnormal result and your provider advises testing more often).    If you get Pap tests with HPV test, you only need to test every 5 years, unless you have an abnormal result.     You do not need a Pap test if your uterus was removed (hysterectomy) and you have not had cancer.    You should be tested each year for STDs (sexually transmitted diseases) if you're at risk.     Have a mammogram every 1 to 2 years.    Have a colonoscopy at age 50, or have a yearly FIT test (stool test). These exams screen for colon cancer.      Have a cholesterol test every 5 years, or more often if advised.    Have a diabetes test (fasting glucose) every three years. If you are at risk for diabetes, you should have this test more often.     If you are at risk for osteoporosis (brittle bone disease), think about having a bone density scan (DEXA).    Shots:  Get a flu shot each year. Get a tetanus shot every 10 years.    Nutrition:     Eat at least 5 servings of fruits and vegetables each day.    Eat whole-grain bread, whole-wheat pasta and brown rice instead of white grains and rice.    Talk to your provider about Calcium and Vitamin D.     Lifestyle    Exercise at least 150 minutes a week (30 minutes a day, 5 days a week). This will help you control your weight and prevent disease.    Limit alcohol to one drink per day.    No smoking.     Wear sunscreen to prevent skin cancer.     See your dentist every six months for an exam and cleaning.    See your eye doctor every 1 to 2 years.            Follow-ups after your visit        Who to contact     If you have questions or need follow up information about today's clinic visit or your schedule please contact [facility] directly at [phone].  Normal or non-critical lab and imaging results will be communicated to you by T[employer] Canadahart, letter or phone within 4 business days after the clinic has received the results. If you do not hear from us within 7 days, please contact the clinic through Amplidatat or phone. If you have a critical or abnormal lab result, we will notify you by phone as soon as possible.  Submit refill requests through WazeTrip or call your pharmacy and they will forward the refill request to us. Please allow 3 business days for your refill to be completed.          Additional Information About Your Visit        WazeTrip Information     WazeTrip gives you secure access to your electronic health record. If you see a primary care provider, you can also send messages to your care team and make appointments. If you have questions, please call your primary care clinic.  If you do not have a primary care provider, please call [phone] and they will assist you.        Care EveryWhere ID     This is your Care EveryWhere ID. This could be used by other organizations to access your [facility]  "records  [id number]        Your Vitals Were     Pulse Temperature Respirations Height Last Period Pulse Oximetry    108 97.2  F (36.2  C) (Temporal) 14 5' 2\" (1.575 m) 11/22/2012 94%    BMI (Body Mass Index)                   30.73 kg/m2            Blood Pressure from Last 3 Encounters:   06/19/17 (!) 166/100   03/28/17 119/74   03/01/17 138/79    Weight from Last 3 Encounters:   06/19/17 168 lb (76.2 kg)   04/10/17 167 lb (75.8 kg)   02/28/17 169 lb 6.4 oz (76.8 kg)              We Performed the Following     Basic metabolic panel          Cancer antigen 19-9     Hepatitis C antibody     Lipid Profile (Chol, Trig, HDL, LDL calc)     Pap imaged thin layer screen with HPV - recommended age 30 - 65 years (select HPV order below)        Primary Care Provider Office Phone # Fax #    Gregory G Schoen, [phone]       [place] [address] [place] [address]        Equal Access to Services     [facility]: Hadii aad ku hadasho Soomaali, waaxda luqadaha, qaybta kaalmada adeesequielyabenji, mindy melo . So [facility] [phone].    ATENCIÓN: Si habla español, tiene a strange disposición servicios gratuitos de asistencia lingüística. Barby al [phone].    We comply with applicable federal civil rights laws and [place] laws. We do not discriminate on the basis of race, color, national origin, age, disability sex, sexual orientation or gender identity.            Thank you!     Thank you for choosing [facility]  for your care. Our goal is always to provide you with excellent care. Hearing back from our patients is one way we can continue to improve our services. Please take a few minutes to complete the written survey that you may receive in the mail after your visit with us. Thank you!             Your Updated Medication List - Protect others around you: Learn how to safely use, store and throw away your medicines at " www.disposemymeds.org.          This list is accurate as of: 6/19/17 11:59 PM.  Always use your most recent med list.                   Brand Name Dispense Instructions for use Diagnosis    ALPRAZolam 0.25 MG tablet    XANAX    30 tablet    Take 1 tablet (0.25 mg) by mouth 3 times daily as needed for anxiety    Generalized anxiety disorder       aspirin 81 MG tablet     100    ONE DAILY    Family history of malignant neoplasm of gastrointestinal tract, Other general counseling and advice for contraceptive management       CHLOROPHYLL PO      Take 15 mLs by mouth daily        fish oil-omega-3 fatty acids 1000 MG capsule      Take 1 g by mouth 2 times daily        flaxseed oil 1000 MG Caps      Take 1,000 mg by mouth 3 times daily        Gelatin 650 MG Caps      2 daily    Other general counseling and advice for contraceptive management, Family history of malignant neoplasm of gastrointestinal tract       GLUCOSAMINE CHONDRO COMPLEX OR      2 tablets x 2 daily    Other general counseling and advice for contraceptive management, Family history of malignant neoplasm of gastrointestinal tract       ibuprofen 400 MG tablet    ADVIL/MOTRIN    40 tablet    Take 1 tablet (400 mg) by mouth every 6 hours as needed for moderate pain    Diverticulitis of large intestine with abscess without bleeding       losartan-hydrochlorothiazide 50-12.5 MG per tablet    HYZAAR    30 tablet    TAKE ONE TABLET BY MOUTH EVERY DAY    Essential hypertension with goal blood pressure less than 140/90       Lysine 1000 MG Tabs      Take 1,000 mg by mouth daily.        ondansetron 4 MG tablet    ZOFRAN    20 tablet    Take 1 tablet (4 mg) by mouth every 8 hours as needed for nausea    Nausea       OVER-THE-COUNTER      1 tablet daily Citracal 500 D and Calcium 600        oxyCODONE-acetaminophen 5-325 MG per tablet    PERCOCET    30 tablet    Take 1-2 tablets by mouth every 4 hours as needed for moderate to severe pain    Acute post-operative pain        vitamin E 400 UNIT capsule     3 MONTHS    ONE CAPSULE DAILY    Other general counseling and advice for contraceptive management, Family history of malignant neoplasm of gastrointestinal tract

## 2017-06-20 LAB
CANCER AG125 SERPL-ACNC: 14 U/ML (ref 0–30)
HCV AB SERPL QL IA: NORMAL

## 2017-06-20 ASSESSMENT — ANXIETY QUESTIONNAIRES: GAD7 TOTAL SCORE: 4

## 2017-06-21 LAB — CANCER AG19-9 SERPL-ACNC: 18

## 2017-06-22 ENCOUNTER — TELEPHONE (OUTPATIENT)
Dept: FAMILY MEDICINE | Facility: CLINIC | Age: 57
End: 2017-06-22

## 2017-06-22 LAB
COPATH REPORT: NORMAL
PAP: NORMAL

## 2017-06-22 NOTE — TELEPHONE ENCOUNTER
"----- Message from Gregory G Schoen, MD sent at 6/21/2017  1:50 PM CDT -----  Please notify that her labs are all normal and look very good. The  and CA 19-9 were both normal, the Hep C screen was negative, her lipids appear well controlled and chemistry tests all normal except for glucose which continues to run in the very low 100s range (112) which implies she is \"pre-diabetic.\"  She be careful about ingesting sweets/sugars in her diet.   Electronically signed by Greg Schoen, MD    "

## 2017-06-23 LAB
FINAL DIAGNOSIS: NORMAL
HPV HR 12 DNA CVX QL NAA+PROBE: NEGATIVE
HPV16 DNA SPEC QL NAA+PROBE: NEGATIVE
HPV18 DNA SPEC QL NAA+PROBE: NEGATIVE
SPECIMEN DESCRIPTION: NORMAL

## 2017-07-05 DIAGNOSIS — F41.1 GENERALIZED ANXIETY DISORDER: ICD-10-CM

## 2017-07-05 NOTE — TELEPHONE ENCOUNTER
Xanax 0.25mg       Last Written Prescription Date:  04/18/17  Last Fill Quantity: 30,   # refills: 0  Last Office Visit with [facility] or [facility] prescribing provider: 06/19/17  Future Office visit:       Routing refill request to provider for review/approval because:  Drug not on the [facility], [facility] or [facility] refill protocol or controlled substance      On behalf of [facility]    Thank You~  Abigail Gonzalez CPhT  [facility]

## 2017-07-06 RX ORDER — ALPRAZOLAM 0.25 MG
0.25 TABLET ORAL 3 TIMES DAILY PRN
Qty: 30 TABLET | Refills: 0 | Status: SHIPPED | OUTPATIENT
Start: 2017-07-06 | End: 2017-08-18

## 2017-07-09 DIAGNOSIS — I10 ESSENTIAL HYPERTENSION WITH GOAL BLOOD PRESSURE LESS THAN 140/90: ICD-10-CM

## 2017-07-10 NOTE — TELEPHONE ENCOUNTER
losartan      Last Written Prescription Date: 8/2/16  Last Fill Quantity: 30, # refills: 10  Last Office Visit with [facility], [facility] or [facility] prescribing provider: 6/19/17       Potassium   Date Value Ref Range Status   06/19/2017 3.7 3.4 - 5.3 mmol/L Final     Creatinine   Date Value Ref Range Status   06/19/2017 0.70 0.52 - 1.04 mg/dL Final     BP Readings from Last 3 Encounters:   06/19/17 (!) 166/100   03/28/17 119/74   03/01/17 138/79

## 2017-07-12 RX ORDER — LOSARTAN POTASSIUM AND HYDROCHLOROTHIAZIDE 12.5; 5 MG/1; MG/1
TABLET ORAL
Qty: 30 TABLET | Refills: 11 | Status: SHIPPED | OUTPATIENT
Start: 2017-07-12 | End: 2018-07-06

## 2017-07-12 NOTE — TELEPHONE ENCOUNTER
Prescription approved per [facility] Refill Protocol.......MAURY Plummer

## 2017-08-08 ENCOUNTER — OFFICE VISIT (OUTPATIENT)
Dept: URGENT CARE | Facility: RETAIL CLINIC | Age: 57
End: 2017-08-08
Payer: COMMERCIAL

## 2017-08-08 VITALS
TEMPERATURE: 98.4 F | HEART RATE: 104 BPM | DIASTOLIC BLOOD PRESSURE: 85 MMHG | OXYGEN SATURATION: 96 % | SYSTOLIC BLOOD PRESSURE: 145 MMHG

## 2017-08-08 DIAGNOSIS — J01.90 ACUTE SINUSITIS WITH COEXISTING CONDITION REQUIRING PROPHYLACTIC TREATMENT: Primary | ICD-10-CM

## 2017-08-08 DIAGNOSIS — R09.81 NASAL SINUS CONGESTION: ICD-10-CM

## 2017-08-08 DIAGNOSIS — I10 ESSENTIAL HYPERTENSION WITH GOAL BLOOD PRESSURE LESS THAN 140/90: ICD-10-CM

## 2017-08-08 DIAGNOSIS — R05.9 COUGH: ICD-10-CM

## 2017-08-08 PROCEDURE — 99213 OFFICE O/P EST LOW 20 MIN: CPT | Performed by: PHYSICIAN ASSISTANT

## 2017-08-08 RX ORDER — ALBUTEROL SULFATE 90 UG/1
2 AEROSOL, METERED RESPIRATORY (INHALATION) EVERY 6 HOURS PRN
Qty: 1 INHALER | Refills: 0 | Status: SHIPPED | OUTPATIENT
Start: 2017-08-08 | End: 2018-07-06

## 2017-08-08 NOTE — MR AVS SNAPSHOT
After Visit Summary   8/8/2017    Caro Palmer    MRN: [id number]           Patient Information     Date Of Birth          1960        Visit Information        Provider Department      8/8/2017 5:00 PM Evette Oconnell PA-C [facility]        Today's Diagnoses     Acute sinusitis with coexisting condition requiring prophylactic treatment    -  1    Essential hypertension with goal blood pressure less than 140/90        Nasal sinus congestion        Cough          Care Instructions    Your blood pressure is elevated at today's visit.  You should follow up with your primary provider regarding possible hypertension if your recheck are greater than 140/90.  Check your blood pressure several times in the next week or so. You can do this at local pharmacies, grocery stores or with the float nurse at the [facility]. Record your readings and take them with you to your appointment.  Goal BP <140/90  Do not take decongestants - they can raise your BP.  If you have chest pain, unusual headaches, vision changes or any sign or symptoms of stroke seek prompt medical attention.    /85 (BP Location: Right arm, Patient Position: Chair, Cuff Size: Adult Regular)  Pulse 104  Temp 98.4  F (36.9  C) (Tympanic)  LMP 11/22/2012  SpO2 96%    ...........................      Please FOLLOW UP at primary care clinic if not improving, new symptoms, worse or this does not resolve.  [facility]  [phone]    .......................  Increase fluid intake.  Increase rest.  Stay in clean air environment.  Salt water gargles. Throat lozenges if soothing.  Try Mucinex if thick nasal or chest congestion - take in AM.  Robitussin DM or Delsym may help decrease your cough at night.  Saline nose spray may help with nasal and sinus congestion.  Try acetominophen or Ibuprofen (with food) for fever and pain.    If you are unable to swallow or are having difficulty breathing seek  prompt medical attention - go to the emergency department.              Follow-ups after your visit        Who to contact     You can reach your care team any time of the day by calling [phone].  Notification of test results:  If you have an abnormal lab result, we will notify you by phone as soon as possible.         Additional Information About Your Visit        Ofelia Felizhart Information     [employer] gives you secure access to your electronic health record. If you see a primary care provider, you can also send messages to your care team and make appointments. If you have questions, please call your primary care clinic.  If you do not have a primary care provider, please call [phone] and they will assist you.        Care EveryWhere ID     This is your Care EveryWhere ID. This could be used by other organizations to access your [place] medical records  [id number]        Your Vitals Were     Pulse Temperature Last Period Pulse Oximetry          104 98.4  F (36.9  C) (Tympanic) 11/22/2012 96%         Blood Pressure from Last 3 Encounters:   08/08/17 145/85   06/19/17 (!) 166/100   03/28/17 119/74    Weight from Last 3 Encounters:   06/19/17 168 lb (76.2 kg)   04/10/17 167 lb (75.8 kg)   02/28/17 169 lb 6.4 oz (76.8 kg)              Today, you had the following     No orders found for display         Today's Medication Changes          These changes are accurate as of: 8/8/17  5:24 PM.  If you have any questions, ask your nurse or doctor.               Start taking these medicines.        Dose/Directions    albuterol 108 (90 BASE) MCG/ACT Inhaler   Commonly known as:  PROAIR HFA/PROVENTIL HFA/VENTOLIN HFA   Used for:  Cough        Dose:  2 puff   Inhale 2 puffs into the lungs every 6 hours as needed for shortness of breath / dyspnea or wheezing   Quantity:  1 Inhaler   Refills:  0       amoxicillin-clavulanate 875-125 MG per tablet   Commonly known as:  AUGMENTIN   Used for:  Acute sinusitis with coexisting  condition requiring prophylactic treatment        Dose:  1 tablet   Take 1 tablet by mouth 2 times daily   Quantity:  20 tablet   Refills:  0            Where to get your medicines      These medications were [facility] to [facility] [place], [place] - [address]  [address] [place] [address]     Phone:  [phone]     albuterol 108 (90 BASE) MCG/ACT Inhaler    amoxicillin-clavulanate 875-125 MG per tablet                Primary Care Provider Office Phone # Fax #    Gregory G Schoen, [phone]       [address] [place] [phone]        Equal Access to Services     [facility]: Hadii aad ku hadasho Soomaali, waaxda luqadaha, qaybta kaalmada adeegyada, mindy cuenca adeesequiel emlo . So [facility] [phone].    ATENCIÓN: Si habla español, tiene a strange disposición servicios gratuitos de asistencia lingüística. [facility] [phone].    We comply with applicable federal civil rights laws and [place] laws. We do not discriminate on the basis of race, color, national origin, age, disability sex, sexual orientation or gender identity.            Thank you!     Thank you for choosing [facility]  for your care. Our goal is always to provide you with excellent care. Hearing back from our patients is one way we can continue to improve our services. Please take a few minutes to complete the written survey that you may receive in the mail after your visit with us. Thank you!             Your Updated Medication List - Protect others around you: Learn how to safely use, store and throw away your medicines at www.disposemymeds.org.          This list is accurate as of: 8/8/17  5:24 PM.  Always use your most recent med list.                   Brand Name Dispense Instructions for use Diagnosis    albuterol 108 (90 BASE) MCG/ACT Inhaler    PROAIR HFA/PROVENTIL HFA/VENTOLIN HFA    1 Inhaler    Inhale 2 puffs into the lungs every 6 hours as needed for shortness of  breath / dyspnea or wheezing    Cough       ALPRAZolam 0.25 MG tablet    XANAX    30 tablet    Take 1 tablet (0.25 mg) by mouth 3 times daily as needed for anxiety    Generalized anxiety disorder       amoxicillin-clavulanate 875-125 MG per tablet    AUGMENTIN    20 tablet    Take 1 tablet by mouth 2 times daily    Acute sinusitis with coexisting condition requiring prophylactic treatment       aspirin 81 MG tablet     100    ONE DAILY    Family history of malignant neoplasm of gastrointestinal tract, Other general counseling and advice for contraceptive management       CHLOROPHYLL PO      Take 15 mLs by mouth daily        fish oil-omega-3 fatty acids 1000 MG capsule      Take 1 g by mouth 2 times daily        flaxseed oil 1000 MG Caps      Take 1,000 mg by mouth 3 times daily        Gelatin 650 MG Caps      2 daily    Other general counseling and advice for contraceptive management, Family history of malignant neoplasm of gastrointestinal tract       GLUCOSAMINE CHONDRO COMPLEX OR      2 tablets x 2 daily    Other general counseling and advice for contraceptive management, Family history of malignant neoplasm of gastrointestinal tract       ibuprofen 400 MG tablet    ADVIL/MOTRIN    40 tablet    Take 1 tablet (400 mg) by mouth every 6 hours as needed for moderate pain    Diverticulitis of large intestine with abscess without bleeding       losartan-hydrochlorothiazide 50-12.5 MG per tablet    HYZAAR    30 tablet    TAKE ONE TABLET BY MOUTH EVERY DAY    Essential hypertension with goal blood pressure less than 140/90       Lysine 1000 MG Tabs      Take 1,000 mg by mouth daily.        OVER-THE-COUNTER      1 tablet daily Citracal 500 D and Calcium 600        vitamin E 400 UNIT capsule     3 MONTHS    ONE CAPSULE DAILY    Other general counseling and advice for contraceptive management, Family history of malignant neoplasm of gastrointestinal tract

## 2017-08-08 NOTE — NURSING NOTE
"Chief Complaint   Patient presents with     Sinus Problem     sinus pressure, frontal headache and congestion since last thursday       Initial /85 (BP Location: Right arm, Patient Position: Chair, Cuff Size: Adult Regular)  Pulse 104  Temp 98.4  F (36.9  C) (Tympanic)  LMP 11/22/2012  SpO2 96% Estimated body mass index is 30.73 kg/(m^2) as calculated from the following:    Height as of 6/19/17: 5' 2\" (1.575 m).    Weight as of 6/19/17: 168 lb (76.2 kg).  Medication Reconciliation: complete     Jessica Sundet      "

## 2017-08-08 NOTE — PROGRESS NOTES
"  Chief Complaint   Patient presents with     Sinus Problem     sinus pressure, frontal headache and congestion since last thursday         SUBJECTIVE:   Pt. presenting to [facility] -  with a chief complaint of sinus HA and congestion -worsening HA and now dry irritating cough. Very tired. Feverish this aft.  Hx of asthma no.  Onset of symptoms gradual  Course of illness is worsening.    Severity moderate  Current and Associated symptoms: feverish, nasal congestion, \"cold symptoms\", dry cough , scratchy sore throat, facial pain/pressure, headache and malaise  Treatment measures tried include OTC meds.  Predisposing factors include works as a .  Last antibiotic winter 2017     Smoker no    Past Medical History:   Diagnosis Date     Diverticulitis      Hypertension      Papanicolaou smear of cervix with low grade squamous intraepithelial lesion (LGSIL)      Past Surgical History:   Procedure Laterality Date     COLECTOMY LOW ANTERIOR N/A 3/23/2017    Procedure: COLECTOMY LOW ANTERIOR;  Surgeon: Ryan Dailey MD;  Location:  OR     COLONOSCOPY  2/27/2012    Procedure:COLONOSCOPY; Colonoscopy ; Surgeon:SHIRA ROWLAND; Location: GI     COLONOSCOPY N/A 3/1/2017    Procedure: COLONOSCOPY;  Surgeon: Ryan Dailey MD;  Location:  GI     COLPOSCOPY,BX CERVIX/ENDOCERV CURR  10/19/1999    moderate/severe dysplasia     CONIZATION CERVIX,LOOP ELECTRD  11/29/1999     INSERT STENT URETER Bilateral 3/23/2017    Procedure: INSERT STENT URETER (PRE-OP);  Surgeon: Eamon Linn MD;  Location:  OR     LAPAROSCOPIC ASSISTED COLECTOMY N/A 3/23/2017    Procedure: LAPAROSCOPIC ASSISTED COLECTOMY;  Surgeon: Ryan Dailey MD;  Location:  OR     Patient Active Problem List   Diagnosis     S/P LEEP (status post loop electrosurgical excision procedure)     Generalized anxiety disorder     CARDIOVASCULAR SCREENING; LDL GOAL LESS THAN 160     Chronic rhinitis     Family history of pancreatic " cancer     Essential hypertension with goal blood pressure less than 140/90     Diverticulitis of intestine with abscess     Leukocytosis     Advanced care planning/counseling discussion     S/P partial resection of colon     Current Outpatient Prescriptions   Medication     losartan-hydrochlorothiazide (HYZAAR) 50-12.5 MG per tablet     ALPRAZolam (XANAX) 0.25 MG tablet     ibuprofen (ADVIL/MOTRIN) 400 MG tablet     CHLOROPHYLL PO     Lysine 1000 MG TABS     fish oil-omega-3 fatty acids (FISH OIL) 1000 MG capsule     Flaxseed, Linseed, (FLAXSEED OIL) 1000 MG CAPS     OVER-THE-COUNTER     GLUCOSAMINE CHONDRO COMPLEX OR     VITAMIN E 400 UNIT OR CAPS     GELATIN 650 MG OR CAPS     ASPIRIN 81 MG OR TABS     No current facility-administered medications for this visit.              ROS:  ENT - see above  CP - No SOB or chest pain   GI- - appetite good. No nausea, vomiting or diarrhea.   No bowel or bladder changes   MSK - no joint pain or swelling   Skin: No rashes    OBJECTIVE:  /85 (BP Location: Right arm, Patient Position: Chair, Cuff Size: Adult Regular)  Pulse 104  Temp 98.4  F (36.9  C) (Tympanic)  LMP 11/22/2012  SpO2 96%    GENERAL APPEARANCE: cooperative, alert and no distress. Appears well hydrated.  EYES: conjunctiva clear  HENT: Rt ear canal  clear and TM normal   Lt ear canal clear and TM normal   Nose some congestion. thick discharge  Mouth without ulcers or lesions. no erythema. no exudate. Some PND  NECK: supple, few small shoddy NT ant nodes. No  posterior nodes.  RESP: lungs clear to auscultation - no rales, rhonchi or wheezes. Breathing easily.  CV: regular rates and rhythm  ABDOMEN:  soft, nontender, no HSM or masses and bowel sounds normal   SKIN: no suspicious lesions or rashes  some tenderness to palpate over max sinus areas.      ASSESSMENT:     Essential hypertension with goal blood pressure less than 140/90  Nasal sinus congestion  Acute sinusitis with coexisting condition requiring  prophylactic treatment  Cough      PLAN:  Symptomatic measures   Prescriptions as below. Discussed indications, dosing, side affects and adverse reactions of medications with  Patient -Augmentin and Albuterol  Eat yogurt daily or take a probiotic supplement when on antibiotics.  OTC cough suppressant/expectorant discussed  Discussed BP - needs rechecks  Salt water gargles -throat lozenges or honey/lemon tea if soothing   saline nasal spray for  nasal congestion   Cool mist vaporizer   Stay in clean air environment.  > rest.  > fluids.  Contagiousness and hygiene discussed.  Fever and pain  control measures discussed.   If unable to swallow or any breathing difficulty to go to ED   AVS given and discussed:  Patient Instructions   Your blood pressure is elevated at today's visit.  You should follow up with your primary provider regarding possible hypertension if your recheck are greater than 140/90.  Check your blood pressure several times in the next week or so. You can do this at local pharmacies, grocery stores or with the float nurse at the [facility]. Record your readings and take them with you to your appointment.  Goal BP <140/90  Do not take decongestants - they can raise your BP.  If you have chest pain, unusual headaches, vision changes or any sign or symptoms of stroke seek prompt medical attention.    /85 (BP Location: Right arm, Patient Position: Chair, Cuff Size: Adult Regular)  Pulse 104  Temp 98.4  F (36.9  C) (Tympanic)  LMP 11/22/2012  SpO2 96%    ...........................      Please FOLLOW UP at primary care clinic if not improving, new symptoms, worse or this does not resolve.  [facility]  [phone]    .......................  Increase fluid intake.  Increase rest.  Stay in clean air environment.  Salt water gargles. Throat lozenges if soothing.  Try Mucinex if thick nasal or chest congestion - take in AM.  Robitussin DM or Delsym may help decrease your cough at  night.  Saline nose spray may help with nasal and sinus congestion.  Try acetominophen or Ibuprofen (with food) for fever and pain.    If you are unable to swallow or are having difficulty breathing seek prompt medical attention - go to the emergency department.      PT is comfortable with this plan.  Electronically signed,  ROCHELLE Oconnell, PAC

## 2017-08-18 DIAGNOSIS — F41.1 GENERALIZED ANXIETY DISORDER: ICD-10-CM

## 2017-08-18 RX ORDER — ALPRAZOLAM 0.25 MG
0.25 TABLET ORAL 3 TIMES DAILY PRN
Qty: 30 TABLET | Refills: 0 | Status: SHIPPED | OUTPATIENT
Start: 2017-08-18 | End: 2017-09-21

## 2017-08-18 NOTE — TELEPHONE ENCOUNTER
alprazolam   Last Written Prescription Date: 7/6/17  Last Fill Quantity: 30,  # refills: 0   Last Office Visit with [facility], [facility] or [facility] prescribing provider: 8/8/17    [facility]  [facility]  [phone]

## 2017-09-21 DIAGNOSIS — F41.1 GENERALIZED ANXIETY DISORDER: ICD-10-CM

## 2017-09-21 NOTE — TELEPHONE ENCOUNTER
Xanax 0.25MG TABS      Last Written Prescription Date:  08/18/2017  Last Fill Quantity: 30,   # refills: 0  [facility] Visit with [facility] prescribing provider: 06/19/2017  Future Office visit:       Routing refill request to provider for review/approval because:  Drug not on the [facility] refill protocol or controlled substance    Thank You  Santosh Benítez  Pharmacy Technician  [facility]    On behalf of [facility]

## 2017-09-22 RX ORDER — ALPRAZOLAM 0.25 MG
0.25 TABLET ORAL 3 TIMES DAILY PRN
Qty: 30 TABLET | Refills: 0 | Status: SHIPPED | OUTPATIENT
Start: 2017-09-22 | End: 2017-11-06

## 2017-10-24 NOTE — TELEPHONE ENCOUNTER
"Hospital/TCU/ED for chronic condition Discharge Protocol    \"Hi, my name is Lety Valentino, a registered nurse, and I am calling from [facility].  I am calling to follow up and see how things are going for you after your recent emergency visit/hospital/TCU stay.\"    Tell me how you are doing now that you are home?\" Doing good. I have been taking my medications as prescribed and also taking Ibuprofen as needed (every 8-9 hours). Doing very well overall. BMs are good. No diarrhea or constipation.\"      Discharge Instructions    \"Let's review your discharge instructions.  What is/are the follow-up recommendations?  Pt. Response: follow up as needed    \"Has an appointment with your primary care provider been scheduled?\"   Yes. (confirm)    \"When you see the provider, I would recommend that you bring your medications with you.\"    Medications    \"Tell me what changed about your medicines when you discharged?\"    Changes to chronic meds?    0-1    \"What questions do you have about your medications?\"    None     New diagnoses of heart failure, COPD, diabetes, or MI?    No              Medication reconciliation completed? Yes  Was [facility] referral placed (*Make sure to put transitions as reason for referral)?   No    Call Summary    \"What questions or concerns do you have about your recent visit and your follow-up care?\"     none    \"If you have questions or things don't continue to improve, we encourage you contact us through the main clinic number (give number).  Even if the clinic is not open, triage nurses are available 24/7 to help you.     We would like you to know that our clinic has extended hours (provide information).  We also have urgent care (provide details on closest location and hours/contact info)\"      \"Thank you for your time and take care!\"    Lety Valentino RN  [facility]  "
ED / Discharge Outreach Protocol    Patient Contact    Attempt # 1    Was call answered?  No.  Left message on voicemail with information to call me back. [phone], may speak with any of the RNs.   KavithaRN        
Inpatient Visit Date: 1/15/17, [facility]  Diagnosis / Reason for Visit: Diverticulitis of large intestine with abscess without bleeding    
No

## 2017-11-06 DIAGNOSIS — F41.1 GENERALIZED ANXIETY DISORDER: ICD-10-CM

## 2017-11-06 RX ORDER — ALPRAZOLAM 0.25 MG
0.25 TABLET ORAL 3 TIMES DAILY PRN
Qty: 30 TABLET | Refills: 0 | Status: SHIPPED | OUTPATIENT
Start: 2017-11-06 | End: 2017-12-26

## 2017-11-06 NOTE — TELEPHONE ENCOUNTER
Alprazolam      Last Written Prescription Date: 9/22/17  Last Fill Quantity: 30,  # refills: 0   Last Office Visit with [facility], [facility] or [facility] prescribing provider: 6/19/17

## 2017-11-09 ENCOUNTER — ALLIED HEALTH/NURSE VISIT (OUTPATIENT)
Dept: FAMILY MEDICINE | Facility: CLINIC | Age: 57
End: 2017-11-09
Payer: COMMERCIAL

## 2017-11-09 DIAGNOSIS — Z23 NEED FOR PROPHYLACTIC VACCINATION AND INOCULATION AGAINST INFLUENZA: Primary | ICD-10-CM

## 2017-11-09 PROCEDURE — 90686 IIV4 VACC NO PRSV 0.5 ML IM: CPT

## 2017-11-09 PROCEDURE — 90471 IMMUNIZATION ADMIN: CPT

## 2017-11-09 NOTE — PROGRESS NOTES

## 2017-11-09 NOTE — MR AVS SNAPSHOT
After Visit Summary   11/9/2017    Caro Palmer    MRN: [id number]           Patient Information     Date Of Birth          1960        Visit Information        Provider Department      11/9/2017 5:45 PM NL FLOAT TEAM D [facility][note 3]        Today's Diagnoses     Need for prophylactic vaccination and inoculation against influenza    -  1       Follow-ups after your visit        Your next 10 appointments already scheduled     Nov 09, 2017  5:45 PM CST   Nurse Only with NL FLOAT TEAM D [facility][note 3] ([facility])    [address] [place] [id number] [phone]              Who to contact     If you have questions or need follow up information about today's clinic visit or your schedule please contact [facility] directly at [phone].  Normal or non-critical lab and imaging results will be communicated to you by MyChart, letter or phone within 4 business days after the clinic has received the results. If you do not hear from us within 7 days, please contact the clinic through MyChart or phone. If you have a critical or abnormal lab result, we will notify you by phone as soon as possible.  Submit refill requests through [facility] or call your pharmacy and they will forward the refill request to us. Please allow 3 business days for your refill to be completed.          Additional Information About Your Visit        MyChart Information     [facility] gives you secure access to your electronic health record. If you see a primary care provider, you can also send messages to your care team and make appointments. If you have questions, please call your primary care clinic.  If you do not have a primary care provider, please call [phone] and they will assist you.        Care EveryWhere ID     This is your Care EveryWhere ID. This could be used by other organizations to access your [place] medical records  [id number]         Your Vitals Were     Last Period                   11/22/2012            Blood Pressure from Last 3 Encounters:   08/08/17 145/85   06/19/17 (!) 166/100   03/28/17 119/74    Weight from Last 3 Encounters:   06/19/17 168 lb (76.2 kg)   04/10/17 167 lb (75.8 kg)   02/28/17 169 lb 6.4 oz (76.8 kg)              We Performed the Following     FLU VAC, SPLIT VIRUS IM > 3 YO (QUADRIVALENT) [24222]     Vaccine Administration, Initial [29518]        Primary Care Provider Office Phone # Fax #    Gregory G Schoen, [phone] [address] [place] [address]        Equal Access to Services     [facility] JOSEPHINE : Hadii aad ku hadasho Soomaali, waaxda luqadaha, qaybta kaalmada adeegyada, waxtaylor melo . So [facility] [phone].    ATENCIÓN: Si habla español, tiene a strange disposición servicios gratuitos de asistencia lingüística. Llame al [phone].    We comply with applicable federal civil rights laws and [place] laws. We do not discriminate on the basis of race, color, national origin, age, disability, sex, sexual orientation, or gender identity.            Thank you!     Thank you for choosing [facility]  for your care. Our goal is always to provide you with excellent care. Hearing back from our patients is one way we can continue to improve our services. Please take a few minutes to complete the written survey that you may receive in the mail after your visit with us. Thank you!             Your Updated Medication List - Protect others around you: Learn how to safely use, store and throw away your medicines at www.disposemymeds.org.          This list is accurate as of: 11/9/17  5:37 PM.  Always use your most recent med list.                   Brand Name Dispense Instructions for use Diagnosis    albuterol 108 (90 BASE) MCG/ACT Inhaler    PROAIR HFA/PROVENTIL HFA/VENTOLIN HFA    1 Inhaler    Inhale 2 puffs into the lungs every 6 hours as needed for  shortness of breath / dyspnea or wheezing    Cough       ALPRAZolam 0.25 MG tablet    XANAX    30 tablet    Take 1 tablet (0.25 mg) by mouth 3 times daily as needed for anxiety    Generalized anxiety disorder       amoxicillin-clavulanate 875-125 MG per tablet    AUGMENTIN    20 tablet    Take 1 tablet by mouth 2 times daily    Acute sinusitis with coexisting condition requiring prophylactic treatment       aspirin 81 MG tablet     100    ONE DAILY    Family history of malignant neoplasm of gastrointestinal tract, Other general counseling and advice for contraceptive management       CHLOROPHYLL PO      Take 15 mLs by mouth daily        fish oil-omega-3 fatty acids 1000 MG capsule      Take 1 g by mouth 2 times daily        flaxseed oil 1000 MG Caps      Take 1,000 mg by mouth 3 times daily        Gelatin 650 MG Caps      2 daily    Other general counseling and advice for contraceptive management, Family history of malignant neoplasm of gastrointestinal tract       GLUCOSAMINE CHONDRO COMPLEX OR      2 tablets x 2 daily    Other general counseling and advice for contraceptive management, Family history of malignant neoplasm of gastrointestinal tract       ibuprofen 400 MG tablet    ADVIL/MOTRIN    40 tablet    Take 1 tablet (400 mg) by mouth every 6 hours as needed for moderate pain    Diverticulitis of large intestine with abscess without bleeding       losartan-hydrochlorothiazide 50-12.5 MG per tablet    HYZAAR    30 tablet    TAKE ONE TABLET BY MOUTH EVERY DAY    Essential hypertension with goal blood pressure less than 140/90       Lysine 1000 MG Tabs      Take 1,000 mg by mouth daily.        OVER-THE-COUNTER      1 tablet daily Citracal 500 D and Calcium 600        vitamin E 400 UNIT capsule     3 MONTHS    ONE CAPSULE DAILY    Other general counseling and advice for contraceptive management, Family history of malignant neoplasm of gastrointestinal tract

## 2017-12-26 DIAGNOSIS — F41.1 GENERALIZED ANXIETY DISORDER: ICD-10-CM

## 2017-12-27 RX ORDER — ALPRAZOLAM 0.25 MG
0.25 TABLET ORAL 3 TIMES DAILY PRN
Qty: 30 TABLET | Refills: 0 | Status: SHIPPED | OUTPATIENT
Start: 2017-12-27 | End: 2018-01-30

## 2017-12-27 NOTE — TELEPHONE ENCOUNTER
alprazolam      Last Written Prescription Date: 11/06/17  Last Fill Quantity: 30,  # refills: 0   Last Office Visit with [facility], [facility] or [facility] prescribing provider: 06/19/17

## 2018-01-30 DIAGNOSIS — F41.1 GENERALIZED ANXIETY DISORDER: ICD-10-CM

## 2018-01-30 RX ORDER — ALPRAZOLAM 0.25 MG
0.25 TABLET ORAL 3 TIMES DAILY PRN
Qty: 30 TABLET | Refills: 0 | Status: SHIPPED | OUTPATIENT
Start: 2018-01-30 | End: 2018-03-04

## 2018-01-30 NOTE — TELEPHONE ENCOUNTER
Alprazolam  0.25 MG  Last Written Prescription Date:  12/27/17  Last Fill Quantity: 30,   # refills: 0  Last Office Visit: 6-19-17  Future Office visit:       Routing refill request to provider for review/approval because:  Drug not on the [facility], [facility] or [facility] refill protocol or controlled substance

## 2018-03-03 ENCOUNTER — OFFICE VISIT (OUTPATIENT)
Dept: URGENT CARE | Facility: RETAIL CLINIC | Age: 58
End: 2018-03-03
Payer: COMMERCIAL

## 2018-03-03 VITALS
HEART RATE: 106 BPM | DIASTOLIC BLOOD PRESSURE: 84 MMHG | OXYGEN SATURATION: 95 % | SYSTOLIC BLOOD PRESSURE: 141 MMHG | TEMPERATURE: 98.7 F

## 2018-03-03 DIAGNOSIS — J01.90 ACUTE SINUSITIS WITH SYMPTOMS > 10 DAYS: ICD-10-CM

## 2018-03-03 DIAGNOSIS — R09.81 NASAL CONGESTION: Primary | ICD-10-CM

## 2018-03-03 DIAGNOSIS — R05.9 COUGH: ICD-10-CM

## 2018-03-03 PROCEDURE — 99213 OFFICE O/P EST LOW 20 MIN: CPT | Performed by: NURSE PRACTITIONER

## 2018-03-03 RX ORDER — TURMERIC ROOT EXTRACT 500 MG
TABLET ORAL
COMMUNITY

## 2018-03-03 NOTE — PROGRESS NOTES
[facility] clinic note    SUBJECTIVE:    Caro Palmer is a 57 year old female who presents to [facility] clinic with the following symptoms:  Facial pain for seven days or more  Purulent nasal discharge  Failure of over-the-counter nasal decongestants or other medications  Headache  History of sinusitis in the past  Mild to moderate facial swelling  Flushed  Cough, slight wheezing, heavy feeling in chest.  Myalgias, Malaise  Ear Pressure    The patient denies a history of:  Fever >102.5  Orbital pain  Periorbital swelling or erythema  Severe facial swelling or erythema  Severe neck pain  Vision changes    PAST MEDICAL HISTORY:   Past Medical History:   Diagnosis Date     Diverticulitis      Hypertension      Papanicolaou smear of cervix with low grade squamous intraepithelial lesion (LGSIL)        PAST SURGICAL HISTORY:   Past Surgical History:   Procedure Laterality Date     COLECTOMY LOW ANTERIOR N/A 3/23/2017    Procedure: COLECTOMY LOW ANTERIOR;  Surgeon: Ryan Dailey MD;  Location:  OR     COLONOSCOPY  2/27/2012    Procedure:COLONOSCOPY; Colonoscopy ; Surgeon:SHIRA ROWLAND; Location: GI     COLONOSCOPY N/A 3/1/2017    Procedure: COLONOSCOPY;  Surgeon: Ryan Dailey MD;  Location:  GI     COLPOSCOPY,BX CERVIX/ENDOCERV CURR  10/19/1999    moderate/severe dysplasia     CONIZATION CERVIX,LOOP ELECTRD  11/29/1999     INSERT STENT URETER Bilateral 3/23/2017    Procedure: INSERT STENT URETER (PRE-OP);  Surgeon: Eamon Linn MD;  Location:  OR     LAPAROSCOPIC ASSISTED COLECTOMY N/A 3/23/2017    Procedure: LAPAROSCOPIC ASSISTED COLECTOMY;  Surgeon: Ryan Dailey MD;  Location:  OR       FAMILY HISTORY:   Family History   Problem Relation Age of Onset     CANCER Father      pancrea     DIABETES Father      insulin control     Respiratory Father      Hypertension Father      HEART DISEASE Mother      heart attack at age 63     CANCER Mother       pancrea     HEART DISEASE Maternal Grandmother       at age 62     CANCER Brother      CANCER Sister      half     Family History Negative No family hx of      has no hx of father or his family       SOCIAL HISTORY:   Social History   Substance Use Topics     Smoking status: Never Smoker     Smokeless tobacco: Never Used     Alcohol use 0.0 oz/week     0 Standard drinks or equivalent per week      Comment: 1-2 drinks monthly       Current Outpatient Prescriptions   Medication     Turmeric 500 MG TABS     ALPRAZolam (XANAX) 0.25 MG tablet     albuterol (PROAIR HFA/PROVENTIL HFA/VENTOLIN HFA) 108 (90 BASE) MCG/ACT Inhaler     losartan-hydrochlorothiazide (HYZAAR) 50-12.5 MG per tablet     ibuprofen (ADVIL/MOTRIN) 400 MG tablet     CHLOROPHYLL PO     Lysine 1000 MG TABS     fish oil-omega-3 fatty acids (FISH OIL) 1000 MG capsule     Flaxseed, Linseed, (FLAXSEED OIL) 1000 MG CAPS     OVER-THE-COUNTER     GLUCOSAMINE CHONDRO COMPLEX OR     VITAMIN E 400 UNIT OR CAPS     GELATIN 650 MG OR CAPS     ASPIRIN 81 MG OR TABS     amoxicillin-clavulanate (AUGMENTIN) 875-125 MG per tablet     No current facility-administered medications for this visit.        OBJECTIVE:  This patient appears today in in moderate distress.  Vitals:    18 1449   BP: 141/84   Pulse: 106   Temp: 98.7  F (37.1  C)   TempSrc: Tympanic   SpO2: 95%     HEENT:  Facial tenderness located over the frontal & maxillary sinus region       Tenderness is bilateral.  Purulent nasal discharge present from both sides.  Tympanic membranes are clear and without bulging or erythema  Extraoccular movements are free and intact  Sclera, cornea and conjunctiva are clear  No proptosis  No significant periorbital edema or erythema  Throat is clear without significant erythema, tonsillar swelling or exudates  NECK:  Soft and supple without significant tenderness or adenopathy  RESP:  Clear to auscultation without wheezing, rales or ronchi    ASSESSMENT:   Nasal  congestion  Cough  Acute sinusitis with symptoms > 10 days      PLAN:  Current Outpatient Prescriptions   Medication     Turmeric 500 MG TABS     amoxicillin-clavulanate (AUGMENTIN) 875-125 MG per tablet     ALPRAZolam (XANAX) 0.25 MG tablet     albuterol (PROAIR HFA/PROVENTIL HFA/VENTOLIN HFA) 108 (90 BASE) MCG/ACT Inhaler     losartan-hydrochlorothiazide (HYZAAR) 50-12.5 MG per tablet     ibuprofen (ADVIL/MOTRIN) 400 MG tablet     CHLOROPHYLL PO     Lysine 1000 MG TABS     fish oil-omega-3 fatty acids (FISH OIL) 1000 MG capsule     Flaxseed, Linseed, (FLAXSEED OIL) 1000 MG CAPS     OVER-THE-COUNTER     GLUCOSAMINE CHONDRO COMPLEX OR     VITAMIN E 400 UNIT OR CAPS     GELATIN 650 MG OR CAPS     ASPIRIN 81 MG OR TABS     amoxicillin-clavulanate (AUGMENTIN) 875-125 MG per tablet     No current facility-administered medications for this visit.      Afrin nasal spray as needed for up to 3 days.  Apply warm facial packs for 5-10 minutes three times daily.  Drink plenty of fluids- 6 to 10 glasses of liquids each day.  Elevate head of the bed.  Increase the humidity level in the home.  Rest.  Saline drops or nasal sprays as needed.  Take warm, steamy showers to reduce congestion.  Tylenol or ibuprofen as needed for discomfort.  Contact primary care clinic for increasing pain, high fever, vision changes, worsening symptoms, or no relief from symptoms after 7-10 days.  May drink tea /c honey to sooth throat.    Symptomatic treatment or other home remedies as discussed & reviewed.   Use of a nasal flush discussed with Caro Palmer as a good treatment option.   OTC Mucinex (or generic) may help to loosen congestion as well.  Rest as needed.  Avoid items which you are or maybe allergic.  Add moisture to the air with a humidifier or a vaporizer &/or inhale steam from a basin of hot water or shower.  Follow up at:  [facility] [phone]    Art Brice MSN, APRN, Family NP-C  [facility]

## 2018-03-03 NOTE — MR AVS SNAPSHOT
After Visit Summary   3/3/2018    Caro Palmer    MRN: [id number]           Patient Information     Date Of Birth          1960        Visit Information        Provider Department      3/3/2018 3:00 PM Art Brice APRN [facility]        Today's Diagnoses     Nasal congestion    -  1    Cough        Acute sinusitis with symptoms > 10 days           Follow-ups after your visit        Who to contact     You can reach your care team any time of the day by calling [phone].  Notification of test results:  If you have an abnormal lab result, we will notify you by phone as soon as possible.         Additional Information About Your Visit        MyChart Information     The [employer] [facility] gives you secure access to your electronic health record. If you see a primary care provider, you can also send messages to your care team and make appointments. If you have questions, please call your primary care clinic.  If you do not have a primary care provider, please call [phone] and they will assist you.        Care EveryWhere ID     This is your Care EveryWhere ID. This could be used by other organizations to access your [place] medical records  [id number]        Your Vitals Were     Pulse Temperature Last Period Pulse Oximetry          106 98.7  F (37.1  C) (Tympanic) 11/22/2012 95%         Blood Pressure from Last 3 Encounters:   03/03/18 141/84   08/08/17 145/85   06/19/17 (!) 166/100    Weight from Last 3 Encounters:   06/19/17 168 lb (76.2 kg)   04/10/17 167 lb (75.8 kg)   02/28/17 169 lb 6.4 oz (76.8 kg)              Today, you had the following     No orders found for display         Today's Medication Changes          These changes are accurate as of 3/3/18  3:13 PM.  If you have any questions, ask your nurse or doctor.               These medicines have changed or have updated prescriptions.        Dose/Directions    * amoxicillin-clavulanate 875-125 MG per tablet    Commonly known as:  AUGMENTIN   This may have changed:  Another medication with the same name was added. Make sure you understand how and when to take each.   Used for:  Acute sinusitis with coexisting condition requiring prophylactic treatment   Changed by:  Art Brice APRN CNP        Dose:  1 tablet   Take 1 tablet by mouth 2 times daily   Quantity:  20 tablet   Refills:  0       * amoxicillin-clavulanate 875-125 MG per tablet   Commonly known as:  AUGMENTIN   This may have changed:  You were already taking a medication with the same name, and this prescription was added. Make sure you understand how and when to take each.   Used for:  Acute sinusitis with symptoms > 10 days   Changed by:  Art Brice APRN CNP        Dose:  1 tablet   Take 1 tablet by mouth 2 times daily for 14 days   Quantity:  28 tablet   Refills:  0       * Notice:  This list has 2 medication(s) that are the same as other medications prescribed for you. Read the directions carefully, and ask your doctor or other care provider to review them with you.         Where to get your medicines      These medications were sent to [address]   [address] [place] [address] , [facility] [address]     Phone:  [phone]     amoxicillin-clavulanate 875-125 MG per tablet                Primary Care Provider Office Phone # Fax #    Gregory G Schoen, [phone]       [address] [place] [address]   [facility] [address]        Equal Access to Services     [facility]: Hadmarshall avina hadasho Socherry, waaxda luqadaha, qaybta kaalmada gabbyyabenji, mindy faustin. So [facility] [phone].    ATENCIÓN: Si habla español, tiene a strange disposición servicios gratuitos de asistencia lingüística. Barby al [phone].    We comply with applicable federal civil rights laws and [place] laws. We do not discriminate on the basis of race, color, national origin, age, disability, sex, sexual  orientation, or gender identity.            Thank you!     Thank you for choosing [facility]  for your care. Our goal is always to provide you with excellent care. Hearing back from our patients is one way we can continue to improve our services. Please take a few minutes to complete the written survey that you may receive in the mail after your visit with us. Thank you!             Your Updated Medication List - Protect others around you: Learn how to safely use, store and throw away your medicines at www.disposemymeds.org.          This list is accurate as of 3/3/18  3:13 PM.  Always use your most recent med list.                   Brand Name Dispense Instructions for use Diagnosis    albuterol 108 (90 BASE) MCG/ACT Inhaler    PROAIR HFA/PROVENTIL HFA/VENTOLIN HFA    1 Inhaler    Inhale 2 puffs into the lungs every 6 hours as needed for shortness of breath / dyspnea or wheezing    Cough       ALPRAZolam 0.25 MG tablet    XANAX    30 tablet    Take 1 tablet (0.25 mg) by mouth 3 times daily as needed for anxiety    Generalized anxiety disorder       * amoxicillin-clavulanate 875-125 MG per tablet    AUGMENTIN    20 tablet    Take 1 tablet by mouth 2 times daily    Acute sinusitis with coexisting condition requiring prophylactic treatment       * amoxicillin-clavulanate 875-125 MG per tablet    AUGMENTIN    28 tablet    Take 1 tablet by mouth 2 times daily for 14 days    Acute sinusitis with symptoms > 10 days       aspirin 81 MG tablet     100    ONE DAILY    Family history of malignant neoplasm of gastrointestinal tract, Other general counseling and advice for contraceptive management       CHLOROPHYLL PO      Take 15 mLs by mouth daily        fish oil-omega-3 fatty acids 1000 MG capsule      Take 1 g by mouth 2 times daily        flaxseed oil 1000 MG Caps      Take 1,000 mg by mouth 3 times daily        Gelatin 650 MG Caps      2 daily    Other general counseling and advice for contraceptive  management, Family history of malignant neoplasm of gastrointestinal tract       GLUCOSAMINE CHONDRO COMPLEX OR      2 tablets x 2 daily    Other general counseling and advice for contraceptive management, Family history of malignant neoplasm of gastrointestinal tract       ibuprofen 400 MG tablet    ADVIL/MOTRIN    40 tablet    Take 1 tablet (400 mg) by mouth every 6 hours as needed for moderate pain    Diverticulitis of large intestine with abscess without bleeding       losartan-hydrochlorothiazide 50-12.5 MG per tablet    HYZAAR    30 tablet    TAKE ONE TABLET BY MOUTH EVERY DAY    Essential hypertension with goal blood pressure less than 140/90       Lysine 1000 MG Tabs      Take 1,000 mg by mouth daily.        OVER-THE-COUNTER      1 tablet daily Citracal 500 D and Calcium 600        Turmeric 500 MG Tabs           vitamin E 400 UNIT capsule     3 MONTHS    ONE CAPSULE DAILY    Other general counseling and advice for contraceptive management, Family history of malignant neoplasm of gastrointestinal tract       * Notice:  This list has 2 medication(s) that are the same as other medications prescribed for you. Read the directions carefully, and ask your doctor or other care provider to review them with you.

## 2018-03-03 NOTE — NURSING NOTE
"Chief Complaint   Patient presents with     Sinus Problem     sinus pressure and congestion x 1 month has gotten worse in the last 2 weeks      Otalgia     bilateral ear pressure and headaches        Initial /84  Pulse 106  Temp 98.7  F (37.1  C) (Tympanic)  LMP 11/22/2012  SpO2 95% Estimated body mass index is 30.73 kg/(m^2) as calculated from the following:    Height as of 6/19/17: 5' 2\" (1.575 m).    Weight as of 6/19/17: 168 lb (76.2 kg).  Medication Reconciliation: complete     Jessica Sundet      "

## 2018-03-04 DIAGNOSIS — F41.1 GENERALIZED ANXIETY DISORDER: ICD-10-CM

## 2018-03-05 RX ORDER — ALPRAZOLAM 0.25 MG
0.25 TABLET ORAL 3 TIMES DAILY PRN
Qty: 30 TABLET | Refills: 0 | Status: SHIPPED | OUTPATIENT
Start: 2018-03-05 | End: 2018-04-06

## 2018-03-05 NOTE — TELEPHONE ENCOUNTER
Xanax      Last Written Prescription Date:  1/30/2018  Last Fill Quantity: 30,   # refills: 0  Last Office Visit: 6/19/2017  Future Office visit:       Routing refill request to provider for review/approval because:  Drug not on the [facility], [facility] or [facility] refill protocol or controlled substance

## 2018-04-06 DIAGNOSIS — F41.1 GENERALIZED ANXIETY DISORDER: ICD-10-CM

## 2018-04-09 NOTE — TELEPHONE ENCOUNTER
xanax 0.25mg      Last Written Prescription Date:  3/5/18  Last Fill Quantity: 30,   # refills: 0  Last Office Visit: 6/19/17  Future Office visit:       Routing refill request to provider for review/approval because:  Drug not on the [facility], [facility] or [facility] refill protocol or controlled substance

## 2018-04-16 RX ORDER — ALPRAZOLAM 0.25 MG
0.25 TABLET ORAL 3 TIMES DAILY PRN
Qty: 30 TABLET | Refills: 0 | Status: SHIPPED | OUTPATIENT
Start: 2018-04-16 | End: 2018-05-11

## 2018-05-11 DIAGNOSIS — F41.1 GENERALIZED ANXIETY DISORDER: ICD-10-CM

## 2018-05-11 RX ORDER — ALPRAZOLAM 0.25 MG
0.25 TABLET ORAL 3 TIMES DAILY PRN
Qty: 30 TABLET | Refills: 0 | Status: SHIPPED | OUTPATIENT
Start: 2018-05-11 | End: 2018-06-19

## 2018-05-11 NOTE — TELEPHONE ENCOUNTER
Alprazolam   0.25 MG     Last Written Prescription Date:  4/16/18  Last Fill Quantity: 30,   # refills: 0  Last Office Visit: 6-19-17  Future Office visit:       Routing refill request to provider for review/approval because:  Drug not on the [facility], [facility] or [facility] refill protocol or controlled substance

## 2018-06-15 ENCOUNTER — HOSPITAL ENCOUNTER (OUTPATIENT)
Dept: MAMMOGRAPHY | Facility: CLINIC | Age: 58
Discharge: HOME | End: 2018-06-15
Attending: FAMILY MEDICINE | Admitting: FAMILY MEDICINE
Payer: COMMERCIAL

## 2018-06-15 DIAGNOSIS — Z12.31 VISIT FOR SCREENING MAMMOGRAM: ICD-10-CM

## 2018-06-15 PROCEDURE — 77063 BREAST TOMOSYNTHESIS BI: CPT

## 2018-06-19 DIAGNOSIS — F41.1 GENERALIZED ANXIETY DISORDER: ICD-10-CM

## 2018-06-20 RX ORDER — ALPRAZOLAM 0.25 MG
0.25 TABLET ORAL 3 TIMES DAILY PRN
Qty: 30 TABLET | Refills: 0 | Status: SHIPPED | OUTPATIENT
Start: 2018-06-20 | End: 2018-08-06

## 2018-06-20 NOTE — TELEPHONE ENCOUNTER
Xanax      Last Written Prescription Date:  5/11/2018  Last Fill Quantity: 30,   # refills: 0  Last Office Visit: 6/19/2017  Future Office visit:    Next 5 appointments (look out 90 days)     Jul 06, 2018  4:30 PM CDT   PHYSICAL with Gregory G Schoen, MD   [facility] ([facility]    [address] [place] [address]   [phone]                   Routing refill request to provider for review/approval because:  Drug not on the [facility], [facility] or [facility] refill protocol or controlled substance

## 2018-06-27 ENCOUNTER — OFFICE VISIT (OUTPATIENT)
Dept: FAMILY MEDICINE | Facility: CLINIC | Age: 58
End: 2018-06-27
Payer: COMMERCIAL

## 2018-06-27 DIAGNOSIS — R73.09 ELEVATED GLUCOSE: Primary | ICD-10-CM

## 2018-06-27 PROCEDURE — 99207 ZZC NO BILLABLE SERVICE THIS VISIT: CPT | Performed by: FAMILY MEDICINE

## 2018-06-27 NOTE — MR AVS SNAPSHOT
After Visit Summary   6/27/2018    Caro Palmer    MRN: [id number]           Patient Information     Date Of Birth          1960        Today's Diagnoses     Elevated glucose    -  1       Follow-ups after your visit        Who to contact     If you have questions or need follow up information about today's clinic visit or your schedule please contact [facility] directly at [phone].  Normal or non-critical lab and imaging results will be communicated to you by MyChart, letter or phone within 4 business days after the clinic has received the results. If you do not hear from us within 7 days, please contact the clinic through [employer] Exchange[employer] or phone. If you have a critical or abnormal lab result, we will notify you by phone as soon as possible.  Submit refill requests through Clipik or call your pharmacy and they will forward the refill request to us. Please allow 3 business days for your refill to be completed.          Additional Information About Your Visit        MyChart Information     Clipik gives you secure access to your electronic health record. If you see a primary care provider, you can also send messages to your care team and make appointments. If you have questions, please call your primary care clinic.  If you do not have a primary care provider, please call [phone] and they will assist you.        Care EveryWhere ID     This is your Care EveryWhere ID. This could be used by other organizations to access your [place] medical records  [id number]        Your Vitals Were     Last Period                   11/22/2012            Blood Pressure from Last 3 Encounters:   07/06/18 144/84   03/03/18 141/84   08/08/17 145/85    Weight from Last 3 Encounters:   06/19/17 168 lb (76.2 kg)   04/10/17 167 lb (75.8 kg)   02/28/17 169 lb 6.4 oz (76.8 kg)               Primary Care Provider Office Phone # Fax #    Gregory G Schoen, [phone] [address]  [address] [place] [address]        Equal Access to Services     CORINNA BURTON : Hadii aad ku hadmelitonpippa Cortes, amy amor, kandice martin, mindy faustin. So [facility] [phone].    ATENCIÓN: Si habla español, tiene a strange disposición servicios gratuitos de asistencia lingüística. Llame al [phone].    We comply with applicable federal civil rights laws and [place] laws. We do not discriminate on the basis of race, color, national origin, age, disability, sex, sexual orientation, or gender identity.            Thank you!     Thank you for choosing [facility]  for your care. Our goal is always to provide you with excellent care. Hearing back from our patients is one way we can continue to improve our services. Please take a few minutes to complete the written survey that you may receive in the mail after your visit with us. Thank you!             Your Updated Medication List - Protect others around you: Learn how to safely use, store and throw away your medicines at www.disposemymeds.org.          This list is accurate as of 6/27/18 11:59 PM.  Always use your most recent med list.                   Brand Name Dispense Instructions for use Diagnosis    ALPRAZolam 0.25 MG tablet    XANAX    30 tablet    Take 1 tablet (0.25 mg) by mouth 3 times daily as needed for anxiety    Generalized anxiety disorder       aspirin 81 MG tablet     100    ONE DAILY    Family history of malignant neoplasm of gastrointestinal tract, Other general counseling and advice for contraceptive management       CHLOROPHYLL PO      Take 15 mLs by mouth daily        fish oil-omega-3 fatty acids 1000 MG capsule      Take 1 g by mouth 2 times daily        flaxseed oil 1000 MG Caps      Take 1,000 mg by mouth 3 times daily        Gelatin 650 MG Caps      2 daily    Other general counseling and advice for contraceptive management, Family history of malignant neoplasm of gastrointestinal tract        GLUCOSAMINE CHONDRO COMPLEX OR      2 tablets x 2 daily    Other general counseling and advice for contraceptive management, Family history of malignant neoplasm of gastrointestinal tract       ibuprofen 400 MG tablet    ADVIL/MOTRIN    40 tablet    Take 1 tablet (400 mg) by mouth every 6 hours as needed for moderate pain    Diverticulitis of large intestine with abscess without bleeding       Lysine 1000 MG Tabs      Take 1,000 mg by mouth daily.        OVER-THE-COUNTER      1 tablet daily Citracal 500 D and Calcium 600        Turmeric 500 MG Tabs           vitamin E 400 UNIT capsule     3 MONTHS    ONE CAPSULE DAILY    Other general counseling and advice for contraceptive management, Family history of malignant neoplasm of gastrointestinal tract

## 2018-07-06 ENCOUNTER — OFFICE VISIT (OUTPATIENT)
Dept: FAMILY MEDICINE | Facility: CLINIC | Age: 58
End: 2018-07-06
Payer: COMMERCIAL

## 2018-07-06 VITALS
SYSTOLIC BLOOD PRESSURE: 144 MMHG | HEIGHT: 61 IN | TEMPERATURE: 98.1 F | OXYGEN SATURATION: 97 % | HEART RATE: 96 BPM | DIASTOLIC BLOOD PRESSURE: 84 MMHG

## 2018-07-06 DIAGNOSIS — F41.1 GENERALIZED ANXIETY DISORDER: ICD-10-CM

## 2018-07-06 DIAGNOSIS — Z80.0 FAMILY HISTORY OF PANCREATIC CANCER: ICD-10-CM

## 2018-07-06 DIAGNOSIS — I10 ESSENTIAL HYPERTENSION WITH GOAL BLOOD PRESSURE LESS THAN 140/90: ICD-10-CM

## 2018-07-06 DIAGNOSIS — Z98.890 S/P LEEP (STATUS POST LOOP ELECTROSURGICAL EXCISION PROCEDURE): ICD-10-CM

## 2018-07-06 DIAGNOSIS — J45.20 MILD INTERMITTENT ASTHMA WITHOUT COMPLICATION: ICD-10-CM

## 2018-07-06 DIAGNOSIS — Z00.00 ROUTINE GENERAL MEDICAL EXAMINATION AT A HEALTH CARE FACILITY: Primary | ICD-10-CM

## 2018-07-06 LAB
ANION GAP SERPL CALCULATED.3IONS-SCNC: 10 MMOL/L (ref 3–14)
BUN SERPL-MCNC: 12 MG/DL (ref 7–30)
CALCIUM SERPL-MCNC: 9.2 MG/DL (ref 8.5–10.1)
CHLORIDE SERPL-SCNC: 104 MMOL/L (ref 94–109)
CHOLEST SERPL-MCNC: 225 MG/DL
CO2 SERPL-SCNC: 27 MMOL/L (ref 20–32)
CREAT SERPL-MCNC: 0.7 MG/DL (ref 0.52–1.04)
CREAT UR-MCNC: 30 MG/DL
GFR SERPL CREATININE-BSD FRML MDRD: 87 ML/MIN/1.7M2
GLUCOSE SERPL-MCNC: 116 MG/DL (ref 70–99)
HDLC SERPL-MCNC: 69 MG/DL
LDLC SERPL CALC-MCNC: 135 MG/DL
MICROALBUMIN UR-MCNC: <5 MG/L
MICROALBUMIN/CREAT UR: NORMAL MG/G CR (ref 0–25)
NONHDLC SERPL-MCNC: 156 MG/DL
POTASSIUM SERPL-SCNC: 3.9 MMOL/L (ref 3.4–5.3)
SODIUM SERPL-SCNC: 141 MMOL/L (ref 133–144)
TRIGL SERPL-MCNC: 106 MG/DL

## 2018-07-06 PROCEDURE — G0145 SCR C/V CYTO,THINLAYER,RESCR: HCPCS | Performed by: FAMILY MEDICINE

## 2018-07-06 PROCEDURE — 86301 IMMUNOASSAY TUMOR CA 19-9: CPT | Mod: 90 | Performed by: FAMILY MEDICINE

## 2018-07-06 PROCEDURE — 99000 SPECIMEN HANDLING OFFICE-LAB: CPT | Performed by: FAMILY MEDICINE

## 2018-07-06 PROCEDURE — G0476 HPV COMBO ASSAY CA SCREEN: HCPCS | Performed by: FAMILY MEDICINE

## 2018-07-06 PROCEDURE — 99396 PREV VISIT EST AGE 40-64: CPT | Performed by: FAMILY MEDICINE

## 2018-07-06 PROCEDURE — 80061 LIPID PANEL: CPT | Performed by: FAMILY MEDICINE

## 2018-07-06 PROCEDURE — 86304 IMMUNOASSAY TUMOR CA 125: CPT | Performed by: FAMILY MEDICINE

## 2018-07-06 PROCEDURE — 87624 HPV HI-RISK TYP POOLED RSLT: CPT | Performed by: FAMILY MEDICINE

## 2018-07-06 PROCEDURE — 82043 UR ALBUMIN QUANTITATIVE: CPT | Performed by: FAMILY MEDICINE

## 2018-07-06 PROCEDURE — 36415 COLL VENOUS BLD VENIPUNCTURE: CPT | Performed by: FAMILY MEDICINE

## 2018-07-06 PROCEDURE — 80048 BASIC METABOLIC PNL TOTAL CA: CPT | Performed by: FAMILY MEDICINE

## 2018-07-06 RX ORDER — LOSARTAN POTASSIUM AND HYDROCHLOROTHIAZIDE 12.5; 5 MG/1; MG/1
1 TABLET ORAL DAILY
Qty: 30 TABLET | Refills: 11 | Status: SHIPPED | OUTPATIENT
Start: 2018-07-06 | End: 2019-06-27

## 2018-07-06 RX ORDER — ALBUTEROL SULFATE 90 UG/1
2 AEROSOL, METERED RESPIRATORY (INHALATION) EVERY 6 HOURS PRN
Qty: 1 INHALER | Refills: 0 | Status: SHIPPED | OUTPATIENT
Start: 2018-07-06 | End: 2020-10-26

## 2018-07-06 NOTE — MR AVS SNAPSHOT
After Visit Summary   7/6/2018    Caro Palmer    MRN: [id number]           Patient Information     Date Of Birth          1960        Visit Information        Provider Department      7/6/2018 4:30 PM Schoen, Gregory G, MD [facility]        Today's Diagnoses     Routine general medical examination at a health care facility    -  1    S/P LEEP (status post loop electrosurgical excision procedure)        Essential hypertension with goal blood pressure less than 140/90        Generalized anxiety disorder        Family history of pancreatic cancer        Mild intermittent asthma without complication          Care Instructions      Preventive Health Recommendations  Female Ages 50 - 64    Yearly exam: See your health care provider every year in order to  o Review health changes.   o Discuss preventive care.    o Review your medicines if your doctor has prescribed any.      Get a Pap test every three years (unless you have an abnormal result and your provider advises testing more often).    If you get Pap tests with HPV test, you only need to test every 5 years, unless you have an abnormal result.     You do not need a Pap test if your uterus was removed (hysterectomy) and you have not had cancer.    You should be tested each year for STDs (sexually transmitted diseases) if you're at risk.     Have a mammogram every 1 to 2 years.    Have a colonoscopy at age 50, or have a yearly FIT test (stool test). These exams screen for colon cancer.      Have a cholesterol test every 5 years, or more often if advised.    Have a diabetes test (fasting glucose) every three years. If you are at risk for diabetes, you should have this test more often.     If you are at risk for osteoporosis (brittle bone disease), think about having a bone density scan (DEXA).    Shots: Get a flu shot each year. Get a tetanus shot every 10 years.    Nutrition:     Eat at least 5 servings of fruits and vegetables each  "day.    Eat whole-grain bread, whole-wheat pasta and brown rice instead of white grains and rice.    Get adequate Calcium and Vitamin D.     Lifestyle    Exercise at least 150 minutes a week (30 minutes a day, 5 days a week). This will help you control your weight and prevent disease.    Limit alcohol to one drink per day.    No smoking.     Wear sunscreen to prevent skin cancer.     See your dentist every six months for an exam and cleaning.    See your eye doctor every 1 to 2 years.            Follow-ups after your visit        Who to contact     If you have questions or need follow up information about today's clinic visit or your schedule please contact [facility] directly at [phone].  Normal or non-critical lab and imaging results will be communicated to you by GetJarhart, letter or phone within 4 business days after the clinic has received the results. If you do not hear from us within 7 days, please contact the clinic through [employer] or phone. If you have a critical or abnormal lab result, we will notify you by phone as soon as possible.  Submit refill requests through [employer] or call your pharmacy and they will forward the refill request to us. Please allow 3 business days for your refill to be completed.          Additional Information About Your Visit        [employer] Information     [employer] gives you secure access to your electronic health record. If you see a primary care provider, you can also send messages to your care team and make appointments. If you have questions, please call your primary care clinic.  If you do not have a primary care provider, please call [phone] and they will assist you.        Care EveryWhere ID     This is your Care EveryWhere ID. This could be used by other organizations to access your [place] medical records  [id number]        Your Vitals Were     Pulse Temperature Height Last Period Pulse Oximetry       96 98.1  F (36.7  C) (Temporal) 5' 1.4\" (1.56 " m) 11/22/2012 97%        Blood Pressure from Last 3 Encounters:   07/06/18 144/84   03/03/18 141/84   08/08/17 145/85    Weight from Last 3 Encounters:   06/19/17 168 lb (76.2 kg)   04/10/17 167 lb (75.8 kg)   02/28/17 169 lb 6.4 oz (76.8 kg)              We Performed the Following     Albumin Random Urine Quantitative with Creat Ratio     Basic metabolic panel          Cancer antigen 19-9     HPV High Risk Types DNA Cervical     Lipid Profile (Chol, Trig, HDL, LDL calc)     Pap imaged thin layer screen with HPV - recommended age 30 - 65 years (select HPV order below)          Today's Medication Changes          These changes are accurate as of 7/6/18 11:59 PM.  If you have any questions, ask your nurse or doctor.               These medicines have changed or have updated prescriptions.        Dose/Directions    losartan-hydrochlorothiazide 50-12.5 MG per tablet   Commonly known as:  HYZAAR   This may have changed:  See the new instructions.   Used for:  Essential hypertension with goal blood pressure less than 140/90   Changed by:  Schoen, Gregory G, MD        Dose:  1 tablet   Take 1 tablet by mouth daily   Quantity:  30 tablet   Refills:  11            Where to get your medicines      These medications were sent to [facility] [address]   [address] [place] [address] [address] [facility] [address]     Phone:  [phone]     albuterol 108 (90 Base) MCG/ACT Inhaler    losartan-hydrochlorothiazide 50-12.5 MG per tablet                Primary Care Provider Office Phone # Fax #    Gregory G Schoen, [phone]       [facility] [place] [address]        Equal Access to Services     [facility]: Hadii camden avina hadashpippa Soomaali, waaxda luqadaha, qaybta kaalmada mario, mindy faustin. So [facility] [phone].    ATENCIÓN: Si habla español, tiene a strange disposición servicios gratuitos de asistencia lingüística. Barby al [phone].    We  comply with applicable federal civil rights laws and [place] laws. We do not discriminate on the basis of race, color, national origin, age, disability, sex, sexual orientation, or gender identity.            Thank you!     Thank you for choosing [facility]  for your care. Our goal is always to provide you with excellent care. Hearing back from our patients is one way we can continue to improve our services. Please take a few minutes to complete the written survey that you may receive in the mail after your visit with us. Thank you!             Your Updated Medication List - Protect others around you: Learn how to safely use, store and throw away your medicines at www.disposemymeds.org.          This list is accurate as of 7/6/18 11:59 PM.  Always use your most recent med list.                   Brand Name Dispense Instructions for use Diagnosis    albuterol 108 (90 Base) MCG/ACT Inhaler    PROAIR HFA/PROVENTIL HFA/VENTOLIN HFA    1 Inhaler    Inhale 2 puffs into the lungs every 6 hours as needed for shortness of breath / dyspnea or wheezing        ALPRAZolam 0.25 MG tablet    XANAX    30 tablet    Take 1 tablet (0.25 mg) by mouth 3 times daily as needed for anxiety    Generalized anxiety disorder       aspirin 81 MG tablet     100    ONE DAILY    Family history of malignant neoplasm of gastrointestinal tract, Other general counseling and advice for contraceptive management       CHLOROPHYLL PO      Take 15 mLs by mouth daily        fish oil-omega-3 fatty acids 1000 MG capsule      Take 1 g by mouth 2 times daily        flaxseed oil 1000 MG Caps      Take 1,000 mg by mouth 3 times daily        Gelatin 650 MG Caps      2 daily    Other general counseling and advice for contraceptive management, Family history of malignant neoplasm of gastrointestinal tract       GLUCOSAMINE CHONDRO COMPLEX OR      2 tablets x 2 daily    Other general counseling and advice for contraceptive management, Family history  of malignant neoplasm of gastrointestinal tract       ibuprofen 400 MG tablet    ADVIL/MOTRIN    40 tablet    Take 1 tablet (400 mg) by mouth every 6 hours as needed for moderate pain    Diverticulitis of large intestine with abscess without bleeding       losartan-hydrochlorothiazide 50-12.5 MG per tablet    HYZAAR    30 tablet    Take 1 tablet by mouth daily    Essential hypertension with goal blood pressure less than 140/90       Lysine 1000 MG Tabs      Take 1,000 mg by mouth daily.        OVER-THE-COUNTER      1 tablet daily Citracal 500 D and Calcium 600        PROBIOTIC ADVANCED PO      Take 4 BAU by mouth        Turmeric 500 MG Tabs           vitamin E 400 UNIT capsule     3 MONTHS    ONE CAPSULE DAILY    Other general counseling and advice for contraceptive management, Family history of malignant neoplasm of gastrointestinal tract

## 2018-07-06 NOTE — PROGRESS NOTES
SUBJECTIVE:   CC: Caro Palmer is an 57 year old woman who presents for preventive health visit.     Healthy Habits:    Do you get at least three servings of calcium containing foods daily (dairy, green leafy vegetables, etc.)? yes    Amount of exercise or daily activities, outside of work: 2-3 hour(s) per day    Problems taking medications regularly No    Medication side effects: No    Have you had an eye exam in the past two years? yes    Do you see a dentist twice per year? yes    Do you have sleep apnea, excessive snoring or daytime drowsiness?yes    Blood pressures usually 120s outside the office  but rarely checks them since it is typically in a store such as [employer] and doesn't have time to stop and sit idle.      She rarely needs to use her inhaler any more than once every 2-3 weeks.       Today's PHQ-2 Score:   PHQ-2 ( 1999 Pfizer) 6/19/2017 2/28/2017   Q1: Little interest or pleasure in doing things 0 0   Q2: Feeling down, depressed or hopeless 0 0   PHQ-2 Score 0 0       Abuse: Current or Past(Physical, Sexual or Emotional)- No  Do you feel safe in your environment - Yes    Social History   Substance Use Topics     Smoking status: Never Smoker     Smokeless tobacco: Never Used     Alcohol use 0.0 oz/week     0 Standard drinks or equivalent per week      Comment: 1-2 drinks monthly     If you drink alcohol do you typically have >3 drinks per day or >7 drinks per week? No                     Reviewed orders with patient.  Reviewed health maintenance and updated orders accordingly - Yes          Pertinent mammograms are reviewed under the imaging tab.  History of abnormal Pap smear:     PAP / HPV Latest Ref Rng & Units 6/19/2017 6/17/2016 5/29/2015   PAP - NIL NIL NIL   HPV 16 DNA NEG Negative Negative Negative   HPV 18 DNA NEG Negative Negative Negative   OTHER HR HPV NEG Negative Negative Negative     Reviewed and updated as needed this visit by clinical staff  Tobacco  Allergies  Meds      "    Reviewed and updated as needed this visit by Provider            ROS:  CONSTITUTIONAL: NEGATIVE for fever, chills, change in weight  INTEGUMENTARY/SKIN: NEGATIVE for worrisome rashes, moles or lesions  EYES: NEGATIVE for vision changes or irritation  ENT: NEGATIVE for ear, mouth and throat problems  RESP: NEGATIVE for significant cough or SOB  BREAST: NEGATIVE for masses, tenderness or discharge  CV: NEGATIVE for chest pain, palpitations or peripheral edema  GI: NEGATIVE for nausea, abdominal pain, heartburn, or change in bowel habits; has done well since her colon resection for diverticulitis a little over a year ago with end to end anastomosis.   : NEGATIVE for unusual urinary or vaginal symptoms. No vaginal bleeding.  MUSCULOSKELETAL: NEGATIVE for significant arthralgias or myalgia  NEURO: NEGATIVE for weakness, dizziness or paresthesias  PSYCHIATRIC: NEGATIVE for changes in mood or affect; anxiety has been fairly stable    OBJECTIVE:   /90 (BP Location: Left arm, Patient Position: Chair, Cuff Size: Adult Regular)  Pulse 96  Temp 98.1  F (36.7  C) (Temporal)  Ht 5' 1.4\" (1.56 m)  LMP 11/22/2012  SpO2 97%  EXAM:  GENERAL: healthy, alert and no distress  EYES: Eyes grossly normal to inspection, PERRL and conjunctivae and sclerae normal  HENT: ear canals and TM's normal, nose and mouth without ulcers or lesions  NECK: no adenopathy, no asymmetry, masses, or scars and thyroid normal to palpation  RESP: lungs clear to auscultation - no rales, rhonchi or wheezes    CV: regular rate and rhythm, normal S1 S2, no S3 or S4, no murmur, click or rub, no peripheral edema and peripheral pulses strong  ABDOMEN: soft, nontender, no hepatosplenomegaly, no masses and bowel sounds normal  MS: no gross musculoskeletal defects noted, no edema  SKIN: no suspicious lesions or rashes  NEURO: Normal strength and tone, mentation intact and speech normal  PSYCH: mentation appears normal, affect normal/bright    Pelvic - " "Normal external genitalia and BUS without lesions.  Vaginal mucosa was normal in appearance.  Cervix appears shortened secondary to prior conization and os appears stenotic.   Pap Smear was performed using ThinPrep brush and broom with some slight bleeding.            ASSESSMENT/PLAN:      Routine general medical examination at a health care facility  S/P LEEP (status post loop electrosurgical excision procedure)  Essential hypertension with goal blood pressure less than 140/90  Generalized anxiety disorder  Family history of pancreatic cancer  Mild intermittent asthma without complication     She will arrange for mammogram on her own. Will check her lipids as well as chem profile, urine microalbumin and do her annual cancer screen with  and  due to family history of pancreatic cancer.  Will contact her with results when available and create an action plan based on results.    She will get a blood pressure cuff so she can effectively monitor pressures at home in a relaxed state and report those to determine if she needs an adjustment in her blood pressure meds.  She has been using fish oil and flaxseed oil for lipids and will assess the effectiveness of that.     Will continue to prescribe and closely monitor use of xanax for anxiety, but no evidence of abuse and she reports it is effective when used/needed.        COUNSELING:   Reviewed preventive health counseling, as reflected in patient instructions       Regular exercise       Healthy diet/nutrition    BP Readings from Last 1 Encounters:   07/06/18 146/90     Estimated body mass index is 30.73 kg/(m^2) as calculated from the following:    Height as of 6/19/17: 5' 2\" (1.575 m).    Weight as of 6/19/17: 168 lb (76.2 kg).           reports that she has never smoked. She has never used smokeless tobacco.      Counseling Resources:  ATP IV Guidelines  Pooled Cohorts Equation Calculator  Breast Cancer Risk Calculator  FRAX Risk Assessment  ICSI Preventive " Guidelines  Dietary Guidelines for Americans, 2010  USDA's MyPlate  ASA Prophylaxis  Lung CA Screening    Gregory G. Schoen, MD  [facility]

## 2018-07-06 NOTE — NURSING NOTE
Health Maintenance Due   Topic Date Due     HIV SCREEN (SYSTEM ASSIGNED)  08/24/1979     PHQ-2 Q1 YR  06/19/2018     HPV Q1 Year  06/19/2018     PAP Q1 YR DIAGNOSTIC  06/19/2018       Health Maintenance reviewed at today's visit patient asked to schedule/complete:   Patient is aware.

## 2018-07-07 LAB — CANCER AG125 SERPL-ACNC: 9 U/ML (ref 0–30)

## 2018-07-08 PROBLEM — J45.20 MILD INTERMITTENT ASTHMA WITHOUT COMPLICATION: Status: ACTIVE | Noted: 2018-07-08

## 2018-07-08 LAB — CANCER AG19-9 SERPL-ACNC: 18 U/ML (ref 0–37)

## 2018-07-11 LAB
COPATH REPORT: NORMAL
PAP: NORMAL

## 2018-07-11 ASSESSMENT — ASTHMA QUESTIONNAIRES: ACT_TOTALSCORE: 22

## 2018-07-12 LAB
FINAL DIAGNOSIS: NORMAL
HPV HR 12 DNA CVX QL NAA+PROBE: NEGATIVE
HPV16 DNA SPEC QL NAA+PROBE: NEGATIVE
HPV18 DNA SPEC QL NAA+PROBE: NEGATIVE
SPECIMEN DESCRIPTION: NORMAL
SPECIMEN SOURCE CVX/VAG CYTO: NORMAL

## 2018-07-16 NOTE — PROGRESS NOTES
Glucose level elevated now over several years.  Will obtain an A1c to further investigate.  Electronically signed by Greg Schoen, MD

## 2018-07-18 ENCOUNTER — TELEPHONE (OUTPATIENT)
Dept: FAMILY MEDICINE | Facility: CLINIC | Age: 58
End: 2018-07-18

## 2018-07-18 NOTE — LETTER
[address] [id number] [phone]        July 23, 2018    Caro Palmer  [address]  [place] [address]          Dear Caro,    We have attempted to reach you and unable to in regards to your [employer] message. Please call and let us know if you have any questions or concerns about your results.       Sincerely,        Gregory G. Schoen, M.D. Care Team

## 2018-07-23 NOTE — TELEPHONE ENCOUNTER
Pt called back, she is scheduled for Thursday 7/26/18 for lab. She had no other qeustions or concerns.

## 2018-07-23 NOTE — TELEPHONE ENCOUNTER
Left message for patient to call back in regards to message below if they have any questions or concerns. Letter sent to patient since unable to reach them.     Sarah Zamudio MA

## 2018-07-26 ENCOUNTER — ALLIED HEALTH/NURSE VISIT (OUTPATIENT)
Dept: FAMILY MEDICINE | Facility: CLINIC | Age: 58
End: 2018-07-26
Payer: COMMERCIAL

## 2018-07-26 ENCOUNTER — TELEPHONE (OUTPATIENT)
Dept: FAMILY MEDICINE | Facility: CLINIC | Age: 58
End: 2018-07-26

## 2018-07-26 VITALS — SYSTOLIC BLOOD PRESSURE: 138 MMHG | DIASTOLIC BLOOD PRESSURE: 82 MMHG | HEART RATE: 86 BPM

## 2018-07-26 DIAGNOSIS — I10 ESSENTIAL HYPERTENSION WITH GOAL BLOOD PRESSURE LESS THAN 140/90: Primary | ICD-10-CM

## 2018-07-26 DIAGNOSIS — R73.09 ELEVATED GLUCOSE: ICD-10-CM

## 2018-07-26 LAB — HBA1C MFR BLD: 5.3 % (ref 0–5.6)

## 2018-07-26 PROCEDURE — 36415 COLL VENOUS BLD VENIPUNCTURE: CPT | Performed by: FAMILY MEDICINE

## 2018-07-26 PROCEDURE — 99207 ZZC NO CHARGE NURSE ONLY: CPT

## 2018-07-26 PROCEDURE — 83036 HEMOGLOBIN GLYCOSYLATED A1C: CPT | Mod: QW | Performed by: FAMILY MEDICINE

## 2018-07-26 NOTE — LETTER
My Asthma Action Plan  Name: Caro Palmer   YOB: 1960  Date: 7/26/2018   My doctor: ALYCIA Jenkins MA   My clinic: [facility]        My Control Medicine: None  My Rescue Medicine: Albuterol (Proair/Ventolin/Proventil) inhaler .   My Asthma Severity: intermittent  Avoid your asthma triggers: NA               GREEN ZONE   Good Control    I feel good    No cough or wheeze    Can work, sleep and play without asthma symptoms       Take your asthma control medicine every day.     1. If exercise triggers your asthma, take your rescue medication    15 minutes before exercise or sports, and    During exercise if you have asthma symptoms  2. Spacer to use with inhaler: If you have a spacer, make sure to use it with your inhaler             YELLOW ZONE Getting Worse  I have ANY of these:    I do not feel good    Cough or wheeze    Chest feels tight    Wake up at night   1. Keep taking your Green Zone medications  2. Start taking your rescue medicine:    every 20 minutes for up to 1 hour. Then every 4 hours for 24-48 hours.  3. If you stay in the Yellow Zone for more than 12-24 hours, contact your doctor.  4. If you do not return to the Green Zone in 12-24 hours or you get worse, start taking your oral steroid medicine if prescribed by your provider.           RED ZONE Medical Alert - Get Help  I have ANY of these:    I feel awful    Medicine is not helping    Breathing getting harder    Trouble walking or talking    Nose opens wide to breathe       1. Take your rescue medicine NOW  2. If your provider has prescribed an oral steroid medicine, start taking it NOW  3. Call your doctor NOW  4. If you are still in the Red Zone after 20 minutes and you have not reached your doctor:    Take your rescue medicine again and    Call 911 or go to the emergency room right away    See your regular doctor within 2 weeks of an Emergency Room or Urgent Care visit for follow-up treatment.          Annual Reminders:   Meet with Asthma Educator,  Flu Shot in the Fall, consider Pneumonia Vaccination for patients with asthma (aged 19 and older).    Pharmacy:    [facility], [place] - [address] [place] [facility] [place], [place] - [address]                      Asthma Triggers  How To Control Things That Make Your Asthma Worse    Triggers are things that make your asthma worse.  Look at the list below to help you find your triggers and what you can do about them.  You can help prevent asthma flare-ups by staying away from your triggers.      Trigger                                                          What you can do   Cigarette Smoke  Tobacco smoke can make asthma worse. Do not allow smoking in your home, car or around you.  Be sure no one smokes at a child s day care or school.  If you smoke, ask your health care provider for ways to help you quit.  Ask family members to quit too.  Ask your health care provider for a referral to Quit Plan to help you quit smoking, or call 3-906-051-PLAN.     Colds, Flu, Bronchitis  These are common triggers of asthma. Wash your hands often.  Don t touch your eyes, nose or mouth.  Get a flu shot every year.     Dust Mites  These are tiny bugs that live in cloth or carpet. They are too small to see. Wash sheets and blankets in hot water every week.   Encase pillows and mattress in dust mite proof covers.  Avoid having carpet if you can. If you have carpet, vacuum weekly.   Use a dust mask and HEPA vacuum.   Pollen and Outdoor Mold  Some people are allergic to trees, grass, or weed pollen, or molds. Try to keep your windows closed.  Limit time out doors when pollen count is high.   Ask you health care provider about taking medicine during allergy season.     Animal Dander  Some people are allergic to skin flakes, urine or saliva from pets with fur or feathers. Keep pets with fur or feathers out of your home.    If you can t keep the pet outdoors, then keep the pet out  of your bedroom.  Keep the bedroom door closed.  Keep pets off cloth furniture and away from stuffed toys.     Mice, Rats, and Cockroaches  Some people are allergic to the waste from these pests.   Cover food and garbage.  Clean up spills and food crumbs.  Store grease in the refrigerator.   Keep food out of the bedroom.   Indoor Mold  This can be a trigger if your home has high moisture. Fix leaking faucets, pipes, or other sources of water.   Clean moldy surfaces.  Dehumidify basement if it is damp and smelly.   Smoke, Strong Odors, and Sprays  These can reduce air quality. Stay away from strong odors and sprays, such as perfume, powder, hair spray, paints, smoke incense, paint, cleaning products, candles and new carpet.   Exercise or Sports  Some people with asthma have this trigger. Be active!  Ask your doctor about taking medicine before sports or exercise to prevent symptoms.    Warm up for 5-10 minutes before and after sports or exercise.     Other Triggers of Asthma  Cold air:  Cover your nose and mouth with a scarf.  Sometimes laughing or crying can be a trigger.  Some medicines and food can trigger asthma.

## 2018-07-26 NOTE — TELEPHONE ENCOUNTER
----- Message from Gregory G Schoen, MD sent at 7/26/2018 10:00 AM CDT -----  Please inform Caro that her A1c was normal. This means she does not have diabetes but her system is slow in handling sugars so she should cut back on them in her diet, as well as starches, to avoid progression on to diabetes in the future.  Electronically signed by Greg Schoen, MD

## 2018-07-26 NOTE — MR AVS SNAPSHOT
After Visit Summary   7/26/2018    Caro Palmer    MRN: [id number]           Patient Information     Date Of Birth          1960        Visit Information        Provider Department      7/26/2018 9:30 AM ALYCIA MITTAL MA [facility]        Today's Diagnoses     Essential hypertension with goal blood pressure less than 140/90    -  1       Follow-ups after your visit        Who to contact     If you have questions or need follow up information about today's clinic visit or your schedule please contact [facility] directly at [phone].  Normal or non-critical lab and imaging results will be communicated to you by [employer] Life Plushart, letter or phone within 4 business days after the clinic has received the results. If you do not hear from us within 7 days, please contact the clinic through Desallt or phone. If you have a critical or abnormal lab result, we will notify you by phone as soon as possible.  Submit refill requests through [employer] or call your pharmacy and they will forward the refill request to us. Please allow 3 business days for your refill to be completed.          Additional Information About Your Visit        MyChart Information     [employer] gives you secure access to your electronic health record. If you see a primary care provider, you can also send messages to your care team and make appointments. If you have questions, please call your primary care clinic.  If you do not have a primary care provider, please call [phone] and they will assist you.        Care EveryWhere ID     This is your Care EveryWhere ID. This could be used by other organizations to access your [place] medical records  [id number]        Your Vitals Were     Pulse Last Period                86 11/22/2012           Blood Pressure from Last 3 Encounters:   07/26/18 138/82   07/06/18 144/84   03/03/18 141/84    Weight from Last 3 Encounters:   06/19/17 168 lb (76.2 kg)   04/10/17 167 lb  (75.8 kg)   02/28/17 169 lb 6.4 oz (76.8 kg)              We Performed the Following     Asthma Action Plan (AAP)        Primary Care Provider Office Phone # Fax #    Gregory G Schoen, [phone]       [address] [place] [address]        Equal Access to Services     [facility]: Hadii aad ku hadasho Soomaali, waaxda luqadaha, qaybta kaalmada adeegyada, waxay idiin hayaan adeeg khkeniash laFainaaan . So [facility] [phone].    ATENCIÓN: Si habla español, tiene a strange disposición servicios gratuitos de asistencia lingüística. Llame al [phone].    We comply with applicable federal civil rights laws and [place] laws. We do not discriminate on the basis of race, color, national origin, age, disability, sex, sexual orientation, or gender identity.            Thank you!     Thank you for choosing [facility]  for your care. Our goal is always to provide you with excellent care. Hearing back from our patients is one way we can continue to improve our services. Please take a few minutes to complete the written survey that you may receive in the mail after your visit with us. Thank you!             Your Updated Medication List - Protect others around you: Learn how to safely use, store and throw away your medicines at www.disposemymeds.org.          This list is accurate as of 7/26/18  9:51 AM.  Always use your most recent med list.                   Brand Name Dispense Instructions for use Diagnosis    albuterol 108 (90 Base) MCG/ACT Inhaler    PROAIR HFA/PROVENTIL HFA/VENTOLIN HFA    1 Inhaler    Inhale 2 puffs into the lungs every 6 hours as needed for shortness of breath / dyspnea or wheezing        ALPRAZolam 0.25 MG tablet    XANAX    30 tablet    Take 1 tablet (0.25 mg) by mouth 3 times daily as needed for anxiety    Generalized anxiety disorder       aspirin 81 MG tablet     100    ONE DAILY    Family history of malignant neoplasm of gastrointestinal tract, Other general  counseling and advice for contraceptive management       CHLOROPHYLL PO      Take 15 mLs by mouth daily        fish oil-omega-3 fatty acids 1000 MG capsule      Take 1 g by mouth 2 times daily        flaxseed oil 1000 MG Caps      Take 1,000 mg by mouth 3 times daily        Gelatin 650 MG Caps      2 daily    Other general counseling and advice for contraceptive management, Family history of malignant neoplasm of gastrointestinal tract       GLUCOSAMINE CHONDRO COMPLEX OR      2 tablets x 2 daily    Other general counseling and advice for contraceptive management, Family history of malignant neoplasm of gastrointestinal tract       ibuprofen 400 MG tablet    ADVIL/MOTRIN    40 tablet    Take 1 tablet (400 mg) by mouth every 6 hours as needed for moderate pain    Diverticulitis of large intestine with abscess without bleeding       losartan-hydrochlorothiazide 50-12.5 MG per tablet    HYZAAR    30 tablet    Take 1 tablet by mouth daily    Essential hypertension with goal blood pressure less than 140/90       Lysine 1000 MG Tabs      Take 1,000 mg by mouth daily.        OVER-THE-COUNTER      1 tablet daily Citracal 500 D and Calcium 600        PROBIOTIC ADVANCED PO      Take 4 BAU by mouth        Turmeric 500 MG Tabs           vitamin E 400 UNIT capsule     3 MONTHS    ONE CAPSULE DAILY    Other general counseling and advice for contraceptive management, Family history of malignant neoplasm of gastrointestinal tract

## 2018-07-26 NOTE — NURSING NOTE
Caro Palmer is a 57 year old patient who comes in today for a Blood Pressure check.  Initial BP:  /82 (BP Location: Left arm, Patient Position: Sitting, Cuff Size: Adult Regular)  Pulse 86  LMP 11/22/2012     86  Disposition: Patient came in to check machine and make sure she was using it right.     Patients BP went below the average so patient went home    Kassandra Dinh MA 7/26/2018

## 2018-08-06 DIAGNOSIS — F41.1 GENERALIZED ANXIETY DISORDER: ICD-10-CM

## 2018-08-06 RX ORDER — ALPRAZOLAM 0.25 MG
0.25 TABLET ORAL 3 TIMES DAILY PRN
Qty: 30 TABLET | Refills: 0 | Status: SHIPPED | OUTPATIENT
Start: 2018-08-06 | End: 2018-09-05

## 2018-08-06 NOTE — TELEPHONE ENCOUNTER
Alprazolam      Last Written Prescription Date:  6/20/2018  Last Fill Quantity: 30,   # refills: 0  Last Office Visit: 7/06/2018  Future Office visit:       Routing refill request to provider for review/approval because:  Drug not on the [facility], [facility] or [facility] refill protocol or controlled substance

## 2018-09-05 DIAGNOSIS — F41.1 GENERALIZED ANXIETY DISORDER: ICD-10-CM

## 2018-09-06 NOTE — TELEPHONE ENCOUNTER
Requested Prescriptions   Pending Prescriptions Disp Refills     ALPRAZolam (XANAX) 0.25 MG tablet 30 tablet 0     Sig: Take 1 tablet (0.25 mg) by mouth 3 times daily as needed for anxiety    There is no refill protocol information for this order        Last Written Prescription Date: 8/6/2018  Last Fill Quantity: 30,  # refills: 0   Last office visit: 7/26/2018 with prescribing provider:   Schoen, Gregory G, MD  Future Office Visit:  NONE

## 2018-09-06 NOTE — TELEPHONE ENCOUNTER
Routing refill request to provider for review/approval because:  Drug not on the [facility] refill protocol     Eri Broderick RN

## 2018-09-07 RX ORDER — ALPRAZOLAM 0.25 MG
0.25 TABLET ORAL 3 TIMES DAILY PRN
Qty: 30 TABLET | Refills: 0 | Status: SHIPPED | OUTPATIENT
Start: 2018-09-07 | End: 2018-10-10

## 2018-10-10 DIAGNOSIS — F41.1 GENERALIZED ANXIETY DISORDER: ICD-10-CM

## 2018-10-10 NOTE — TELEPHONE ENCOUNTER
Alprazolam       Last Written Prescription Date:  9-7-18  Last Fill Quantity: 30,   # refills: 0  Last Office Visit: 7/6/18  Future Office visit:       Routing refill request to provider for review/approval because:  Drug not on the [facility], P or [facility] refill protocol or controlled substance

## 2018-10-11 RX ORDER — ALPRAZOLAM 0.25 MG
0.25 TABLET ORAL 3 TIMES DAILY PRN
Qty: 30 TABLET | Refills: 0 | Status: SHIPPED | OUTPATIENT
Start: 2018-10-11 | End: 2018-11-16

## 2018-11-16 DIAGNOSIS — F41.1 GENERALIZED ANXIETY DISORDER: ICD-10-CM

## 2018-11-16 RX ORDER — ALPRAZOLAM 0.25 MG
0.25 TABLET ORAL 3 TIMES DAILY PRN
Qty: 30 TABLET | Refills: 0 | Status: SHIPPED | OUTPATIENT
Start: 2018-11-16 | End: 2019-01-03

## 2018-11-16 NOTE — TELEPHONE ENCOUNTER
Xanax 0.25 MG       Last Written Prescription Date:  10/11/18  Last Fill Quantity: 30,   # refills: 0  Last Office Visit: 7/6/18  Future Office visit:       Routing refill request to provider for review/approval because:  Drug not on the [facility], [facility] or [facility] refill protocol or controlled substance

## 2018-11-27 ENCOUNTER — ALLIED HEALTH/NURSE VISIT (OUTPATIENT)
Dept: FAMILY MEDICINE | Facility: CLINIC | Age: 58
End: 2018-11-27
Payer: COMMERCIAL

## 2018-11-27 DIAGNOSIS — Z23 NEED FOR PROPHYLACTIC VACCINATION AND INOCULATION AGAINST INFLUENZA: Primary | ICD-10-CM

## 2018-11-27 PROCEDURE — 90682 RIV4 VACC RECOMBINANT DNA IM: CPT

## 2018-11-27 PROCEDURE — 90471 IMMUNIZATION ADMIN: CPT

## 2018-11-27 PROCEDURE — 99207 ZZC NO CHARGE NURSE ONLY: CPT

## 2018-11-27 NOTE — PROGRESS NOTES

## 2018-11-27 NOTE — MR AVS SNAPSHOT
After Visit Summary   11/27/2018    Caro Palmer    MRN: [id number]           Patient Information     Date Of Birth          1960        Visit Information        Provider Department      11/27/2018 2:45 PM ALYCIA MITTAL MA [facility]        Today's Diagnoses     Need for prophylactic vaccination and inoculation against influenza    -  1       Follow-ups after your visit        Who to contact     If you have questions or need follow up information about today's clinic visit or your schedule please contact [facility] directly at [phone].  Normal or non-critical lab and imaging results will be communicated to you by SocialBuyhart, letter or phone within 4 business days after the clinic has received the results. If you do not hear from us within 7 days, please contact the clinic through Rezziet or phone. If you have a critical or abnormal lab result, we will notify you by phone as soon as possible.  Submit refill requests through [employer] or call your pharmacy and they will forward the refill request to us. Please allow 3 business days for your refill to be completed.          Additional Information About Your Visit        MyChart Information     [employer] gives you secure access to your electronic health record. If you see a primary care provider, you can also send messages to your care team and make appointments. If you have questions, please call your primary care clinic.  If you do not have a primary care provider, please call [phone] and they will assist you.        Care EveryWhere ID     This is your Care EveryWhere ID. This could be used by other organizations to access your [place] medical records  [id number]        Your Vitals Were     Last Period                   11/22/2012            Blood Pressure from Last 3 Encounters:   07/26/18 138/82   07/06/18 144/84   03/03/18 141/84    Weight from Last 3 Encounters:   06/19/17 76.2 kg (168 lb)   04/10/17 75.8 kg  (167 lb)   02/28/17 76.8 kg (169 lb 6.4 oz)              We Performed the Following     FLU VACCINE, (RIV4) RECOMBINANT HA  , IM (FluBlok, egg free) [52667]- >18 YRS (G recommended  50-64 YRS)     Vaccine Administration, Initial [38144]        Primary Care Provider Office Phone # Fax #    Gregory G Schoen, [phone]       [address] [place] [address]        Equal Access to Services     [facility] : Hadii aad ku hadasho Soomaali, waaxda luqadaha, qaybta kaalmada adeegyada, waxay idiin hayaan marioeg sinan melo . So [facility] [phone].    ATENCIÓN: Si habla español, tiene a strange disposición servicios gratuitos de asistencia lingüística. [facility] [phone].    We comply with applicable federal civil rights laws and [place] laws. We do not discriminate on the basis of race, color, national origin, age, disability, sex, sexual orientation, or gender identity.            Thank you!     Thank you for choosing [facility]  for your care. Our goal is always to provide you with excellent care. Hearing back from our patients is one way we can continue to improve our services. Please take a few minutes to complete the written survey that you may receive in the mail after your visit with us. Thank you!             Your Updated Medication List - Protect others around you: Learn how to safely use, store and throw away your medicines at www.disposemymeds.org.          This list is accurate as of 11/27/18  3:00 PM.  Always use your most recent med list.                   Brand Name Dispense Instructions for use Diagnosis    albuterol 108 (90 Base) MCG/ACT inhaler    PROAIR HFA/PROVENTIL HFA/VENTOLIN HFA    1 Inhaler    Inhale 2 puffs into the lungs every 6 hours as needed for shortness of breath / dyspnea or wheezing        ALPRAZolam 0.25 MG tablet    XANAX    30 tablet    Take 1 tablet (0.25 mg) by mouth 3 times daily as needed for anxiety    Generalized anxiety disorder        aspirin 81 MG tablet    ASA    100    ONE DAILY    Family history of malignant neoplasm of gastrointestinal tract, Other general counseling and advice for contraceptive management       CHLOROPHYLL PO      Take 15 mLs by mouth daily        fish oil-omega-3 fatty acids 1000 MG capsule      Take 1 g by mouth 2 times daily        flaxseed oil 1000 MG Caps      Take 1,000 mg by mouth 3 times daily        gelatin 650 MG capsule      2 daily    Other general counseling and advice for contraceptive management, Family history of malignant neoplasm of gastrointestinal tract       GLUCOSAMINE CHONDRO COMPLEX OR      2 tablets x 2 daily    Other general counseling and advice for contraceptive management, Family history of malignant neoplasm of gastrointestinal tract       ibuprofen 400 MG tablet    ADVIL/MOTRIN    40 tablet    Take 1 tablet (400 mg) by mouth every 6 hours as needed for moderate pain    Diverticulitis of large intestine with abscess without bleeding       losartan-hydrochlorothiazide 50-12.5 MG per tablet    HYZAAR    30 tablet    Take 1 tablet by mouth daily    Essential hypertension with goal blood pressure less than 140/90       Lysine 1000 MG Tabs      Take 1,000 mg by mouth daily.        OVER-THE-COUNTER      1 tablet daily Citracal 500 D and Calcium 600        PROBIOTIC ADVANCED PO      Take 4 BAU by mouth        Turmeric 500 MG Tabs           vitamin E 400 units (180 mg) capsule    TOCOPHEROL    3 MONTHS    ONE CAPSULE DAILY    Other general counseling and advice for contraceptive management, Family history of malignant neoplasm of gastrointestinal tract

## 2018-12-03 NOTE — TELEPHONE ENCOUNTER
----- Message from Gregory G Schoen, MD sent at 7/16/2018  1:45 PM CDT -----  The following message was sent via [employer] but please call to verify receipt of information and inquire about any questions or concerns.    Your cancer screens were both negative once again!  Your cholesterol has crept up over the past few years and is likely related to diet.  I would encourage you to look into a mediterranean diet, which is what most cardiologists are recommending these days as  the best heart healthy diet:  lots of fruits, vegetables, fish, white meats, olive oil.  Increasing physical activity on a daily basis would also be of benefit. Your blood sugar has also been elevated slightly over the past few years and suggests that you may be prediabetic or have a slow metabolism in breaking down sugars without truly being diabetic.  I would like you to stop by lab to check a test called a hemoglobin A1c, which gives is a bit better information about your diabetes risk.  I have placed this order for you to come in at your convenience in the next couple of weeks.     Electronically signed by Greg Schoen, MD       today

## 2019-01-03 DIAGNOSIS — F41.1 GENERALIZED ANXIETY DISORDER: ICD-10-CM

## 2019-01-03 RX ORDER — ALPRAZOLAM 0.25 MG
0.25 TABLET ORAL 3 TIMES DAILY PRN
Qty: 30 TABLET | Refills: 0 | Status: SHIPPED | OUTPATIENT
Start: 2019-01-03 | End: 2019-02-12

## 2019-01-03 NOTE — TELEPHONE ENCOUNTER
Xanax  Last Written Prescription Date:  11/16/2018  Last Fill Quantity: 30,  # refills: 0   Last office visit: 07/06/2018 with prescribing provider:  Schoen Future Office Visit:  None  Routing refill request to provider for review/approval because:  Drug not on the [facility] refill protocol     Eri Broderick RN

## 2019-01-28 ENCOUNTER — TELEPHONE (OUTPATIENT)
Dept: FAMILY MEDICINE | Facility: CLINIC | Age: 59
End: 2019-01-28

## 2019-01-28 DIAGNOSIS — J31.0 CHRONIC RHINITIS: ICD-10-CM

## 2019-01-28 NOTE — TELEPHONE ENCOUNTER
Patient is looking for a refill on claritin-d 24 hr. I don't see anything on her med list.      Thank You,  Raghav Huber, Pharmacy [facility] [place]

## 2019-02-12 DIAGNOSIS — F41.1 GENERALIZED ANXIETY DISORDER: ICD-10-CM

## 2019-02-13 RX ORDER — ALPRAZOLAM 0.25 MG
0.25 TABLET ORAL 3 TIMES DAILY PRN
Qty: 30 TABLET | Refills: 0 | Status: SHIPPED | OUTPATIENT
Start: 2019-02-13 | End: 2019-04-10

## 2019-02-13 NOTE — TELEPHONE ENCOUNTER
Alprazolam 0.25 MG       Last Written Prescription Date:  1/3/19  Last Fill Quantity: 30,   # refills: 0  Last Office Visit: 7/6/18  Future Office visit:       Routing refill request to provider for review/approval because:  Drug not on the [facility], [facility] or [facility] refill protocol or controlled substance

## 2019-04-04 DIAGNOSIS — F41.1 GENERALIZED ANXIETY DISORDER: ICD-10-CM

## 2019-04-08 NOTE — TELEPHONE ENCOUNTER
Alprazolam 0.25 MG       Last Written Prescription Date:  2/13/19  Last Fill Quantity: 30,   # refills: 0  Last Office Visit: 7/6/18  Future Office visit:       Routing refill request to provider for review/approval because:  Drug not on the [facility], [facility] or [facility] refill protocol or controlled substance

## 2019-04-10 RX ORDER — ALPRAZOLAM 0.25 MG
0.25 TABLET ORAL 3 TIMES DAILY PRN
Qty: 30 TABLET | Refills: 0 | Status: SHIPPED | OUTPATIENT
Start: 2019-04-10 | End: 2019-05-20

## 2019-04-10 NOTE — TELEPHONE ENCOUNTER
XANAX  Routing refill request to Dr. Ferrara in Dr. Schoen's absence from clinic.  Drug not on the [facility] refill protocol   MAURY Plummer

## 2019-05-18 DIAGNOSIS — F41.1 GENERALIZED ANXIETY DISORDER: ICD-10-CM

## 2019-05-20 RX ORDER — ALPRAZOLAM 0.25 MG
0.25 TABLET ORAL 3 TIMES DAILY PRN
Qty: 30 TABLET | Refills: 0 | Status: SHIPPED | OUTPATIENT
Start: 2019-05-20 | End: 2019-06-24

## 2019-05-20 NOTE — TELEPHONE ENCOUNTER
Xanax  Last Written Prescription Date:  04/10/2019  Last Fill Quantity: 30,  # refills: 0   Last office visit: 07/06/2018 with prescribing provider:  Schoen Future Office Visit:  None    Requested Prescriptions   Pending Prescriptions Disp Refills     ALPRAZolam (XANAX) 0.25 MG tablet 30 tablet 0     Sig: Take 1 tablet (0.25 mg) by mouth 3 times daily as needed for anxiety       There is no refill protocol information for this order        Routing refill request to provider for review/approval because:  Drug not on the [facility] refill protocol     Eri Broderick RN

## 2019-06-23 DIAGNOSIS — F41.1 GENERALIZED ANXIETY DISORDER: ICD-10-CM

## 2019-06-24 RX ORDER — ALPRAZOLAM 0.25 MG
0.25 TABLET ORAL 3 TIMES DAILY PRN
Qty: 30 TABLET | Refills: 0 | Status: SHIPPED | OUTPATIENT
Start: 2019-06-24 | End: 2019-08-07

## 2019-06-24 NOTE — TELEPHONE ENCOUNTER
Xanax  Last Written Prescription Date:  05/20/2019  Last Fill Quantity: 30,  # refills: 0   Last office visit: 07/06/2018 with prescribing provider:  Schoen   Future Office Visit:  None    Routing refill request to provider for review/approval because:  Drug not on the [facility] refill protocol     Eri Broderick RN

## 2019-06-27 ENCOUNTER — TELEPHONE (OUTPATIENT)
Dept: FAMILY MEDICINE | Facility: CLINIC | Age: 59
End: 2019-06-27

## 2019-06-27 DIAGNOSIS — I10 ESSENTIAL HYPERTENSION WITH GOAL BLOOD PRESSURE LESS THAN 140/90: ICD-10-CM

## 2019-06-28 RX ORDER — LOSARTAN POTASSIUM AND HYDROCHLOROTHIAZIDE 12.5; 5 MG/1; MG/1
TABLET ORAL
Qty: 30 TABLET | Refills: 1 | Status: SHIPPED | OUTPATIENT
Start: 2019-06-28 | End: 2019-07-12

## 2019-06-28 NOTE — TELEPHONE ENCOUNTER
Patient called and made appt for July 12th. Patient states she needs a refill of her BP medication.

## 2019-06-28 NOTE — TELEPHONE ENCOUNTER
"Routing to schedulers for yearly appointment with PCP after July 6th, 2019.    Hyzaar  Last Written Prescription Date:  07/06/2018  Last Fill Quantity: 30,  # refills: 11   Last office visit: 07/06/2018 with prescribing provider:  Schoen   Future Office Visit:  None  Prescription approved per [facility] Refill Protocol.    Requested Prescriptions   Pending Prescriptions Disp Refills     losartan-hydrochlorothiazide (HYZAAR) 50-12.5 MG tablet [Pharmacy Med Name: LOSARTAN POTASSIUM-HCT 50-12.5 TABS] 30 tablet 11     Sig: TAKE ONE TABLET BY MOUTH EVERY DAY       Angiotensin-II Receptors Passed - 6/27/2019  9:18 AM        Passed - Blood pressure under 140/90 in past 12 months     BP Readings from Last 3 Encounters:   07/26/18 138/82   07/06/18 144/84   03/03/18 141/84           Passed - Recent (12 mo) or future (30 days) visit within the authorizing provider's specialty     Patient had office visit in the last 12 months or has a visit in the next 30 days with authorizing provider or within the authorizing provider's specialty.  See \"Patient Info\" tab in inbasket, or \"Choose Columns\" in Meds & Orders section of the refill encounter.          Passed - Medication is active on med list        Passed - Patient is age 18 or older        Passed - No active pregnancy on record        Passed - Normal serum creatinine on file in past 12 months     Recent Labs   Lab Test 07/06/18  1748   CR 0.70           Passed - Normal serum potassium on file in past 12 months     Recent Labs   Lab Test 07/06/18  1748   POTASSIUM 3.9            Passed - No positive pregnancy test in past 12 months      Eri Broderick RN  "

## 2019-07-09 ENCOUNTER — HOSPITAL ENCOUNTER (OUTPATIENT)
Dept: MAMMOGRAPHY | Facility: CLINIC | Age: 59
Discharge: HOME | End: 2019-07-09
Attending: FAMILY MEDICINE | Admitting: FAMILY MEDICINE
Payer: COMMERCIAL

## 2019-07-09 DIAGNOSIS — Z12.31 VISIT FOR SCREENING MAMMOGRAM: ICD-10-CM

## 2019-07-09 PROCEDURE — 77063 BREAST TOMOSYNTHESIS BI: CPT

## 2019-07-11 ASSESSMENT — ENCOUNTER SYMPTOMS
COUGH: 0
ABDOMINAL PAIN: 0
WEAKNESS: 0
JOINT SWELLING: 0
NAUSEA: 0
HEARTBURN: 0
DIARRHEA: 0
PARESTHESIAS: 0
SORE THROAT: 0
FREQUENCY: 0
SHORTNESS OF BREATH: 0
FEVER: 0
PALPITATIONS: 0
ARTHRALGIAS: 0
CHILLS: 0
MYALGIAS: 0
EYE PAIN: 0
HEADACHES: 0
CONSTIPATION: 0
DYSURIA: 0
BREAST MASS: 0
HEMATURIA: 0
HEMATOCHEZIA: 0
NERVOUS/ANXIOUS: 0
DIZZINESS: 0

## 2019-07-12 ENCOUNTER — OFFICE VISIT (OUTPATIENT)
Dept: FAMILY MEDICINE | Facility: CLINIC | Age: 59
End: 2019-07-12
Payer: COMMERCIAL

## 2019-07-12 VITALS
TEMPERATURE: 96.2 F | DIASTOLIC BLOOD PRESSURE: 82 MMHG | BODY MASS INDEX: 31.23 KG/M2 | SYSTOLIC BLOOD PRESSURE: 122 MMHG | HEART RATE: 96 BPM | HEIGHT: 62 IN | RESPIRATION RATE: 20 BRPM

## 2019-07-12 DIAGNOSIS — Z00.00 ROUTINE GENERAL MEDICAL EXAMINATION AT A HEALTH CARE FACILITY: Primary | ICD-10-CM

## 2019-07-12 DIAGNOSIS — I10 ESSENTIAL HYPERTENSION WITH GOAL BLOOD PRESSURE LESS THAN 140/90: ICD-10-CM

## 2019-07-12 DIAGNOSIS — Z80.0 FAMILY HISTORY OF PANCREATIC CANCER: ICD-10-CM

## 2019-07-12 DIAGNOSIS — Z12.4 SCREENING FOR MALIGNANT NEOPLASM OF CERVIX: ICD-10-CM

## 2019-07-12 DIAGNOSIS — Z80.41 FAMILY HISTORY OF MALIGNANT NEOPLASM OF OVARY: ICD-10-CM

## 2019-07-12 LAB
ANION GAP SERPL CALCULATED.3IONS-SCNC: 7 MMOL/L (ref 3–14)
BUN SERPL-MCNC: 8 MG/DL (ref 7–30)
CALCIUM SERPL-MCNC: 8.9 MG/DL (ref 8.5–10.1)
CHLORIDE SERPL-SCNC: 104 MMOL/L (ref 94–109)
CHOLEST SERPL-MCNC: 184 MG/DL
CO2 SERPL-SCNC: 29 MMOL/L (ref 20–32)
CREAT SERPL-MCNC: 0.64 MG/DL (ref 0.52–1.04)
CREAT UR-MCNC: 56 MG/DL
GFR SERPL CREATININE-BSD FRML MDRD: >90 ML/MIN/{1.73_M2}
GLUCOSE SERPL-MCNC: 98 MG/DL (ref 70–99)
HDLC SERPL-MCNC: 54 MG/DL
LDLC SERPL CALC-MCNC: 114 MG/DL
MICROALBUMIN UR-MCNC: 6 MG/L
MICROALBUMIN/CREAT UR: 10.23 MG/G CR (ref 0–25)
NONHDLC SERPL-MCNC: 130 MG/DL
POTASSIUM SERPL-SCNC: 3.7 MMOL/L (ref 3.4–5.3)
SODIUM SERPL-SCNC: 140 MMOL/L (ref 133–144)
TRIGL SERPL-MCNC: 81 MG/DL

## 2019-07-12 PROCEDURE — 86301 IMMUNOASSAY TUMOR CA 19-9: CPT | Mod: 90 | Performed by: FAMILY MEDICINE

## 2019-07-12 PROCEDURE — 86304 IMMUNOASSAY TUMOR CA 125: CPT | Performed by: FAMILY MEDICINE

## 2019-07-12 PROCEDURE — 99396 PREV VISIT EST AGE 40-64: CPT | Performed by: FAMILY MEDICINE

## 2019-07-12 PROCEDURE — 80061 LIPID PANEL: CPT | Performed by: FAMILY MEDICINE

## 2019-07-12 PROCEDURE — G0476 HPV COMBO ASSAY CA SCREEN: HCPCS | Performed by: FAMILY MEDICINE

## 2019-07-12 PROCEDURE — 82043 UR ALBUMIN QUANTITATIVE: CPT | Performed by: FAMILY MEDICINE

## 2019-07-12 PROCEDURE — 80048 BASIC METABOLIC PNL TOTAL CA: CPT | Performed by: FAMILY MEDICINE

## 2019-07-12 PROCEDURE — 36415 COLL VENOUS BLD VENIPUNCTURE: CPT | Performed by: FAMILY MEDICINE

## 2019-07-12 PROCEDURE — G0145 SCR C/V CYTO,THINLAYER,RESCR: HCPCS | Performed by: FAMILY MEDICINE

## 2019-07-12 PROCEDURE — 99000 SPECIMEN HANDLING OFFICE-LAB: CPT | Performed by: FAMILY MEDICINE

## 2019-07-12 RX ORDER — LOSARTAN POTASSIUM AND HYDROCHLOROTHIAZIDE 12.5; 5 MG/1; MG/1
1 TABLET ORAL DAILY
Qty: 90 TABLET | Refills: 3 | Status: SHIPPED | OUTPATIENT
Start: 2019-07-12 | End: 2021-09-29 | Stop reason: ALTCHOICE

## 2019-07-12 ASSESSMENT — PAIN SCALES - GENERAL: PAINLEVEL: NO PAIN (0)

## 2019-07-12 NOTE — LETTER
My Asthma Action Plan  Name: Caro Palmer   YOB: 1960  Date: 7/12/2019   My doctor: Gregory G. Schoen, MD   My clinic: [facility]        My Control Medicine:   My Rescue Medicine:    My Asthma Severity:   Avoid your asthma triggers:                GREEN ZONE   Good Control    I feel good    No cough or wheeze    Can work, sleep and play without asthma symptoms       Take your asthma control medicine every day.     1. If exercise triggers your asthma, take your rescue medication    15 minutes before exercise or sports, and    During exercise if you have asthma symptoms  2. Spacer to use with inhaler: If you have a spacer, make sure to use it with your inhaler             YELLOW ZONE Getting Worse  I have ANY of these:    I do not feel good    Cough or wheeze    Chest feels tight    Wake up at night   1. Keep taking your Green Zone medications  2. Start taking your rescue medicine:    every 20 minutes for up to 1 hour. Then every 4 hours for 24-48 hours.  3. If you stay in the Yellow Zone for more than 12-24 hours, contact your doctor.  4. If you do not return to the Green Zone in 12-24 hours or you get worse, start taking your oral steroid medicine if prescribed by your provider.           RED ZONE Medical Alert - Get Help  I have ANY of these:    I feel awful    Medicine is not helping    Breathing getting harder    Trouble walking or talking    Nose opens wide to breathe       1. Take your rescue medicine NOW  2. If your provider has prescribed an oral steroid medicine, start taking it NOW  3. Call your doctor NOW  4. If you are still in the Red Zone after 20 minutes and you have not reached your doctor:    Take your rescue medicine again and    Call 911 or go to the emergency room right away    See your regular doctor within 2 weeks of an Emergency Room or Urgent Care visit for follow-up treatment.          Annual Reminders:  Meet with Asthma Educator,  Flu Shot in the Fall,  consider Pneumonia Vaccination for patients with asthma (aged 19 and older).    Pharmacy:    [facility] [place] - [address] [place] [facility] [place], [place] - [address]                      Asthma Triggers  How To Control Things That Make Your Asthma Worse    Triggers are things that make your asthma worse.  Look at the list below to help you find your triggers and what you can do about them.  You can help prevent asthma flare-ups by staying away from your triggers.      Trigger                                                          What you can do   Cigarette Smoke  Tobacco smoke can make asthma worse. Do not allow smoking in your home, car or around you.  Be sure no one smokes at a child s day care or school.  If you smoke, ask your health care provider for ways to help you quit.  Ask family members to quit too.  Ask your health care provider for a referral to Quit Plan to help you quit smoking, or call 2-347-802-PLAN.     Colds, Flu, Bronchitis  These are common triggers of asthma. Wash your hands often.  Don t touch your eyes, nose or mouth.  Get a flu shot every year.     Dust Mites  These are tiny bugs that live in cloth or carpet. They are too small to see. Wash sheets and blankets in hot water every week.   Encase pillows and mattress in dust mite proof covers.  Avoid having carpet if you can. If you have carpet, vacuum weekly.   Use a dust mask and HEPA vacuum.   Pollen and Outdoor Mold  Some people are allergic to trees, grass, or weed pollen, or molds. Try to keep your windows closed.  Limit time out doors when pollen count is high.   Ask you health care provider about taking medicine during allergy season.     Animal Dander  Some people are allergic to skin flakes, urine or saliva from pets with fur or feathers. Keep pets with fur or feathers out of your home.    If you can t keep the pet outdoors, then keep the pet out of your bedroom.  Keep the bedroom door  closed.  Keep pets off cloth furniture and away from stuffed toys.     Mice, Rats, and Cockroaches  Some people are allergic to the waste from these pests.   Cover food and garbage.  Clean up spills and food crumbs.  Store grease in the refrigerator.   Keep food out of the bedroom.   Indoor Mold  This can be a trigger if your home has high moisture. Fix leaking faucets, pipes, or other sources of water.   Clean moldy surfaces.  Dehumidify basement if it is damp and smelly.   Smoke, Strong Odors, and Sprays  These can reduce air quality. Stay away from strong odors and sprays, such as perfume, powder, hair spray, paints, smoke incense, paint, cleaning products, candles and new carpet.   Exercise or Sports  Some people with asthma have this trigger. Be active!  Ask your doctor about taking medicine before sports or exercise to prevent symptoms.    Warm up for 5-10 minutes before and after sports or exercise.     Other Triggers of Asthma  Cold air:  Cover your nose and mouth with a scarf.  Sometimes laughing or crying can be a trigger.  Some medicines and food can trigger asthma.

## 2019-07-12 NOTE — PROGRESS NOTES
SUBJECTIVE:   CC: Caro Palmer is an 58 year old woman who presents for preventive health visit.     States has been doing very well the past year. She did purchase a BP cuff and typically will run in the 120-130/70-80 range.  She denies side effects (cough/edema) from her BP meds.  Denies headaches, chest pains.  Her anxiety is generally well controlled and will typically use xanax a couple of times a day, occasionally needs a third dose. She is basically parenting her 5 year old grandchild with cerebral palsy as her daughter had a  in December with some health issues as well so this increases her stress a bit.      Healthy Habits:  Do you get at least three servings of calcium containing foods daily (dairy, green leafy vegetables, etc.)? Answers for HPI/ROS submitted by the patient on 2019   Annual Exam:  Frequency of exercise:: 4-5 days/week  Getting at least 3 servings of Calcium per day:: Yes  Diet:: Regular (no restrictions), Low salt, Low fat/cholesterol, Carbohydrate counting  Taking medications regularly:: Yes  Medication side effects:: None, No muscle aches, No significant flushing  Bi-annual eye exam:: Yes  Dental care twice a year:: Yes  Sleep apnea or symptoms of sleep apnea:: None  abdominal pain: No  Blood in stool: No  Blood in urine: No  chest pain: No  chills: No  congestion: No  constipation: No  cough: No  diarrhea: No  dizziness: No  ear pain: No  eye pain: No  nervous/anxious: No  fever: No  frequency: No  genital sores: No  headaches: No  hearing loss: No  heartburn: No  arthralgias: No  joint swelling: No  peripheral edema: No  mood changes: No  myalgias: No  nausea: No  dysuria: No  palpitations: No  Skin sensation changes: No  sore throat: No  urgency: No  rash: No  shortness of breath: No  visual disturbance: No  weakness: No  pelvic pain: No  vaginal bleeding: No  vaginal discharge: No  tenderness: No  breast mass: No  breast discharge: No  Additional concerns today::  No  Duration of exercise:: 30-45 minutes  Today's PHQ-2 Score:   PHQ-2 ( 1999 [employer]) 7/11/2019 7/26/2018   Q1: Little interest or pleasure in doing things 0 0   Q2: Feeling down, depressed or hopeless 0 0   PHQ-2 Score 0 0   Q1: Little interest or pleasure in doing things Not at all -   Q2: Feeling down, depressed or hopeless Not at all -   PHQ-2 Score 0 -     Abuse: Current or Past(Physical, Sexual or Emotional)- No  Do you feel safe in your environment? Yes    Social History     Tobacco Use     Smoking status: Never Smoker     Smokeless tobacco: Never Used   Substance Use Topics     Alcohol use: Yes     Alcohol/week: 0.0 oz     Comment: 1-2 drinks monthly     If you drink alcohol do you typically have >3 drinks per day or >7 drinks per week? No                     Reviewed orders with patient.  Reviewed health maintenance and updated orders accordingly - Yes      Mammogram Screening: Patient over age 50, mutual decision to screen reflected in health maintenance.    Pertinent mammograms are reviewed under the imaging tab.  History of abnormal Pap smear: YES - TYSON 2/3 on biopsy -   PAP / HPV Latest Ref Rng & Units 7/6/2018 6/19/2017 6/17/2016   PAP - NIL NIL NIL   HPV 16 DNA NEG:Negative Negative Negative Negative   HPV 18 DNA NEG:Negative Negative Negative Negative   OTHER HR HPV NEG:Negative Negative Negative Negative     Reviewed and updated as needed this visit by clinical staff  Tobacco  Allergies  Meds         Reviewed and updated as needed this visit by Provider          ROS:  CONSTITUTIONAL: NEGATIVE for fever, chills, change in weight  INTEGUMENTARY/SKIN: NEGATIVE for worrisome rashes, moles or lesions  EYES: NEGATIVE for vision changes or irritation  ENT: NEGATIVE for ear, mouth and throat problems  RESP: NEGATIVE for significant cough or SOB  BREAST: NEGATIVE for masses, tenderness or discharge  CV: NEGATIVE for chest pain, palpitations or peripheral edema  GI: NEGATIVE for nausea, abdominal pain,  heartburn, or change in bowel habits  : NEGATIVE for unusual urinary or vaginal symptoms. No vaginal bleeding.  MUSCULOSKELETAL: NEGATIVE for significant arthralgias or myalgia  NEURO: NEGATIVE for weakness, dizziness or paresthesias  PSYCHIATRIC: NEGATIVE for changes in mood or affect     OBJECTIVE:   LMP 11/22/2012   Breastfeeding? No   EXAM:  GENERAL: healthy, alert and no distress  EYES: Eyes grossly normal to inspection, PERRL and conjunctivae and sclerae normal  HENT: ear canals and TM's normal, nose and mouth without ulcers or lesions  NECK: no adenopathy, no asymmetry, masses, or scars and thyroid normal to palpation  RESP: lungs clear to auscultation - no rales, rhonchi or wheezes  CV: regular rate and rhythm, normal S1 S2, no S3 or S4, no murmur, click or rub, no peripheral edema and peripheral pulses strong  ABDOMEN: soft, nontender, no hepatosplenomegaly, no masses and bowel sounds normal   (female): normal female external genitalia, normal urethral meatus, vaginal mucosa is estrogen deficient, pale, and normal cervix/adnexa/uterus without masses or discharge. Pap smear done with ThinPrep brush and broom.  MS: no gross musculoskeletal defects noted, no edema  SKIN: no suspicious lesions or rashes  NEURO: Normal strength and tone, mentation intact and speech normal  PSYCH: mentation appears normal, affect normal/bright        ASSESSMENT/PLAN:      Routine general medical examination at a health care facility  Essential hypertension with goal blood pressure less than 140/90  Screening for malignant neoplasm of cervix  Family history of pancreatic cancer  Family history of malignant neoplasm of ovary    No change in any of her medications for BP or anxiety. Is fasting and will check basic profile, lipid profile and have her do a urine microalbumin.  Per usual, we will order a  and a CA 19-9 to screen for her increased family risk for ovarian and pancreatic CA.  She will keep monitoring BP at home  "and notify if any issues with creeping up over 140/90.    Refilled BP meds x 1 year.    Up to date on Colon CA screening and just had a normal mammogram.      COUNSELING:   Reviewed preventive health counseling, as reflected in patient instructions       Regular exercise       Healthy diet/nutrition    Estimated body mass index is 31.33 kg/m  as calculated from the following:    Height as of 7/6/18: 1.56 m (5' 1.4\").    Weight as of 6/19/17: 76.2 kg (168 lb).    Weight management plan: Discussed healthy diet and exercise guidelines     reports that she has never smoked. She has never used smokeless tobacco.     States she just had her annual formal eye exam and that was normal.     Counseling Resources:  ATP IV Guidelines  Pooled Cohorts Equation Calculator  Breast Cancer Risk Calculator  FRAX Risk Assessment  ICSI Preventive Guidelines  Dietary Guidelines for Americans, 2010  USDA's MyPlate  ASA Prophylaxis  Lung CA Screening    Gregory G. Schoen, MD  [facility]  "

## 2019-07-13 LAB — CANCER AG125 SERPL-ACNC: 7 U/ML (ref 0–30)

## 2019-07-13 ASSESSMENT — ASTHMA QUESTIONNAIRES: ACT_TOTALSCORE: 24

## 2019-07-14 LAB — CANCER AG19-9 SERPL-ACNC: 12 U/ML (ref 0–37)

## 2019-07-15 ENCOUNTER — TELEPHONE (OUTPATIENT)
Dept: FAMILY MEDICINE | Facility: CLINIC | Age: 59
End: 2019-07-15

## 2019-07-15 NOTE — TELEPHONE ENCOUNTER
----- Message from Gregory G Schoen, MD sent at 7/15/2019  5:04 PM CDT -----  Please call with message:  CA 19-9 and  were both normal.  Cholesterol was noted to be good, with the total at 184 and the bad cholesterol 114, much better than last year. No changes in diet needed.  Chemistry profile showed normal kidneys, sodium, potassium and calcium.  No recommendations needed at this time as all looks fine.  Electronically signed by Greg Schoen, MD

## 2019-07-15 NOTE — TELEPHONE ENCOUNTER
Tried to reach patient, left message for patient to call the clinic back.  Haider Whipple, [facility]

## 2019-07-15 NOTE — TELEPHONE ENCOUNTER
Patient called back and I gave her the message about her results. Patient understood and had no other questions at this time.       Thank you,  Tova Barreto   for [facility]

## 2019-07-17 LAB
COPATH REPORT: NORMAL
PAP: NORMAL

## 2019-08-07 DIAGNOSIS — F41.1 GENERALIZED ANXIETY DISORDER: ICD-10-CM

## 2019-08-07 NOTE — TELEPHONE ENCOUNTER
Requested Prescriptions   Pending Prescriptions Disp Refills     ALPRAZolam (XANAX) 0.25 MG tablet 30 tablet 0     Sig: Take 1 tablet (0.25 mg) by mouth 3 times daily as needed for anxiety       There is no refill protocol information for this order        Last Written Prescription Date:  6/24/19  Last Fill Quantity: 30,  # refills: 0   Last office visit: 7/12/2019 with prescribing provider:     Future Office Visit:      Routing refill request to provider for review/approval because:  Drug not on the [facility] refill protocol   MAGDALENA MartinezN, RN

## 2019-08-08 RX ORDER — ALPRAZOLAM 0.25 MG
0.25 TABLET ORAL 3 TIMES DAILY PRN
Qty: 30 TABLET | Refills: 0 | Status: SHIPPED | OUTPATIENT
Start: 2019-08-08 | End: 2019-09-12

## 2019-09-12 DIAGNOSIS — F41.1 GENERALIZED ANXIETY DISORDER: ICD-10-CM

## 2019-09-12 RX ORDER — ALPRAZOLAM 0.25 MG
0.25 TABLET ORAL 3 TIMES DAILY PRN
Qty: 30 TABLET | Refills: 0 | Status: SHIPPED | OUTPATIENT
Start: 2019-09-12 | End: 2019-10-22

## 2019-09-12 NOTE — TELEPHONE ENCOUNTER
Xanax  Last Written Prescription Date:  08/08/2019  Last Fill Quantity: 30,  # refills: 0   Last office visit: 7/12/2019 with prescribing provider:  Schoen Future Office Visit:  None    Routing refill request to provider for review/approval because:  Drug not on the [facility] refill protocol     Eri Broderick RN

## 2019-09-28 ENCOUNTER — HEALTH MAINTENANCE LETTER (OUTPATIENT)
Age: 59
End: 2019-09-28

## 2019-10-22 DIAGNOSIS — F41.1 GENERALIZED ANXIETY DISORDER: ICD-10-CM

## 2019-10-22 NOTE — TELEPHONE ENCOUNTER
Xanax  Last Written Prescription Date:  09/12/2019  Last Fill Quantity: 30,  # refills: 0   Last office visit: 7/12/2019 with prescribing provider:  Schoen   Future Office Visit:  None    Routing refill request to provider for review/approval because:  Drug not on the [facility] refill protocol     Eri Broderick RN

## 2019-10-23 RX ORDER — ALPRAZOLAM 0.25 MG
0.25 TABLET ORAL 3 TIMES DAILY PRN
Qty: 30 TABLET | Refills: 0 | Status: SHIPPED | OUTPATIENT
Start: 2019-10-23 | End: 2019-12-02

## 2019-12-02 DIAGNOSIS — F41.1 GENERALIZED ANXIETY DISORDER: ICD-10-CM

## 2019-12-02 RX ORDER — ALPRAZOLAM 0.25 MG
TABLET ORAL
Qty: 30 TABLET | Refills: 0 | Status: SHIPPED | OUTPATIENT
Start: 2019-12-02 | End: 2020-01-09

## 2019-12-02 NOTE — TELEPHONE ENCOUNTER
Alprazolam        Last Written Prescription Date:  10/23/19  Last Fill Quantity: 30,   # refills: 0  Last Office Visit: 7/12/19  Future Office visit:       Routing refill request to provider for review/approval because:  Drug not on the [facility], [facility] or [facility] refill protocol or controlled substance

## 2020-01-08 DIAGNOSIS — F41.1 GENERALIZED ANXIETY DISORDER: ICD-10-CM

## 2020-01-09 RX ORDER — ALPRAZOLAM 0.25 MG
TABLET ORAL
Qty: 30 TABLET | Refills: 0 | Status: SHIPPED | OUTPATIENT
Start: 2020-01-09 | End: 2020-02-20

## 2020-01-09 NOTE — TELEPHONE ENCOUNTER
alprazolam 0.25 MG       Last Written Prescription Date:  12/2/19  Last Fill Quantity: 30,   # refills: 0  Last Office Visit: 7/12/19  Future Office visit:       Routing refill request to provider for review/approval because:  Drug not on the [facility], [facility] or [facility] refill protocol or controlled substance

## 2020-02-20 DIAGNOSIS — F41.1 GENERALIZED ANXIETY DISORDER: ICD-10-CM

## 2020-02-20 RX ORDER — ALPRAZOLAM 0.25 MG
TABLET ORAL
Qty: 30 TABLET | Refills: 0 | Status: SHIPPED | OUTPATIENT
Start: 2020-02-20 | End: 2020-03-30

## 2020-02-20 NOTE — TELEPHONE ENCOUNTER
Alprazolam  Last Written Prescription Date:  01/09/2020  Last Fill Quantity: 30,  # refills: 0   Last office visit: 7/12/2019 with prescribing provider:  Schoen   Future Office Visit:  None    Routing refill request to provider for review/approval because:  Drug not on the [facility] refill protocol     Eri Broderick RN

## 2020-03-29 DIAGNOSIS — F41.1 GENERALIZED ANXIETY DISORDER: ICD-10-CM

## 2020-03-30 RX ORDER — ALPRAZOLAM 0.25 MG
TABLET ORAL
Qty: 30 TABLET | Refills: 0 | Status: SHIPPED | OUTPATIENT
Start: 2020-03-30 | End: 2020-05-12

## 2020-03-30 NOTE — TELEPHONE ENCOUNTER
Xanax  Last Written Prescription Date:  02/20/2020  Last Fill Quantity: 30,  # refills: 0   Last office visit: 7/12/2019 with prescribing provider:  Schoen   Future Office Visit:  None    Routing refill request to provider for review/approval because:  Drug not on the [facility] refill protocol     Eri Broderick RN

## 2020-05-11 DIAGNOSIS — F41.1 GENERALIZED ANXIETY DISORDER: ICD-10-CM

## 2020-05-12 RX ORDER — ALPRAZOLAM 0.25 MG
TABLET ORAL
Qty: 30 TABLET | Refills: 0 | Status: SHIPPED | OUTPATIENT
Start: 2020-05-12 | End: 2020-06-19

## 2020-05-12 NOTE — TELEPHONE ENCOUNTER
Alprazolam      Last Written Prescription Date:  3/30/2020  Last Fill Quantity: 30,   # refills: 0  Last Office Visit: 7/12/2019  Future Office visit:       Routing refill request to provider for review/approval because:  Drug not on the [facility], [facility] or [facility] refill protocol or controlled substance

## 2020-06-18 DIAGNOSIS — F41.1 GENERALIZED ANXIETY DISORDER: ICD-10-CM

## 2020-06-19 RX ORDER — ALPRAZOLAM 0.25 MG
TABLET ORAL
Qty: 30 TABLET | Refills: 0 | Status: SHIPPED | OUTPATIENT
Start: 2020-06-19 | End: 2020-07-27

## 2020-06-19 NOTE — TELEPHONE ENCOUNTER
Pending Prescriptions:                       Disp   Refills    ALPRAZolam (XANAX) 0.25 MG tablet [Pharmac*30 tab*0        Sig: TAKE ONE TABLET BY MOUTH THREE TIMES A DAY AS NEEDED           FOR ANXIETY    Last Written Prescription Date:  5/12/20  Last Fill Quantity: 30,  # refills: 0   Last office visit: 7/12/2019 with prescribing provider:  Schoen   Future Office Visit:      Routing refill request to provider for review/approval because:  Drug not on the [facility] refill protocol     Ludivina Blackwell RN on 6/19/2020 at 8:21 AM

## 2020-07-05 DIAGNOSIS — J31.0 CHRONIC RHINITIS: ICD-10-CM

## 2020-07-06 RX ORDER — LORATADINE AND PSEUDOEPHEDRINE SULFATE 10; 240 MG/1; MG/1
TABLET, FILM COATED, EXTENDED RELEASE ORAL
Qty: 30 TABLET | Refills: 3 | Status: SHIPPED | OUTPATIENT
Start: 2020-07-06 | End: 2021-10-04

## 2020-07-06 NOTE — TELEPHONE ENCOUNTER
SM Allergy Relief  Last Written Prescription Date:  none  Last Fill Quantity: 0   # refills: 0Last Office Visit:   Future Office visit:   07/12/2019    Routing refill request to provider for review/approval because:  Drug not active on patient's medication list

## 2020-07-25 DIAGNOSIS — I10 ESSENTIAL HYPERTENSION WITH GOAL BLOOD PRESSURE LESS THAN 140/90: Primary | ICD-10-CM

## 2020-07-27 RX ORDER — LOSARTAN POTASSIUM 50 MG/1
TABLET ORAL
Qty: 30 TABLET | Refills: 2 | Status: SHIPPED | OUTPATIENT
Start: 2020-07-27 | End: 2020-10-23

## 2020-07-27 RX ORDER — HYDROCHLOROTHIAZIDE 12.5 MG/1
TABLET ORAL
Qty: 30 TABLET | Refills: 2 | Status: SHIPPED | OUTPATIENT
Start: 2020-07-27 | End: 2020-10-23

## 2020-07-27 NOTE — TELEPHONE ENCOUNTER
COZAAR and HYDRODIURIL Prescription approved per [facility] Refill Protocol.........MAURY Plummer

## 2020-08-04 ENCOUNTER — HOSPITAL ENCOUNTER (OUTPATIENT)
Dept: MAMMOGRAPHY | Facility: CLINIC | Age: 60
Discharge: HOME | End: 2020-08-04
Attending: FAMILY MEDICINE | Admitting: FAMILY MEDICINE
Payer: COMMERCIAL

## 2020-08-04 DIAGNOSIS — Z12.31 VISIT FOR SCREENING MAMMOGRAM: ICD-10-CM

## 2020-08-04 PROCEDURE — 77067 SCR MAMMO BI INCL CAD: CPT

## 2020-08-07 ENCOUNTER — VIRTUAL VISIT (OUTPATIENT)
Dept: FAMILY MEDICINE | Facility: CLINIC | Age: 60
End: 2020-08-07
Payer: COMMERCIAL

## 2020-08-07 DIAGNOSIS — I10 ESSENTIAL HYPERTENSION WITH GOAL BLOOD PRESSURE LESS THAN 140/90: ICD-10-CM

## 2020-08-07 DIAGNOSIS — Z80.41 FAMILY HISTORY OF MALIGNANT NEOPLASM OF OVARY: ICD-10-CM

## 2020-08-07 DIAGNOSIS — Z80.0 FAMILY HISTORY OF PANCREATIC CANCER: ICD-10-CM

## 2020-08-07 DIAGNOSIS — F41.1 GENERALIZED ANXIETY DISORDER: Primary | ICD-10-CM

## 2020-08-07 PROCEDURE — 86304 IMMUNOASSAY TUMOR CA 125: CPT | Performed by: FAMILY MEDICINE

## 2020-08-07 PROCEDURE — 99214 OFFICE O/P EST MOD 30 MIN: CPT | Mod: 95 | Performed by: FAMILY MEDICINE

## 2020-08-07 ASSESSMENT — ANXIETY QUESTIONNAIRES
5. BEING SO RESTLESS THAT IT IS HARD TO SIT STILL: NOT AT ALL
7. FEELING AFRAID AS IF SOMETHING AWFUL MIGHT HAPPEN: NOT AT ALL
IF YOU CHECKED OFF ANY PROBLEMS ON THIS QUESTIONNAIRE, HOW DIFFICULT HAVE THESE PROBLEMS MADE IT FOR YOU TO DO YOUR WORK, TAKE CARE OF THINGS AT HOME, OR GET ALONG WITH OTHER PEOPLE: NOT DIFFICULT AT ALL
6. BECOMING EASILY ANNOYED OR IRRITABLE: NOT AT ALL
GAD7 TOTAL SCORE: 1
2. NOT BEING ABLE TO STOP OR CONTROL WORRYING: NOT AT ALL
1. FEELING NERVOUS, ANXIOUS, OR ON EDGE: SEVERAL DAYS
3. WORRYING TOO MUCH ABOUT DIFFERENT THINGS: NOT AT ALL

## 2020-08-07 ASSESSMENT — PATIENT HEALTH QUESTIONNAIRE - PHQ9: 5. POOR APPETITE OR OVEREATING: NOT AT ALL

## 2020-08-07 NOTE — PROGRESS NOTES
"Caro Palmer is a 59 year old female who is being evaluated via a billable telephone visit.      The patient has been notified of following:     \"This telephone visit will be conducted via a call between you and your physician/provider. We have found that certain health care needs can be provided without the need for a physical exam.  This service lets us provide the care you need with a short phone conversation.  If a prescription is necessary we can send it directly to your pharmacy.  If lab work is needed we can place an order for that and you can then stop by our lab to have the test done at a later time.    Telephone visits are billed at different rates depending on your insurance coverage. During this emergency period, for some insurers they may be billed the same as an in-person visit.  Please reach out to your insurance provider with any questions.    If during the course of the call the physician/provider feels a telephone visit is not appropriate, you will not be charged for this service.\"    Patient has given verbal consent for Telephone visit?  Yes    What phone number would you like to be contacted at? [phone]    How would you like to obtain your AVS? Norma Llamas     Caro Palmre is a 59 year old female who presents via phone visit today for the following health issues:    HPI    Hypertension Follow-up      Do you check your blood pressure regularly outside of the clinic? Yes     Are you following a low salt diet? Yes    Are your blood pressures ever more than 140 on the top number (systolic) OR more   than 90 on the bottom number (diastolic), for example 140/90? No   States she has a machine at home and checks periodically and running 120s/80s.  She is eating a healthier diet, watching carbs, pop, no alcohol and feels this helps.      Anxiety Follow-Up    How are you doing with your anxiety since your last visit? No change    Are you having other symptoms that might be associated with " anxiety? No    Have you had a significant life event? Grief or Loss     Are you feeling depressed? No    Do you have any concerns with your use of alcohol or other drugs? No     Doing well on her current doses of alprazolam. She will take half a pill once a day or so and some days gets by without taking.      She notes she did get her mammogram this week which came back as normal.      Current Outpatient Medications   Medication Sig Dispense Refill     albuterol (PROAIR HFA/PROVENTIL HFA/VENTOLIN HFA) 108 (90 Base) MCG/ACT Inhaler Inhale 2 puffs into the lungs every 6 hours as needed for shortness of breath / dyspnea or wheezing 1 Inhaler 0     ALPRAZolam (XANAX) 0.25 MG tablet TAKE ONE TABLET BY MOUTH THREE TIMES A DAY AS NEEDED FOR ANXIETY 30 tablet 0     ASPIRIN 81 MG OR TABS ONE DAILY 100 3     CHLOROPHYLL PO Take 15 mLs by mouth daily       fish oil-omega-3 fatty acids (FISH OIL) 1000 MG capsule Take 1 g by mouth 2 times daily        Flaxseed, Linseed, (FLAXSEED OIL) 1000 MG CAPS Take 1,000 mg by mouth 3 times daily        GELATIN 650 MG OR CAPS 2 daily  0     GLUCOSAMINE CHONDRO COMPLEX OR 2 tablets x 2 daily  0     hydrochlorothiazide (HYDRODIURIL) 12.5 MG tablet TAKE ONE TABLET BY MOUTH ONCE DAILY 30 tablet 2     losartan (COZAAR) 50 MG tablet TAKE ONE TABLET BY MOUTH ONCE DAILY 30 tablet 2     Lysine 1000 MG TABS Take 1,000 mg by mouth daily.       OVER-THE-COUNTER 1 tablet daily Citracal 500 D and Calcium 600       SM ALLERGY RELIEF D  MG 24 hr tablet TAKE ONE TABLET BY MOUTH ONCE DAILY 30 tablet 3     Turmeric 500 MG TABS        VITAMIN E 400 UNIT OR CAPS ONE CAPSULE DAILY 3 MONTHS 1 YEAR     ibuprofen (ADVIL/MOTRIN) 400 MG tablet Take 1 tablet (400 mg) by mouth every 6 hours as needed for moderate pain 40 tablet 0     losartan-hydrochlorothiazide (HYZAAR) 50-12.5 MG tablet Take 1 tablet by mouth daily (Patient not taking: Reported on 8/7/2020) 90 tablet 3     Probiotic Product (PROBIOTIC ADVANCED  PO) Take 4 BAU by mouth         Social History     Tobacco Use     Smoking status: Never Smoker     Smokeless tobacco: Never Used   Substance Use Topics     Alcohol use: Yes     Alcohol/week: 0.0 standard drinks     Comment: 1-2 drinks monthly     Drug use: No     BLAZE-7 SCORE 6/19/2017   Total Score 4     No flowsheet data found.        How many servings of fruits and vegetables do you eat daily?  2-3    On average, how many sweetened beverages do you drink each day (Examples: soda, juice, sweet tea, etc.  Do NOT count diet or artificially sweetened beverages)?   1    How many days per week do you exercise enough to make your heart beat faster? 5    How many minutes a day do you exercise enough to make your heart beat faster? 30 - 60    How many days per week do you miss taking your medication? 0     Reviewed and updated as needed this visit by Provider         Review of Systems   CONSTITUTIONAL: NEGATIVE for fever, chills, change in weight  ENT/MOUTH: NEGATIVE for ear, mouth and throat problems  RESP: NEGATIVE for significant cough or SOB  CV: NEGATIVE for chest pain, palpitations or peripheral edema       Objective   Reported vitals:  LMP 11/22/2012    healthy, alert and no distress  PSYCH: Alert and oriented times 3; coherent speech, normal   rate and volume, able to articulate logical thoughts, able   to abstract reason, no tangential thoughts, no hallucinations   or delusions  Her affect is normal  RESP: No cough, no audible wheezing, able to talk in full sentences  Remainder of exam unable to be completed due to telephone visits            Assessment/Plan:    1. Generalized anxiety disorder  She is happy with current control using 1/2 to 3/4 tab of 0.25mg xanax daily. No change in plan and will refill monthly as needed.      2. Essential hypertension with goal blood pressure less than 140/90  BP reports are at target.  Due for  lab work as below.    - Lipid panel reflex to direct LDL Fasting; Future  - **Basic  metabolic panel FUTURE anytime; Future    3. Family history of pancreatic cancer  Will place orders for tumor marker order and will come in for lab draw.   - Cancer antigen 19-9; Future    4. Family history of malignant neoplasm of ovary  Will place orders for tumor marker order and will come in for lab draw.   - ; Future    Return in about 6 months (around 2/7/2021) for In-Clinic Visit.      Phone call duration:  18 minutes    Gregory G. Schoen, MD

## 2020-08-08 ASSESSMENT — ANXIETY QUESTIONNAIRES: GAD7 TOTAL SCORE: 1

## 2020-08-11 DIAGNOSIS — Z80.0 FAMILY HISTORY OF PANCREATIC CANCER: ICD-10-CM

## 2020-08-11 DIAGNOSIS — Z80.41 FAMILY HISTORY OF MALIGNANT NEOPLASM OF OVARY: ICD-10-CM

## 2020-08-11 DIAGNOSIS — I10 ESSENTIAL HYPERTENSION WITH GOAL BLOOD PRESSURE LESS THAN 140/90: ICD-10-CM

## 2020-08-11 LAB
ANION GAP SERPL CALCULATED.3IONS-SCNC: 6 MMOL/L (ref 3–14)
BUN SERPL-MCNC: 15 MG/DL (ref 7–30)
CALCIUM SERPL-MCNC: 10 MG/DL (ref 8.5–10.1)
CANCER AG125 SERPL-ACNC: 10 U/ML (ref 0–30)
CHLORIDE SERPL-SCNC: 106 MMOL/L (ref 94–109)
CHOLEST SERPL-MCNC: 178 MG/DL
CO2 SERPL-SCNC: 27 MMOL/L (ref 20–32)
CREAT SERPL-MCNC: 0.73 MG/DL (ref 0.52–1.04)
GFR SERPL CREATININE-BSD FRML MDRD: 90 ML/MIN/{1.73_M2}
GLUCOSE SERPL-MCNC: 106 MG/DL (ref 70–99)
HDLC SERPL-MCNC: 58 MG/DL
LDLC SERPL CALC-MCNC: 100 MG/DL
NONHDLC SERPL-MCNC: 120 MG/DL
POTASSIUM SERPL-SCNC: 3.9 MMOL/L (ref 3.4–5.3)
SODIUM SERPL-SCNC: 139 MMOL/L (ref 133–144)
TRIGL SERPL-MCNC: 98 MG/DL

## 2020-08-11 PROCEDURE — 80048 BASIC METABOLIC PNL TOTAL CA: CPT | Performed by: FAMILY MEDICINE

## 2020-08-11 PROCEDURE — 99000 SPECIMEN HANDLING OFFICE-LAB: CPT | Performed by: FAMILY MEDICINE

## 2020-08-11 PROCEDURE — 86304 IMMUNOASSAY TUMOR CA 125: CPT | Performed by: FAMILY MEDICINE

## 2020-08-11 PROCEDURE — 80061 LIPID PANEL: CPT | Performed by: FAMILY MEDICINE

## 2020-08-11 PROCEDURE — 36415 COLL VENOUS BLD VENIPUNCTURE: CPT | Performed by: FAMILY MEDICINE

## 2020-08-11 PROCEDURE — 86301 IMMUNOASSAY TUMOR CA 19-9: CPT | Mod: 90 | Performed by: FAMILY MEDICINE

## 2020-08-13 LAB — CANCER AG19-9 SERPL-ACNC: 9 U/ML (ref 0–37)

## 2020-08-14 ENCOUNTER — TELEPHONE (OUTPATIENT)
Dept: FAMILY MEDICINE | Facility: CLINIC | Age: 60
End: 2020-08-14

## 2020-08-14 NOTE — TELEPHONE ENCOUNTER
Spoke with patient and informed of message below. She understood and did see these. No further questions at this time.   Sarah Zamudio MA

## 2020-08-14 NOTE — TELEPHONE ENCOUNTER
----- Message from Gregory G Schoen, MD sent at 8/14/2020 10:19 AM CDT -----  Please check to see that patient received MyChart result note:    Caro,  Labs are all fine.  Cancer screens are both normal, cholesterol profile looks very good and chemistry panel has a very slight increase in glucose about 99 but not considered to be diabetic (requires level to be over 125 when fasting).  Only recommendation is to be careful about sugars/sweets in your diet and minimize these.  We need to be sure to follow a fasting blood sugar at least once a year.    Electronically signed by Greg Schoen, MD

## 2020-09-03 DIAGNOSIS — F41.1 GENERALIZED ANXIETY DISORDER: ICD-10-CM

## 2020-09-04 RX ORDER — ALPRAZOLAM 0.25 MG
TABLET ORAL
Qty: 30 TABLET | Refills: 0 | Status: SHIPPED | OUTPATIENT
Start: 2020-09-04 | End: 2020-10-12

## 2020-09-04 NOTE — TELEPHONE ENCOUNTER
Xanax      Last Written Prescription Date:  07/27/2020  Last Fill Quantity: 30,   # refills: 0  Last Office Visit: 08/07/2020  Future Office visit:   None    Routing refill request to provider for review/approval because:  Drug not on the [facility], [facility] or [facility] refill protocol or controlled substance    Eri Broderick RN

## 2020-10-10 DIAGNOSIS — F41.1 GENERALIZED ANXIETY DISORDER: ICD-10-CM

## 2020-10-12 RX ORDER — ALPRAZOLAM 0.25 MG
TABLET ORAL
Qty: 30 TABLET | Refills: 0 | Status: SHIPPED | OUTPATIENT
Start: 2020-10-12 | End: 2020-11-23

## 2020-10-12 NOTE — TELEPHONE ENCOUNTER
Routing refill request to provider for review/approval because:  Drug not on the [facility] refill protocol     Xanax  Last Written Prescription Date:  9/4/2020  Last Fill Quantity: 30,  # refills: 0   Last office visit: 8/7/2020 with prescribing provider:  Schoen Future Office Visit:      Requested Prescriptions   Pending Prescriptions Disp Refills     ALPRAZolam (XANAX) 0.25 MG tablet [Pharmacy Med Name: ALPRAZOLAM 0.25MG TABS] 30 tablet 0     Sig: TAKE ONE TABLET BY MOUTH THREE TIMES A DAY AS NEEDED FOR ANXIETY       There is no refill protocol information for this order        Soraya Sim RN on 10/12/2020 at 10:40 AM

## 2020-10-25 DIAGNOSIS — J45.20 MILD INTERMITTENT ASTHMA WITHOUT COMPLICATION: Primary | ICD-10-CM

## 2020-10-26 RX ORDER — ALBUTEROL SULFATE 90 UG/1
AEROSOL, METERED RESPIRATORY (INHALATION)
Qty: 1 INHALER | Refills: 11 | Status: SHIPPED | OUTPATIENT
Start: 2020-10-26 | End: 2022-10-03

## 2020-10-26 NOTE — TELEPHONE ENCOUNTER
Routing refill request to provider for review/approval because:  ACT not current.   Last completed on 07/12/2019    Eri Broderick RN

## 2021-01-09 ENCOUNTER — HEALTH MAINTENANCE LETTER (OUTPATIENT)
Age: 61
End: 2021-01-09

## 2021-02-11 DIAGNOSIS — F41.1 GENERALIZED ANXIETY DISORDER: ICD-10-CM

## 2021-02-11 RX ORDER — ALPRAZOLAM 0.25 MG
TABLET ORAL
Qty: 30 TABLET | Refills: 0 | Status: SHIPPED | OUTPATIENT
Start: 2021-02-11 | End: 2021-03-24

## 2021-02-11 NOTE — TELEPHONE ENCOUNTER
Xanax      Last Written Prescription Date:  01/04/2021  Last Fill Quantity: 30,   # refills: 0  Last Office Visit: 08/07/2020  Future Office visit:   None  Routing refill request to provider for review/approval because:  Drug not on the [facility], [facility] or [facility] refill protocol or controlled substance    Eri Broderick RN

## 2021-03-23 DIAGNOSIS — F41.1 GENERALIZED ANXIETY DISORDER: ICD-10-CM

## 2021-03-24 RX ORDER — ALPRAZOLAM 0.25 MG
TABLET ORAL
Qty: 30 TABLET | Refills: 0 | Status: SHIPPED | OUTPATIENT
Start: 2021-03-24 | End: 2021-05-01

## 2021-04-30 DIAGNOSIS — F41.1 GENERALIZED ANXIETY DISORDER: ICD-10-CM

## 2021-04-30 NOTE — TELEPHONE ENCOUNTER
Xanax      Last Written Prescription Date:  3/24/2021  Last Fill Quantity: 30,   # refills: 0  Last Office Visit: 8/7/2020  Future Office visit:       Routing refill request to provider for review/approval because:  Drug not on the [facility], [facility] or [facility] refill protocol or controlled substance

## 2021-05-01 RX ORDER — ALPRAZOLAM 0.25 MG
TABLET ORAL
Qty: 30 TABLET | Refills: 0 | Status: SHIPPED | OUTPATIENT
Start: 2021-05-01 | End: 2021-06-14

## 2021-07-21 DIAGNOSIS — F41.1 GENERALIZED ANXIETY DISORDER: ICD-10-CM

## 2021-07-21 RX ORDER — ALPRAZOLAM 0.25 MG
TABLET ORAL
Qty: 30 TABLET | Refills: 0 | Status: SHIPPED | OUTPATIENT
Start: 2021-07-21 | End: 2021-08-30

## 2021-07-21 NOTE — TELEPHONE ENCOUNTER
Xanax      Last Written Prescription Date:  06/14/2021  Last Fill Quantity: 30,   # refills: 0  Last Office Visit: 08/07/2020  Future Office visit:   None  Routing refill request to provider for review/approval because:  Drug not on the [facility], [facility] or [facility] refill protocol or controlled substance    Eri Broderick RN

## 2021-08-06 ENCOUNTER — HOSPITAL ENCOUNTER (OUTPATIENT)
Dept: MAMMOGRAPHY | Facility: CLINIC | Age: 61
Discharge: HOME | End: 2021-08-06
Attending: FAMILY MEDICINE | Admitting: FAMILY MEDICINE
Payer: COMMERCIAL

## 2021-08-06 DIAGNOSIS — Z12.31 VISIT FOR SCREENING MAMMOGRAM: ICD-10-CM

## 2021-08-06 PROCEDURE — 77063 BREAST TOMOSYNTHESIS BI: CPT

## 2021-08-20 ENCOUNTER — TELEPHONE (OUTPATIENT)
Dept: FAMILY MEDICINE | Facility: CLINIC | Age: 61
End: 2021-08-20

## 2021-08-20 DIAGNOSIS — Z13.6 CARDIOVASCULAR SCREENING; LDL GOAL LESS THAN 130: ICD-10-CM

## 2021-08-20 DIAGNOSIS — Z80.0 FAMILY HISTORY OF PANCREATIC CANCER: ICD-10-CM

## 2021-08-20 DIAGNOSIS — I10 ESSENTIAL HYPERTENSION WITH GOAL BLOOD PRESSURE LESS THAN 140/90: Primary | ICD-10-CM

## 2021-08-20 DIAGNOSIS — Z80.41 FAMILY HISTORY OF MALIGNANT NEOPLASM OF OVARY: ICD-10-CM

## 2021-08-20 NOTE — TELEPHONE ENCOUNTER
Patient called wanting to get lab work done prior to her 9/28/21 physical exam appt. Call when lab orders are ready to schedule appt.

## 2021-08-26 ENCOUNTER — LAB (OUTPATIENT)
Dept: LAB | Facility: CLINIC | Age: 61
End: 2021-08-26
Payer: COMMERCIAL

## 2021-08-26 DIAGNOSIS — Z13.6 CARDIOVASCULAR SCREENING; LDL GOAL LESS THAN 130: ICD-10-CM

## 2021-08-26 DIAGNOSIS — Z80.41 FAMILY HISTORY OF MALIGNANT NEOPLASM OF OVARY: ICD-10-CM

## 2021-08-26 DIAGNOSIS — Z80.0 FAMILY HISTORY OF PANCREATIC CANCER: ICD-10-CM

## 2021-08-26 DIAGNOSIS — I10 ESSENTIAL HYPERTENSION WITH GOAL BLOOD PRESSURE LESS THAN 140/90: ICD-10-CM

## 2021-08-26 LAB
ANION GAP SERPL CALCULATED.3IONS-SCNC: 5 MMOL/L (ref 3–14)
BUN SERPL-MCNC: 20 MG/DL (ref 7–30)
CALCIUM SERPL-MCNC: 8.7 MG/DL (ref 8.5–10.1)
CANCER AG125 SERPL-ACNC: 9 U/ML (ref 0–30)
CHLORIDE BLD-SCNC: 107 MMOL/L (ref 94–109)
CHOLEST SERPL-MCNC: 180 MG/DL
CO2 SERPL-SCNC: 27 MMOL/L (ref 20–32)
CREAT SERPL-MCNC: 0.72 MG/DL (ref 0.52–1.04)
FASTING STATUS PATIENT QL REPORTED: YES
GFR SERPL CREATININE-BSD FRML MDRD: >90 ML/MIN/1.73M2
GLUCOSE BLD-MCNC: 123 MG/DL (ref 70–99)
HDLC SERPL-MCNC: 64 MG/DL
LDLC SERPL CALC-MCNC: 102 MG/DL
NONHDLC SERPL-MCNC: 116 MG/DL
POTASSIUM BLD-SCNC: 3.6 MMOL/L (ref 3.4–5.3)
SODIUM SERPL-SCNC: 139 MMOL/L (ref 133–144)
TRIGL SERPL-MCNC: 69 MG/DL

## 2021-08-26 PROCEDURE — 86301 IMMUNOASSAY TUMOR CA 19-9: CPT | Mod: 90

## 2021-08-26 PROCEDURE — 99000 SPECIMEN HANDLING OFFICE-LAB: CPT

## 2021-08-26 PROCEDURE — 80048 BASIC METABOLIC PNL TOTAL CA: CPT

## 2021-08-26 PROCEDURE — 86304 IMMUNOASSAY TUMOR CA 125: CPT

## 2021-08-26 PROCEDURE — 36415 COLL VENOUS BLD VENIPUNCTURE: CPT

## 2021-08-26 PROCEDURE — 80061 LIPID PANEL: CPT

## 2021-08-29 DIAGNOSIS — F41.1 GENERALIZED ANXIETY DISORDER: ICD-10-CM

## 2021-08-29 LAB — CANCER AG19-9 SERPL IA-ACNC: 9 U/ML

## 2021-08-30 RX ORDER — ALPRAZOLAM 0.25 MG
TABLET ORAL
Qty: 30 TABLET | Refills: 0 | Status: SHIPPED | OUTPATIENT
Start: 2021-08-30 | End: 2021-10-04

## 2021-08-30 NOTE — TELEPHONE ENCOUNTER
Xanax      Last Written Prescription Date:  07/21/2021  Last Fill Quantity: 30,   # refills: 0  Last Office Visit: 08/07/2020  Future Office visit:    Next 5 appointments (look out 90 days)      Sep 28, 2021  4:10 PM  PHYSICAL with Gregory G Schoen, MD  [facility] ([facility] [address] [place] [id number] [phone]        Routing refill request to provider for review/approval because:  Drug not on the [facility], [facility] or [facility] refill protocol or controlled substance    Eri Broderick RN

## 2021-09-28 ENCOUNTER — OFFICE VISIT (OUTPATIENT)
Dept: FAMILY MEDICINE | Facility: CLINIC | Age: 61
End: 2021-09-28
Payer: COMMERCIAL

## 2021-09-28 VITALS
RESPIRATION RATE: 18 BRPM | HEART RATE: 90 BPM | SYSTOLIC BLOOD PRESSURE: 134 MMHG | DIASTOLIC BLOOD PRESSURE: 74 MMHG | HEIGHT: 60 IN | TEMPERATURE: 98.3 F | BODY MASS INDEX: 33.03 KG/M2 | OXYGEN SATURATION: 99 %

## 2021-09-28 DIAGNOSIS — I10 ESSENTIAL HYPERTENSION WITH GOAL BLOOD PRESSURE LESS THAN 140/90: ICD-10-CM

## 2021-09-28 DIAGNOSIS — F41.1 GENERALIZED ANXIETY DISORDER: ICD-10-CM

## 2021-09-28 DIAGNOSIS — R73.03 PRE-DIABETES: ICD-10-CM

## 2021-09-28 DIAGNOSIS — Z23 VACCINE FOR DIPHTHERIA-TETANUS: ICD-10-CM

## 2021-09-28 DIAGNOSIS — Z00.00 ROUTINE GENERAL MEDICAL EXAMINATION AT A HEALTH CARE FACILITY: Primary | ICD-10-CM

## 2021-09-28 DIAGNOSIS — J45.20 MILD INTERMITTENT ASTHMA WITHOUT COMPLICATION: ICD-10-CM

## 2021-09-28 LAB — HBA1C MFR BLD: 6.2 % (ref 0–5.6)

## 2021-09-28 PROCEDURE — 83036 HEMOGLOBIN GLYCOSYLATED A1C: CPT | Performed by: FAMILY MEDICINE

## 2021-09-28 PROCEDURE — 99213 OFFICE O/P EST LOW 20 MIN: CPT | Mod: 25 | Performed by: FAMILY MEDICINE

## 2021-09-28 PROCEDURE — 90471 IMMUNIZATION ADMIN: CPT | Performed by: FAMILY MEDICINE

## 2021-09-28 PROCEDURE — 90732 PPSV23 VACC 2 YRS+ SUBQ/IM: CPT | Performed by: FAMILY MEDICINE

## 2021-09-28 PROCEDURE — 90714 TD VACC NO PRESV 7 YRS+ IM: CPT | Performed by: FAMILY MEDICINE

## 2021-09-28 PROCEDURE — 36415 COLL VENOUS BLD VENIPUNCTURE: CPT | Performed by: FAMILY MEDICINE

## 2021-09-28 PROCEDURE — 90472 IMMUNIZATION ADMIN EACH ADD: CPT | Performed by: FAMILY MEDICINE

## 2021-09-28 PROCEDURE — 99396 PREV VISIT EST AGE 40-64: CPT | Mod: 25 | Performed by: FAMILY MEDICINE

## 2021-09-28 RX ORDER — MULTIVITAMIN WITH IRON
1 TABLET ORAL DAILY
COMMUNITY

## 2021-09-28 ASSESSMENT — ENCOUNTER SYMPTOMS
ABDOMINAL PAIN: 0
HEMATOCHEZIA: 0
FREQUENCY: 0
WEAKNESS: 0
DIZZINESS: 0
HEMATURIA: 0
DIARRHEA: 0
SHORTNESS OF BREATH: 0
NAUSEA: 0
HEARTBURN: 0
DYSURIA: 0
NERVOUS/ANXIOUS: 0
HEADACHES: 0
JOINT SWELLING: 0
CHILLS: 0
ARTHRALGIAS: 0
CONSTIPATION: 0
SORE THROAT: 0
COUGH: 0
EYE PAIN: 0
BREAST MASS: 0
PARESTHESIAS: 0
FEVER: 0
MYALGIAS: 0
PALPITATIONS: 0

## 2021-09-28 ASSESSMENT — PAIN SCALES - GENERAL: PAINLEVEL: NO PAIN (0)

## 2021-09-28 NOTE — NURSING NOTE
Prior to immunization administration, verified patients identity using patient s name and date of birth. Please see Immunization Activity for additional information.     Screening Questionnaire for Adult Immunization    Are you sick today?   No   Do you have allergies to medications, food, a vaccine component or latex?   No   Have you ever had a serious reaction after receiving a vaccination?   No   Do you have a long-term health problem with heart, lung, kidney, or metabolic disease (e.g., diabetes), asthma, a blood disorder, no spleen, complement component deficiency, a cochlear implant, or a spinal fluid leak?  Are you on long-term aspirin therapy?   No   Do you have cancer, leukemia, HIV/AIDS, or any other immune system problem?   No   Do you have a parent, brother, or sister with an immune system problem?   No   In the past 3 months, have you taken medications that affect  your immune system, such as prednisone, other steroids, or anticancer drugs; drugs for the treatment of rheumatoid arthritis, Crohn s disease, or psoriasis; or have you had radiation treatments?   No   Have you had a seizure, or a brain or other nervous system problem?   No   During the past year, have you received a transfusion of blood or blood    products, or been given immune (gamma) globulin or antiviral drug?   No   For women: Are you pregnant or is there a chance you could become       pregnant during the next month?   No   Have you received any vaccinations in the past 4 weeks?   No     Immunization questionnaire answers were all negative.        Per orders of Dr. Schoen, injection of td and pneumovax 23 given by Bri Gupta CMA. Patient instructed to remain in clinic for 15 minutes afterwards, and to report any adverse reaction to me immediately.       Screening performed by Bri Gupta CMA on 9/28/2021 at 5:09 PM.

## 2021-09-29 ASSESSMENT — ASTHMA QUESTIONNAIRES
QUESTION_4 LAST FOUR WEEKS HOW OFTEN HAVE YOU USED YOUR RESCUE INHALER OR NEBULIZER MEDICATION (SUCH AS ALBUTEROL): NOT AT ALL
ACUTE_EXACERBATION_TODAY: NO
QUESTION_5 LAST FOUR WEEKS HOW WOULD YOU RATE YOUR ASTHMA CONTROL: COMPLETELY CONTROLLED
QUESTION_1 LAST FOUR WEEKS HOW MUCH OF THE TIME DID YOUR ASTHMA KEEP YOU FROM GETTING AS MUCH DONE AT WORK, SCHOOL OR AT HOME: NONE OF THE TIME
QUESTION_2 LAST FOUR WEEKS HOW OFTEN HAVE YOU HAD SHORTNESS OF BREATH: ONCE OR TWICE A WEEK
ACT_TOTALSCORE: 24
QUESTION_3 LAST FOUR WEEKS HOW OFTEN DID YOUR ASTHMA SYMPTOMS (WHEEZING, COUGHING, SHORTNESS OF BREATH, CHEST TIGHTNESS OR PAIN) WAKE YOU UP AT NIGHT OR EARLIER THAN USUAL IN THE MORNING: NOT AT ALL

## 2021-09-30 DIAGNOSIS — J31.0 CHRONIC RHINITIS: ICD-10-CM

## 2021-09-30 DIAGNOSIS — F41.1 GENERALIZED ANXIETY DISORDER: ICD-10-CM

## 2021-09-30 ASSESSMENT — ASTHMA QUESTIONNAIRES: ACT_TOTALSCORE: 24

## 2021-10-01 NOTE — TELEPHONE ENCOUNTER
Xanax      Last Written Prescription Date:  08/30/2021  Last Fill Quantity: 30,   # refills: 0  Last Office Visit: 09/28/2021  Future Office visit:   None  Routing refill request to provider for review/approval because:  Drug not on the [facility], [facility] or [facility] refill protocol or controlled substance    Allergy D      Last Written Prescription Date:  07/06/2020  Last Fill Quantity: 30,   # refills: 3  Last Office Visit: 09/28/2021  Future Office visit:   None  Routing refill request to provider for review/approval because:  Drug not on the [facility], [facility] or [facility] refill protocol or controlled substance    Eri Broderick RN

## 2021-10-04 RX ORDER — LORATADINE AND PSEUDOEPHEDRINE SULFATE 10; 240 MG/1; MG/1
TABLET, FILM COATED, EXTENDED RELEASE ORAL
Qty: 30 TABLET | Refills: 3 | Status: SHIPPED | OUTPATIENT
Start: 2021-10-04 | End: 2022-08-26

## 2021-10-04 RX ORDER — ALPRAZOLAM 0.25 MG
TABLET ORAL
Qty: 30 TABLET | Refills: 0 | Status: SHIPPED | OUTPATIENT
Start: 2021-10-04 | End: 2021-11-18

## 2021-10-16 DIAGNOSIS — I10 ESSENTIAL HYPERTENSION WITH GOAL BLOOD PRESSURE LESS THAN 140/90: ICD-10-CM

## 2021-10-19 RX ORDER — LOSARTAN POTASSIUM 50 MG/1
TABLET ORAL
Qty: 90 TABLET | Refills: 3 | Status: SHIPPED | OUTPATIENT
Start: 2021-10-19 | End: 2023-01-02

## 2021-10-19 RX ORDER — HYDROCHLOROTHIAZIDE 12.5 MG/1
TABLET ORAL
Qty: 90 TABLET | Refills: 3 | Status: SHIPPED | OUTPATIENT
Start: 2021-10-19 | End: 2022-10-22

## 2021-11-18 DIAGNOSIS — F41.1 GENERALIZED ANXIETY DISORDER: ICD-10-CM

## 2021-11-18 RX ORDER — ALPRAZOLAM 0.25 MG
TABLET ORAL
Qty: 30 TABLET | Refills: 0 | Status: SHIPPED | OUTPATIENT
Start: 2021-11-18 | End: 2021-12-22

## 2021-11-18 NOTE — TELEPHONE ENCOUNTER
Pending Prescriptions:                       Disp   Refills    ALPRAZolam (XANAX) 0.25 MG tablet [Pharmac*30 tab*0        Sig: TAKE ONE TABLET BY MOUTH THREE TIMES A DAY AS NEEDED           FOR ANXIETY      Routing refill request to provider for review/approval because:  Drug not on the [facility] refill protocol     Ludivina Blackwell RN on 11/18/2021 at 12:53 PM

## 2021-12-21 DIAGNOSIS — F41.1 GENERALIZED ANXIETY DISORDER: ICD-10-CM

## 2021-12-21 NOTE — TELEPHONE ENCOUNTER
Xanax      Last Written Prescription Date:  11/18/2021  Last Fill Quantity: 30,   # refills: 0  Last Office Visit: 9/28/2021  Future Office visit:       Routing refill request to provider for review/approval because:  Drug not on the [facility], [facility] or [facility] refill protocol or controlled substance

## 2021-12-22 RX ORDER — ALPRAZOLAM 0.25 MG
TABLET ORAL
Qty: 30 TABLET | Refills: 0 | Status: SHIPPED | OUTPATIENT
Start: 2021-12-22 | End: 2022-02-01

## 2022-01-10 NOTE — PROGRESS NOTES
F/U for sigmoid resection      Subjective:  Pt feels good.  No complaints.      Objective:  B/P: Data Unavailable, T: 96.9, P: 127, R: Data Unavailable  Abd: Soft non tender, non distended    Path -     FINAL DIAGNOSIS:   Colon, sigmoid, resection   - Diverticulosis, acute diverticulitis with perforation and   peridiverticular abscesses.   -Serosal congestion, edema, chronic and acute inflammation and fibrosis.     Electronically signed out by:     Jean Angulo M.D., PhD       Assessment/Plan:  Pt s/p sigmoid resection - doing well.   Path - chronic diverticulitis.  Reviewed Colonoscopy from 5 years ago - no polyps.  In view of low risk - would be ok to wait 3-5 years for repeat colonoscopy.  Pt will f/u with me PRN.    Ryan Dailey MD, FACS   Please refer to the [facility] ultrasound report in Ob Procedures for additional information regarding today's visit

## 2022-02-01 DIAGNOSIS — F41.1 GENERALIZED ANXIETY DISORDER: ICD-10-CM

## 2022-02-01 RX ORDER — ALPRAZOLAM 0.25 MG
TABLET ORAL
Qty: 30 TABLET | Refills: 0 | Status: SHIPPED | OUTPATIENT
Start: 2022-02-01 | End: 2022-03-05

## 2022-02-01 NOTE — TELEPHONE ENCOUNTER
Pending Prescriptions:                       Disp   Refills    ALPRAZolam (XANAX) 0.25 MG tablet [Pharmac*30 tab*0        Sig: TAKE ONE TABLET BY MOUTH THREE TIMES A DAY AS NEEDED           FOR ANXIETY    Routing refill request to provider for review/approval because:  Drug not on the [facility] refill protocol

## 2022-02-22 ENCOUNTER — TRANSFERRED RECORDS (OUTPATIENT)
Dept: MULTI SPECIALTY CLINIC | Facility: CLINIC | Age: 62
End: 2022-02-22
Payer: COMMERCIAL

## 2022-02-22 LAB — RETINOPATHY: NORMAL

## 2022-02-23 ENCOUNTER — OFFICE VISIT (OUTPATIENT)
Dept: FAMILY MEDICINE | Facility: CLINIC | Age: 62
End: 2022-02-23
Payer: COMMERCIAL

## 2022-02-23 VITALS
RESPIRATION RATE: 20 BRPM | TEMPERATURE: 97.6 F | OXYGEN SATURATION: 98 % | SYSTOLIC BLOOD PRESSURE: 126 MMHG | DIASTOLIC BLOOD PRESSURE: 84 MMHG | HEART RATE: 92 BPM

## 2022-02-23 DIAGNOSIS — K43.2 INCISIONAL HERNIA, WITHOUT OBSTRUCTION OR GANGRENE: Primary | ICD-10-CM

## 2022-02-23 PROCEDURE — 99213 OFFICE O/P EST LOW 20 MIN: CPT | Performed by: STUDENT IN AN ORGANIZED HEALTH CARE EDUCATION/TRAINING PROGRAM

## 2022-02-23 RX ORDER — PERPHENAZINE 4 MG
TABLET ORAL DAILY
COMMUNITY

## 2022-02-23 ASSESSMENT — PAIN SCALES - GENERAL: PAINLEVEL: NO PAIN (0)

## 2022-02-23 NOTE — PROGRESS NOTES
Assessment & Plan     Incisional hernia, without obstruction or gangrene  Patient with large and what appears to be incisional hernia from previous surgery. Wanting surgical evaluation. No signs of obstruction and easily reducible. Follow-up with general surgery  - Adult General Surg Referral        Murray Moran MD  [facility]    Farhad Palmer is a 61 year old who presents for the following health issues    Mass    History of Present Illness     Reason for visit:  Hertnia above bellybutton  Symptom onset:  More than a month  Symptoms include:  Pops out like a balloon  Symptom intensity:  Moderate  Symptom progression:  Worsening  Had these symptoms before:  No  What makes it worse:  Its getting bigger  What makes it better:  Getting it fixed    She eats 2-3 servings of fruits and vegetables daily.She consumes 0 sweetened beverage(s) daily.She exercises with enough effort to increase her heart rate 30 to 60 minutes per day.  She exercises with enough effort to increase her heart rate 4 days per week.   She is taking medications regularly.     Had diverticulitis surgery in the past but now noticing bulging for last 6 months or so. Notices it with exercises and abdominal work. No pain. Does seem to pop back in without issues. No nausea vomiting diarrhea or constipation. Generally feels well.    Review of Systems   Constitutional, HEENT, cardiovascular, pulmonary, gi and gu systems are negative, except as otherwise noted.      Objective    /84   Pulse 92   Temp 97.6  F (36.4  C) (Temporal)   Resp 20   LMP 11/22/2012   SpO2 98%   There is no height or weight on file to calculate BMI.  Physical Exam   GENERAL: healthy, alert and no distress  EYES: Eyes grossly normal to inspection, PERRL and conjunctivae and sclerae normal  HENT: Hearing grossly intact  RESP: lungs clear to auscultation - no rales, rhonchi or wheezes  CV: regular rate and rhythm, normal S1 S2, no  S3 or S4, no murmur, click or rub, no peripheral edema and peripheral pulses strong  ABDOMEN: soft, nontender, large hernia superior to umbilicus that is reducible, no hepatosplenomegaly, bowel sounds normal  MS: no gross musculoskeletal defects noted, no edema  SKIN: no suspicious lesions or rashes  NEURO: mentation intact and speech normal but pressured  PSYCH: mentation appears normal, affect normal/bright

## 2022-03-01 ENCOUNTER — OFFICE VISIT (OUTPATIENT)
Dept: SURGERY | Facility: CLINIC | Age: 62
End: 2022-03-01
Payer: COMMERCIAL

## 2022-03-01 VITALS
TEMPERATURE: 95.1 F | HEART RATE: 56 BPM | BODY MASS INDEX: 35.93 KG/M2 | RESPIRATION RATE: 18 BRPM | OXYGEN SATURATION: 92 % | DIASTOLIC BLOOD PRESSURE: 88 MMHG | WEIGHT: 183 LBS | HEIGHT: 60 IN | SYSTOLIC BLOOD PRESSURE: 132 MMHG

## 2022-03-01 DIAGNOSIS — Z90.49 S/P PARTIAL RESECTION OF COLON: ICD-10-CM

## 2022-03-01 DIAGNOSIS — E66.01 MORBID OBESITY (H): ICD-10-CM

## 2022-03-01 DIAGNOSIS — M62.08 DIASTASIS OF RECTUS ABDOMINIS: Primary | ICD-10-CM

## 2022-03-01 DIAGNOSIS — K43.2 INCISIONAL HERNIA, WITHOUT OBSTRUCTION OR GANGRENE: ICD-10-CM

## 2022-03-01 PROCEDURE — 99215 OFFICE O/P EST HI 40 MIN: CPT | Performed by: SURGERY

## 2022-03-01 ASSESSMENT — PAIN SCALES - GENERAL: PAINLEVEL: NO PAIN (0)

## 2022-03-01 NOTE — LETTER
3/1/2022         RE: Caro Palmer  [address]  [facility] [id number]        Dear Colleague,    Thank you for referring your patient, Caro Palmer, to the [facility]. Please see a copy of my visit note below.      Assessment & Plan   Problem List Items Addressed This Visit        Digestive    Morbid obesity (H)       Other    S/P partial resection of colon      Other Visit Diagnoses     Diastasis of rectus abdominis    -  Primary    Relevant Orders    CT Abdomen Pelvis w Contrast    Incisional hernia, without obstruction or gangrene        Relevant Orders    CT Abdomen Pelvis w Contrast         Caro has a diastases rectus.  It is difficult to tell if there is an incisional hernia with in this diastases on physical exam.  She is currently asymptomatic besides having this bulge.  Thus I recommend that we do a CT of the abdomen and pelvis to rule out any underlying incisional hernia.  She will come back after her CT scan is resulted.  We can discuss surgical options or other options if she only has a diastases.  All of her questions were answered to her satisfaction.      45 minutes spent on the date of the encounter doing chart review, history and exam, documentation and further activities per the note         No follow-ups on file.    Mati-Dalila Nicolas MD  [facility]    Subjective   Caro Palmer is a 61 year old who presents for the following health issues:    Present today with a large bulge at the midline incision.  Stated that this has been getting worse especially when she exercises.  There is no pain.  But it is bothersome that it is getting so large.  When she lays down in a supine position and pushes it slowly it will reduce.  Patient had history of acute diverticulitis with abscess and underwent exploratory laparotomy with colon resection with Dr. Dailey in March 2017.  Patient's since healed from that.  Patient is a huge advocate for her  "health.  She lives a healthy lifestyle with frequent workout.  She lost tremendous amount of weight pt had excess skin; pt describes getting an abdominoplasty a few years after her ex lap.  Ever since this bulge been getting bigger,  now cut down from working out to 4-5 days a week; instead of the usual 5-7.  Patient did gain some weight back during Covid.  She is planning to change her diet to lose that last 10 to 15 pounds that she is gained.  Denies any history of heart attack.  No stroke.  She is not a smoker.  Never been.  She is not an alcohol drinker either.           Review of Systems   Constitutional, HEENT, cardiovascular, pulmonary, GI, , musculoskeletal, neuro, skin, endocrine and psych systems are negative, except as otherwise noted.      Objective    /88   Pulse 56   Temp (!) 95.1  F (35.1  C) (Temporal)   Resp 18   Ht 1.519 m (4' 11.8\")   Wt 83 kg (183 lb)   LMP 11/22/2012   SpO2 92%   BMI 35.98 kg/m    Body mass index is 35.98 kg/m .  Physical Exam  Vitals reviewed.   Eyes:      Conjunctiva/sclera: Conjunctivae normal.   Cardiovascular:      Pulses: Normal pulses.   Pulmonary:      Effort: Pulmonary effort is normal.   Abdominal:      Palpations: Abdomen is soft.       Musculoskeletal:         General: Normal range of motion.      Cervical back: Normal range of motion.   Skin:     General: Skin is warm.      Capillary Refill: Capillary refill takes less than 2 seconds.   Neurological:      General: No focal deficit present.      Mental Status: She is alert.   Psychiatric:         Mood and Affect: Mood normal.                    Again, thank you for allowing me to participate in the care of your patient.        Sincerely,        Glenn Nicolas MD    "

## 2022-03-01 NOTE — PROGRESS NOTES
Assessment & Plan   Problem List Items Addressed This Visit        Digestive    Morbid obesity (H)       Other    S/P partial resection of colon      Other Visit Diagnoses     Diastasis of rectus abdominis    -  Primary    Relevant Orders    CT Abdomen Pelvis w Contrast    Incisional hernia, without obstruction or gangrene        Relevant Orders    CT Abdomen Pelvis w Contrast         Caro has a diastases rectus.  It is difficult to tell if there is an incisional hernia with in this diastases on physical exam.  She is currently asymptomatic besides having this bulge.  Thus I recommend that we do a CT of the abdomen and pelvis to rule out any underlying incisional hernia.  She will come back after her CT scan is resulted.  We can discuss surgical options or other options if she only has a diastases.  All of her questions were answered to her satisfaction.      45 minutes spent on the date of the encounter doing chart review, history and exam, documentation and further activities per the note         No follow-ups on file.    Glenn Nicolas MD  [facility]   Caro Palmer is a 61 year old who presents for the following health issues:    Present today with a large bulge at the midline incision.  Stated that this has been getting worse especially when she exercises.  There is no pain.  But it is bothersome that it is getting so large.  When she lays down in a supine position and pushes it slowly it will reduce.  Patient had history of acute diverticulitis with abscess and underwent exploratory laparotomy with colon resection with Dr. Dailey in March 2017.  Patient's since healed from that.  Patient is a huge advocate for her health.  She lives a healthy lifestyle with frequent workout.  She lost tremendous amount of weight pt had excess skin; pt describes getting an abdominoplasty a few years after her ex lap.  Ever since this bulge been getting bigger,  now cut down from working  "out to 4-5 days a week; instead of the usual 5-7.  Patient did gain some weight back during Covid.  She is planning to change her diet to lose that last 10 to 15 pounds that she is gained.  Denies any history of heart attack.  No stroke.  She is not a smoker.  Never been.  She is not an alcohol drinker either.           Review of Systems   Constitutional, HEENT, cardiovascular, pulmonary, GI, , musculoskeletal, neuro, skin, endocrine and psych systems are negative, except as otherwise noted.      Objective    /88   Pulse 56   Temp (!) 95.1  F (35.1  C) (Temporal)   Resp 18   Ht 1.519 m (4' 11.8\")   Wt 83 kg (183 lb)   LMP 11/22/2012   SpO2 92%   BMI 35.98 kg/m    Body mass index is 35.98 kg/m .  Physical Exam  Vitals reviewed.   Eyes:      Conjunctiva/sclera: Conjunctivae normal.   Cardiovascular:      Pulses: Normal pulses.   Pulmonary:      Effort: Pulmonary effort is normal.   Abdominal:      Palpations: Abdomen is soft.       Musculoskeletal:         General: Normal range of motion.      Cervical back: Normal range of motion.   Skin:     General: Skin is warm.      Capillary Refill: Capillary refill takes less than 2 seconds.   Neurological:      General: No focal deficit present.      Mental Status: She is alert.   Psychiatric:         Mood and Affect: Mood normal.                "

## 2022-03-03 ENCOUNTER — HOSPITAL ENCOUNTER (OUTPATIENT)
Dept: CT IMAGING | Facility: CLINIC | Age: 62
Discharge: HOME | End: 2022-03-03
Attending: SURGERY | Admitting: SURGERY
Payer: COMMERCIAL

## 2022-03-03 DIAGNOSIS — K43.2 INCISIONAL HERNIA, WITHOUT OBSTRUCTION OR GANGRENE: ICD-10-CM

## 2022-03-03 DIAGNOSIS — M62.08 DIASTASIS OF RECTUS ABDOMINIS: ICD-10-CM

## 2022-03-03 DIAGNOSIS — F41.1 GENERALIZED ANXIETY DISORDER: ICD-10-CM

## 2022-03-03 PROCEDURE — 250N000009 HC RX 250: Performed by: SURGERY

## 2022-03-03 PROCEDURE — 74177 CT ABD & PELVIS W/CONTRAST: CPT

## 2022-03-03 PROCEDURE — 250N000011 HC RX IP 250 OP 636: Performed by: SURGERY

## 2022-03-03 RX ORDER — IOPAMIDOL 755 MG/ML
500 INJECTION, SOLUTION INTRAVASCULAR ONCE
Status: COMPLETED | OUTPATIENT
Start: 2022-03-03 | End: 2022-03-03

## 2022-03-03 RX ADMIN — SODIUM CHLORIDE 60 ML: 9 INJECTION, SOLUTION INTRAVENOUS at 08:52

## 2022-03-03 RX ADMIN — IOPAMIDOL 90 ML: 755 INJECTION, SOLUTION INTRAVENOUS at 08:53

## 2022-03-04 ENCOUNTER — OFFICE VISIT (OUTPATIENT)
Dept: SURGERY | Facility: CLINIC | Age: 62
End: 2022-03-04
Payer: COMMERCIAL

## 2022-03-04 VITALS
DIASTOLIC BLOOD PRESSURE: 80 MMHG | TEMPERATURE: 98.5 F | SYSTOLIC BLOOD PRESSURE: 132 MMHG | HEIGHT: 60 IN | WEIGHT: 183 LBS | BODY MASS INDEX: 35.93 KG/M2

## 2022-03-04 DIAGNOSIS — Z90.49 S/P PARTIAL RESECTION OF COLON: ICD-10-CM

## 2022-03-04 DIAGNOSIS — K43.2 INCISIONAL HERNIA OF ANTERIOR ABDOMINAL WALL WITHOUT OBSTRUCTION OR GANGRENE: Primary | ICD-10-CM

## 2022-03-04 PROCEDURE — 99215 OFFICE O/P EST HI 40 MIN: CPT | Performed by: SURGERY

## 2022-03-04 NOTE — LETTER
3/4/2022         RE: Caro Palmer  [address]  [facility] [address]        Dear Colleague,    Thank you for referring your patient, Caro Palmer, to the [facility]. Please see a copy of my visit note below.      Assessment & Plan   Problem List Items Addressed This Visit        Other    S/P partial resection of colon      Other Visit Diagnoses     Incisional hernia of anterior abdominal wall without obstruction or gangrene    -  Primary    Relevant Orders    Case Request: Laparoscopic assisted open incisional hernia repair with mesh, possible lysis of adhesions. (Completed)    BMI 35.0-35.9,adult        Relevant Orders    Case Request: Laparoscopic assisted open incisional hernia repair with mesh, possible lysis of adhesions. (Completed)         The patient was thoroughly counseled regarding Incisional hernia of anterior abdominal wall without obstruction or gangrene [K43.2].     The patient was informed that the proposed procedure or medical intervention involves reduction and repair with or without mesh and does offer a very good likelihood of symptom relief. We also discussed the options of repairing the hernia laparoscopically, robotically, versus open. Patient opted for laparoscopic assisted open incisional hernia repair.      Patient has incisional hernia with multiple defects.  She also has significant diastases.  I discussed laparoscopic versus open versus robotic, if we were to close the primary defects with the robot she will see a bridge anteriorly as she heals.  This ridge will decrease with time however will still be present as patient has a very thin abdominal wall.  Versus doing this repair open where I can primary close the defect through the previous cicatrix.  The ridge would be posterior on the inside and would not be present.  She would like to have that done this way as she thinks that ridge will be bothersome to her.  She understands it we do a  laparoscopic-assisted open incisional hernia she will have another incision at the midline.  She is understanding of this.  Thus I will start this laparoscopic, likely lysis of adhesion, then resect the previous cicatrix and repair the hernia via the open incision with placement of the mesh laparoscopically at the end.      The patient was made aware of the risks of the procedure, including but not limited to:  nerve entrapment or injury, persistence of pain (10%), injury to the bowel/bladder, infertility,hematoma, mesh migration, mesh infection, cardiac or pulmonary complication and anesthesia related complications also that difficulties may be encountered during recovery to include: wound infection, recurrence (5-10%), seroma, hematoma and chronic pain.     In the course of the evaluation we did discuss other therapeutic options with the patient, including continued watchful waiting. The risks and benefits of these options were also discussed which include but are not limited to: incarceration and/or strangulation..     Also discussed were possible problems or difficulties the patient may encounter if treatment was not pursued at this time.     The patient was informed that MatiDamien Nicolas MD will be primarily responsible for the procedure. Assistance during the procedure and during hospitalization may also be provided by other physicians, nurses and technicians.     The patient will be provided additional education resources by the support staff. If there are ever any questions regarding their diagnosis or the procedure, the patient is encouraged to ask.     All of the patient s or their legal representative s questions have been answered to their satisfaction and they have indicated a clear understanding of this discussion.   Caro expressed understanding of risks, benefits and alternatives and wished to proceed.     All findings, test results, and diagnosis were discussed with the patient. Caro  participated  "in the decision making process and agreed with the plan of care. Questions were sought and answered.       Review of the result(s) of each unique test - review and discussed CT scan with pt   50 minutes spent on the date of the encounter doing chart review, history and exam, documentation and further activities per the note        No follow-ups on file.    [facility] MD Maria Isabel  [facility]   Caro Palmer is a 61 year old who presents for the following health issues:    Here to discussed her CT scan review and surgical plan.  No changes to the hernia.  We discussed in details of the plan; potential complications; and her diets.         Review of Systems   Constitutional, HEENT, cardiovascular, pulmonary, gi and gu systems are negative, except as otherwise noted.      Objective    /80   Temp 98.5  F (36.9  C) (Temporal)   Ht 1.519 m (4' 11.8\")   Wt 83 kg (183 lb)   LMP 11/22/2012   BMI 35.98 kg/m    Body mass index is 35.98 kg/m .  Physical Exam   none    CT ABDOMEN PELVIS W CONTRAST 3/3/2022 9:08 AM     CLINICAL HISTORY: Incisional hernia, without obstruction or gangrene;  Diastasis of rectus abdominis     TECHNIQUE: CT scan of the abdomen and pelvis was performed after  administration of intravenous contrast. Multiplanar reformats were  obtained. Dose reduction techniques were used.  CONTRAST: 90ml, isovue 370     COMPARISON: CT of the abdomen and pelvis dated 1/11/2017.     FINDINGS:   LOWER CHEST: Lung bases are clear.     HEPATOBILIARY: Diffuse hepatic steatosis. No worrisome liver lesions.  No bile duct dilatation. No calcified gallstones.     PANCREAS: Normal pancreatic enhancement without evidence of mass. No  duct dilatation. No inflammatory change.      SPLEEN: Normal size spleen without focal abnormality.      ADRENAL GLANDS: No significant nodules.     KIDNEYS/BLADDER: Normal nephrograms without enhancing mass. Left upper  pole cyst. A few additional " subcentimeter presumed cysts, which do not  require follow-up. No urinary tract dilatation. No bladder wall  thickening.     BOWEL: Postoperative changes of the rectosigmoid colon with colorectal  anastomosis. The bowel is nondistended without inflammatory change. No  evidence of appendicitis.     LYMPH NODES: No lymphadenopathy.     VASCULATURE: Atherosclerosis. Normal caliber abdominal aorta.  Retroaortic left renal vein.     PELVIC ORGANS: Small calcification in the uterine fundus, likely a  degenerated fibroid. No adnexal masses.     ADDITIONAL FINDINGS: No ascites or fluid collections.     MUSCULOSKELETAL: Diastases of the abdominis rectus muscles with  superimposed fat-containing midline ventral abdominal wall hernias.  For example, superiorly there is a small hernia with the fascial  defect measuring 2.0 x 1.0 cm (transverse by craniocaudal). Just  inferior to this is a widemouth hernia, with the fascial defect  measuring 5.5 x 3.2 cm in diameter. The transverse colon is adjacent  to the mouth of this hernia, but does not extend into the hernia sac.  Inferiorly is an additional bilobed wide mouth hernia, with a fascial  defect measuring 5.8 x 5.6 cm. Multiple small bowel loops are at the  mouth of the hernia, but do not extend into the hernia sac.     Diffuse osteopenia. Unchanged mild T11 superior endplate compression  deformity. T10 vertebral body hemangioma. Minimal degenerative changes  of the visualized thoracolumbar spine.                                                                      IMPRESSION:   1.  Diastases of the abdominis rectus muscles with superimposed  ventral abdominal wall hernias, as detailed above.     2.  Diffuse hepatic steatosis.        Again, thank you for allowing me to participate in the care of your patient.        Sincerely,        Glenn Nicolas MD

## 2022-03-04 NOTE — TELEPHONE ENCOUNTER
Xanax      Last Written Prescription Date:  02/01/2022  Last Fill Quantity: 30,   # refills: 0  Last Office Visit: 09/28/2021  Future Office visit:    Next 5 appointments (look out 90 days)      Mar 04, 2022  3:00 PM  Return Visit with Glenn Nicolas MD  [facility] ([facility] ) [address] [place] [id number] [phone]        Routing refill request to provider for review/approval because:  Drug not on the [facility], [facility] or [facility] refill protocol or controlled substance    Eri Broderick Rn

## 2022-03-04 NOTE — PROGRESS NOTES
Assessment & Plan   Problem List Items Addressed This Visit        Other    S/P partial resection of colon      Other Visit Diagnoses     Incisional hernia of anterior abdominal wall without obstruction or gangrene    -  Primary    Relevant Orders    Case Request: Laparoscopic assisted open incisional hernia repair with mesh, possible lysis of adhesions. (Completed)    BMI 35.0-35.9,adult        Relevant Orders    Case Request: Laparoscopic assisted open incisional hernia repair with mesh, possible lysis of adhesions. (Completed)         The patient was thoroughly counseled regarding Incisional hernia of anterior abdominal wall without obstruction or gangrene [K43.2].     The patient was informed that the proposed procedure or medical intervention involves reduction and repair with or without mesh and does offer a very good likelihood of symptom relief. We also discussed the options of repairing the hernia laparoscopically, robotically, versus open. Patient opted for laparoscopic assisted open incisional hernia repair.      Patient has incisional hernia with multiple defects.  She also has significant diastases.  I discussed laparoscopic versus open versus robotic, if we were to close the primary defects with the robot she will see a bridge anteriorly as she heals.  This ridge will decrease with time however will still be present as patient has a very thin abdominal wall.  Versus doing this repair open where I can primary close the defect through the previous cicatrix.  The ridge would be posterior on the inside and would not be present.  She would like to have that done this way as she thinks that ridge will be bothersome to her.  She understands it we do a laparoscopic-assisted open incisional hernia she will have another incision at the midline.  She is understanding of this.  Thus I will start this laparoscopic, likely lysis of adhesion, then resect the previous cicatrix and repair the hernia via the open  incision with placement of the mesh laparoscopically at the end.      The patient was made aware of the risks of the procedure, including but not limited to:  nerve entrapment or injury, persistence of pain (10%), injury to the bowel/bladder, infertility,hematoma, mesh migration, mesh infection, cardiac or pulmonary complication and anesthesia related complications also that difficulties may be encountered during recovery to include: wound infection, recurrence (5-10%), seroma, hematoma and chronic pain.     In the course of the evaluation we did discuss other therapeutic options with the patient, including continued watchful waiting. The risks and benefits of these options were also discussed which include but are not limited to: incarceration and/or strangulation..     Also discussed were possible problems or difficulties the patient may encounter if treatment was not pursued at this time.     The patient was informed that [facility] MD Maria Isabel will be primarily responsible for the procedure. Assistance during the procedure and during hospitalization may also be provided by other physicians, nurses and technicians.     The patient will be provided additional education resources by the support staff. If there are ever any questions regarding their diagnosis or the procedure, the patient is encouraged to ask.     All of the patient s or their legal representative s questions have been answered to their satisfaction and they have indicated a clear understanding of this discussion.   Caro expressed understanding of risks, benefits and alternatives and wished to proceed.     All findings, test results, and diagnosis were discussed with the patient. Caro  participated in the decision making process and agreed with the plan of care. Questions were sought and answered.       Review of the result(s) of each unique test - review and discussed CT scan with pt   50 minutes spent on the date of the encounter doing chart review,  "history and exam, documentation and further activities per the note        No follow-ups on file.    [facility] Maria Isabel  [facility]    Subjective   Caro Palmer is a 61 year old who presents for the following health issues:    Here to discussed her CT scan review and surgical plan.  No changes to the hernia.  We discussed in details of the plan; potential complications; and her diets.         Review of Systems   Constitutional, HEENT, cardiovascular, pulmonary, gi and gu systems are negative, except as otherwise noted.      Objective    /80   Temp 98.5  F (36.9  C) (Temporal)   Ht 1.519 m (4' 11.8\")   Wt 83 kg (183 lb)   LMP 11/22/2012   BMI 35.98 kg/m    Body mass index is 35.98 kg/m .  Physical Exam   none    CT ABDOMEN PELVIS W CONTRAST 3/3/2022 9:08 AM     CLINICAL HISTORY: Incisional hernia, without obstruction or gangrene;  Diastasis of rectus abdominis     TECHNIQUE: CT scan of the abdomen and pelvis was performed after  administration of intravenous contrast. Multiplanar reformats were  obtained. Dose reduction techniques were used.  CONTRAST: 90ml, isovue 370     COMPARISON: CT of the abdomen and pelvis dated 1/11/2017.     FINDINGS:   LOWER CHEST: Lung bases are clear.     HEPATOBILIARY: Diffuse hepatic steatosis. No worrisome liver lesions.  No bile duct dilatation. No calcified gallstones.     PANCREAS: Normal pancreatic enhancement without evidence of mass. No  duct dilatation. No inflammatory change.      SPLEEN: Normal size spleen without focal abnormality.      ADRENAL GLANDS: No significant nodules.     KIDNEYS/BLADDER: Normal nephrograms without enhancing mass. Left upper  pole cyst. A few additional subcentimeter presumed cysts, which do not  require follow-up. No urinary tract dilatation. No bladder wall  thickening.     BOWEL: Postoperative changes of the rectosigmoid colon with colorectal  anastomosis. The bowel is nondistended without inflammatory change. " No  evidence of appendicitis.     LYMPH NODES: No lymphadenopathy.     VASCULATURE: Atherosclerosis. Normal caliber abdominal aorta.  Retroaortic left renal vein.     PELVIC ORGANS: Small calcification in the uterine fundus, likely a  degenerated fibroid. No adnexal masses.     ADDITIONAL FINDINGS: No ascites or fluid collections.     MUSCULOSKELETAL: Diastases of the abdominis rectus muscles with  superimposed fat-containing midline ventral abdominal wall hernias.  For example, superiorly there is a small hernia with the fascial  defect measuring 2.0 x 1.0 cm (transverse by craniocaudal). Just  inferior to this is a widemouth hernia, with the fascial defect  measuring 5.5 x 3.2 cm in diameter. The transverse colon is adjacent  to the mouth of this hernia, but does not extend into the hernia sac.  Inferiorly is an additional bilobed wide mouth hernia, with a fascial  defect measuring 5.8 x 5.6 cm. Multiple small bowel loops are at the  mouth of the hernia, but do not extend into the hernia sac.     Diffuse osteopenia. Unchanged mild T11 superior endplate compression  deformity. T10 vertebral body hemangioma. Minimal degenerative changes  of the visualized thoracolumbar spine.                                                                      IMPRESSION:   1.  Diastases of the abdominis rectus muscles with superimposed  ventral abdominal wall hernias, as detailed above.     2.  Diffuse hepatic steatosis.

## 2022-03-05 RX ORDER — ALPRAZOLAM 0.25 MG
TABLET ORAL
Qty: 30 TABLET | Refills: 0 | Status: SHIPPED | OUTPATIENT
Start: 2022-03-05 | End: 2022-04-14

## 2022-03-07 ENCOUNTER — TELEPHONE (OUTPATIENT)
Dept: SURGERY | Facility: CLINIC | Age: 62
End: 2022-03-07
Payer: COMMERCIAL

## 2022-03-07 DIAGNOSIS — Z11.59 ENCOUNTER FOR SCREENING FOR OTHER VIRAL DISEASES: Primary | ICD-10-CM

## 2022-03-07 NOTE — TELEPHONE ENCOUNTER
Type of surgery: Laparoscopic assisted open incisional hernia repair with mesh, possible lysis of adhesions. (N/A)     Location of surgery: [facility]  Date and time of surgery: 3/24  Surgeon: Maria Isabel  Pre-Op Appt Date: 3/21  Post-Op Appt Date: 4/1   Packet sent out: Yes  Pre-cert/Authorization completed:  Not Applicable  Date: na

## 2022-03-21 ENCOUNTER — OFFICE VISIT (OUTPATIENT)
Dept: FAMILY MEDICINE | Facility: CLINIC | Age: 62
End: 2022-03-21
Payer: COMMERCIAL

## 2022-03-21 ENCOUNTER — LAB (OUTPATIENT)
Dept: LAB | Facility: CLINIC | Age: 62
End: 2022-03-21
Payer: COMMERCIAL

## 2022-03-21 VITALS
TEMPERATURE: 97.3 F | HEART RATE: 105 BPM | SYSTOLIC BLOOD PRESSURE: 128 MMHG | BODY MASS INDEX: 34.02 KG/M2 | HEIGHT: 62 IN | DIASTOLIC BLOOD PRESSURE: 76 MMHG | OXYGEN SATURATION: 99 %

## 2022-03-21 DIAGNOSIS — R89.9 ABNORMAL LABORATORY TEST: ICD-10-CM

## 2022-03-21 DIAGNOSIS — Z01.818 PRE-OP EXAM: ICD-10-CM

## 2022-03-21 DIAGNOSIS — Z11.59 ENCOUNTER FOR SCREENING FOR OTHER VIRAL DISEASES: ICD-10-CM

## 2022-03-21 DIAGNOSIS — K43.2 INCISIONAL HERNIA, WITHOUT OBSTRUCTION OR GANGRENE: Primary | ICD-10-CM

## 2022-03-21 LAB
ANION GAP SERPL CALCULATED.3IONS-SCNC: 1 MMOL/L (ref 3–14)
BUN SERPL-MCNC: 16 MG/DL (ref 7–30)
CALCIUM SERPL-MCNC: 9.1 MG/DL (ref 8.5–10.1)
CHLORIDE BLD-SCNC: 107 MMOL/L (ref 94–109)
CO2 SERPL-SCNC: 30 MMOL/L (ref 20–32)
CREAT SERPL-MCNC: 0.66 MG/DL (ref 0.52–1.04)
ERYTHROCYTE [DISTWIDTH] IN BLOOD BY AUTOMATED COUNT: 13.1 % (ref 10–15)
GFR SERPL CREATININE-BSD FRML MDRD: >90 ML/MIN/1.73M2
GLUCOSE BLD-MCNC: 231 MG/DL (ref 70–99)
HCT VFR BLD AUTO: 42.8 % (ref 35–47)
HGB BLD-MCNC: 14.2 G/DL (ref 11.7–15.7)
MCH RBC QN AUTO: 30.9 PG (ref 26.5–33)
MCHC RBC AUTO-ENTMCNC: 33.2 G/DL (ref 31.5–36.5)
MCV RBC AUTO: 93 FL (ref 78–100)
PLATELET # BLD AUTO: 284 10E3/UL (ref 150–450)
POTASSIUM BLD-SCNC: 4.3 MMOL/L (ref 3.4–5.3)
RBC # BLD AUTO: 4.59 10E6/UL (ref 3.8–5.2)
SARS-COV-2 RNA RESP QL NAA+PROBE: NEGATIVE
SODIUM SERPL-SCNC: 138 MMOL/L (ref 133–144)
WBC # BLD AUTO: 7 10E3/UL (ref 4–11)

## 2022-03-21 PROCEDURE — 80048 BASIC METABOLIC PNL TOTAL CA: CPT | Performed by: PHYSICIAN ASSISTANT

## 2022-03-21 PROCEDURE — 99214 OFFICE O/P EST MOD 30 MIN: CPT | Performed by: PHYSICIAN ASSISTANT

## 2022-03-21 PROCEDURE — 36415 COLL VENOUS BLD VENIPUNCTURE: CPT | Performed by: PHYSICIAN ASSISTANT

## 2022-03-21 PROCEDURE — U0005 INFEC AGEN DETEC AMPLI PROBE: HCPCS

## 2022-03-21 PROCEDURE — 83036 HEMOGLOBIN GLYCOSYLATED A1C: CPT | Performed by: PHYSICIAN ASSISTANT

## 2022-03-21 PROCEDURE — U0003 INFECTIOUS AGENT DETECTION BY NUCLEIC ACID (DNA OR RNA); SEVERE ACUTE RESPIRATORY SYNDROME CORONAVIRUS 2 (SARS-COV-2) (CORONAVIRUS DISEASE [COVID-19]), AMPLIFIED PROBE TECHNIQUE, MAKING USE OF HIGH THROUGHPUT TECHNOLOGIES AS DESCRIBED BY CMS-2020-01-R: HCPCS

## 2022-03-21 PROCEDURE — 85027 COMPLETE CBC AUTOMATED: CPT | Performed by: PHYSICIAN ASSISTANT

## 2022-03-21 ASSESSMENT — ASTHMA QUESTIONNAIRES
ACT_TOTALSCORE: 20
ACT_TOTALSCORE: 20
QUESTION_2 LAST FOUR WEEKS HOW OFTEN HAVE YOU HAD SHORTNESS OF BREATH: THREE TO SIX TIMES A WEEK
QUESTION_1 LAST FOUR WEEKS HOW MUCH OF THE TIME DID YOUR ASTHMA KEEP YOU FROM GETTING AS MUCH DONE AT WORK, SCHOOL OR AT HOME: NONE OF THE TIME
QUESTION_5 LAST FOUR WEEKS HOW WOULD YOU RATE YOUR ASTHMA CONTROL: WELL CONTROLLED
QUESTION_4 LAST FOUR WEEKS HOW OFTEN HAVE YOU USED YOUR RESCUE INHALER OR NEBULIZER MEDICATION (SUCH AS ALBUTEROL): TWO OR THREE TIMES PER WEEK
QUESTION_3 LAST FOUR WEEKS HOW OFTEN DID YOUR ASTHMA SYMPTOMS (WHEEZING, COUGHING, SHORTNESS OF BREATH, CHEST TIGHTNESS OR PAIN) WAKE YOU UP AT NIGHT OR EARLIER THAN USUAL IN THE MORNING: NOT AT ALL

## 2022-03-21 NOTE — PROGRESS NOTES
[address] [address]  Phone: [phone]  Fax: [phone]  Primary Provider: Schoen, Gregory G  Pre-op Performing Provider: SHAHID DAVIDSON    PREOPERATIVE EVALUATION:  Today's date: 3/21/2022    Caro Palmer is a 61 year old female who presents for a preoperative evaluation.    Surgical Information:  Surgery/Procedure: Laparoscopic assisted open incisional hernia repair with mesh  Surgery Location: [facility]  Surgeon: Dr. Nicolsa  Surgery Date: 03/24/2022  Time of Surgery: TBD  Where patient plans to recover: At home with family  Fax number for surgical facility: Note does not need to be faxed, will be available electronically in Epic.    Type of Anesthesia Anticipated: to be determined    Assessment & Plan     The proposed surgical procedure is considered INTERMEDIATE risk.    Incisional hernia, without obstruction or gangrene  Pre-op exam  Patient educated on the prep for upcoming procedure. She was given copy attached to the AVS. Reviewed which medications to hold on day of and reminded her not to take the aspirin from now until procedure as she took one this AM.    CBC with platelets; Future  - Basic metabolic panel  (Ca, Cl, CO2, Creat, Gluc, K, Na, BUN); Future  - CBC with platelets  - Basic metabolic panel  (Ca, Cl, CO2, Creat, Gluc, K, Na, BUN)    Risks and Recommendations:  The patient has the following additional risks and recommendations for perioperative complications:   - No identified additional risk factors other than previously addressed    Medication Instructions:   - aspirin: patient took aspirin this AM, 03/21. She has been advised to stop this medication until after the procedure   - ACE/ARB: May be continued on the day of surgery.    - Diuretics: HOLD on the day of surgery.   - Benzodiazepines: Continue without modification.    RECOMMENDATION:  APPROVAL GIVEN to proceed with proposed procedure,  without further diagnostic evaluation.    Subjective     HPI related to upcoming procedure:   Incisional hernia over anterior abdomen.     Preop Questions 3/21/2022   1. Have you ever had a heart attack or stroke? No   2. Have you ever had surgery on your heart or blood vessels, such as a stent placement, a coronary artery bypass, or surgery on an artery in your head, neck, heart, or legs? No   3. Do you have chest pain with activity? No   4. Do you have a history of  heart failure? No   5. Do you currently have a cold, bronchitis or symptoms of other infection? No   6. Do you have a cough, shortness of breath, or wheezing? No   7. Do you or anyone in your family have previous history of blood clots? No   8. Do you or does anyone in your family have a serious bleeding problem such as prolonged bleeding following surgeries or cuts? No   9. Have you ever had problems with anemia or been told to take iron pills? No   10. Have you had any abnormal blood loss such as black, tarry or bloody stools, or abnormal vaginal bleeding? No   11. Have you ever had a blood transfusion? No   12. Are you willing to have a blood transfusion if it is medically needed before, during, or after your surgery? Yes   13. Have you or any of your relatives ever had problems with anesthesia? No   14. Do you have sleep apnea, excessive snoring or daytime drowsiness? No   15. Do you have any artifical heart valves or other implanted medical devices like a pacemaker, defibrillator, or continuous glucose monitor? No   16. Do you have artificial joints? No   17. Are you allergic to latex? No   18. Is there any chance that you may be pregnant? No       Health Care Directive:  Patient does not have a Health Care Directive or Living Will: Discussed advance care planning with patient; however, patient declined at this time.    Preoperative Review of :   reviewed - controlled substances reflected in medication list.    Status of Chronic  Conditions:  See problem list for active medical problems.  Problems all longstanding and stable, except as noted/documented.  See ROS for pertinent symptoms related to these conditions.      Review of Systems  Constitutional, neuro, ENT, endocrine, pulmonary, cardiac, gastrointestinal, genitourinary, musculoskeletal, integument and psychiatric systems are negative, except as otherwise noted.    Patient Active Problem List    Diagnosis Date Noted     Morbid obesity (H) 03/01/2022     Priority: Medium     Mild intermittent asthma without complication 07/08/2018     Priority: Medium     S/P partial resection of colon 03/23/2017     Priority: Medium     Advanced care planning/counseling discussion 01/20/2017     Priority: Medium     Diverticulitis of intestine with abscess 01/11/2017     Priority: Medium     Leukocytosis 01/11/2017     Priority: Medium     Essential hypertension with goal blood pressure less than 140/90 06/17/2016     Priority: Medium     Family history of pancreatic cancer 05/21/2013     Priority: Medium     Chronic rhinitis 11/08/2012     Priority: Medium     CARDIOVASCULAR SCREENING; LDL GOAL LESS THAN 160 10/31/2010     Priority: Medium     Generalized anxiety disorder 04/27/2009     Priority: Medium     S/P LEEP (status post loop electrosurgical excision procedure)      Priority: Medium     1999 LEEP Mod/severe dysplasia  Plan: cotest at 12 and 24 mo, then cotest Q3y X 20 years (until 2019).  NIL paps annually from  [id number] 4/24/08 NIL/neg HR HPV  NIL paps annually from 2009 - 2013 5/22/14 NIL pap, neg HR HPV.   6/17/16 NIL pap, neg HR HPV. Plan: cotest in 3 years  6/19/17 NIL pap, neg HR HPV. Plan: cotest in 1 year.  8/6/18 NIL pap, neg HR HPV. Plan: cotest in 3 years.  7/12/19 Pap: NIL/neg HR HPV. Plan cotest in 3 years        Past Medical History:   Diagnosis Date     Diverticulitis      Hypertension      Papanicolaou smear of cervix with low grade squamous intraepithelial lesion (LGSIL)       Uncomplicated asthma      Past Surgical History:   Procedure Laterality Date     COLECTOMY LOW ANTERIOR N/A 3/23/2017    Procedure: COLECTOMY LOW ANTERIOR;  Surgeon: Ryan Dailey MD;  Location: [facility]     COLONOSCOPY  2/27/2012    Procedure:COLONOSCOPY; Colonoscopy ; Surgeon:SHIRA ROWLAND; Location:[facility]     COLONOSCOPY N/A 3/1/2017    Procedure: COLONOSCOPY;  Surgeon: Ryan Dailey MD;  Location: [facility]     COLPOSCOPY,BX CERVIX/ENDOCERV CURR  10/19/1999    moderate/severe dysplasia     CONIZATION CERVIX,LOOP ELECTRD  11/29/1999     INSERT STENT URETER Bilateral 3/23/2017    Procedure: INSERT STENT URETER (PRE-OP);  Surgeon: Eamon Linn MD;  Location: [facility]     LAPAROSCOPIC ASSISTED COLECTOMY N/A 3/23/2017    Procedure: LAPAROSCOPIC ASSISTED COLECTOMY;  Surgeon: Ryan Dailey MD;  Location: [facility]     Current Outpatient Medications   Medication Sig Dispense Refill     ALPRAZolam (XANAX) 0.25 MG tablet TAKE ONE TABLET BY MOUTH THREE TIMES A DAY AS NEEDED FOR ANXIETY 30 tablet 0     ASPIRIN 81 MG OR TABS ONE DAILY 100 3     CHLOROPHYLL PO Take 15 mLs by mouth daily       Collagen-Vitamin C-Biotin (COLLAGEN 1500/C) 500-50-0.8 MG CAPS Take by mouth daily       fish oil-omega-3 fatty acids (FISH OIL) 1000 MG capsule Take 1 g by mouth 2 times daily        Flaxseed, Linseed, (FLAXSEED OIL) 1000 MG CAPS Take 1,000 mg by mouth 3 times daily        GELATIN 650 MG OR CAPS 2 daily  0     GLUCOSAMINE CHONDRO COMPLEX OR 2 tablets x 2 daily  0     hydrochlorothiazide (HYDRODIURIL) 12.5 MG tablet TAKE ONE TABLET BY MOUTH ONCE DAILY 90 tablet 3     losartan (COZAAR) 50 MG tablet TAKE ONE TABLET BY MOUTH ONCE DAILY 90 tablet 3     Lysine 1000 MG TABS Take 1,000 mg by mouth daily.       magnesium 250 MG tablet Take 1 tablet by mouth daily       Multiple Vitamins-Minerals (WOMENS MULTI VITAMIN & MINERAL) TABS        OVER-THE-COUNTER 1 tablet daily Citracal 500 D and Calcium 600       potassium 99 MG TABS         "SM ALLERGY RELIEF D  MG 24 hr tablet TAKE ONE TABLET BY MOUTH ONCE DAILY 30 tablet 3     Turmeric 500 MG TABS        VENTOLIN  (90 Base) MCG/ACT inhaler INHALE 2 PUFFS INTO THE LUNGS EVERY 6 HOURS AS NEEDED FOR SHORTNESS OF BREATH, DIFFICULTY BREATHING OR WHEEZING. 1 Inhaler 11     VITAMIN E 400 UNIT OR CAPS ONE CAPSULE DAILY 3 MONTHS 1 YEAR       Allergies   Allergen Reactions     No Known Drug Allergies         Social History     Tobacco Use     Smoking status: Never Smoker     Smokeless tobacco: Never Used   Substance Use Topics     Alcohol use: Not Currently     Alcohol/week: 0.0 standard drinks     Comment: 1-2 drinks monthly       History   Drug Use No         Objective     /76   Pulse 105   Temp 97.3  F (36.3  C) (Temporal)   Ht 1.562 m (5' 1.5\")   LMP 11/22/2012   SpO2 99%   BMI 34.02 kg/m      Physical Exam    GENERAL APPEARANCE: healthy, alert and no distress     EYES: EOMI, PERRL     HENT: ear canals and TM's normal and nose and mouth without ulcers or lesions     NECK: no adenopathy, no asymmetry, masses, or scars and thyroid normal to palpation     RESP: lungs clear to auscultation - no rales, rhonchi or wheezes     CV: regular rates and rhythm, normal S1 S2, no S3 or S4 and no murmur, click or rub     ABDOMEN: incisional hernia present, minimally tender. No evidence of obstruction.      MS: extremities normal- no gross deformities noted, no evidence of inflammation in joints, FROM in all extremities.     NEURO: Normal strength and tone, sensory exam grossly normal, mentation intact and speech normal     PSYCH: mentation appears normal. and affect normal/bright     LYMPHATICS: No cervical adenopathy    Recent Labs   Lab Test 09/28/21  1712 08/26/21  1610 08/11/20  1551   NA  --  139 139   POTASSIUM  --  3.6 3.9   CR  --  0.72 0.73   A1C 6.2*  --   --         Diagnostics:  Recent Results (from the past 24 hour(s))   CBC with platelets    Collection Time: 03/21/22  9:04 AM "   Result Value Ref Range    WBC Count 7.0 4.0 - 11.0 10e3/uL    RBC Count 4.59 3.80 - 5.20 10e6/uL    Hemoglobin 14.2 11.7 - 15.7 g/dL    Hematocrit 42.8 35.0 - 47.0 %    MCV 93 78 - 100 fL    MCH 30.9 26.5 - 33.0 pg    MCHC 33.2 31.5 - 36.5 g/dL    RDW 13.1 10.0 - 15.0 %    Platelet Count 284 150 - 450 10e3/uL   Basic metabolic panel  (Ca, Cl, CO2, Creat, Gluc, K, Na, BUN)    Collection Time: 03/21/22  9:04 AM   Result Value Ref Range    Sodium 138 133 - 144 mmol/L    Potassium 4.3 3.4 - 5.3 mmol/L    Chloride 107 94 - 109 mmol/L    Carbon Dioxide (CO2) 30 20 - 32 mmol/L    Anion Gap 1 (L) 3 - 14 mmol/L    Urea Nitrogen 16 7 - 30 mg/dL    Creatinine 0.66 0.52 - 1.04 mg/dL    Calcium 9.1 8.5 - 10.1 mg/dL    Glucose 231 (H) 70 - 99 mg/dL    GFR Estimate >90 >60 mL/min/1.73m2      No EKG required, no history of coronary heart disease, significant arrhythmia, peripheral arterial disease or other structural heart disease.    Revised Cardiac Risk Index (RCRI):  The patient has the following serious cardiovascular risks for perioperative complications:   - No serious cardiac risks = 0 points     RCRI Interpretation: 0 points: Class I (very low risk - 0.4% complication rate)           Signed Electronically by: Alvaro Cortez PA-C  Copy of this evaluation report is provided to requesting physician.

## 2022-03-21 NOTE — PATIENT INSTRUCTIONS
Before Your Surgery      Call your surgeon if there is any change in your health. This includes signs of a cold or flu (such as a sore throat, runny nose, cough, rash or fever).    A surgery nurse will call you to review your health history and instructions. They will give you an arrival time based on your scheduled surgery time.    Please be ready to share the following:    Your doctor's clinic name and phone number    Your medical, surgical and anesthesia history    A list of allergies and sensitivities    A list of medicines, including herbal treatments and over-the-counter drugs    Whether the patient has a legal guardian (ask how to send us the papers in advance)    Do not smoke, drink alcohol or take over the counter medicine (unless your surgeon or primary care doctor tells you to) for the 24 hours before and after surgery.  o No smoking 2 weeks prior and 2 weeks after surgery to improve healing  o No alcohol 24 hours prior to surgery to reduce bleed risk  o No NSAIDs (ibuprofen, advil, motrin, aleve, naproxen, aspirin) 7 days prior to surgery to reduce bleed risk  - Tylenol or acetaminophen is A-OK    If you take prescribed drugs: Follow your doctor s orders about which medicines to take and which to stop until after surgery.    Hold all supplements on day of procedure  Stop Aspirin today  No NSAIDs (ibuprofen, advil, motrin, aleve, naproxen, aspirin, etc) from now until surgery  If you have aches/pains stick to tylneol  Ok to take blood pressure medications on day surgery        Eating and drinking prior to surgery: follow the instructions from your surgeon    Eat and drink as usual until 8 hours before surgery. After that, no food or milk.    Drink clear liquids until 2 hours before surgery. These are liquids you can see through, like water, Gatorade and Propel Water. You may also have black coffee and tea (no cream or milk).    Nothing by mouth within 2 hours of surgery. This includes gum, candy and  breath mints.  Preventing infection    Shower or bathe the night before and morning of your surgery. Follow the instructions your clinic gave you. (If no instructions, use regular soap.)    Don't shave or clip hair near your surgery site. This can lead to skin infection.    Your care team will make every effort to keep you safe from infection. We will:  ? Clean our hands often with soap and water (or an alcohol-based hand rub).  ? Clean the skin at your surgery site with a special soap that kills germs. We'll also remove hair from the site as needed.  ? Wear special hair covers, masks, gowns and gloves during surgery.  ? Give antibiotic medicine, if prescribed. Not all surgeries need antibiotics.  What to bring on the day of surgery    Photo ID and insurance card    Copy of your health care directive, if you have one    Glasses and hearing aides (bring cases)  ? You can't wear contacts during surgery    Inhaler and eye drops, if you use them (tell us about these when you arrive)    CPAP machine or breathing device, if you use them    A few personal items, if spending the night    If you have . . .  ? A pacemaker or ICD (cardiac defibrillator): Bring the ID card.  ? An implanted stimulator: Bring the remote control.  ? A legal guardian: Bring a copy of the certified (court-stamped) guardianship papers.  Please remove any jewelry, including body piercings. Leave jewelry and other valuables at home.  If you're going home the day of surgery  Important: If you don't follow the rules below, we must cancel your surgery.     Arrange for someone to drive you home after surgery. You may not drive, take a taxi or take public transportation by yourself (unless you'll have local anesthesia only).    Arrange for a responsible adult to stay with you overnight. If you don't, we may keep you in the hospital overnight, and you may need to pay the costs yourself.

## 2022-03-21 NOTE — H&P (VIEW-ONLY)
[address] [address]  Phone: [phone]  Fax: [phone]  Primary Provider: Schoen, Gregory G  Pre-op Performing Provider: SHAHID DAVIDSON    PREOPERATIVE EVALUATION:  Today's date: 3/21/2022    Caro Palmer is a 61 year old female who presents for a preoperative evaluation.    Surgical Information:  Surgery/Procedure: Laparoscopic assisted open incisional hernia repair with mesh  Surgery Location: [facility]  Surgeon: Dr. Nicolas  Surgery Date: 03/24/2022  Time of Surgery: TBD  Where patient plans to recover: At home with family  Fax number for surgical facility: Note does not need to be faxed, will be available electronically in Epic.    Type of Anesthesia Anticipated: to be determined    Assessment & Plan     The proposed surgical procedure is considered INTERMEDIATE risk.    Incisional hernia, without obstruction or gangrene  Pre-op exam  Patient educated on the prep for upcoming procedure. She was given copy attached to the AVS. Reviewed which medications to hold on day of and reminded her not to take the aspirin from now until procedure as she took one this AM.    CBC with platelets; Future  - Basic metabolic panel  (Ca, Cl, CO2, Creat, Gluc, K, Na, BUN); Future  - CBC with platelets  - Basic metabolic panel  (Ca, Cl, CO2, Creat, Gluc, K, Na, BUN)    Risks and Recommendations:  The patient has the following additional risks and recommendations for perioperative complications:   - No identified additional risk factors other than previously addressed    Medication Instructions:   - aspirin: patient took aspirin this AM, 03/21. She has been advised to stop this medication until after the procedure   - ACE/ARB: May be continued on the day of surgery.    - Diuretics: HOLD on the day of surgery.   - Benzodiazepines: Continue without modification.    RECOMMENDATION:  APPROVAL GIVEN to proceed with proposed procedure,  without further diagnostic evaluation.    Subjective     HPI related to upcoming procedure:   Incisional hernia over anterior abdomen.     Preop Questions 3/21/2022   1. Have you ever had a heart attack or stroke? No   2. Have you ever had surgery on your heart or blood vessels, such as a stent placement, a coronary artery bypass, or surgery on an artery in your head, neck, heart, or legs? No   3. Do you have chest pain with activity? No   4. Do you have a history of  heart failure? No   5. Do you currently have a cold, bronchitis or symptoms of other infection? No   6. Do you have a cough, shortness of breath, or wheezing? No   7. Do you or anyone in your family have previous history of blood clots? No   8. Do you or does anyone in your family have a serious bleeding problem such as prolonged bleeding following surgeries or cuts? No   9. Have you ever had problems with anemia or been told to take iron pills? No   10. Have you had any abnormal blood loss such as black, tarry or bloody stools, or abnormal vaginal bleeding? No   11. Have you ever had a blood transfusion? No   12. Are you willing to have a blood transfusion if it is medically needed before, during, or after your surgery? Yes   13. Have you or any of your relatives ever had problems with anesthesia? No   14. Do you have sleep apnea, excessive snoring or daytime drowsiness? No   15. Do you have any artifical heart valves or other implanted medical devices like a pacemaker, defibrillator, or continuous glucose monitor? No   16. Do you have artificial joints? No   17. Are you allergic to latex? No   18. Is there any chance that you may be pregnant? No       Health Care Directive:  Patient does not have a Health Care Directive or Living Will: Discussed advance care planning with patient; however, patient declined at this time.    Preoperative Review of :   reviewed - controlled substances reflected in medication list.    Status of Chronic  Conditions:  See problem list for active medical problems.  Problems all longstanding and stable, except as noted/documented.  See ROS for pertinent symptoms related to these conditions.      Review of Systems  Constitutional, neuro, ENT, endocrine, pulmonary, cardiac, gastrointestinal, genitourinary, musculoskeletal, integument and psychiatric systems are negative, except as otherwise noted.    Patient Active Problem List    Diagnosis Date Noted     Morbid obesity (H) 03/01/2022     Priority: Medium     Mild intermittent asthma without complication 07/08/2018     Priority: Medium     S/P partial resection of colon 03/23/2017     Priority: Medium     Advanced care planning/counseling discussion 01/20/2017     Priority: Medium     Diverticulitis of intestine with abscess 01/11/2017     Priority: Medium     Leukocytosis 01/11/2017     Priority: Medium     Essential hypertension with goal blood pressure less than 140/90 06/17/2016     Priority: Medium     Family history of pancreatic cancer 05/21/2013     Priority: Medium     Chronic rhinitis 11/08/2012     Priority: Medium     CARDIOVASCULAR SCREENING; LDL GOAL LESS THAN 160 10/31/2010     Priority: Medium     Generalized anxiety disorder 04/27/2009     Priority: Medium     S/P LEEP (status post loop electrosurgical excision procedure)      Priority: Medium     1999 LEEP Mod/severe dysplasia  Plan: cotest at 12 and 24 mo, then cotest Q3y X 20 years (until 2019).  NIL paps annually from  [id number] 4/24/08 NIL/neg HR HPV  NIL paps annually from 2009 - 2013 5/22/14 NIL pap, neg HR HPV.   6/17/16 NIL pap, neg HR HPV. Plan: cotest in 3 years  6/19/17 NIL pap, neg HR HPV. Plan: cotest in 1 year.  8/6/18 NIL pap, neg HR HPV. Plan: cotest in 3 years.  7/12/19 Pap: NIL/neg HR HPV. Plan cotest in 3 years        Past Medical History:   Diagnosis Date     Diverticulitis      Hypertension      Papanicolaou smear of cervix with low grade squamous intraepithelial lesion (LGSIL)       Uncomplicated asthma      Past Surgical History:   Procedure Laterality Date     COLECTOMY LOW ANTERIOR N/A 3/23/2017    Procedure: COLECTOMY LOW ANTERIOR;  Surgeon: Ryan Dailey MD;  [facility]     COLONOSCOPY  2/27/2012    Procedure:COLONOSCOPY; Colonoscopy ; Surgeon:SHIRA ROWLAND; Location:[facility]     COLONOSCOPY N/A 3/1/2017    Procedure: COLONOSCOPY;  Surgeon: Ryan Dailey MD;  Location: [facility]     COLPOSCOPY,BX CERVIX/ENDOCERV CURR  10/19/1999    moderate/severe dysplasia     CONIZATION CERVIX,LOOP ELECTRD  11/29/1999     INSERT STENT URETER Bilateral 3/23/2017    Procedure: INSERT STENT URETER (PRE-OP);  Surgeon: Eamon Linn MD;  Location: [facility]     LAPAROSCOPIC ASSISTED COLECTOMY N/A 3/23/2017    Procedure: LAPAROSCOPIC ASSISTED COLECTOMY;  Surgeon: Ryan Dailey MD;  Location: [facility]     Current Outpatient Medications   Medication Sig Dispense Refill     ALPRAZolam (XANAX) 0.25 MG tablet TAKE ONE TABLET BY MOUTH THREE TIMES A DAY AS NEEDED FOR ANXIETY 30 tablet 0     ASPIRIN 81 MG OR TABS ONE DAILY 100 3     CHLOROPHYLL PO Take 15 mLs by mouth daily       Collagen-Vitamin C-Biotin (COLLAGEN 1500/C) 500-50-0.8 MG CAPS Take by mouth daily       fish oil-omega-3 fatty acids (FISH OIL) 1000 MG capsule Take 1 g by mouth 2 times daily        Flaxseed, Linseed, (FLAXSEED OIL) 1000 MG CAPS Take 1,000 mg by mouth 3 times daily        GELATIN 650 MG OR CAPS 2 daily  0     GLUCOSAMINE CHONDRO COMPLEX OR 2 tablets x 2 daily  0     hydrochlorothiazide (HYDRODIURIL) 12.5 MG tablet TAKE ONE TABLET BY MOUTH ONCE DAILY 90 tablet 3     losartan (COZAAR) 50 MG tablet TAKE ONE TABLET BY MOUTH ONCE DAILY 90 tablet 3     Lysine 1000 MG TABS Take 1,000 mg by mouth daily.       magnesium 250 MG tablet Take 1 tablet by mouth daily       Multiple Vitamins-Minerals (WOMENS MULTI VITAMIN & MINERAL) TABS        OVER-THE-COUNTER 1 tablet daily Citracal 500 D and Calcium 600       potassium 99 MG TABS         "SM ALLERGY RELIEF D  MG 24 hr tablet TAKE ONE TABLET BY MOUTH ONCE DAILY 30 tablet 3     Turmeric 500 MG TABS        VENTOLIN  (90 Base) MCG/ACT inhaler INHALE 2 PUFFS INTO THE LUNGS EVERY 6 HOURS AS NEEDED FOR SHORTNESS OF BREATH, DIFFICULTY BREATHING OR WHEEZING. 1 Inhaler 11     VITAMIN E 400 UNIT OR CAPS ONE CAPSULE DAILY 3 MONTHS 1 YEAR       Allergies   Allergen Reactions     No Known Drug Allergies         Social History     Tobacco Use     Smoking status: Never Smoker     Smokeless tobacco: Never Used   Substance Use Topics     Alcohol use: Not Currently     Alcohol/week: 0.0 standard drinks     Comment: 1-2 drinks monthly       History   Drug Use No         Objective     /76   Pulse 105   Temp 97.3  F (36.3  C) (Temporal)   Ht 1.562 m (5' 1.5\")   LMP 11/22/2012   SpO2 99%   BMI 34.02 kg/m      Physical Exam    GENERAL APPEARANCE: healthy, alert and no distress     EYES: EOMI, PERRL     HENT: ear canals and TM's normal and nose and mouth without ulcers or lesions     NECK: no adenopathy, no asymmetry, masses, or scars and thyroid normal to palpation     RESP: lungs clear to auscultation - no rales, rhonchi or wheezes     CV: regular rates and rhythm, normal S1 S2, no S3 or S4 and no murmur, click or rub     ABDOMEN: incisional hernia present, minimally tender. No evidence of obstruction.      MS: extremities normal- no gross deformities noted, no evidence of inflammation in joints, FROM in all extremities.     NEURO: Normal strength and tone, sensory exam grossly normal, mentation intact and speech normal     PSYCH: mentation appears normal. and affect normal/bright     LYMPHATICS: No cervical adenopathy    Recent Labs   Lab Test 09/28/21  1712 08/26/21  1610 08/11/20  1551   NA  --  139 139   POTASSIUM  --  3.6 3.9   CR  --  0.72 0.73   A1C 6.2*  --   --         Diagnostics:  Recent Results (from the past 24 hour(s))   CBC with platelets    Collection Time: 03/21/22  9:04 AM "   Result Value Ref Range    WBC Count 7.0 4.0 - 11.0 10e3/uL    RBC Count 4.59 3.80 - 5.20 10e6/uL    Hemoglobin 14.2 11.7 - 15.7 g/dL    Hematocrit 42.8 35.0 - 47.0 %    MCV 93 78 - 100 fL    MCH 30.9 26.5 - 33.0 pg    MCHC 33.2 31.5 - 36.5 g/dL    RDW 13.1 10.0 - 15.0 %    Platelet Count 284 150 - 450 10e3/uL   Basic metabolic panel  (Ca, Cl, CO2, Creat, Gluc, K, Na, BUN)    Collection Time: 03/21/22  9:04 AM   Result Value Ref Range    Sodium 138 133 - 144 mmol/L    Potassium 4.3 3.4 - 5.3 mmol/L    Chloride 107 94 - 109 mmol/L    Carbon Dioxide (CO2) 30 20 - 32 mmol/L    Anion Gap 1 (L) 3 - 14 mmol/L    Urea Nitrogen 16 7 - 30 mg/dL    Creatinine 0.66 0.52 - 1.04 mg/dL    Calcium 9.1 8.5 - 10.1 mg/dL    Glucose 231 (H) 70 - 99 mg/dL    GFR Estimate >90 >60 mL/min/1.73m2      No EKG required, no history of coronary heart disease, significant arrhythmia, peripheral arterial disease or other structural heart disease.    Revised Cardiac Risk Index (RCRI):  The patient has the following serious cardiovascular risks for perioperative complications:   - No serious cardiac risks = 0 points     RCRI Interpretation: 0 points: Class I (very low risk - 0.4% complication rate)           Signed Electronically by: Alvaro Cortez PA-C  Copy of this evaluation report is provided to requesting physician.

## 2022-03-23 DIAGNOSIS — R89.9 ABNORMAL LABORATORY TEST: Primary | ICD-10-CM

## 2022-03-23 LAB — HBA1C MFR BLD: 7.1 % (ref 0–5.6)

## 2022-03-24 ENCOUNTER — ANESTHESIA (OUTPATIENT)
Dept: SURGERY | Facility: CLINIC | Age: 62
End: 2022-03-24
Payer: COMMERCIAL

## 2022-03-24 ENCOUNTER — ANESTHESIA EVENT (OUTPATIENT)
Dept: SURGERY | Facility: CLINIC | Age: 62
End: 2022-03-24
Payer: COMMERCIAL

## 2022-03-24 ENCOUNTER — HOSPITAL ENCOUNTER (OUTPATIENT)
Facility: CLINIC | Age: 62
Discharge: HOME | End: 2022-03-24
Attending: SURGERY | Admitting: SURGERY
Payer: COMMERCIAL

## 2022-03-24 VITALS
RESPIRATION RATE: 16 BRPM | SYSTOLIC BLOOD PRESSURE: 142 MMHG | TEMPERATURE: 99 F | HEART RATE: 88 BPM | OXYGEN SATURATION: 93 % | DIASTOLIC BLOOD PRESSURE: 81 MMHG

## 2022-03-24 DIAGNOSIS — K66.0 INTRA-ABDOMINAL ADHESIONS: ICD-10-CM

## 2022-03-24 DIAGNOSIS — K43.2 INCISIONAL HERNIA, WITHOUT OBSTRUCTION OR GANGRENE: Primary | ICD-10-CM

## 2022-03-24 DIAGNOSIS — E11.9 TYPE 2 DIABETES MELLITUS WITHOUT COMPLICATION, WITHOUT LONG-TERM CURRENT USE OF INSULIN (H): Primary | ICD-10-CM

## 2022-03-24 PROCEDURE — 250N000009 HC RX 250: Performed by: SURGERY

## 2022-03-24 PROCEDURE — 710N000012 HC RECOVERY PHASE 2, PER MINUTE: Performed by: SURGERY

## 2022-03-24 PROCEDURE — 250N000011 HC RX IP 250 OP 636: Performed by: NURSE ANESTHETIST, CERTIFIED REGISTERED

## 2022-03-24 PROCEDURE — 272N000001 HC OR GENERAL SUPPLY STERILE: Performed by: SURGERY

## 2022-03-24 PROCEDURE — 710N000010 HC RECOVERY PHASE 1, LEVEL 2, PER MIN: Performed by: SURGERY

## 2022-03-24 PROCEDURE — 250N000009 HC RX 250: Performed by: NURSE ANESTHETIST, CERTIFIED REGISTERED

## 2022-03-24 PROCEDURE — 258N000003 HC RX IP 258 OP 636: Performed by: NURSE ANESTHETIST, CERTIFIED REGISTERED

## 2022-03-24 PROCEDURE — 370N000017 HC ANESTHESIA TECHNICAL FEE, PER MIN: Performed by: SURGERY

## 2022-03-24 PROCEDURE — 360N000076 HC SURGERY LEVEL 3, PER MIN: Performed by: SURGERY

## 2022-03-24 PROCEDURE — 250N000011 HC RX IP 250 OP 636: Performed by: SURGERY

## 2022-03-24 PROCEDURE — C1781 MESH (IMPLANTABLE): HCPCS | Performed by: SURGERY

## 2022-03-24 PROCEDURE — 250N000026 HC DESFLURANE, PER MIN: Performed by: SURGERY

## 2022-03-24 PROCEDURE — 999N000141 HC STATISTIC PRE-PROCEDURE NURSING ASSESSMENT: Performed by: SURGERY

## 2022-03-24 PROCEDURE — 271N000001 HC OR GENERAL SUPPLY NON-STERILE: Performed by: SURGERY

## 2022-03-24 PROCEDURE — C9290 INJ, BUPIVACAINE LIPOSOME: HCPCS | Performed by: NURSE ANESTHETIST, CERTIFIED REGISTERED

## 2022-03-24 PROCEDURE — 49560 PR REPAIR INCISIONAL HERNIA,REDUCIBLE: CPT | Performed by: SURGERY

## 2022-03-24 PROCEDURE — 250N000013 HC RX MED GY IP 250 OP 250 PS 637: Performed by: SURGERY

## 2022-03-24 PROCEDURE — 49568 PR REPAIR HERNIA WITH MESH: CPT | Performed by: SURGERY

## 2022-03-24 DEVICE — IMPLANTABLE DEVICE: Type: IMPLANTABLE DEVICE | Site: ABDOMEN | Status: FUNCTIONAL

## 2022-03-24 RX ORDER — ACETAMINOPHEN 500 MG
500-1000 TABLET ORAL EVERY 6 HOURS PRN
COMMUNITY
End: 2023-02-25

## 2022-03-24 RX ORDER — IBUPROFEN 200 MG
600 TABLET ORAL EVERY 6 HOURS PRN
COMMUNITY
End: 2023-02-25

## 2022-03-24 RX ORDER — HYDROMORPHONE HYDROCHLORIDE 1 MG/ML
0.5 INJECTION, SOLUTION INTRAMUSCULAR; INTRAVENOUS; SUBCUTANEOUS EVERY 5 MIN PRN
Status: DISCONTINUED | OUTPATIENT
Start: 2022-03-24 | End: 2022-03-24 | Stop reason: HOSPADM

## 2022-03-24 RX ORDER — OXYCODONE HYDROCHLORIDE 5 MG/1
5 TABLET ORAL
Status: COMPLETED | OUTPATIENT
Start: 2022-03-24 | End: 2022-03-24

## 2022-03-24 RX ORDER — SODIUM CHLORIDE, SODIUM LACTATE, POTASSIUM CHLORIDE, CALCIUM CHLORIDE 600; 310; 30; 20 MG/100ML; MG/100ML; MG/100ML; MG/100ML
INJECTION, SOLUTION INTRAVENOUS CONTINUOUS
Status: DISCONTINUED | OUTPATIENT
Start: 2022-03-24 | End: 2022-03-24 | Stop reason: HOSPADM

## 2022-03-24 RX ORDER — BUPIVACAINE HYDROCHLORIDE AND EPINEPHRINE 2.5; 5 MG/ML; UG/ML
INJECTION, SOLUTION INFILTRATION; PERINEURAL PRN
Status: DISCONTINUED | OUTPATIENT
Start: 2022-03-24 | End: 2022-03-24 | Stop reason: HOSPADM

## 2022-03-24 RX ORDER — OXYCODONE HYDROCHLORIDE 5 MG/1
5 TABLET ORAL EVERY 4 HOURS PRN
Status: DISCONTINUED | OUTPATIENT
Start: 2022-03-24 | End: 2022-03-24 | Stop reason: HOSPADM

## 2022-03-24 RX ORDER — METFORMIN HCL 500 MG
500 TABLET, EXTENDED RELEASE 24 HR ORAL
Qty: 90 TABLET | Refills: 0 | Status: SHIPPED | OUTPATIENT
Start: 2022-03-24 | End: 2023-02-25

## 2022-03-24 RX ORDER — FENTANYL CITRATE 50 UG/ML
25 INJECTION, SOLUTION INTRAMUSCULAR; INTRAVENOUS
Status: DISCONTINUED | OUTPATIENT
Start: 2022-03-24 | End: 2022-03-24 | Stop reason: HOSPADM

## 2022-03-24 RX ORDER — ONDANSETRON 4 MG/1
4 TABLET, ORALLY DISINTEGRATING ORAL EVERY 30 MIN PRN
Status: DISCONTINUED | OUTPATIENT
Start: 2022-03-24 | End: 2022-03-24 | Stop reason: HOSPADM

## 2022-03-24 RX ORDER — ONDANSETRON 2 MG/ML
INJECTION INTRAMUSCULAR; INTRAVENOUS PRN
Status: DISCONTINUED | OUTPATIENT
Start: 2022-03-24 | End: 2022-03-24

## 2022-03-24 RX ORDER — LOSARTAN POTASSIUM 25 MG/1
TABLET ORAL
COMMUNITY
Start: 2022-01-14 | End: 2022-07-08

## 2022-03-24 RX ORDER — DEXAMETHASONE SODIUM PHOSPHATE 4 MG/ML
INJECTION, SOLUTION INTRA-ARTICULAR; INTRALESIONAL; INTRAMUSCULAR; INTRAVENOUS; SOFT TISSUE PRN
Status: DISCONTINUED | OUTPATIENT
Start: 2022-03-24 | End: 2022-03-24

## 2022-03-24 RX ORDER — BUPIVACAINE HYDROCHLORIDE AND EPINEPHRINE 2.5; 5 MG/ML; UG/ML
INJECTION, SOLUTION INFILTRATION; PERINEURAL PRN
Status: DISCONTINUED | OUTPATIENT
Start: 2022-03-24 | End: 2022-03-24

## 2022-03-24 RX ORDER — ACETAMINOPHEN 325 MG/1
975 TABLET ORAL ONCE
Status: COMPLETED | OUTPATIENT
Start: 2022-03-24 | End: 2022-03-24

## 2022-03-24 RX ORDER — MEPERIDINE HYDROCHLORIDE 25 MG/ML
12.5 INJECTION INTRAMUSCULAR; INTRAVENOUS; SUBCUTANEOUS
Status: DISCONTINUED | OUTPATIENT
Start: 2022-03-24 | End: 2022-03-24 | Stop reason: HOSPADM

## 2022-03-24 RX ORDER — DEXMEDETOMIDINE HYDROCHLORIDE 4 UG/ML
INJECTION, SOLUTION INTRAVENOUS PRN
Status: DISCONTINUED | OUTPATIENT
Start: 2022-03-24 | End: 2022-03-24

## 2022-03-24 RX ORDER — FENTANYL CITRATE 50 UG/ML
INJECTION, SOLUTION INTRAMUSCULAR; INTRAVENOUS PRN
Status: DISCONTINUED | OUTPATIENT
Start: 2022-03-24 | End: 2022-03-24

## 2022-03-24 RX ORDER — LIDOCAINE 40 MG/G
CREAM TOPICAL
Status: DISCONTINUED | OUTPATIENT
Start: 2022-03-24 | End: 2022-03-24 | Stop reason: HOSPADM

## 2022-03-24 RX ORDER — GABAPENTIN 300 MG/1
600 CAPSULE ORAL ONCE
Status: COMPLETED | OUTPATIENT
Start: 2022-03-24 | End: 2022-03-24

## 2022-03-24 RX ORDER — METHOCARBAMOL 750 MG/1
750 TABLET, FILM COATED ORAL
Status: DISCONTINUED | OUTPATIENT
Start: 2022-03-24 | End: 2022-03-24 | Stop reason: HOSPADM

## 2022-03-24 RX ORDER — CEFAZOLIN SODIUM/WATER 2 G/20 ML
2 SYRINGE (ML) INTRAVENOUS
Status: COMPLETED | OUTPATIENT
Start: 2022-03-24 | End: 2022-03-24

## 2022-03-24 RX ORDER — CEFAZOLIN SODIUM/WATER 2 G/20 ML
2 SYRINGE (ML) INTRAVENOUS SEE ADMIN INSTRUCTIONS
Status: DISCONTINUED | OUTPATIENT
Start: 2022-03-24 | End: 2022-03-24 | Stop reason: HOSPADM

## 2022-03-24 RX ORDER — PROPOFOL 10 MG/ML
INJECTION, EMULSION INTRAVENOUS CONTINUOUS PRN
Status: DISCONTINUED | OUTPATIENT
Start: 2022-03-24 | End: 2022-03-24

## 2022-03-24 RX ORDER — CELECOXIB 100 MG/1
400 CAPSULE ORAL ONCE
Status: COMPLETED | OUTPATIENT
Start: 2022-03-24 | End: 2022-03-24

## 2022-03-24 RX ORDER — ONDANSETRON 2 MG/ML
4 INJECTION INTRAMUSCULAR; INTRAVENOUS EVERY 30 MIN PRN
Status: DISCONTINUED | OUTPATIENT
Start: 2022-03-24 | End: 2022-03-24 | Stop reason: HOSPADM

## 2022-03-24 RX ORDER — OXYCODONE HYDROCHLORIDE 5 MG/1
5-10 TABLET ORAL EVERY 4 HOURS PRN
Qty: 12 TABLET | Refills: 0 | Status: SHIPPED | OUTPATIENT
Start: 2022-03-24 | End: 2022-07-26

## 2022-03-24 RX ORDER — PROPOFOL 10 MG/ML
INJECTION, EMULSION INTRAVENOUS PRN
Status: DISCONTINUED | OUTPATIENT
Start: 2022-03-24 | End: 2022-03-24

## 2022-03-24 RX ORDER — LIDOCAINE HYDROCHLORIDE 20 MG/ML
INJECTION, SOLUTION INFILTRATION; PERINEURAL PRN
Status: DISCONTINUED | OUTPATIENT
Start: 2022-03-24 | End: 2022-03-24

## 2022-03-24 RX ORDER — HYDROMORPHONE HYDROCHLORIDE 1 MG/ML
0.2 INJECTION, SOLUTION INTRAMUSCULAR; INTRAVENOUS; SUBCUTANEOUS EVERY 5 MIN PRN
Status: DISCONTINUED | OUTPATIENT
Start: 2022-03-24 | End: 2022-03-24

## 2022-03-24 RX ORDER — FENTANYL CITRATE 50 UG/ML
50 INJECTION, SOLUTION INTRAMUSCULAR; INTRAVENOUS EVERY 5 MIN PRN
Status: DISCONTINUED | OUTPATIENT
Start: 2022-03-24 | End: 2022-03-24 | Stop reason: HOSPADM

## 2022-03-24 RX ORDER — HEPARIN SODIUM 5000 [USP'U]/.5ML
5000 INJECTION, SOLUTION INTRAVENOUS; SUBCUTANEOUS
Status: COMPLETED | OUTPATIENT
Start: 2022-03-24 | End: 2022-03-24

## 2022-03-24 RX ADMIN — LIDOCAINE HYDROCHLORIDE 60 MG: 20 INJECTION, SOLUTION INFILTRATION; PERINEURAL at 14:23

## 2022-03-24 RX ADMIN — Medication 2 G: at 14:23

## 2022-03-24 RX ADMIN — SODIUM CHLORIDE, POTASSIUM CHLORIDE, SODIUM LACTATE AND CALCIUM CHLORIDE: 600; 310; 30; 20 INJECTION, SOLUTION INTRAVENOUS at 14:18

## 2022-03-24 RX ADMIN — CELECOXIB 400 MG: 100 CAPSULE ORAL at 12:11

## 2022-03-24 RX ADMIN — HYDROMORPHONE HYDROCHLORIDE 0.5 MG: 1 INJECTION, SOLUTION INTRAMUSCULAR; INTRAVENOUS; SUBCUTANEOUS at 15:03

## 2022-03-24 RX ADMIN — BUPIVACAINE 10 ML: 13.3 INJECTION, SUSPENSION, LIPOSOMAL INFILTRATION at 16:59

## 2022-03-24 RX ADMIN — FENTANYL CITRATE 50 MCG: 50 INJECTION, SOLUTION INTRAMUSCULAR; INTRAVENOUS at 16:58

## 2022-03-24 RX ADMIN — OXYCODONE HYDROCHLORIDE 5 MG: 5 TABLET ORAL at 18:37

## 2022-03-24 RX ADMIN — ACETAMINOPHEN 975 MG: 325 TABLET, FILM COATED ORAL at 12:11

## 2022-03-24 RX ADMIN — BUPIVACAINE 10 ML: 13.3 INJECTION, SUSPENSION, LIPOSOMAL INFILTRATION at 16:44

## 2022-03-24 RX ADMIN — ONDANSETRON 4 MG: 2 INJECTION INTRAMUSCULAR; INTRAVENOUS at 15:53

## 2022-03-24 RX ADMIN — FENTANYL CITRATE 50 MCG: 50 INJECTION, SOLUTION INTRAMUSCULAR; INTRAVENOUS at 17:17

## 2022-03-24 RX ADMIN — FENTANYL CITRATE 50 MCG: 50 INJECTION, SOLUTION INTRAMUSCULAR; INTRAVENOUS at 17:24

## 2022-03-24 RX ADMIN — DEXMEDETOMIDINE 10 MCG: 100 INJECTION, SOLUTION, CONCENTRATE INTRAVENOUS at 16:26

## 2022-03-24 RX ADMIN — ROCURONIUM BROMIDE 15 MG: 50 INJECTION, SOLUTION INTRAVENOUS at 15:15

## 2022-03-24 RX ADMIN — ROCURONIUM BROMIDE 35 MG: 50 INJECTION, SOLUTION INTRAVENOUS at 14:24

## 2022-03-24 RX ADMIN — BUPIVACAINE HYDROCHLORIDE AND EPINEPHRINE BITARTRATE 20 ML: 2.5; .005 INJECTION, SOLUTION INFILTRATION; PERINEURAL at 16:44

## 2022-03-24 RX ADMIN — PROPOFOL 180 MG: 10 INJECTION, EMULSION INTRAVENOUS at 14:23

## 2022-03-24 RX ADMIN — HEPARIN SODIUM 5000 UNITS: 10000 INJECTION, SOLUTION INTRAVENOUS; SUBCUTANEOUS at 14:29

## 2022-03-24 RX ADMIN — GABAPENTIN 600 MG: 300 CAPSULE ORAL at 12:12

## 2022-03-24 RX ADMIN — LIDOCAINE HYDROCHLORIDE 1 ML: 10 INJECTION, SOLUTION EPIDURAL; INFILTRATION; INTRACAUDAL; PERINEURAL at 12:14

## 2022-03-24 RX ADMIN — BUPIVACAINE HYDROCHLORIDE AND EPINEPHRINE BITARTRATE 20 ML: 2.5; .005 INJECTION, SOLUTION INFILTRATION; PERINEURAL at 16:59

## 2022-03-24 RX ADMIN — PROPOFOL 100 MCG/KG/MIN: 10 INJECTION, EMULSION INTRAVENOUS at 14:41

## 2022-03-24 RX ADMIN — FENTANYL CITRATE 50 MCG: 50 INJECTION, SOLUTION INTRAMUSCULAR; INTRAVENOUS at 17:12

## 2022-03-24 RX ADMIN — SUGAMMADEX 200 MG: 100 INJECTION, SOLUTION INTRAVENOUS at 16:58

## 2022-03-24 RX ADMIN — FENTANYL CITRATE 50 MCG: 50 INJECTION, SOLUTION INTRAMUSCULAR; INTRAVENOUS at 14:15

## 2022-03-24 RX ADMIN — HYDROMORPHONE HYDROCHLORIDE 0.5 MG: 1 INJECTION, SOLUTION INTRAMUSCULAR; INTRAVENOUS; SUBCUTANEOUS at 17:30

## 2022-03-24 RX ADMIN — DEXAMETHASONE SODIUM PHOSPHATE 8 MG: 4 INJECTION, SOLUTION INTRA-ARTICULAR; INTRALESIONAL; INTRAMUSCULAR; INTRAVENOUS; SOFT TISSUE at 14:32

## 2022-03-24 RX ADMIN — MIDAZOLAM 1 MG: 1 INJECTION INTRAMUSCULAR; INTRAVENOUS at 14:15

## 2022-03-24 NOTE — OP NOTE
[facility]  Operative Note    Pre-operative diagnosis: Incisional hernia of anterior abdominal wall without obstruction or gangrene [K43.2]  BMI 35.0-35.9,adult [Z68.35]   Post-operative diagnosis Incisional hernia of anterior abdominal wall without obstruction or gangrene [K43.2]  BMI 35.0-35.9,adult [Z68.35]  Intra-abdominal adhesions   Procedure: Procedure(s):  Laparoscopic assisted open incisional hernia repair with mesh  possible lysis of adhesions.   Surgeon: Glenn Nicolas MD   Assistants(s): Single scrub tech (needed for expertise and retraction hemostasis and suctioning)   Anesthesia: Combined General with Tap Block    Estimated blood loss: Minimal                Drains: None     Specimens       * No specimens in log *   Implants: 20cm round mesh - Bard coated with Echo PS   Findings: 15x9cm swiss cheese type defects with diastasis.  Intra-abdominal adhesions..   Complications: None.   Condition: Stable       Indications for the procedure:   This is a 61-year-old female who has history of exploratory laparotomy with sigmoid colon resection.  This happened in 2018.  Throughout the years patient's noticed that she is get increased bulge at the very center of her abdomen especially when she uses the abdominal wall.  The CT of the abdomen pelvis was obtained and noted multiple incisional hernia defects superimpose on the diastases of the abdominis rectus.  Risks, benefits, alternatives were explained to the patient.  Patient showed understanding wish to proceed.    Description of procedure:  Patient was probably identified in the preop holding area.  Patient was then brought back to the operative suite placed in the supine position.  Anesthesia was induced per anesthesia protocol.  Patient was then prepped and draped in usual sterile fashion.  A timeout was then done to confirm the correct patient positioning and procedure.  Started procedure by infiltrating local anesthetic to  the area of the left upper quadrant.  Stab incision was then made.  A 5 mm trocar connected to the 5 mm camera was then used to enter the abdomen under direct visualization.  Once confirmed in the abdominal cavity insufflation was then turned on and pneumoperitoneum was then achieved.  Brief glance of the abdomen showed that there are multiple portions of the omentum well adherent to the anterior midline incision and hernia.  Thus I placed 2 additional trocar 1 at the mid left lateral and another at the left lower quadrant both under direct visualization.  The this time a full visualization of the anterior abdominal wall was noted once again there was diastases of the rectus abdominis muscle with superimposed multiple incisional abdominal wall hernias in various width and length.  Utilizing a [place] LigaSure device the omentum was taken down against the abdominal wall until this was completely clean.  At this time insufflation was then reduced and released, I marked the skin where the hernias extend this measured approximately 15 x 9 cm.  Thus I picked out a 20 cm round mesh that has a coverage to the viscera in the echo positioning system.  This time an incision was then made through the old cicatrix with the intention to remove the old cicatrix.  Went down to the anterior rectus fascia and extend caudad and cephalad.  At this time I was able to find the diastases in the strongest part of the anterior rectus fascia.  Starting at the superior aspect I grasped the apex with a Kocher and I began to close the entire defect utilizing a #1 strata fix.  Was done the entire length of the incision.  I made sure to place the mesh inside of the abdomen with the echo positioning loop outside and incorporated within the incision.  At this time I turned on the insufflation inflate the echo positioning system so that the mesh is laying against to the abdominal wall.  Utilizing a secure strap, the mesh was tacked in.  At this  time the echo positioning system was then completely removed.  We checked and noted all the parts were completely removed and intact.  Noted hemostasis was achieved.  Thus at this time pneumoperitoneum was then released.  The plucker ring was released utilizing the electrocautery Bovie.  I did excise the old cicatrix.  The incision was then closed with 3-0 Monocryl in a simple interrupted buried fashion.  Followed by a running 4-0 Monocryl in a continuous running fashion.  All the previous trocar sites were then closed with a 4-0 Monocryl in a simple erupted buried fashion.  Patient tolerated the procedure well.  The entire area was then cleaned and dried.  Exofin was then placed along with a Primapore to the long incision.  Anesthesia is to complete a tap block prior to coming out of the OR.  All counts were correct x2 prior to closing of the fascia.  Patient tolerated procedure well.        [facility] Nicolas, DO    Disclaimer: This note consists of words and symbols derived from keyboarding and dictation using voice recognition software.  As a result, there may be errors that have gone undetected.  Please consider this when interpreting information found in this note.

## 2022-03-24 NOTE — ANESTHESIA PREPROCEDURE EVALUATION
Anesthesia Pre-Procedure Evaluation    Patient: Caro Palmer   MRN: [id number] : 1960        Procedure : Procedure(s):  Laparoscopic assisted open incisional hernia repair with mesh  possible lysis of adhesions.          Past Medical History:   Diagnosis Date     Diverticulitis      Hypertension      Papanicolaou smear of cervix with low grade squamous intraepithelial lesion (LGSIL)      Uncomplicated asthma       Past Surgical History:   Procedure Laterality Date     COLECTOMY LOW ANTERIOR N/A 3/23/2017    Procedure: COLECTOMY LOW ANTERIOR;  Surgeon: Ryan Dailey MD;  [facility]: [facility]     COLONOSCOPY  2012    Procedure:COLONOSCOPY; Colonoscopy ; Surgeon:SHIRA ROWLAND; Location:[facility]     COLONOSCOPY N/A 3/1/2017    Procedure: COLONOSCOPY;  Surgeon: Ryan Dailey MD;  [facility]: [facility]     COLPOSCOPY,BX CERVIX/ENDOCERV CURR  10/19/1999    moderate/severe dysplasia     CONIZATION CERVIX,LOOP ELECTRD  1999     INSERT STENT URETER Bilateral 3/23/2017    Procedure: INSERT STENT URETER (PRE-OP);  Surgeon: Eamon Linn MD;  [facility]     LAPAROSCOPIC ASSISTED COLECTOMY N/A 3/23/2017    Procedure: LAPAROSCOPIC ASSISTED COLECTOMY;  Surgeon: Ryan Dailey MD;  Location: [facility]      Allergies   Allergen Reactions     No Known Drug Allergies       Social History     Tobacco Use     Smoking status: Never Smoker     Smokeless tobacco: Never Used   Substance Use Topics     Alcohol use: Not Currently     Alcohol/week: 0.0 standard drinks     Comment: 1-2 drinks monthly      Wt Readings from Last 1 Encounters:   22 83 kg (183 lb)        Anesthesia Evaluation   Pt has had prior anesthetic. Type: General and MAC.    No history of anesthetic complications       ROS/MED HX  ENT/Pulmonary:     (+) Intermittent, asthma Treatment: Inhaler daily,      Neurologic:  - neg neurologic ROS     Cardiovascular:     (+) hypertension-----Previous cardiac testing   Echo: Date: Results:    Stress  Test: Date: Results:    ECG Reviewed: Date: 02/28/2017 Results:  - NSR  Cath: Date: Results:      METS/Exercise Tolerance:     Hematologic:  - neg hematologic  ROS     Musculoskeletal:  - neg musculoskeletal ROS     GI/Hepatic: Comment: S/p partial resection of colon       Renal/Genitourinary:       Endo:     (+) Obesity,     Psychiatric/Substance Use:  - neg psychiatric ROS     Infectious Disease:       Malignancy:       Other:            Physical Exam    Airway        Mallampati: II   TM distance: > 3 FB   Neck ROM: full   Mouth opening: > 3 cm    Respiratory Devices and Support         Dental       (+) caps and partials    B=Bridge, C=Chipped, L=Loose, M=Missing    Cardiovascular   cardiovascular exam normal          Pulmonary   pulmonary exam normal        breath sounds clear to auscultation           OUTSIDE LABS:  CBC:   Lab Results   Component Value Date    WBC 7.0 03/21/2022    WBC 9.5 03/26/2017    HGB 14.2 03/21/2022    HGB 10.5 (L) 03/26/2017    HCT 42.8 03/21/2022    HCT 32.2 (L) 03/26/2017     03/21/2022     03/26/2017     BMP:   Lab Results   Component Value Date     03/21/2022     08/26/2021    POTASSIUM 4.3 03/21/2022    POTASSIUM 3.6 08/26/2021    CHLORIDE 107 03/21/2022    CHLORIDE 107 08/26/2021    CO2 30 03/21/2022    CO2 27 08/26/2021    BUN 16 03/21/2022    BUN 20 08/26/2021    CR 0.66 03/21/2022    CR 0.72 08/26/2021     (H) 03/21/2022     (H) 08/26/2021     COAGS:   Lab Results   Component Value Date    PTT 28 08/27/2013    INR 1.01 08/27/2013     POC:   Lab Results   Component Value Date    HCG Negative 06/01/2007     HEPATIC:   Lab Results   Component Value Date    ALBUMIN 3.6 01/11/2017    PROTTOTAL 8.1 01/11/2017    ALT 34 01/11/2017    AST 35 01/11/2017    ALKPHOS 80 01/11/2017    BILITOTAL 0.8 01/11/2017     OTHER:   Lab Results   Component Value Date    PH 6.5 02/25/2003    LACT 0.7 01/11/2017    A1C 7.1 (H) 03/21/2022    INGA 9.1 03/21/2022     TSH 1.51 05/22/2014    CRP 18.5 (H) 01/20/2017       Anesthesia Plan    ASA Status:  2      Anesthesia Type: General.     - Airway: ETT   Induction: Intravenous, Propofol.   Maintenance: Balanced.        Consents    Anesthesia Plan(s) and associated risks, benefits, and realistic alternatives discussed. Questions answered and patient/representative(s) expressed understanding.     - Discussed: Risks, Benefits and Alternatives for BOTH SEDATION and the PROCEDURE were discussed     - Discussed with:  Patient      - Extended Intubation/Ventilatory Support Discussed: No.      - Patient is DNR/DNI Status: No    Use of blood products discussed: Yes.     - Discussed with: Patient.     Postoperative Care    Pain management: IV analgesics, Peripheral nerve block (Single Shot) (TAP Block).   PONV prophylaxis: Ondansetron (or other 5HT-3), Dexamethasone or Solumedrol, Background Propofol Infusion     Comments:    Other Comments: The risks and benefits of anesthesia, and the alternatives where applicable, have been discussed with the patient, and they wish to proceed.    Transverse Abdominal Plane Block             ARIANNA Gorman CRNA

## 2022-03-24 NOTE — INTERVAL H&P NOTE
"I have reviewed the surgical (or preoperative) H&P that is linked to this encounter, and examined the patient. There are no significant changes    Clinical Conditions Present on Arrival:              # Platelet Defect: home medication list includes an antiplatelet medication  # Diabetes, type II: last A1C 7.1 % (Ref range: 0.0 - 5.6 %)  # Obesity: Estimated body mass index is 34.02 kg/m  as calculated from the following:    Height as of 3/21/22: 1.562 m (5' 1.5\").    Weight as of 3/4/22: 83 kg (183 lb).     [facility] Nicolas, DO    "

## 2022-03-24 NOTE — ANESTHESIA CARE TRANSFER NOTE
Patient: Caro Palmer    Procedure: Procedure(s):  Laparoscopic assisted open incisional hernia repair with mesh  possible lysis of adhesions.       Diagnosis: Incisional hernia of anterior abdominal wall without obstruction or gangrene [K43.2]  BMI 35.0-35.9,adult [Z68.35]  Diagnosis Additional Information: No value filed.    Anesthesia Type:   General     Note:    Oropharynx: oropharynx clear of all foreign objects and spontaneously breathing  Level of Consciousness: drowsy  Oxygen Supplementation: face mask  Level of Supplemental Oxygen (L/min / FiO2): 6  Independent Airway: airway patency satisfactory and stable  Dentition: dentition unchanged  Vital Signs Stable: post-procedure vital signs reviewed and stable  Report to RN Given: handoff report given  Patient transferred to: PACU    Handoff Report: Identifed the Patient, Identified the Reponsible Provider, Reviewed the pertinent medical history, Discussed the surgical course, Reviewed Intra-OP anesthesia mangement and issues during anesthesia, Set expectations for post-procedure period and Allowed opportunity for questions and acknowledgement of understanding      Vitals:  Vitals Value Taken Time   /101 03/24/22 1705   Temp     Pulse 110 03/24/22 1707   Resp 21 03/24/22 1707   SpO2 95 % 03/24/22 1707   Vitals shown include unvalidated device data.    Electronically Signed By: ARIANNA Gorman CRNA  March 24, 2022  5:08 PM

## 2022-03-24 NOTE — ANESTHESIA PROCEDURE NOTES
TAP Procedure Note    Pre-Procedure   Staff -        Performed By: CRNA       Location: OR       Procedure Start/Stop Times: 3/24/2022 4:37 PM and 3/24/2022 4:59 PM       Pre-Anesthestic Checklist: patient identified, IV checked, site marked, risks and benefits discussed, informed consent, monitors and equipment checked, pre-op evaluation, at physician/surgeon's request and post-op pain management  Timeout:       Correct Patient: Yes        Correct Procedure: Yes        Correct Site: Yes        Correct Position: Yes        Correct Laterality: Yes        Site Marked: Yes  Procedure Documentation  Procedure: TAP       Laterality: bilateral       Patient Position: supine       Patient Prep/Sterile Barriers: sterile gloves, mask       Skin prep: Chloraprep       Needle Type: insulated       Needle Gauge: 22.        Needle Length (millimeters): 100        Ultrasound guided       1. Ultrasound was used to identify targeted nerve, plexus, vascular marker, or fascial plane and place a needle adjacent to it in real-time.       2. Ultrasound was used to visualize the spread of anesthetic in close proximity to the above referenced structure.       3. A permanent image is entered into the patient's record.       4. The visualized anatomic structures appeared normal.       5. There were no apparent abnormal pathologic findings.    Assessment/Narrative         The placement was negative for: blood aspirated, painful injection and site bleeding       Paresthesias: No.    Test dose of mL at.         Test dose negative, 3 minutes after injection, for signs of intravascular, subdural, or intrathecal injection.     Bolus given via needle..        Secured via.        Insertion/Infusion Method: Single Shot       Complications: none       Injection made incrementally with aspirations every 5 mL.     Comments:  Pt tolerated the procedure well as under General Anesthesia.  There was good visualization of the space between the internal  oblique and the transverses abdominis.  There was also good visualization of fluid dissection in this layer.  No complications were noted.  I will follow up with this pt if needed.

## 2022-03-24 NOTE — ANESTHESIA PROCEDURE NOTES
Airway       Patient location during procedure: OR       Procedure Start/Stop Times: 3/24/2022 2:26 PM  Staff -        CRNA: Leoncio Rodriguez APRN CRNA       Performed By: CRNA  Consent for Airway        Urgency: elective  Indications and Patient Condition       Indications for airway management: marcelino-procedural       Induction type:intravenous       Mask difficulty assessment: 1 - vent by mask    Final Airway Details       Final airway type: endotracheal airway       Successful airway: ETT - single  Endotracheal Airway Details        ETT size (mm): 6.5       Cuffed: yes       Successful intubation technique: direct laryngoscopy       DL Blade Type: MAC 3       Grade View of Cords: 1       Adjucts: stylet       Position: Right       Measured from: lips       Secured at (cm): 20       Bite block used: Oral Airway    Post intubation assessment        Placement verified by: capnometry, equal breath sounds and chest rise        Number of attempts at approach: 1       Number of other approaches attempted: 0       Secured with: plastic tape       Ease of procedure: easy       Dentition: Intact and Unchanged

## 2022-03-24 NOTE — DISCHARGE INSTRUCTIONS
[facility]    Home Care Following Hernia Repair (Open or Laparoscopic)    Dr. Nicolas        Care of the Incision:    Remove gauze dressing (if present) after 48 hours (Saturday Evening) and then it is ok to shower,   Gently pat your incision dry with a freshly laundered towel. Do not submerge your incision under water for at least 2 weeks (tub bath or hot tub)    Do not touch your incision with bare hands or pick at scabs.    Leave your incision open to air.  Cover it only if clothing rubs or irritates it.        *    Wear your abdominal binder for comfort          *   Perform Cough and Deep Breath Exercises at least 10 times every hour while awake for next couple days.    Activity:    Gradually increase your activity.  Walk short distances several times each day and increase the distance as your strength allows.    To promote circulation, do not cross your legs while sitting.    No strenuous lifting or straining for 2 weeks.   Do not lift anything over 20 pounds for 2 weeks.    Return to work will be determined by the type of work you do and should be discussed with your physician.    Do not drive or operate equipment while taking prescription pain medicines.  You may drive 1 week after surgery if you have stopped taking prescription pain medicines and are pain-free enough to react quickly and make an emergency stop if necessary.    Diet:    Return to the diet you were on before surgery.    Drink plenty of  water.    Avoid foods that cause constipation.      REMEMBER--most prescription pain pills cause constipation.  Walking, extra fluids, and increased fiber (fresh fruits and vegetables, etc.) are natural remedies for constipation.  You can also take mineral oil, 1-2 Tablespoons per day.  If still constipated you may try a stool softener such as Colace or Miralax.    Call Your Physician if You Have:    Redness, increased swelling or cloudy drainage from your incision.    A temperature of more than 101  degrees F.    Worsening pain in your incision not relieved by your prescription pain pills and/or a short rest.    Any questions or concerns about your recovery, please call Business hours [phone]    After hours [phone] Nurse Advice Line (24 hours a day)      [facility]   Adult Discharge Orders & Instructions Following Anesthesia    For 24 hours after surgery    1. Get plenty of rest.  A responsible adult must stay with you for at least 24 hours after you leave the hospital.   2. Do not drive or use heavy equipment.  If you have weakness or tingling, don't drive or use heavy equipment until this feeling goes away.  3. Do not drink alcohol.  4. Avoid strenuous or risky activities.  Ask for help when climbing stairs.   5. You may feel lightheaded.  IF so, sit for a few minutes before standing.  Have someone help you get up.   6. If you have nausea (feel sick to your stomach): Drink only clear liquids such as apple juice, ginger ale, broth or 7-Up.  Rest may also help.  Be sure to drink enough fluids.  Move to a regular diet as you feel able.  7. You may have a slight fever. Call the doctor if your fever is over 100 F (37.7 C) (taken under the tongue) or lasts longer than 24 hours.  8. You may have a dry mouth, a sore throat, muscle aches or trouble sleeping.  These should go away after 24 hours.  9. Do not make important or legal decisions.

## 2022-03-24 NOTE — INTERVAL H&P NOTE
"I have reviewed the surgical (or preoperative) H&P that is linked to this encounter, and examined the patient. There are no significant changes    Clinical Conditions Present on Arrival:  SECTIONPRESENTONADMISSIONBEGIN@                 # Platelet Defect: home medication list includes an antiplatelet medication  # Diabetes, type II: last A1C 7.1 % (Ref range: 0.0 - 5.6 %)  # Obesity: Estimated body mass index is 34.02 kg/m  as calculated from the following:    Height as of 3/21/22: 1.562 m (5' 1.5\").    Weight as of 3/4/22: 83 kg (183 lb).       "

## 2022-03-24 NOTE — BRIEF OP NOTE
"[facility]    Brief Operative Note    Pre-operative diagnosis: Incisional hernia of anterior abdominal wall without obstruction or gangrene [K43.2]  BMI 35.0-35.9,adult [Z68.35]  Post-operative diagnosis incisional hernias, intra-abdominal adhesions, BMI 36, hx of open bowel resection    Procedure: Procedure(s):  Laparoscopic assisted open incisional hernia repair with mesh  possible lysis of adhesions; excision of previous scar   Surgeon: Surgeon(s) and Role:     * Maria Isabel, Mati-MD Dalila - Primary  Anesthesia: Combined General with Tap Block   Estimated Blood Loss: 15cc    Drains: None  Specimens: * No specimens in log *  Findings:   Entire previous incision had Swiss cheese type defect and diastases.  The entire area combined together was approximately 15 x 9 cm.  There were numerous abdominal wall adhesions of omentum adherent against this.  Thus, I opened the previous incision, closed the entire fascia and as much diastases as possible.  I placed the mesh through the open incision prior to the closure and the mesh was tacked in place with a secure strap.  Complications: None.  Implants:   Implant Name Type Inv. Item Serial No.  Lot No. LRB No. Used Action   MESH VENTRALIGHT ST W/ECHO POS SYSTEM 8\" [place] [id number] - [id number] Mesh MESH VENTRALIGHT ST W/ECHO POS SYSTEM 8\" [place] [id number]  EMELINA [employer] [facility] [id number] N/A 1 Implanted           "

## 2022-03-25 ENCOUNTER — TELEPHONE (OUTPATIENT)
Dept: FAMILY MEDICINE | Facility: CLINIC | Age: 62
End: 2022-03-25
Payer: COMMERCIAL

## 2022-03-25 NOTE — TELEPHONE ENCOUNTER
Diabetes Education Scheduling Outreach #1:    Call to patient to schedule. Left message with phone number to call to schedule.    Plan for 2nd outreach attempt within 1 week.    Antonieta Lamb  [facility]  Diabetes and Nutrition Scheduling

## 2022-03-25 NOTE — ANESTHESIA POSTPROCEDURE EVALUATION
Patient: Caro Palmer    Procedure: Procedure(s):  Laparoscopic assisted open incisional hernia repair with mesh  possible lysis of adhesions.       Anesthesia Type:  General    Note:  Disposition: Outpatient   Postop Pain Control: Uneventful            Sign Out: Well controlled pain   PONV: No   Neuro/Psych: Uneventful            Sign Out: Acceptable/Baseline neuro status   Airway/Respiratory: Uneventful            Sign Out: Acceptable/Baseline resp. status   CV/Hemodynamics: Uneventful            Sign Out: Acceptable CV status   Other NRE: NONE   DID A NON-ROUTINE EVENT OCCUR? No    Event details/Postop Comments:  Pt was happy with anesthesia care.  No complications.  I will follow up with the pt if needed.           Last vitals:  Vitals Value Taken Time   /80 03/24/22 1745   Temp 98.24  F (36.8  C) 03/24/22 1752   Pulse 92 03/24/22 1752   Resp 20 03/24/22 1752   SpO2 95 % 03/24/22 1752   Vitals shown include unvalidated device data.    Electronically Signed By: ARIANNA Gorman CRNA  March 25, 2022  6:24 AM

## 2022-03-28 NOTE — PROGRESS NOTES
"Type of surgery/procedure:  Hernia   Date of surgery:  3/24  Are you experiencing pain in surgical area? {YES/NO/WHERE:102782}  Have you had a temperature since surgery? {YES/HIGH/OFTEN:051021}  Incision closed with {CLOSURE:904758::\"staples\"}.  Appearance: {INCISION APPEARANCE:059894}  Drainage: {YES/COLOR:667855::\"No\"}  Are there any other problems you would like to discuss?  ***  Reviewed incision care with the patient? ***      "

## 2022-04-01 ENCOUNTER — OFFICE VISIT (OUTPATIENT)
Dept: SURGERY | Facility: CLINIC | Age: 62
End: 2022-04-01
Payer: COMMERCIAL

## 2022-04-01 VITALS
SYSTOLIC BLOOD PRESSURE: 124 MMHG | DIASTOLIC BLOOD PRESSURE: 82 MMHG | BODY MASS INDEX: 32.76 KG/M2 | TEMPERATURE: 98.1 F | HEIGHT: 62 IN | WEIGHT: 178 LBS

## 2022-04-01 DIAGNOSIS — Z98.890 S/P HERNIORRHAPHY: Primary | ICD-10-CM

## 2022-04-01 DIAGNOSIS — Z87.19 S/P HERNIORRHAPHY: Primary | ICD-10-CM

## 2022-04-01 DIAGNOSIS — Z90.49 S/P PARTIAL RESECTION OF COLON: ICD-10-CM

## 2022-04-01 PROCEDURE — 99024 POSTOP FOLLOW-UP VISIT: CPT | Performed by: SURGERY

## 2022-04-01 NOTE — PROGRESS NOTES
"  Assessment & Plan   Problem List Items Addressed This Visit        Other    S/P partial resection of colon      Other Visit Diagnoses     S/P herniorrhaphy    -  Primary    BMI 33.0-33.9,adult             Doing well  Minimal pain  Incisions are CDI  No signs of seroma  Reassured the patient that the firmness around the incision is completely normal.  Continue to wear the abdominal binder for the next 4 to 6 weeks.  Discussed restrictions of no lifting more than 20lbs for 4-6 weeks from DOS  Recommend that patient continue her healthy lifestyle to further improve her weight loss.  Patient is overall very happy with the result  All questions were answered.        BMI:   Estimated body mass index is 33.09 kg/m  as calculated from the following:    Height as of this encounter: 1.562 m (5' 1.5\").    Weight as of this encounter: 80.7 kg (178 lb).       No follow-ups on file.    MatiDamien Nicolas MD  [facility]   Caro Palmer is a 61 year old who presents for the following health issues:    Open assisted laparoscopic incisional hernia repair with mesh and DAREN on 3/24/2022  Doing well   Pain well-tolerated.  Having normal bowel movement.  Overall happy with her result.  Is lost a few more pounds since I last saw her with intermittent fasting.  Patient plans to continue doing this.       Review of Systems   n/a      Objective    /82   Temp 98.1  F (36.7  C) (Temporal)   Ht 1.562 m (5' 1.5\")   Wt 80.7 kg (178 lb)   LMP 11/22/2012   BMI 33.09 kg/m    Body mass index is 33.09 kg/m .  Physical Exam   Abdomen: Midline incision clean dry and intact.  No signs of any bulging.  There is some firmness along the entire incision which is normal.  No signs of seroma, hematoma or ecchymosis.            "

## 2022-04-01 NOTE — LETTER
"    4/1/2022         RE: Caro Palmer  [address]  [place] [address]        Dear Colleague,    Thank you for referring your patient, Caro Palmer, to the [facility]. Please see a copy of my visit note below.      Assessment & Plan   Problem List Items Addressed This Visit        Other    S/P partial resection of colon      Other Visit Diagnoses     S/P herniorrhaphy    -  Primary    BMI 33.0-33.9,adult             Doing well  Minimal pain  Incisions are CDI  No signs of seroma  Reassured the patient that the firmness around the incision is completely normal.  Continue to wear the abdominal binder for the next 4 to 6 weeks.  Discussed restrictions of no lifting more than 20lbs for 4-6 weeks from DOS  Recommend that patient continue her healthy lifestyle to further improve her weight loss.  Patient is overall very happy with the result  All questions were answered.        BMI:   Estimated body mass index is 33.09 kg/m  as calculated from the following:    Height as of this encounter: 1.562 m (5' 1.5\").    Weight as of this encounter: 80.7 kg (178 lb).       No follow-ups on file.    MatiDamien Nicolas MD  [facility]    Subjective   Caro Palmer is a 61 year old who presents for the following health issues:    Open assisted laparoscopic incisional hernia repair with mesh and DAREN on 3/24/2022  Doing well   Pain well-tolerated.  Having normal bowel movement.  Overall happy with her result.  Is lost a few more pounds since I last saw her with intermittent fasting.  Patient plans to continue doing this.       Review of Systems   n/a      Objective    /82   Temp 98.1  F (36.7  C) (Temporal)   Ht 1.562 m (5' 1.5\")   Wt 80.7 kg (178 lb)   LMP 11/22/2012   BMI 33.09 kg/m    Body mass index is 33.09 kg/m .  Physical Exam   Abdomen: Midline incision clean dry and intact.  No signs of any bulging.  There is some firmness along the entire incision which is " normal.  No signs of seroma, hematoma or ecchymosis.                Again, thank you for allowing me to participate in the care of your patient.        Sincerely,        Glenn Nicolas MD

## 2022-04-01 NOTE — TELEPHONE ENCOUNTER
Diabetes Education Scheduling Outreach #2:    [facility] message sent to patient requesting to call to schedule.    [facility]  Diabetes and Nutrition Scheduling

## 2022-04-07 PROBLEM — E11.9 DIABETES MELLITUS, TYPE 2 (H): Status: ACTIVE | Noted: 2022-04-07

## 2022-04-13 DIAGNOSIS — F41.1 GENERALIZED ANXIETY DISORDER: ICD-10-CM

## 2022-04-14 RX ORDER — ALPRAZOLAM 0.25 MG
TABLET ORAL
Qty: 30 TABLET | Refills: 0 | Status: SHIPPED | OUTPATIENT
Start: 2022-04-14 | End: 2022-05-19

## 2022-05-09 ENCOUNTER — MYC MEDICAL ADVICE (OUTPATIENT)
Dept: FAMILY MEDICINE | Facility: CLINIC | Age: 62
End: 2022-05-09
Payer: COMMERCIAL

## 2022-05-09 NOTE — TELEPHONE ENCOUNTER
Please abstract the following data from this visit with this patient into the appropriate field in Epic:    Tests that can be patient reported without a hard copy:    Eye exam with ophthalmology on this date: 02/22/22 , [facility], [facility]    Other Tests found in the patient's chart through Chart Review/Care Everywhere:        Note to Abstraction: If this section is blank, no results were found via Chart Review/Care Everywhere.

## 2022-05-18 DIAGNOSIS — F41.1 GENERALIZED ANXIETY DISORDER: ICD-10-CM

## 2022-05-18 NOTE — TELEPHONE ENCOUNTER
Xanax      Last Written Prescription Date:  04/14/2022  Last Fill Quantity: 30,   # refills: 0  Last Office Visit:   Future Office visit:   None  Routing refill request to provider for review/approval because:  Drug not on the [facility], [facility] or [facility] refill protocol or controlled substance    Eri Broderick Rn

## 2022-05-19 RX ORDER — ALPRAZOLAM 0.25 MG
TABLET ORAL
Qty: 30 TABLET | Refills: 0 | Status: SHIPPED | OUTPATIENT
Start: 2022-05-19 | End: 2022-06-29

## 2022-06-26 DIAGNOSIS — F41.1 GENERALIZED ANXIETY DISORDER: ICD-10-CM

## 2022-06-27 NOTE — TELEPHONE ENCOUNTER
Requested Prescriptions   Pending Prescriptions Disp Refills     ALPRAZolam (XANAX) 0.25 MG tablet [Pharmacy Med Name: ALPRAZOLAM 0.25MG TABS] 30 tablet 0     Sig: TAKE ONE TABLET BY MOUTH THREE TIMES A DAY AS NEEDED FOR ANXIETY       There is no refill protocol information for this order        Last Written Prescription Date:  5/19/2022  Last Fill Quantity: 30,  # refills: 0   Last office visit: 3/21/2022 with prescribing provider:     Future Office Visit:

## 2022-06-29 RX ORDER — ALPRAZOLAM 0.25 MG
TABLET ORAL
Qty: 30 TABLET | Refills: 0 | Status: SHIPPED | OUTPATIENT
Start: 2022-06-29 | End: 2022-08-11

## 2022-07-07 ENCOUNTER — NURSE TRIAGE (OUTPATIENT)
Dept: FAMILY MEDICINE | Facility: CLINIC | Age: 62
End: 2022-07-07

## 2022-07-07 NOTE — TELEPHONE ENCOUNTER
Reason for Call:  Other appointment    Detailed comments: Patient wants mole looked at and stomach pains for a few weeks would like appointment next week in [place] with any provider if possible.    Phone Number Patient can be reached at: Home number on file [phone] (home)    Best Time: Anytime    Can we leave a detailed message on this number? YES    Call taken on 7/7/2022 at 10:11 AM by Chela Mehta

## 2022-07-08 ENCOUNTER — HOSPITAL ENCOUNTER (EMERGENCY)
Facility: CLINIC | Age: 62
Discharge: ANOTHER HOSPITAL | End: 2022-07-09
Attending: EMERGENCY MEDICINE | Admitting: EMERGENCY MEDICINE
Payer: COMMERCIAL

## 2022-07-08 ENCOUNTER — OFFICE VISIT (OUTPATIENT)
Dept: FAMILY MEDICINE | Facility: CLINIC | Age: 62
End: 2022-07-08
Payer: COMMERCIAL

## 2022-07-08 ENCOUNTER — HOSPITAL ENCOUNTER (OUTPATIENT)
Dept: CT IMAGING | Facility: CLINIC | Age: 62
Discharge: HOME | End: 2022-07-08
Attending: FAMILY MEDICINE | Admitting: FAMILY MEDICINE
Payer: COMMERCIAL

## 2022-07-08 VITALS
DIASTOLIC BLOOD PRESSURE: 72 MMHG | RESPIRATION RATE: 20 BRPM | TEMPERATURE: 98.2 F | HEART RATE: 100 BPM | SYSTOLIC BLOOD PRESSURE: 116 MMHG

## 2022-07-08 DIAGNOSIS — I10 ESSENTIAL HYPERTENSION WITH GOAL BLOOD PRESSURE LESS THAN 140/90: ICD-10-CM

## 2022-07-08 DIAGNOSIS — R10.84 ABDOMINAL PAIN, GENERALIZED: Primary | ICD-10-CM

## 2022-07-08 DIAGNOSIS — E11.9 TYPE 2 DIABETES MELLITUS WITHOUT COMPLICATION, WITHOUT LONG-TERM CURRENT USE OF INSULIN (H): ICD-10-CM

## 2022-07-08 DIAGNOSIS — Z80.41 FAMILY HISTORY OF MALIGNANT NEOPLASM OF OVARY: ICD-10-CM

## 2022-07-08 DIAGNOSIS — R10.84 ABDOMINAL PAIN, GENERALIZED: ICD-10-CM

## 2022-07-08 DIAGNOSIS — R10.2 ABDOMINAL PAIN, SUPRAPUBIC: ICD-10-CM

## 2022-07-08 DIAGNOSIS — K65.1 INTRA-ABDOMINAL ABSCESS (H): ICD-10-CM

## 2022-07-08 DIAGNOSIS — Z80.0 FAMILY HISTORY OF PANCREATIC CANCER: ICD-10-CM

## 2022-07-08 DIAGNOSIS — N73.9 PELVIC ABSCESS IN FEMALE: ICD-10-CM

## 2022-07-08 LAB
ALBUMIN SERPL-MCNC: 3.4 G/DL (ref 3.4–5)
ALP SERPL-CCNC: 82 U/L (ref 40–150)
ALT SERPL W P-5'-P-CCNC: 27 U/L (ref 0–50)
ANION GAP SERPL CALCULATED.3IONS-SCNC: 6 MMOL/L (ref 3–14)
AST SERPL W P-5'-P-CCNC: 15 U/L (ref 0–45)
BILIRUB SERPL-MCNC: 0.5 MG/DL (ref 0.2–1.3)
BUN SERPL-MCNC: 10 MG/DL (ref 7–30)
CALCIUM SERPL-MCNC: 8.6 MG/DL (ref 8.5–10.1)
CHLORIDE BLD-SCNC: 99 MMOL/L (ref 94–109)
CHOLEST SERPL-MCNC: 135 MG/DL
CO2 SERPL-SCNC: 29 MMOL/L (ref 20–32)
CREAT SERPL-MCNC: 0.74 MG/DL (ref 0.52–1.04)
ERYTHROCYTE [DISTWIDTH] IN BLOOD BY AUTOMATED COUNT: 13 % (ref 10–15)
FASTING STATUS PATIENT QL REPORTED: ABNORMAL
GFR SERPL CREATININE-BSD FRML MDRD: >90 ML/MIN/1.73M2
GLUCOSE BLD-MCNC: 148 MG/DL (ref 70–99)
HBA1C MFR BLD: 7.2 % (ref 0–5.6)
HCT VFR BLD AUTO: 35 % (ref 35–47)
HDLC SERPL-MCNC: 45 MG/DL
HGB BLD-MCNC: 11.6 G/DL (ref 11.7–15.7)
LDLC SERPL CALC-MCNC: 72 MG/DL
LIPASE SERPL-CCNC: 110 U/L (ref 73–393)
MCH RBC QN AUTO: 30.4 PG (ref 26.5–33)
MCHC RBC AUTO-ENTMCNC: 33.1 G/DL (ref 31.5–36.5)
MCV RBC AUTO: 92 FL (ref 78–100)
NONHDLC SERPL-MCNC: 90 MG/DL
PLATELET # BLD AUTO: 388 10E3/UL (ref 150–450)
POTASSIUM BLD-SCNC: 3.8 MMOL/L (ref 3.4–5.3)
PROT SERPL-MCNC: 8 G/DL (ref 6.8–8.8)
RBC # BLD AUTO: 3.82 10E6/UL (ref 3.8–5.2)
SARS-COV-2 RNA RESP QL NAA+PROBE: NEGATIVE
SODIUM SERPL-SCNC: 134 MMOL/L (ref 133–144)
TRIGL SERPL-MCNC: 90 MG/DL
TSH SERPL DL<=0.005 MIU/L-ACNC: 0.91 MU/L (ref 0.4–4)
WBC # BLD AUTO: 19.9 10E3/UL (ref 4–11)

## 2022-07-08 PROCEDURE — 83036 HEMOGLOBIN GLYCOSYLATED A1C: CPT | Performed by: FAMILY MEDICINE

## 2022-07-08 PROCEDURE — 80053 COMPREHEN METABOLIC PANEL: CPT | Performed by: FAMILY MEDICINE

## 2022-07-08 PROCEDURE — 85027 COMPLETE CBC AUTOMATED: CPT | Performed by: FAMILY MEDICINE

## 2022-07-08 PROCEDURE — 250N000009 HC RX 250: Performed by: FAMILY MEDICINE

## 2022-07-08 PROCEDURE — 96365 THER/PROPH/DIAG IV INF INIT: CPT | Mod: 59 | Performed by: EMERGENCY MEDICINE

## 2022-07-08 PROCEDURE — 99214 OFFICE O/P EST MOD 30 MIN: CPT | Performed by: FAMILY MEDICINE

## 2022-07-08 PROCEDURE — 86304 IMMUNOASSAY TUMOR CA 125: CPT | Performed by: FAMILY MEDICINE

## 2022-07-08 PROCEDURE — 99284 EMERGENCY DEPT VISIT MOD MDM: CPT | Mod: CS,25 | Performed by: FAMILY MEDICINE

## 2022-07-08 PROCEDURE — 96367 TX/PROPH/DG ADDL SEQ IV INF: CPT | Performed by: EMERGENCY MEDICINE

## 2022-07-08 PROCEDURE — 87635 SARS-COV-2 COVID-19 AMP PRB: CPT | Performed by: EMERGENCY MEDICINE

## 2022-07-08 PROCEDURE — 258N000003 HC RX IP 258 OP 636: Performed by: EMERGENCY MEDICINE

## 2022-07-08 PROCEDURE — 96367 TX/PROPH/DG ADDL SEQ IV INF: CPT | Mod: CS | Performed by: FAMILY MEDICINE

## 2022-07-08 PROCEDURE — C9803 HOPD COVID-19 SPEC COLLECT: HCPCS | Performed by: FAMILY MEDICINE

## 2022-07-08 PROCEDURE — 99000 SPECIMEN HANDLING OFFICE-LAB: CPT | Performed by: FAMILY MEDICINE

## 2022-07-08 PROCEDURE — 84443 ASSAY THYROID STIM HORMONE: CPT | Performed by: FAMILY MEDICINE

## 2022-07-08 PROCEDURE — 86301 IMMUNOASSAY TUMOR CA 19-9: CPT | Mod: 90 | Performed by: FAMILY MEDICINE

## 2022-07-08 PROCEDURE — 83690 ASSAY OF LIPASE: CPT | Performed by: FAMILY MEDICINE

## 2022-07-08 PROCEDURE — C9803 HOPD COVID-19 SPEC COLLECT: HCPCS | Performed by: EMERGENCY MEDICINE

## 2022-07-08 PROCEDURE — 36415 COLL VENOUS BLD VENIPUNCTURE: CPT | Performed by: FAMILY MEDICINE

## 2022-07-08 PROCEDURE — 250N000011 HC RX IP 250 OP 636: Performed by: EMERGENCY MEDICINE

## 2022-07-08 PROCEDURE — 99284 EMERGENCY DEPT VISIT MOD MDM: CPT | Mod: CS | Performed by: EMERGENCY MEDICINE

## 2022-07-08 PROCEDURE — 250N000011 HC RX IP 250 OP 636: Performed by: FAMILY MEDICINE

## 2022-07-08 PROCEDURE — 99285 EMERGENCY DEPT VISIT HI MDM: CPT | Mod: CS,25 | Performed by: EMERGENCY MEDICINE

## 2022-07-08 PROCEDURE — 96365 THER/PROPH/DIAG IV INF INIT: CPT | Performed by: FAMILY MEDICINE

## 2022-07-08 PROCEDURE — 80061 LIPID PANEL: CPT | Performed by: FAMILY MEDICINE

## 2022-07-08 PROCEDURE — 74177 CT ABD & PELVIS W/CONTRAST: CPT

## 2022-07-08 RX ORDER — IOPAMIDOL 755 MG/ML
200 INJECTION, SOLUTION INTRAVASCULAR ONCE
Status: COMPLETED | OUTPATIENT
Start: 2022-07-08 | End: 2022-07-08

## 2022-07-08 RX ORDER — SODIUM CHLORIDE 9 MG/ML
INJECTION, SOLUTION INTRAVENOUS CONTINUOUS
Status: DISCONTINUED | OUTPATIENT
Start: 2022-07-08 | End: 2022-07-09 | Stop reason: HOSPADM

## 2022-07-08 RX ADMIN — SODIUM CHLORIDE 60 ML: 9 INJECTION, SOLUTION INTRAVENOUS at 16:12

## 2022-07-08 RX ADMIN — IOPAMIDOL 85 ML: 755 INJECTION, SOLUTION INTRAVENOUS at 16:11

## 2022-07-08 RX ADMIN — SODIUM CHLORIDE 500 ML: 9 INJECTION, SOLUTION INTRAVENOUS at 19:23

## 2022-07-08 RX ADMIN — TAZOBACTAM SODIUM AND PIPERACILLIN SODIUM 4.5 G: 500; 4 INJECTION, SOLUTION INTRAVENOUS at 17:06

## 2022-07-08 ASSESSMENT — PATIENT HEALTH QUESTIONNAIRE - PHQ9
SUM OF ALL RESPONSES TO PHQ QUESTIONS 1-9: 13
10. IF YOU CHECKED OFF ANY PROBLEMS, HOW DIFFICULT HAVE THESE PROBLEMS MADE IT FOR YOU TO DO YOUR WORK, TAKE CARE OF THINGS AT HOME, OR GET ALONG WITH OTHER PEOPLE: EXTREMELY DIFFICULT
SUM OF ALL RESPONSES TO PHQ QUESTIONS 1-9: 13

## 2022-07-08 ASSESSMENT — ENCOUNTER SYMPTOMS
VOMITING: 1
DIFFICULTY URINATING: 0
ABDOMINAL PAIN: 1
NAUSEA: 1
VOMITING: 1
FATIGUE: 0
APPETITE CHANGE: 1
LIGHT-HEADEDNESS: 0
SHORTNESS OF BREATH: 0
CHILLS: 0
DYSURIA: 0
FEVER: 0
RESPIRATORY NEGATIVE: 1
HEADACHES: 0
COUGH: 0

## 2022-07-08 ASSESSMENT — PAIN SCALES - GENERAL: PAINLEVEL: EXTREME PAIN (9)

## 2022-07-08 NOTE — ED PROVIDER NOTES
History     Chief Complaint   Patient presents with     Abdominal Pain     HPI  Caro Palmer is a 61 year old female who has a history significant for partial bowel resection with subsequent development of a ventral hernia status postrepair with mesh in March of this year.  She is referred today to the emergency department due to concern of abdominal abscess.  The patient reports onset of abdominal pain described as flulike symptoms starting 2 weeks ago.  This has been in the epigastrium and periumbilical region.  This is been nonradiating rated at moderate to significant intensity.  Patient reports she has had recurrent nausea and vomiting difficult to keep food down.  She has become weak.  She reports no similar symptoms in the past.  No high fevers or whole body shaking chills.  She reports no lower GI symptoms such as diarrhea, constipation, melena, hematochezia.  She did present to her primary care physician today where laboratory studies were obtained demonstrating a leukocytosis.  She subsequently proceeded to CT scan demonstrating a 6 cm intra-abdominal abscess and was then referred to the emergency department for consideration of further management.    Allergies:  Allergies   Allergen Reactions     No Known Drug Allergies        Problem List:    Patient Active Problem List    Diagnosis Date Noted     Diabetes mellitus, type 2 (H) 04/07/2022     Priority: Medium     Morbid obesity (H) 03/01/2022     Priority: Medium     Mild intermittent asthma without complication 07/08/2018     Priority: Medium     S/P partial resection of colon 03/23/2017     Priority: Medium     Advanced care planning/counseling discussion 01/20/2017     Priority: Medium     Diverticulitis of intestine with abscess 01/11/2017     Priority: Medium     Leukocytosis 01/11/2017     Priority: Medium     Essential hypertension with goal blood pressure less than 140/90 06/17/2016     Priority: Medium     Family history of pancreatic cancer  05/21/2013     Priority: Medium     Chronic rhinitis 11/08/2012     Priority: Medium     CARDIOVASCULAR SCREENING; LDL GOAL LESS THAN 160 10/31/2010     Priority: Medium     Generalized anxiety disorder 04/27/2009     Priority: Medium     S/P LEEP (status post loop electrosurgical excision procedure)      Priority: Medium     1999 LEEP Mod/severe dysplasia  Plan: cotest at 12 and 24 mo, then cotest Q3y X 20 years (until 2019).  NIL paps annually from  [id number] 4/24/08 NIL/neg HR HPV  NIL paps annually from 2009 - 2013 5/22/14 NIL pap, neg HR HPV.   6/17/16 NIL pap, neg HR HPV. Plan: cotest in 3 years  6/19/17 NIL pap, neg HR HPV. Plan: cotest in 1 year.  8/6/18 NIL pap, neg HR HPV. Plan: cotest in 3 years.  7/12/19 Pap: NIL/neg HR HPV. Plan cotest in 3 years          Past Medical History:    Past Medical History:   Diagnosis Date     Diabetes mellitus, type 2 (H) 4/7/2022     Diverticulitis      Hypertension      Papanicolaou smear of cervix with low grade squamous intraepithelial lesion (LGSIL)      Uncomplicated asthma        Past Surgical History:    Past Surgical History:   Procedure Laterality Date     COLECTOMY LOW ANTERIOR N/A 3/23/2017    Procedure: COLECTOMY LOW ANTERIOR;  Surgeon: Ryan Dailey MD;  Location:  OR     COLONOSCOPY  2/27/2012    Procedure:COLONOSCOPY; Colonoscopy ; Surgeon:SHIRA ROWLAND; Location: GI     COLONOSCOPY N/A 3/1/2017    Procedure: COLONOSCOPY;  Surgeon: Ryan Dailey MD;  Location:  GI     COLPOSCOPY,BX CERVIX/ENDOCERV CURR  10/19/1999    moderate/severe dysplasia     CONIZATION CERVIX,LOOP ELECTRD  11/29/1999     INSERT STENT URETER Bilateral 3/23/2017    Procedure: INSERT STENT URETER (PRE-OP);  Surgeon: Eamon Linn MD;  Location:  OR     LAPAROSCOPIC ASSISTED COLECTOMY N/A 3/23/2017    Procedure: LAPAROSCOPIC ASSISTED COLECTOMY;  Surgeon: Ryan Dailey MD;  Location:  OR     LAPAROSCOPIC HERNIORRHAPHY INCISIONAL N/A 3/24/2022    Procedure:  Laparoscopic assisted open incisional hernia repair with mesh;  Surgeon: Glenn Nicolas MD;  Location: [facility] OR     LAPAROSCOPIC LYSIS ADHESIONS N/A 3/24/2022    Procedure: lysis of adhesions;  Surgeon: Glenn Nicolas MD;  Location: [facility] OR       Family History:    Family History   Problem Relation Age of Onset     Cancer Father         pancrea     Diabetes Father         insulin control     Respiratory Father      Hypertension Father      Heart Disease Mother         heart attack at age 63     Cancer Mother         pancrea     Diabetes Mother      Hypertension Mother      Other Cancer Mother      Heart Disease Maternal Grandmother          at age 62     Cancer Brother      Other Cancer Brother      Cancer Sister         half     Family History Negative No family hx of         has no hx of father or his family     Breast Cancer No family hx of        Social History:  Marital Status:   [4]  Social History     Tobacco Use     Smoking status: Never Smoker     Smokeless tobacco: Never Used   Vaping Use     Vaping Use: Never used   Substance Use Topics     Alcohol use: Not Currently     Alcohol/week: 0.0 standard drinks     Comment: 1-2 drinks monthly     Drug use: No        Medications:    acetaminophen (TYLENOL) 500 MG tablet  ALPRAZolam (XANAX) 0.25 MG tablet  ASPIRIN 81 MG OR TABS  CHLOROPHYLL PO  Collagen-Vitamin C-Biotin (COLLAGEN 1500/C) 500-50-0.8 MG CAPS  fish oil-omega-3 fatty acids 1000 MG capsule  Flaxseed, Linseed, (FLAXSEED OIL) 1000 MG CAPS  GELATIN 650 MG OR CAPS  GLUCOSAMINE CHONDRO COMPLEX OR  hydrochlorothiazide (HYDRODIURIL) 12.5 MG tablet  ibuprofen (ADVIL/MOTRIN) 200 MG tablet  losartan (COZAAR) 50 MG tablet  Lysine 1000 MG TABS  magnesium 250 MG tablet  Multiple Vitamins-Minerals (WOMENS MULTI VITAMIN & MINERAL) TABS  OVER-THE-COUNTER  potassium 99 MG TABS  Turmeric 500 MG TABS  VENTOLIN  (90 Base) MCG/ACT inhaler  VITAMIN E 400 UNIT OR CAPS  metFORMIN (GLUCOPHAGE-XR) 500 MG 24 hr  tablet  oxyCODONE (ROXICODONE) 5 MG tablet  SM ALLERGY RELIEF D  MG 24 hr tablet          Review of Systems   Constitutional: Positive for appetite change. Negative for chills, fatigue and fever.   HENT: Negative.    Respiratory: Negative.  Negative for cough and shortness of breath.    Gastrointestinal: Positive for abdominal pain, nausea and vomiting.   Genitourinary: Negative for difficulty urinating and dysuria.   Neurological: Negative for light-headedness and headaches.   All other systems reviewed and are negative.      Physical Exam   BP: 134/77  Pulse: 102  Temp: 98.3  F (36.8  C)  Resp: 18  Weight: 75.3 kg (166 lb 1.6 oz)  SpO2: 97 %      Physical Exam  Vitals and nursing note reviewed.   Constitutional:       General: She is in acute distress.      Appearance: Normal appearance. She is not ill-appearing, toxic-appearing or diaphoretic.   HENT:      Head: Normocephalic and atraumatic.      Right Ear: External ear normal.      Left Ear: External ear normal.      Nose: Nose normal. No congestion.      Mouth/Throat:      Mouth: Mucous membranes are moist.      Pharynx: Oropharynx is clear. No oropharyngeal exudate.   Eyes:      Extraocular Movements: Extraocular movements intact.      Conjunctiva/sclera: Conjunctivae normal.      Pupils: Pupils are equal, round, and reactive to light.   Cardiovascular:      Rate and Rhythm: Normal rate.      Pulses: Normal pulses.      Heart sounds: Normal heart sounds. No murmur heard.    No friction rub.   Pulmonary:      Effort: Pulmonary effort is normal. No respiratory distress.      Breath sounds: No stridor. No wheezing, rhonchi or rales.   Abdominal:      General: Abdomen is flat. There is no distension.      Tenderness: There is abdominal tenderness. There is no guarding or rebound.   Musculoskeletal:         General: No deformity or signs of injury. Normal range of motion.      Cervical back: Normal range of motion.   Skin:     General: Skin is warm.       Capillary Refill: Capillary refill takes less than 2 seconds.      Coloration: Skin is not pale.      Findings: No bruising or erythema.   Neurological:      General: No focal deficit present.      Mental Status: She is alert and oriented to person, place, and time.      Cranial Nerves: No cranial nerve deficit.      Motor: No weakness.   Psychiatric:         Mood and Affect: Mood normal.         Behavior: Behavior normal.         ED Course                 Procedures                  Results for orders placed or performed during the hospital encounter of 07/08/22 (from the past 24 hour(s))   Asymptomatic COVID-19 Virus (Coronavirus) by PCR Nasopharyngeal    Specimen: Nasopharyngeal; Swab   Result Value Ref Range    SARS CoV2 PCR Negative Negative    Narrative    Testing was performed using the lesly  SARS-CoV-2 & Influenza A/B Assay on the lesly  Marci  System.  This test should be ordered for the detection of SARS-COV-2 in individuals who meet SARS-CoV-2 clinical and/or epidemiological criteria. Test performance is unknown in asymptomatic patients.  This test is for in vitro diagnostic use under the FDA EUA for laboratories certified under CLIA to perform moderate and/or high complexity testing. This test has not been FDA cleared or approved.  A negative test does not rule out the presence of PCR inhibitors in the specimen or target RNA in concentration below the limit of detection for the assay. The possibility of a false negative should be considered if the patient's recent exposure or clinical presentation suggests COVID-19.  [facility] are certified under the Clinical Laboratory Improvement Amendments of 1988 (CLIA-88) as qualified to perform moderate and/or high complexity laboratory testing.       Medications   0.9% sodium chloride BOLUS (0 mLs Intravenous Stopped 7/8/22 2043)     Followed by   sodium chloride 0.9% infusion (has no administration in time range)   piperacillin-tazobactam  (ZOSYN) intermittent infusion 4.5 g (0 g Intravenous Stopped 7/8/22 1812)       Assessments & Plan (with Medical Decision Making)     I have reviewed the nursing notes.    I have reviewed the findings, diagnosis, plan and need for follow up with the patient.    Caro Palmer is a 61 year old female who has a history significant for partial bowel resection with subsequent development of a ventral hernia status postrepair with mesh in March of this year.  She is referred today to the emergency department due to concern of abdominal abscess.  Upon arrival to the emergency department this patient is noted alert.  She is presently afebrile and hemodynamically stable with mild tachycardia.  She is seated upright in bed and appears slightly uncomfortable is nontoxic.  She does have tenderness with palpation of the abdomen.  No involuntary guarding or significant distention.  Clinical suspicion for peritonitis would be low.  I did review case with the patient's primary care physician.  I have reviewed the patient's laboratory studies and CT imaging.  I suspect she would benefit from antibiotics with consultation with interventional radiology with potential need for drain.    The patient has remained hemodynamically stable but in continued discomfort.  Unfortunately despite 5 hours of searching in the emergency department through [place] no bed availability exists at this time.  We will continue to search for appropriate bed placement for this patient.  Patient signed over to my colleague at 10 PM awaiting availability for internal medicine hospitalization for antibiotics and IR consultation for suspected need of drainage.    New Prescriptions    No medications on file       Final diagnoses:   Intra-abdominal abscess (H)       7/8/2022   [facility] EMERGENCY DEPT     Dex Yen MD  07/09/22 0996

## 2022-07-08 NOTE — ED TRIAGE NOTES
She had an appointment in the clinic and was found to have an elevated WBC and an abscess per CT.

## 2022-07-08 NOTE — PROGRESS NOTES
Assessment & Plan     Abdominal pain, generalized  Pelvic abscess in female  Elevated WBC and findings discussed with patient.  She is taken over to the ED from radiology after her CT scan and they will place IV, start abx and discuss with surgery/IR regarding options to manage the abscess. Patient requested to go home and take care of a few things first but with elevated WBC felt best to go straight to ED.            Essential hypertension with goal blood pressure less than 140/90  Pressures and labs are stable; no changes.         Family history of pancreatic cancer  Annual CA 19-9 pending    Family history of malignant neoplasm of ovary  Annual  pending    Type 2 diabetes mellitus without complication, without long-term current use of insulin (H)  Labs as noted with A1c 7.2.  Will need dietary adjustment and would recommend she start metformin after discharge.        Depression Screening Follow Up    PHQ 7/8/2022   PHQ-9 Total Score 13   Q9: Thoughts of better off dead/self-harm past 2 weeks Not at all             Gregory G. Schoen, MD  [facility]   Caro Palmer is a 61 year old, presenting for the following health issues:  Abdominal Pain and Vomiting      Vomiting     History of Present Illness       Reason for visit:  Extremely bad stomach pains    She eats 2-3 servings of fruits and vegetables daily.She consumes 0 sweetened beverage(s) daily.She exercises with enough effort to increase her heart rate 30 to 60 minutes per day.  She exercises with enough effort to increase her heart rate 5 days per week.   She is taking medications regularly.    Today's PHQ-9         PHQ-9 Total Score: 13    PHQ-9 Q9 Thoughts of better off dead/self-harm past 2 weeks :   Not at all    How difficult have these problems made it for you to do your work, take care of things at home, or get along with other people: Extremely difficult       Started having nausea and vomiting on  6/24/22.  Did get better for a few days but now back again.  Also got cold sores but these are better but GI issues are not any better.  She had a recent ventral hernia repair this past March from which she has healed well.  When vomiting frequently she did have some sweats/felt hot and chills but didn't check for fever.  BMs have been normal throughout without diarrhea but her stomach is bloated and hurts when she tries to defecate.  Did have prior bowel resection for diverticulitis several years ago as well.     Under a lot of stress because her son committed suicide on 3/10/22, same day as a grandson with CP had surgery. Wondering if this  part of it as well.      Reports at her preop visit she was given metformin because it was felt she had diabetes but she has not started taking this yet, wanting to review with me first and was going to wait for her well visit this fall.     Current Outpatient Medications   Medication Sig Dispense Refill     acetaminophen (TYLENOL) 500 MG tablet Take 500-1,000 mg by mouth every 6 hours as needed for mild pain       ALPRAZolam (XANAX) 0.25 MG tablet TAKE ONE TABLET BY MOUTH THREE TIMES A DAY AS NEEDED FOR ANXIETY 30 tablet 0     ASPIRIN 81 MG OR TABS ONE DAILY 100 3     CHLOROPHYLL PO Take 15 mLs by mouth daily       Collagen-Vitamin C-Biotin (COLLAGEN 1500/C) 500-50-0.8 MG CAPS Take by mouth daily       fish oil-omega-3 fatty acids 1000 MG capsule Take 1 g by mouth 2 times daily        Flaxseed, Linseed, (FLAXSEED OIL) 1000 MG CAPS Take 1,000 mg by mouth 3 times daily        GELATIN 650 MG OR CAPS 2 daily  0     GLUCOSAMINE CHONDRO COMPLEX OR 2 tablets x 2 daily  0     hydrochlorothiazide (HYDRODIURIL) 12.5 MG tablet TAKE ONE TABLET BY MOUTH ONCE DAILY 90 tablet 3     ibuprofen (ADVIL/MOTRIN) 200 MG tablet Take 600 mg by mouth every 6 hours as needed for mild pain       losartan (COZAAR) 50 MG tablet TAKE ONE TABLET BY MOUTH ONCE DAILY 90 tablet 3     Lysine 1000 MG TABS Take  1,000 mg by mouth daily.       magnesium 250 MG tablet Take 1 tablet by mouth daily       metFORMIN (GLUCOPHAGE-XR) 500 MG 24 hr tablet Take 1 tablet (500 mg) by mouth daily (with dinner) 90 tablet 0     Multiple Vitamins-Minerals (WOMENS MULTI VITAMIN & MINERAL) TABS        OVER-THE-COUNTER 1 tablet daily Citracal 500 D and Calcium 600       oxyCODONE (ROXICODONE) 5 MG tablet Take 1-2 tablets (5-10 mg) by mouth every 4 hours as needed for moderate to severe pain 12 tablet 0     potassium 99 MG TABS        SM ALLERGY RELIEF D  MG 24 hr tablet TAKE ONE TABLET BY MOUTH ONCE DAILY 30 tablet 3     Turmeric 500 MG TABS        VENTOLIN  (90 Base) MCG/ACT inhaler INHALE 2 PUFFS INTO THE LUNGS EVERY 6 HOURS AS NEEDED FOR SHORTNESS OF BREATH, DIFFICULTY BREATHING OR WHEEZING. 1 Inhaler 11     VITAMIN E 400 UNIT OR CAPS ONE CAPSULE DAILY 3 MONTHS 1 YEAR               Review of Systems   Gastrointestinal: Positive for vomiting.    HEENT: cold sore, face, otherwise negative  CVR: no chest pains, palpitations  RESP: no cough, shortness of breath.   GI: as above  : neg for dysuria, frequency  MSK: neg  Neuro: neg  Psych: anxiety increased, more emotional and stressed with loss of son          Objective    LMP 11/22/2012   There is no height or weight on file to calculate BMI.   /72 (Cuff Size: Adult Large)   Pulse 100   Temp 98.2  F (36.8  C) (Temporal)   Resp 20   LMP 11/22/2012     Physical Exam   GENERAL: healthy, alert and no distress; appears anxious, speech pressured, speaking rapidly but able to track  NECK: no adenopathy, no asymmetry, masses, or scars and thyroid normal to palpation  RESP: lungs clear to auscultation - no rales, rhonchi or wheezes  CV: Slightly tachycardic, regular rate and rhythm, normal S1 S2, no S3 or S4, no murmur, click or rub, no peripheral edema and peripheral pulses strong  ABDOMEN: BS hyperactive, slight tympany and borborygmi, nontender to percussion but tender to  palpation diffusely, especially above the umbilicus.  No guarding and is soft however has a slight degree of rebound without rigidity.   MS: no gross musculoskeletal defects noted, no edema    Results for orders placed or performed in visit on 07/08/22 (from the past 24 hour(s))   TSH with free T4 reflex   Result Value Ref Range    TSH 0.91 0.40 - 4.00 mU/L   Lipid panel reflex to direct LDL Fasting   Result Value Ref Range    Cholesterol 135 <200 mg/dL    Triglycerides 90 <150 mg/dL    Direct Measure HDL 45 (L) >=50 mg/dL    LDL Cholesterol Calculated 72 <=100 mg/dL    Non HDL Cholesterol 90 <130 mg/dL    Patient Fasting > 8hrs? Unknown     Narrative    Cholesterol  Desirable:  <200 mg/dL    Triglycerides  Normal:  Less than 150 mg/dL  Borderline High:  150-199 mg/dL  High:  200-499 mg/dL  Very High:  Greater than or equal to 500 mg/dL    Direct Measure HDL  Female:  Greater than or equal to 50 mg/dL   Male:  Greater than or equal to 40 mg/dL    LDL Cholesterol  Desirable:  <100mg/dL  Above Desirable:  100-129 mg/dL   Borderline High:  130-159 mg/dL   High:  160-189 mg/dL   Very High:  >= 190 mg/dL    Non HDL Cholesterol  Desirable:  130 mg/dL  Above Desirable:  130-159 mg/dL  Borderline High:  160-189 mg/dL  High:  190-219 mg/dL  Very High:  Greater than or equal to 220 mg/dL   Lipase   Result Value Ref Range    Lipase 110 73 - 393 U/L   Comprehensive metabolic panel   Result Value Ref Range    Sodium 134 133 - 144 mmol/L    Potassium 3.8 3.4 - 5.3 mmol/L    Chloride 99 94 - 109 mmol/L    Carbon Dioxide (CO2) 29 20 - 32 mmol/L    Anion Gap 6 3 - 14 mmol/L    Urea Nitrogen 10 7 - 30 mg/dL    Creatinine 0.74 0.52 - 1.04 mg/dL    Calcium 8.6 8.5 - 10.1 mg/dL    Glucose 148 (H) 70 - 99 mg/dL    Alkaline Phosphatase 82 40 - 150 U/L    AST 15 0 - 45 U/L    ALT 27 0 - 50 U/L    Protein Total 8.0 6.8 - 8.8 g/dL    Albumin 3.4 3.4 - 5.0 g/dL    Bilirubin Total 0.5 0.2 - 1.3 mg/dL    GFR Estimate >90 >60 mL/min/1.73m2    HEMOGLOBIN A1C   Result Value Ref Range    Hemoglobin A1C 7.2 (H) 0.0 - 5.6 %   CBC with platelets   Result Value Ref Range    WBC Count 19.9 (H) 4.0 - 11.0 10e3/uL    RBC Count 3.82 3.80 - 5.20 10e6/uL    Hemoglobin 11.6 (L) 11.7 - 15.7 g/dL    Hematocrit 35.0 35.0 - 47.0 %    MCV 92 78 - 100 fL    MCH 30.4 26.5 - 33.0 pg    MCHC 33.1 31.5 - 36.5 g/dL    RDW 13.0 10.0 - 15.0 %    Platelet Count 388 150 - 450 10e3/uL     CT scan results called indicating a pelvic abscess with dilated small bowel loops, possibly amenable to IR drainage.        .  ..

## 2022-07-08 NOTE — TELEPHONE ENCOUNTER
Patient is scheduled with PCP today.    Reason for Disposition    Constant abdominal pain lasting > 2 hours    Additional Information    Negative: Passed out (i.e., fainted, collapsed and was not responding)    Negative: Shock suspected (e.g., cold/pale/clammy skin, too weak to stand, low BP, rapid pulse)    Negative: Sounds like a life-threatening emergency to the triager    Negative: Chest pain    Negative: Pain is mainly in upper abdomen (if needed ask: 'is it mainly above the belly button?')    Negative: Abdominal pain and pregnant > 20 weeks    Negative: Abdominal pain and pregnant < 20 weeks    Protocols used: ABDOMINAL PAIN - FEMALE-A-OH

## 2022-07-09 VITALS
SYSTOLIC BLOOD PRESSURE: 126 MMHG | OXYGEN SATURATION: 94 % | DIASTOLIC BLOOD PRESSURE: 74 MMHG | RESPIRATION RATE: 18 BRPM | BODY MASS INDEX: 30.88 KG/M2 | HEART RATE: 92 BPM | TEMPERATURE: 98.9 F | WEIGHT: 166.1 LBS

## 2022-07-09 LAB — CANCER AG125 SERPL-ACNC: 33 U/ML

## 2022-07-09 PROCEDURE — 96366 THER/PROPH/DIAG IV INF ADDON: CPT | Performed by: EMERGENCY MEDICINE

## 2022-07-09 PROCEDURE — 258N000003 HC RX IP 258 OP 636: Performed by: EMERGENCY MEDICINE

## 2022-07-09 PROCEDURE — 96366 THER/PROPH/DIAG IV INF ADDON: CPT | Performed by: FAMILY MEDICINE

## 2022-07-09 PROCEDURE — 250N000011 HC RX IP 250 OP 636: Performed by: FAMILY MEDICINE

## 2022-07-09 RX ADMIN — SODIUM CHLORIDE: 9 INJECTION, SOLUTION INTRAVENOUS at 01:23

## 2022-07-09 RX ADMIN — TAZOBACTAM SODIUM AND PIPERACILLIN SODIUM 4.5 G: 500; 4 INJECTION, SOLUTION INTRAVENOUS at 01:23

## 2022-07-09 NOTE — ED NOTES
EMS here, report to crew, pt en route at this time. Report given to Jacob MENDIOLA and Tray RN after pt departed per [facility]'s protocol.

## 2022-07-09 NOTE — ED NOTES
Discussed need for maintenance IVF with Dr. Sutton for transport. BP rechecked with pt supine. 126/74. Ok per provider to hold for transport to [place].

## 2022-07-10 LAB — CANCER AG19-9 SERPL IA-ACNC: 6 U/ML

## 2022-07-11 ENCOUNTER — TELEPHONE (OUTPATIENT)
Dept: SURGERY | Facility: CLINIC | Age: 62
End: 2022-07-11

## 2022-07-11 NOTE — TELEPHONE ENCOUNTER
Reason for Call:  Other call back    Detailed comments: [facility] in [place], [place] calling wanting to speak to Dr. Nicolas about the surgery she did on the pt and about possibly cutting out the mesh that was put in. Please call them back at. [phone] Thank you    Phone Number Patient can be reached at: Other phone number:  [phone]*    Best Time: any    Can we leave a detailed message on this number? Not Applicable    Call taken on 7/11/2022 at 12:23 PM by Yanique Lozada

## 2022-07-18 ENCOUNTER — TELEPHONE (OUTPATIENT)
Dept: INFECTIOUS DISEASES | Facility: CLINIC | Age: 62
End: 2022-07-18

## 2022-07-18 ENCOUNTER — MEDICAL CORRESPONDENCE (OUTPATIENT)
Dept: INFUSION THERAPY | Facility: CLINIC | Age: 62
End: 2022-07-18

## 2022-07-18 ENCOUNTER — TELEPHONE (OUTPATIENT)
Dept: FAMILY MEDICINE | Facility: CLINIC | Age: 62
End: 2022-07-18

## 2022-07-18 DIAGNOSIS — T81.49XA ABDOMINAL WALL ABSCESS AT SITE OF SURGICAL WOUND: Primary | ICD-10-CM

## 2022-07-18 RX ORDER — HEPARIN SODIUM (PORCINE) LOCK FLUSH IV SOLN 100 UNIT/ML 100 UNIT/ML
5 SOLUTION INTRAVENOUS
Status: CANCELLED | OUTPATIENT
Start: 2022-07-18

## 2022-07-18 RX ORDER — ERTAPENEM 1 G/1
1 INJECTION, POWDER, LYOPHILIZED, FOR SOLUTION INTRAMUSCULAR; INTRAVENOUS ONCE
Status: CANCELLED | OUTPATIENT
Start: 2022-07-18 | End: 2022-07-18

## 2022-07-18 RX ORDER — EPINEPHRINE 1 MG/ML
0.3 INJECTION, SOLUTION, CONCENTRATE INTRAVENOUS EVERY 5 MIN PRN
Status: CANCELLED | OUTPATIENT
Start: 2022-07-18

## 2022-07-18 RX ORDER — ALBUTEROL SULFATE 90 UG/1
1-2 AEROSOL, METERED RESPIRATORY (INHALATION)
Status: CANCELLED
Start: 2022-07-18

## 2022-07-18 RX ORDER — ALBUTEROL SULFATE 0.83 MG/ML
2.5 SOLUTION RESPIRATORY (INHALATION)
Status: CANCELLED | OUTPATIENT
Start: 2022-07-18

## 2022-07-18 RX ORDER — DIPHENHYDRAMINE HYDROCHLORIDE 50 MG/ML
50 INJECTION INTRAMUSCULAR; INTRAVENOUS
Status: CANCELLED
Start: 2022-07-18

## 2022-07-18 RX ORDER — HEPARIN SODIUM,PORCINE 10 UNIT/ML
5 VIAL (ML) INTRAVENOUS
Status: CANCELLED | OUTPATIENT
Start: 2022-07-18

## 2022-07-18 RX ORDER — MEPERIDINE HYDROCHLORIDE 25 MG/ML
25 INJECTION INTRAMUSCULAR; INTRAVENOUS; SUBCUTANEOUS EVERY 30 MIN PRN
Status: CANCELLED | OUTPATIENT
Start: 2022-07-18

## 2022-07-18 RX ORDER — METHYLPREDNISOLONE SODIUM SUCCINATE 125 MG/2ML
125 INJECTION, POWDER, LYOPHILIZED, FOR SOLUTION INTRAMUSCULAR; INTRAVENOUS
Status: CANCELLED
Start: 2022-07-18

## 2022-07-18 NOTE — TELEPHONE ENCOUNTER
[facility] Call Center    Phone Message    May a detailed message be left on voicemail: yes     Reason for Call: Appointment Intake -CJ from [facility] is trying to co ordinate a hospital follow up for patient at [place] location because it is closer  For patient . She needs to be seen with in one week.      Diagnosis and/or Symptoms: Abdominal Abscess     Action Taken: Other: ID    Travel Screening: Not Applicable

## 2022-07-18 NOTE — TELEPHONE ENCOUNTER
[facility] Call Center     Phone Message    May a detailed message be left on voicemail: yes     Reason for Call: Other: Antonieta from [facility] requesting call back to discuss getting an appt for the pt this week, pt will be discharging and needs to be on IV antibiotics this week. Pt will be discharging at 1pm today, Antonieta requesting call back prior

## 2022-07-18 NOTE — TELEPHONE ENCOUNTER
[facility] for MAURY Villegas. [phone] ext [phone]    On call provider Dr Conklin received page requesting for urgent consult. Provider called clinic to see if any opening available sooner than [facility] ID. OK'ed per Dr Pantoja patient is added on 7/27/22 @ 10:40 am.    Waiting for Antonieta to call back on confirmation and referral orders faxed to ID clinic. [phone]

## 2022-07-18 NOTE — TELEPHONE ENCOUNTER
Called jah back, let her know this had already been discussed with CJ. We do not have any appointments available in the requested timeframe.

## 2022-07-18 NOTE — TELEPHONE ENCOUNTER
[facility] [place] calling back to speak with [facility]fusion about patient getting IV antibiotics.    Call transferred to Infusion.

## 2022-07-18 NOTE — TELEPHONE ENCOUNTER
Called CJ back to let her know that we unfortunately have no appointments available until August. Patient was scheduled for soonest available and per CJ, this appointment should be kept just in case.

## 2022-07-18 NOTE — PROGRESS NOTES
Recently released from hospital stay in [place] for abdominal abscess with recommendation for invanz 1gm IV daily x 14 days via PICC line.  Therapy plan placed.  Will be following up with ID in [place].  Electronically signed by Greg Schoen, MD

## 2022-07-19 ENCOUNTER — INFUSION THERAPY VISIT (OUTPATIENT)
Dept: INFUSION THERAPY | Facility: CLINIC | Age: 62
End: 2022-07-19
Attending: FAMILY MEDICINE
Payer: COMMERCIAL

## 2022-07-19 VITALS
RESPIRATION RATE: 18 BRPM | DIASTOLIC BLOOD PRESSURE: 62 MMHG | WEIGHT: 166.4 LBS | TEMPERATURE: 98.3 F | BODY MASS INDEX: 30.93 KG/M2 | OXYGEN SATURATION: 98 % | HEART RATE: 96 BPM | SYSTOLIC BLOOD PRESSURE: 129 MMHG

## 2022-07-19 DIAGNOSIS — T81.49XA ABDOMINAL WALL ABSCESS AT SITE OF SURGICAL WOUND: Primary | ICD-10-CM

## 2022-07-19 PROCEDURE — 258N000003 HC RX IP 258 OP 636: Performed by: FAMILY MEDICINE

## 2022-07-19 PROCEDURE — 250N000011 HC RX IP 250 OP 636: Performed by: FAMILY MEDICINE

## 2022-07-19 PROCEDURE — 96365 THER/PROPH/DIAG IV INF INIT: CPT

## 2022-07-19 RX ORDER — DIPHENHYDRAMINE HYDROCHLORIDE 50 MG/ML
50 INJECTION INTRAMUSCULAR; INTRAVENOUS
Status: CANCELLED
Start: 2022-07-20

## 2022-07-19 RX ORDER — EPINEPHRINE 1 MG/ML
0.3 INJECTION, SOLUTION INTRAMUSCULAR; SUBCUTANEOUS EVERY 5 MIN PRN
Status: CANCELLED | OUTPATIENT
Start: 2022-07-20

## 2022-07-19 RX ORDER — ERTAPENEM 1 G/1
1 INJECTION, POWDER, LYOPHILIZED, FOR SOLUTION INTRAMUSCULAR; INTRAVENOUS ONCE
Status: CANCELLED | OUTPATIENT
Start: 2022-07-20 | End: 2022-07-20

## 2022-07-19 RX ORDER — ERTAPENEM 1 G/1
1 INJECTION, POWDER, LYOPHILIZED, FOR SOLUTION INTRAMUSCULAR; INTRAVENOUS ONCE
Status: COMPLETED | OUTPATIENT
Start: 2022-07-19 | End: 2022-07-19

## 2022-07-19 RX ORDER — HEPARIN SODIUM (PORCINE) LOCK FLUSH IV SOLN 100 UNIT/ML 100 UNIT/ML
5 SOLUTION INTRAVENOUS
Status: CANCELLED | OUTPATIENT
Start: 2022-07-20

## 2022-07-19 RX ORDER — ALBUTEROL SULFATE 90 UG/1
1-2 AEROSOL, METERED RESPIRATORY (INHALATION)
Status: CANCELLED
Start: 2022-07-20

## 2022-07-19 RX ORDER — METHYLPREDNISOLONE SODIUM SUCCINATE 125 MG/2ML
125 INJECTION, POWDER, LYOPHILIZED, FOR SOLUTION INTRAMUSCULAR; INTRAVENOUS
Status: CANCELLED
Start: 2022-07-20

## 2022-07-19 RX ORDER — HEPARIN SODIUM,PORCINE 10 UNIT/ML
5 VIAL (ML) INTRAVENOUS
Status: CANCELLED | OUTPATIENT
Start: 2022-07-20

## 2022-07-19 RX ORDER — MEPERIDINE HYDROCHLORIDE 25 MG/ML
25 INJECTION INTRAMUSCULAR; INTRAVENOUS; SUBCUTANEOUS EVERY 30 MIN PRN
Status: CANCELLED | OUTPATIENT
Start: 2022-07-20

## 2022-07-19 RX ORDER — ALBUTEROL SULFATE 0.83 MG/ML
2.5 SOLUTION RESPIRATORY (INHALATION)
Status: CANCELLED | OUTPATIENT
Start: 2022-07-20

## 2022-07-19 RX ADMIN — SODIUM CHLORIDE 250 ML: 9 INJECTION, SOLUTION INTRAVENOUS at 14:29

## 2022-07-19 RX ADMIN — ERTAPENEM SODIUM 1 G: 1 INJECTION, POWDER, LYOPHILIZED, FOR SOLUTION INTRAMUSCULAR; INTRAVENOUS at 14:40

## 2022-07-19 ASSESSMENT — PAIN SCALES - GENERAL: PAINLEVEL: NO PAIN (0)

## 2022-07-19 NOTE — PROGRESS NOTES
Infusion Nursing Note:  Caro Palmer presents today for Ertapenem.    Patient seen by provider today: No   present during visit today: Not Applicable.    Note: Patient was in hospital recently with abdominal wall abscess. PICC line placed yesterday. Denies hx of drug allergies. Hx HTN, controlled asthma. Currently denies pain. VSS, afebrile.     Intravenous Access:  PICC.    Treatment Conditions:  Not Applicable.    Post Infusion Assessment:  Patient tolerated infusion without incident.  Blood return noted pre and post infusion.  Site patent and intact, free from redness, edema or discomfort.  No evidence of extravasations.  Access discontinued per protocol.     Discharge Plan:   AVS to patient via MYCHART.  Patient will return 7/20 for next appointment.   Patient discharged in stable condition accompanied by: self.  Departure Mode: Ambulatory.      Jannie Betancourt RN

## 2022-07-20 ENCOUNTER — INFUSION THERAPY VISIT (OUTPATIENT)
Dept: INFUSION THERAPY | Facility: CLINIC | Age: 62
End: 2022-07-20
Attending: FAMILY MEDICINE
Payer: COMMERCIAL

## 2022-07-20 VITALS
OXYGEN SATURATION: 97 % | DIASTOLIC BLOOD PRESSURE: 63 MMHG | RESPIRATION RATE: 20 BRPM | TEMPERATURE: 98.3 F | SYSTOLIC BLOOD PRESSURE: 131 MMHG | HEART RATE: 83 BPM

## 2022-07-20 DIAGNOSIS — T81.49XA ABDOMINAL WALL ABSCESS AT SITE OF SURGICAL WOUND: Primary | ICD-10-CM

## 2022-07-20 PROCEDURE — 258N000003 HC RX IP 258 OP 636: Performed by: FAMILY MEDICINE

## 2022-07-20 PROCEDURE — 250N000011 HC RX IP 250 OP 636: Performed by: FAMILY MEDICINE

## 2022-07-20 PROCEDURE — 96365 THER/PROPH/DIAG IV INF INIT: CPT

## 2022-07-20 RX ORDER — ERTAPENEM 1 G/1
1 INJECTION, POWDER, LYOPHILIZED, FOR SOLUTION INTRAMUSCULAR; INTRAVENOUS ONCE
Status: COMPLETED | OUTPATIENT
Start: 2022-07-20 | End: 2022-07-20

## 2022-07-20 RX ORDER — HEPARIN SODIUM (PORCINE) LOCK FLUSH IV SOLN 100 UNIT/ML 100 UNIT/ML
5 SOLUTION INTRAVENOUS
Status: CANCELLED | OUTPATIENT
Start: 2022-07-21

## 2022-07-20 RX ORDER — ALBUTEROL SULFATE 90 UG/1
1-2 AEROSOL, METERED RESPIRATORY (INHALATION)
Status: CANCELLED
Start: 2022-07-21

## 2022-07-20 RX ORDER — MEPERIDINE HYDROCHLORIDE 25 MG/ML
25 INJECTION INTRAMUSCULAR; INTRAVENOUS; SUBCUTANEOUS EVERY 30 MIN PRN
Status: CANCELLED | OUTPATIENT
Start: 2022-07-21

## 2022-07-20 RX ORDER — EPINEPHRINE 1 MG/ML
0.3 INJECTION, SOLUTION INTRAMUSCULAR; SUBCUTANEOUS EVERY 5 MIN PRN
Status: CANCELLED | OUTPATIENT
Start: 2022-07-21

## 2022-07-20 RX ORDER — ERTAPENEM 1 G/1
1 INJECTION, POWDER, LYOPHILIZED, FOR SOLUTION INTRAMUSCULAR; INTRAVENOUS ONCE
Status: CANCELLED | OUTPATIENT
Start: 2022-07-21 | End: 2022-07-21

## 2022-07-20 RX ORDER — HEPARIN SODIUM,PORCINE 10 UNIT/ML
5 VIAL (ML) INTRAVENOUS
Status: CANCELLED | OUTPATIENT
Start: 2022-07-21

## 2022-07-20 RX ORDER — ALBUTEROL SULFATE 0.83 MG/ML
2.5 SOLUTION RESPIRATORY (INHALATION)
Status: CANCELLED | OUTPATIENT
Start: 2022-07-21

## 2022-07-20 RX ORDER — DIPHENHYDRAMINE HYDROCHLORIDE 50 MG/ML
50 INJECTION INTRAMUSCULAR; INTRAVENOUS
Status: CANCELLED
Start: 2022-07-21

## 2022-07-20 RX ORDER — METHYLPREDNISOLONE SODIUM SUCCINATE 125 MG/2ML
125 INJECTION, POWDER, LYOPHILIZED, FOR SOLUTION INTRAMUSCULAR; INTRAVENOUS
Status: CANCELLED
Start: 2022-07-21

## 2022-07-20 RX ADMIN — SODIUM CHLORIDE 250 ML: 9 INJECTION, SOLUTION INTRAVENOUS at 14:33

## 2022-07-20 RX ADMIN — ERTAPENEM SODIUM 1 G: 1 INJECTION, POWDER, LYOPHILIZED, FOR SOLUTION INTRAMUSCULAR; INTRAVENOUS at 14:33

## 2022-07-20 ASSESSMENT — PAIN SCALES - GENERAL: PAINLEVEL: NO PAIN (0)

## 2022-07-20 NOTE — PROGRESS NOTES
Infusion Nursing Note:  Caro Palmer presents today for Dose 2 Ertapenem.    Patient seen by provider today: No   present during visit today: Not Applicable.    Note: Patient denies symptoms of reaction to yesterday's dose. No fever, chills, cough, throat swelling, skin rash/itching or other concerns. Denies pain. VSS, afebrile.     Intravenous Access:  PICC.    Treatment Conditions:  Not Applicable.    Post Infusion Assessment:  Patient tolerated infusion without incident.  Blood return noted pre and post infusion.  Site patent and intact, free from redness, edema or discomfort.  No evidence of extravasations.  Access discontinued per protocol. PICC flushed with saline and Spandage placed around site.    Discharge Plan:   [facility]  Patient will return 7/21 for next appointment.   Patient discharged in stable condition accompanied by: self.  Departure Mode: Ambulatory.      Jannie Betancourt RN

## 2022-07-21 ENCOUNTER — INFUSION THERAPY VISIT (OUTPATIENT)
Dept: INFUSION THERAPY | Facility: CLINIC | Age: 62
End: 2022-07-21
Attending: FAMILY MEDICINE
Payer: COMMERCIAL

## 2022-07-21 VITALS
TEMPERATURE: 96 F | OXYGEN SATURATION: 96 % | HEART RATE: 80 BPM | RESPIRATION RATE: 18 BRPM | DIASTOLIC BLOOD PRESSURE: 52 MMHG | SYSTOLIC BLOOD PRESSURE: 117 MMHG

## 2022-07-21 DIAGNOSIS — T81.49XA ABDOMINAL WALL ABSCESS AT SITE OF SURGICAL WOUND: Primary | ICD-10-CM

## 2022-07-21 PROCEDURE — 96365 THER/PROPH/DIAG IV INF INIT: CPT

## 2022-07-21 PROCEDURE — 250N000011 HC RX IP 250 OP 636: Performed by: FAMILY MEDICINE

## 2022-07-21 RX ORDER — DIPHENHYDRAMINE HYDROCHLORIDE 50 MG/ML
50 INJECTION INTRAMUSCULAR; INTRAVENOUS
Status: CANCELLED
Start: 2022-07-22

## 2022-07-21 RX ORDER — ALBUTEROL SULFATE 90 UG/1
1-2 AEROSOL, METERED RESPIRATORY (INHALATION)
Status: CANCELLED
Start: 2022-07-22

## 2022-07-21 RX ORDER — EPINEPHRINE 1 MG/ML
0.3 INJECTION, SOLUTION INTRAMUSCULAR; SUBCUTANEOUS EVERY 5 MIN PRN
Status: CANCELLED | OUTPATIENT
Start: 2022-07-22

## 2022-07-21 RX ORDER — ALBUTEROL SULFATE 0.83 MG/ML
2.5 SOLUTION RESPIRATORY (INHALATION)
Status: CANCELLED | OUTPATIENT
Start: 2022-07-22

## 2022-07-21 RX ORDER — MEPERIDINE HYDROCHLORIDE 25 MG/ML
25 INJECTION INTRAMUSCULAR; INTRAVENOUS; SUBCUTANEOUS EVERY 30 MIN PRN
Status: CANCELLED | OUTPATIENT
Start: 2022-07-22

## 2022-07-21 RX ORDER — HEPARIN SODIUM (PORCINE) LOCK FLUSH IV SOLN 100 UNIT/ML 100 UNIT/ML
5 SOLUTION INTRAVENOUS
Status: DISCONTINUED | OUTPATIENT
Start: 2022-07-21 | End: 2022-07-21 | Stop reason: HOSPADM

## 2022-07-21 RX ORDER — HEPARIN SODIUM,PORCINE 10 UNIT/ML
5 VIAL (ML) INTRAVENOUS
Status: DISCONTINUED | OUTPATIENT
Start: 2022-07-21 | End: 2022-07-21 | Stop reason: HOSPADM

## 2022-07-21 RX ORDER — HEPARIN SODIUM,PORCINE 10 UNIT/ML
5 VIAL (ML) INTRAVENOUS
Status: CANCELLED | OUTPATIENT
Start: 2022-07-22

## 2022-07-21 RX ORDER — HEPARIN SODIUM (PORCINE) LOCK FLUSH IV SOLN 100 UNIT/ML 100 UNIT/ML
5 SOLUTION INTRAVENOUS
Status: CANCELLED | OUTPATIENT
Start: 2022-07-22

## 2022-07-21 RX ORDER — ERTAPENEM 1 G/1
1 INJECTION, POWDER, LYOPHILIZED, FOR SOLUTION INTRAMUSCULAR; INTRAVENOUS ONCE
Status: CANCELLED | OUTPATIENT
Start: 2022-07-22 | End: 2022-07-22

## 2022-07-21 RX ORDER — ERTAPENEM 1 G/1
1 INJECTION, POWDER, LYOPHILIZED, FOR SOLUTION INTRAMUSCULAR; INTRAVENOUS ONCE
Status: COMPLETED | OUTPATIENT
Start: 2022-07-21 | End: 2022-07-21

## 2022-07-21 RX ORDER — METHYLPREDNISOLONE SODIUM SUCCINATE 125 MG/2ML
125 INJECTION, POWDER, LYOPHILIZED, FOR SOLUTION INTRAMUSCULAR; INTRAVENOUS
Status: CANCELLED
Start: 2022-07-22

## 2022-07-21 RX ADMIN — ERTAPENEM SODIUM 1 G: 1 INJECTION, POWDER, LYOPHILIZED, FOR SOLUTION INTRAMUSCULAR; INTRAVENOUS at 15:32

## 2022-07-21 ASSESSMENT — PAIN SCALES - GENERAL: PAINLEVEL: MILD PAIN (2)

## 2022-07-21 NOTE — PROGRESS NOTES
Infusion Nursing Note:  Caro Palmer presents today for Invanz infusion.    Patient seen by provider today: No   present during visit today: Not Applicable.    Note: N/A.    Intravenous Access:  PICC.    Treatment Conditions:  Not Applicable.    Post Infusion Assessment:  Patient tolerated infusion without incident.     Discharge Plan:   Patient discharged in stable condition accompanied by: self.  Departure Mode: Ambulatory.      Liliya Farr RN

## 2022-07-22 ENCOUNTER — OFFICE VISIT (OUTPATIENT)
Dept: INTERNAL MEDICINE | Facility: CLINIC | Age: 62
End: 2022-07-22
Payer: COMMERCIAL

## 2022-07-22 ENCOUNTER — INFUSION THERAPY VISIT (OUTPATIENT)
Dept: INFUSION THERAPY | Facility: CLINIC | Age: 62
End: 2022-07-22
Attending: FAMILY MEDICINE
Payer: COMMERCIAL

## 2022-07-22 VITALS
HEART RATE: 90 BPM | OXYGEN SATURATION: 98 % | TEMPERATURE: 97.7 F | DIASTOLIC BLOOD PRESSURE: 77 MMHG | RESPIRATION RATE: 16 BRPM | SYSTOLIC BLOOD PRESSURE: 141 MMHG

## 2022-07-22 VITALS
OXYGEN SATURATION: 96 % | RESPIRATION RATE: 10 BRPM | SYSTOLIC BLOOD PRESSURE: 140 MMHG | TEMPERATURE: 97.1 F | WEIGHT: 167 LBS | DIASTOLIC BLOOD PRESSURE: 84 MMHG | HEART RATE: 97 BPM | BODY MASS INDEX: 31.04 KG/M2

## 2022-07-22 DIAGNOSIS — Z87.19 HISTORY OF COLONIC DIVERTICULITIS: ICD-10-CM

## 2022-07-22 DIAGNOSIS — T81.43XA POSTOPERATIVE INTRA-ABDOMINAL ABSCESS (H): ICD-10-CM

## 2022-07-22 DIAGNOSIS — E11.9 DIABETES MELLITUS WITHOUT COMPLICATION (H): ICD-10-CM

## 2022-07-22 DIAGNOSIS — D72.825 BANDEMIA: ICD-10-CM

## 2022-07-22 DIAGNOSIS — I10 ESSENTIAL HYPERTENSION WITH GOAL BLOOD PRESSURE LESS THAN 140/90: ICD-10-CM

## 2022-07-22 DIAGNOSIS — T81.49XA ABDOMINAL WALL ABSCESS AT SITE OF SURGICAL WOUND: Primary | ICD-10-CM

## 2022-07-22 DIAGNOSIS — Z12.4 CERVICAL CANCER SCREENING: Primary | ICD-10-CM

## 2022-07-22 DIAGNOSIS — E11.9 TYPE 2 DIABETES MELLITUS WITHOUT COMPLICATION, WITHOUT LONG-TERM CURRENT USE OF INSULIN (H): ICD-10-CM

## 2022-07-22 DIAGNOSIS — T81.49XA ABDOMINAL WALL ABSCESS AT SITE OF SURGICAL WOUND: ICD-10-CM

## 2022-07-22 DIAGNOSIS — K65.1 POSTOPERATIVE INTRA-ABDOMINAL ABSCESS (H): ICD-10-CM

## 2022-07-22 LAB
BASOPHILS # BLD AUTO: 0.1 10E3/UL (ref 0–0.2)
BASOPHILS NFR BLD AUTO: 1 %
CREAT UR-MCNC: 82 MG/DL
EOSINOPHIL # BLD AUTO: 0.2 10E3/UL (ref 0–0.7)
EOSINOPHIL NFR BLD AUTO: 2 %
ERYTHROCYTE [DISTWIDTH] IN BLOOD BY AUTOMATED COUNT: 13.2 % (ref 10–15)
HCT VFR BLD AUTO: 36.5 % (ref 35–47)
HGB BLD-MCNC: 11.9 G/DL (ref 11.7–15.7)
IMM GRANULOCYTES # BLD: 0 10E3/UL
IMM GRANULOCYTES NFR BLD: 0 %
LYMPHOCYTES # BLD AUTO: 3 10E3/UL (ref 0.8–5.3)
LYMPHOCYTES NFR BLD AUTO: 37 %
MCH RBC QN AUTO: 29.8 PG (ref 26.5–33)
MCHC RBC AUTO-ENTMCNC: 32.6 G/DL (ref 31.5–36.5)
MCV RBC AUTO: 92 FL (ref 78–100)
MICROALBUMIN UR-MCNC: 16 MG/L
MICROALBUMIN/CREAT UR: 19.51 MG/G CR (ref 0–25)
MONOCYTES # BLD AUTO: 0.5 10E3/UL (ref 0–1.3)
MONOCYTES NFR BLD AUTO: 7 %
NEUTROPHILS # BLD AUTO: 4.3 10E3/UL (ref 1.6–8.3)
NEUTROPHILS NFR BLD AUTO: 53 %
NRBC # BLD AUTO: 0 10E3/UL
NRBC BLD AUTO-RTO: 0 /100
PLATELET # BLD AUTO: 496 10E3/UL (ref 150–450)
RBC # BLD AUTO: 3.99 10E6/UL (ref 3.8–5.2)
WBC # BLD AUTO: 8.1 10E3/UL (ref 4–11)

## 2022-07-22 PROCEDURE — 82043 UR ALBUMIN QUANTITATIVE: CPT | Performed by: INTERNAL MEDICINE

## 2022-07-22 PROCEDURE — 99495 TRANSJ CARE MGMT MOD F2F 14D: CPT | Performed by: INTERNAL MEDICINE

## 2022-07-22 PROCEDURE — 250N000011 HC RX IP 250 OP 636: Performed by: FAMILY MEDICINE

## 2022-07-22 PROCEDURE — 36415 COLL VENOUS BLD VENIPUNCTURE: CPT | Performed by: INTERNAL MEDICINE

## 2022-07-22 PROCEDURE — 96365 THER/PROPH/DIAG IV INF INIT: CPT

## 2022-07-22 PROCEDURE — 85025 COMPLETE CBC W/AUTO DIFF WBC: CPT | Performed by: INTERNAL MEDICINE

## 2022-07-22 RX ORDER — ALBUTEROL SULFATE 90 UG/1
1-2 AEROSOL, METERED RESPIRATORY (INHALATION)
Status: CANCELLED
Start: 2022-07-23

## 2022-07-22 RX ORDER — EPINEPHRINE 1 MG/ML
0.3 INJECTION, SOLUTION INTRAMUSCULAR; SUBCUTANEOUS EVERY 5 MIN PRN
Status: CANCELLED | OUTPATIENT
Start: 2022-07-23

## 2022-07-22 RX ORDER — HEPARIN SODIUM,PORCINE 10 UNIT/ML
5 VIAL (ML) INTRAVENOUS
Status: DISCONTINUED | OUTPATIENT
Start: 2022-07-22 | End: 2022-07-22 | Stop reason: HOSPADM

## 2022-07-22 RX ORDER — ALBUTEROL SULFATE 0.83 MG/ML
2.5 SOLUTION RESPIRATORY (INHALATION)
Status: CANCELLED | OUTPATIENT
Start: 2022-07-23

## 2022-07-22 RX ORDER — HEPARIN SODIUM (PORCINE) LOCK FLUSH IV SOLN 100 UNIT/ML 100 UNIT/ML
5 SOLUTION INTRAVENOUS
Status: DISCONTINUED | OUTPATIENT
Start: 2022-07-22 | End: 2022-07-22 | Stop reason: HOSPADM

## 2022-07-22 RX ORDER — HEPARIN SODIUM,PORCINE 10 UNIT/ML
5 VIAL (ML) INTRAVENOUS
Status: CANCELLED | OUTPATIENT
Start: 2022-07-23

## 2022-07-22 RX ORDER — MEPERIDINE HYDROCHLORIDE 25 MG/ML
25 INJECTION INTRAMUSCULAR; INTRAVENOUS; SUBCUTANEOUS EVERY 30 MIN PRN
Status: CANCELLED | OUTPATIENT
Start: 2022-07-23

## 2022-07-22 RX ORDER — ERTAPENEM 1 G/1
1 INJECTION, POWDER, LYOPHILIZED, FOR SOLUTION INTRAMUSCULAR; INTRAVENOUS ONCE
Status: CANCELLED | OUTPATIENT
Start: 2022-07-23 | End: 2022-07-23

## 2022-07-22 RX ORDER — DIPHENHYDRAMINE HYDROCHLORIDE 50 MG/ML
50 INJECTION INTRAMUSCULAR; INTRAVENOUS
Status: CANCELLED
Start: 2022-07-23

## 2022-07-22 RX ORDER — HEPARIN SODIUM (PORCINE) LOCK FLUSH IV SOLN 100 UNIT/ML 100 UNIT/ML
5 SOLUTION INTRAVENOUS
Status: CANCELLED | OUTPATIENT
Start: 2022-07-23

## 2022-07-22 RX ORDER — METHYLPREDNISOLONE SODIUM SUCCINATE 125 MG/2ML
125 INJECTION, POWDER, LYOPHILIZED, FOR SOLUTION INTRAMUSCULAR; INTRAVENOUS
Status: CANCELLED
Start: 2022-07-23

## 2022-07-22 RX ORDER — ERTAPENEM 1 G/1
1 INJECTION, POWDER, LYOPHILIZED, FOR SOLUTION INTRAMUSCULAR; INTRAVENOUS ONCE
Status: COMPLETED | OUTPATIENT
Start: 2022-07-22 | End: 2022-07-22

## 2022-07-22 RX ADMIN — ERTAPENEM SODIUM 1 G: 1 INJECTION, POWDER, LYOPHILIZED, FOR SOLUTION INTRAMUSCULAR; INTRAVENOUS at 14:40

## 2022-07-22 RX ADMIN — Medication 5 ML: at 15:15

## 2022-07-22 NOTE — PROGRESS NOTES
"  Assessment & Plan     Cervical cancer screening    Abdominal wall abscess at site of surgical wound    Essential hypertension with goal blood pressure less than 140/90  - Albumin Random Urine Quantitative with Creat Ratio    Diabetes mellitus without complication (H)    Bandemia  - CBC with platelets and differential; Future  - CBC with platelets and differential    History of colonic diverticulitis    Postoperative intra-abdominal abscess    Type 2 diabetes mellitus without complication, without long-term current use of insulin (H)  - Albumin Random Urine Quantitative with Creat Ratio           BMI:   Estimated body mass index is 31.04 kg/m  as calculated from the following:    Height as of 4/1/22: 1.562 m (5' 1.5\").    Weight as of this encounter: 75.8 kg (167 lb).                                FOLLOW UP   I have asked the patient to make an appointment for followup with primary care provider in 2 weeks.  Patient will follow-up with infectious disease as scheduled and continue her regularly scheduled antibiotic infusions in the interim.      [facility]   Caro Palmer is a 61 year old, presenting for the following health issues:  Hospital F/U      HPI   Diagnosis     Abdominal wall abscess [L02.211]     History of colonic diverticulitis [Z87.19]     Hypertension [I10]     Diabetes (HCC) [E11.9]     Asthma [J45.909]     Postoperative intra-abdominal abscess [T81.43XA]       Hospital Follow-up Visit:    Hospital/Nursing Home/IP Rehab Facility: [facility]   Date of Admission: 7/9/2022  Date of Discharge: 7/18/2022  Reason(s) for Admission: Abdominal wall abscess    Was your hospitalization related to COVID-19? No   Problems taking medications regularly:  None  Medication changes since discharge: Med rec done   Problems adhering to non-medication therapy:  None    Summary of hospitalization:  CareEverywhere information " obtained and reviewed  Diagnostic Tests/Treatments reviewed.  Follow up needed: IV antibiotics and follow up with PCP  Other Healthcare Providers Involved in Patient s Care:         Infusion and specialist - Infectious disease clinic  Update since discharge: Has been having mild abdominal pain Post Medication Reconciliation Status:        Plan of care communicated with patient           Review of Systems   CONSTITUTIONAL: NEGATIVE for fever, chills, change in weight  INTEGUMENTARY/SKIN: NEGATIVE for worrisome rashes, moles or lesions  EYES: NEGATIVE for vision changes or irritation  ENT/MOUTH: NEGATIVE for ear, mouth and throat problems  RESP: NEGATIVE for significant cough or SOB  CV: NEGATIVE for chest pain, palpitations or peripheral edema  GI: NEGATIVE for nausea, abdominal pain, heartburn, or change in bowel habits  : NEGATIVE for frequency, dysuria, or hematuria  MUSCULOSKELETAL: NEGATIVE for significant arthralgias or myalgia  NEURO: NEGATIVE for weakness, dizziness or paresthesias  ENDOCRINE: NEGATIVE for temperature intolerance, skin/hair changes  HEME: NEGATIVE for bleeding problems  PSYCHIATRIC: NEGATIVE for changes in mood or affect      Objective    BP (!) 140/84   Pulse 97   Temp 97.1  F (36.2  C)   Resp 10   Wt 75.8 kg (167 lb)   LMP 11/22/2012   SpO2 96%   BMI 31.04 kg/m    Body mass index is 31.04 kg/m .  Physical Exam   GENERAL: healthy, alert and no distress  EYES: Eyes grossly normal to inspection, PERRL and conjunctivae and sclerae normal  HENT: ear canals and TM's normal, nose and mouth without ulcers or lesions  NECK: no adenopathy, no asymmetry, masses, or scars and thyroid normal to palpation  RESP: lungs clear to auscultation - no rales, rhonchi or wheezes  BREAST: normal without masses, tenderness or nipple discharge and no palpable axillary masses or adenopathy  CV: regular rate and rhythm, normal S1 S2, no S3 or S4, no murmur, click or rub, no peripheral edema and peripheral  pulses strong  ABDOMEN: soft, nontender, no hepatosplenomegaly, no masses and bowel sounds normal  MS: no gross musculoskeletal defects noted, no edema  SKIN: no suspicious lesions or rashes  NEURO: Normal strength and tone, mentation intact and speech normal  PSYCH: mentation appears normal, affect normal/bright                    .  ..

## 2022-07-22 NOTE — PROGRESS NOTES
Infusion Nursing Note:  Caro Palmer presents today for IV Invanz..    Patient seen by provider today: No   present during visit today: Not Applicable.    Note: Orders for treatment in ER over weekend sent. Pt states understanding that her treatment will be done in the ER over the weekend.    Intravenous Access:  PICC.    Treatment Conditions:  Not Applicable.    Post Infusion Assessment:  Patient tolerated infusion without incident.  Blood return noted pre and post infusion.  Site patent and intact, free from redness, edema or discomfort.  No evidence of extravasations.     Discharge Plan:   Discharge instructions reviewed with: Patient.  Patient and/or family verbalized understanding of discharge instructions and all questions answered.  Patient discharged in stable condition accompanied by: self.  Departure Mode: Ambulatory.      Emily Powers RN

## 2022-07-23 ENCOUNTER — HOSPITAL ENCOUNTER (EMERGENCY)
Facility: CLINIC | Age: 62
Discharge: HOME | End: 2022-07-23
Admitting: FAMILY MEDICINE
Payer: COMMERCIAL

## 2022-07-23 VITALS
OXYGEN SATURATION: 100 % | DIASTOLIC BLOOD PRESSURE: 79 MMHG | RESPIRATION RATE: 18 BRPM | SYSTOLIC BLOOD PRESSURE: 133 MMHG | HEART RATE: 79 BPM | TEMPERATURE: 97.7 F

## 2022-07-23 DIAGNOSIS — T81.49XA ABDOMINAL WALL ABSCESS AT SITE OF SURGICAL WOUND: ICD-10-CM

## 2022-07-23 PROCEDURE — 999N000104 HC STATISTIC NO CHARGE

## 2022-07-23 PROCEDURE — 250N000011 HC RX IP 250 OP 636: Performed by: FAMILY MEDICINE

## 2022-07-23 PROCEDURE — 96365 THER/PROPH/DIAG IV INF INIT: CPT

## 2022-07-23 RX ORDER — HEPARIN SODIUM (PORCINE) LOCK FLUSH IV SOLN 100 UNIT/ML 100 UNIT/ML
5 SOLUTION INTRAVENOUS
Status: CANCELLED | OUTPATIENT
Start: 2022-07-24

## 2022-07-23 RX ORDER — DIPHENHYDRAMINE HYDROCHLORIDE 50 MG/ML
50 INJECTION INTRAMUSCULAR; INTRAVENOUS
Status: CANCELLED
Start: 2022-07-24

## 2022-07-23 RX ORDER — ERTAPENEM 1 G/1
1 INJECTION, POWDER, LYOPHILIZED, FOR SOLUTION INTRAMUSCULAR; INTRAVENOUS ONCE
Status: CANCELLED | OUTPATIENT
Start: 2022-07-24 | End: 2022-07-24

## 2022-07-23 RX ORDER — METHYLPREDNISOLONE SODIUM SUCCINATE 125 MG/2ML
125 INJECTION, POWDER, LYOPHILIZED, FOR SOLUTION INTRAMUSCULAR; INTRAVENOUS
Status: CANCELLED
Start: 2022-07-24

## 2022-07-23 RX ORDER — HEPARIN SODIUM,PORCINE 10 UNIT/ML
5 VIAL (ML) INTRAVENOUS
Status: CANCELLED | OUTPATIENT
Start: 2022-07-24

## 2022-07-23 RX ORDER — ALBUTEROL SULFATE 90 UG/1
1-2 AEROSOL, METERED RESPIRATORY (INHALATION)
Status: CANCELLED
Start: 2022-07-24

## 2022-07-23 RX ORDER — ERTAPENEM 1 G/1
1 INJECTION, POWDER, LYOPHILIZED, FOR SOLUTION INTRAMUSCULAR; INTRAVENOUS ONCE
Status: COMPLETED | OUTPATIENT
Start: 2022-07-23 | End: 2022-07-23

## 2022-07-23 RX ORDER — HEPARIN SODIUM,PORCINE 10 UNIT/ML
5 VIAL (ML) INTRAVENOUS
Status: DISCONTINUED | OUTPATIENT
Start: 2022-07-23 | End: 2022-07-23 | Stop reason: HOSPADM

## 2022-07-23 RX ORDER — EPINEPHRINE 1 MG/ML
0.3 INJECTION, SOLUTION INTRAMUSCULAR; SUBCUTANEOUS EVERY 5 MIN PRN
Status: CANCELLED | OUTPATIENT
Start: 2022-07-24

## 2022-07-23 RX ORDER — HEPARIN SODIUM (PORCINE) LOCK FLUSH IV SOLN 100 UNIT/ML 100 UNIT/ML
5 SOLUTION INTRAVENOUS
Status: DISCONTINUED | OUTPATIENT
Start: 2022-07-23 | End: 2022-07-23 | Stop reason: HOSPADM

## 2022-07-23 RX ORDER — ALBUTEROL SULFATE 0.83 MG/ML
2.5 SOLUTION RESPIRATORY (INHALATION)
Status: CANCELLED | OUTPATIENT
Start: 2022-07-24

## 2022-07-23 RX ORDER — MEPERIDINE HYDROCHLORIDE 25 MG/ML
25 INJECTION INTRAMUSCULAR; INTRAVENOUS; SUBCUTANEOUS EVERY 30 MIN PRN
Status: CANCELLED | OUTPATIENT
Start: 2022-07-24

## 2022-07-23 RX ADMIN — ERTAPENEM SODIUM 1 G: 1 INJECTION, POWDER, LYOPHILIZED, FOR SOLUTION INTRAMUSCULAR; INTRAVENOUS at 15:29

## 2022-07-23 NOTE — ED TRIAGE NOTES
Pt arrived for IV antibiotics.    Triage Assessment     Row Name 07/23/22 4273       Triage Assessment (Adult)    Airway WDL WDL       Respiratory WDL    Respiratory WDL WDL       Skin Circulation/Temperature WDL    Skin Circulation/Temperature WDL WDL

## 2022-07-24 ENCOUNTER — HOSPITAL ENCOUNTER (EMERGENCY)
Facility: CLINIC | Age: 62
Discharge: HOME | End: 2022-07-24
Admitting: FAMILY MEDICINE
Payer: COMMERCIAL

## 2022-07-24 VITALS
TEMPERATURE: 98.2 F | OXYGEN SATURATION: 97 % | DIASTOLIC BLOOD PRESSURE: 88 MMHG | SYSTOLIC BLOOD PRESSURE: 138 MMHG | HEART RATE: 91 BPM | RESPIRATION RATE: 18 BRPM

## 2022-07-24 DIAGNOSIS — T81.49XA ABDOMINAL WALL ABSCESS AT SITE OF SURGICAL WOUND: ICD-10-CM

## 2022-07-24 PROCEDURE — 96365 THER/PROPH/DIAG IV INF INIT: CPT

## 2022-07-24 PROCEDURE — 250N000011 HC RX IP 250 OP 636: Performed by: FAMILY MEDICINE

## 2022-07-24 PROCEDURE — 999N000104 HC STATISTIC NO CHARGE

## 2022-07-24 RX ORDER — ERTAPENEM 1 G/1
1 INJECTION, POWDER, LYOPHILIZED, FOR SOLUTION INTRAMUSCULAR; INTRAVENOUS ONCE
Status: CANCELLED | OUTPATIENT
Start: 2022-07-25 | End: 2022-07-25

## 2022-07-24 RX ORDER — HEPARIN SODIUM (PORCINE) LOCK FLUSH IV SOLN 100 UNIT/ML 100 UNIT/ML
5 SOLUTION INTRAVENOUS
Status: DISCONTINUED | OUTPATIENT
Start: 2022-07-24 | End: 2022-07-24 | Stop reason: HOSPADM

## 2022-07-24 RX ORDER — METHYLPREDNISOLONE SODIUM SUCCINATE 125 MG/2ML
125 INJECTION, POWDER, LYOPHILIZED, FOR SOLUTION INTRAMUSCULAR; INTRAVENOUS
Status: CANCELLED
Start: 2022-07-25

## 2022-07-24 RX ORDER — HEPARIN SODIUM,PORCINE 10 UNIT/ML
5 VIAL (ML) INTRAVENOUS
Status: DISCONTINUED | OUTPATIENT
Start: 2022-07-24 | End: 2022-07-24 | Stop reason: HOSPADM

## 2022-07-24 RX ORDER — EPINEPHRINE 1 MG/ML
0.3 INJECTION, SOLUTION INTRAMUSCULAR; SUBCUTANEOUS EVERY 5 MIN PRN
Status: CANCELLED | OUTPATIENT
Start: 2022-07-25

## 2022-07-24 RX ORDER — ALBUTEROL SULFATE 90 UG/1
1-2 AEROSOL, METERED RESPIRATORY (INHALATION)
Status: CANCELLED
Start: 2022-07-25

## 2022-07-24 RX ORDER — ALBUTEROL SULFATE 0.83 MG/ML
2.5 SOLUTION RESPIRATORY (INHALATION)
Status: CANCELLED | OUTPATIENT
Start: 2022-07-25

## 2022-07-24 RX ORDER — ERTAPENEM 1 G/1
1 INJECTION, POWDER, LYOPHILIZED, FOR SOLUTION INTRAMUSCULAR; INTRAVENOUS ONCE
Status: COMPLETED | OUTPATIENT
Start: 2022-07-24 | End: 2022-07-24

## 2022-07-24 RX ORDER — HEPARIN SODIUM,PORCINE 10 UNIT/ML
5 VIAL (ML) INTRAVENOUS
Status: CANCELLED | OUTPATIENT
Start: 2022-07-25

## 2022-07-24 RX ORDER — MEPERIDINE HYDROCHLORIDE 25 MG/ML
25 INJECTION INTRAMUSCULAR; INTRAVENOUS; SUBCUTANEOUS EVERY 30 MIN PRN
Status: CANCELLED | OUTPATIENT
Start: 2022-07-25

## 2022-07-24 RX ORDER — HEPARIN SODIUM (PORCINE) LOCK FLUSH IV SOLN 100 UNIT/ML 100 UNIT/ML
5 SOLUTION INTRAVENOUS
Status: CANCELLED | OUTPATIENT
Start: 2022-07-25

## 2022-07-24 RX ORDER — DIPHENHYDRAMINE HYDROCHLORIDE 50 MG/ML
50 INJECTION INTRAMUSCULAR; INTRAVENOUS
Status: CANCELLED
Start: 2022-07-25

## 2022-07-24 RX ADMIN — ERTAPENEM SODIUM 1 G: 1 INJECTION, POWDER, LYOPHILIZED, FOR SOLUTION INTRAMUSCULAR; INTRAVENOUS at 14:19

## 2022-07-25 ENCOUNTER — INFUSION THERAPY VISIT (OUTPATIENT)
Dept: INFUSION THERAPY | Facility: CLINIC | Age: 62
End: 2022-07-25
Attending: FAMILY MEDICINE
Payer: COMMERCIAL

## 2022-07-25 VITALS
TEMPERATURE: 98.6 F | SYSTOLIC BLOOD PRESSURE: 118 MMHG | HEART RATE: 95 BPM | OXYGEN SATURATION: 98 % | DIASTOLIC BLOOD PRESSURE: 71 MMHG

## 2022-07-25 DIAGNOSIS — T81.49XA ABDOMINAL WALL ABSCESS AT SITE OF SURGICAL WOUND: Primary | ICD-10-CM

## 2022-07-25 PROCEDURE — 96365 THER/PROPH/DIAG IV INF INIT: CPT

## 2022-07-25 PROCEDURE — 250N000011 HC RX IP 250 OP 636: Performed by: FAMILY MEDICINE

## 2022-07-25 RX ORDER — ALBUTEROL SULFATE 90 UG/1
1-2 AEROSOL, METERED RESPIRATORY (INHALATION)
Status: CANCELLED
Start: 2022-07-26

## 2022-07-25 RX ORDER — ALBUTEROL SULFATE 0.83 MG/ML
2.5 SOLUTION RESPIRATORY (INHALATION)
Status: CANCELLED | OUTPATIENT
Start: 2022-07-26

## 2022-07-25 RX ORDER — MEPERIDINE HYDROCHLORIDE 25 MG/ML
25 INJECTION INTRAMUSCULAR; INTRAVENOUS; SUBCUTANEOUS EVERY 30 MIN PRN
Status: CANCELLED | OUTPATIENT
Start: 2022-07-26

## 2022-07-25 RX ORDER — HEPARIN SODIUM (PORCINE) LOCK FLUSH IV SOLN 100 UNIT/ML 100 UNIT/ML
5 SOLUTION INTRAVENOUS
Status: DISCONTINUED | OUTPATIENT
Start: 2022-07-25 | End: 2022-07-25 | Stop reason: HOSPADM

## 2022-07-25 RX ORDER — DIPHENHYDRAMINE HYDROCHLORIDE 50 MG/ML
50 INJECTION INTRAMUSCULAR; INTRAVENOUS
Status: CANCELLED
Start: 2022-07-26

## 2022-07-25 RX ORDER — HEPARIN SODIUM,PORCINE 10 UNIT/ML
5 VIAL (ML) INTRAVENOUS
Status: DISCONTINUED | OUTPATIENT
Start: 2022-07-25 | End: 2022-07-25 | Stop reason: HOSPADM

## 2022-07-25 RX ORDER — ERTAPENEM 1 G/1
1 INJECTION, POWDER, LYOPHILIZED, FOR SOLUTION INTRAMUSCULAR; INTRAVENOUS ONCE
Status: CANCELLED | OUTPATIENT
Start: 2022-07-26 | End: 2022-07-26

## 2022-07-25 RX ORDER — ERTAPENEM 1 G/1
1 INJECTION, POWDER, LYOPHILIZED, FOR SOLUTION INTRAMUSCULAR; INTRAVENOUS ONCE
Status: COMPLETED | OUTPATIENT
Start: 2022-07-25 | End: 2022-07-25

## 2022-07-25 RX ORDER — HEPARIN SODIUM (PORCINE) LOCK FLUSH IV SOLN 100 UNIT/ML 100 UNIT/ML
5 SOLUTION INTRAVENOUS
Status: CANCELLED | OUTPATIENT
Start: 2022-07-26

## 2022-07-25 RX ORDER — METHYLPREDNISOLONE SODIUM SUCCINATE 125 MG/2ML
125 INJECTION, POWDER, LYOPHILIZED, FOR SOLUTION INTRAMUSCULAR; INTRAVENOUS
Status: CANCELLED
Start: 2022-07-26

## 2022-07-25 RX ORDER — HEPARIN SODIUM,PORCINE 10 UNIT/ML
5 VIAL (ML) INTRAVENOUS
Status: CANCELLED | OUTPATIENT
Start: 2022-07-26

## 2022-07-25 RX ORDER — EPINEPHRINE 1 MG/ML
0.3 INJECTION, SOLUTION INTRAMUSCULAR; SUBCUTANEOUS EVERY 5 MIN PRN
Status: CANCELLED | OUTPATIENT
Start: 2022-07-26

## 2022-07-25 RX ADMIN — ERTAPENEM SODIUM 1 G: 1 INJECTION, POWDER, LYOPHILIZED, FOR SOLUTION INTRAMUSCULAR; INTRAVENOUS at 14:37

## 2022-07-25 ASSESSMENT — PAIN SCALES - GENERAL: PAINLEVEL: NO PAIN (0)

## 2022-07-25 NOTE — PROGRESS NOTES
Infusion Nursing Note:  Caro Palmer presents today for Invanz antibiotic.    Patient seen by provider today: No   present during visit today: Not Applicable.    Note: Pt here today for her Invanz antibiotic infusion.  Was seen in the ED over the weekend for this infusion.  Pt states she is feeling pretty good.  Had noticed a possible yeast infection. She treated it with over the counter medication and thinks that she has taken care of it.  .    PICC dressing change was done today.   Intravenous Access:  PICC.    Treatment Conditions:  Not Applicable.    Post Infusion Assessment:  Patient tolerated infusion without incident.  Blood return noted pre and post infusion.  Site patent and intact, free from redness, edema or discomfort.     Discharge Plan:   Patient discharged in stable condition accompanied by: self.  Departure Mode: Ambulatory  Pt will return tomorrow to infusion for next dose..      Savi Casper RN

## 2022-07-25 NOTE — PROGRESS NOTES
"Type of surgery/procedure:  ***  Date of surgery:  ***  Are you experiencing pain in surgical area? {YES/NO/WHERE:210274}  Have you had a temperature since surgery? {YES/HIGH/OFTEN:887558}  Incision closed with {CLOSURE:486193::\"staples\"}.  Appearance: {INCISION APPEARANCE:402113}  Drainage: {YES/COLOR:925649::\"No\"}  Are there any other problems you would like to discuss?  ***  Reviewed incision care with the patient? ***      "

## 2022-07-26 ENCOUNTER — INFUSION THERAPY VISIT (OUTPATIENT)
Dept: INFUSION THERAPY | Facility: CLINIC | Age: 62
End: 2022-07-26
Attending: FAMILY MEDICINE
Payer: COMMERCIAL

## 2022-07-26 ENCOUNTER — OFFICE VISIT (OUTPATIENT)
Dept: SURGERY | Facility: CLINIC | Age: 62
End: 2022-07-26
Payer: COMMERCIAL

## 2022-07-26 VITALS
HEART RATE: 92 BPM | OXYGEN SATURATION: 96 % | SYSTOLIC BLOOD PRESSURE: 116 MMHG | DIASTOLIC BLOOD PRESSURE: 71 MMHG | RESPIRATION RATE: 16 BRPM | TEMPERATURE: 98.1 F

## 2022-07-26 VITALS
WEIGHT: 167 LBS | SYSTOLIC BLOOD PRESSURE: 116 MMHG | DIASTOLIC BLOOD PRESSURE: 70 MMHG | TEMPERATURE: 97.3 F | BODY MASS INDEX: 31.04 KG/M2

## 2022-07-26 DIAGNOSIS — Z87.19 HISTORY OF HERNIA REPAIR: ICD-10-CM

## 2022-07-26 DIAGNOSIS — T81.49XA ABDOMINAL WALL ABSCESS AT SITE OF SURGICAL WOUND: Primary | ICD-10-CM

## 2022-07-26 DIAGNOSIS — K65.1 INTRA-ABDOMINAL ABSCESS (H): ICD-10-CM

## 2022-07-26 DIAGNOSIS — Z98.890 HISTORY OF HERNIA REPAIR: ICD-10-CM

## 2022-07-26 DIAGNOSIS — Z87.19 HISTORY OF DIVERTICULITIS: Primary | ICD-10-CM

## 2022-07-26 PROCEDURE — 99214 OFFICE O/P EST MOD 30 MIN: CPT | Performed by: SURGERY

## 2022-07-26 PROCEDURE — 96365 THER/PROPH/DIAG IV INF INIT: CPT

## 2022-07-26 PROCEDURE — 250N000011 HC RX IP 250 OP 636: Performed by: FAMILY MEDICINE

## 2022-07-26 RX ORDER — ALBUTEROL SULFATE 90 UG/1
1-2 AEROSOL, METERED RESPIRATORY (INHALATION)
Status: CANCELLED
Start: 2022-07-27

## 2022-07-26 RX ORDER — ALBUTEROL SULFATE 0.83 MG/ML
2.5 SOLUTION RESPIRATORY (INHALATION)
Status: CANCELLED | OUTPATIENT
Start: 2022-07-27

## 2022-07-26 RX ORDER — MEPERIDINE HYDROCHLORIDE 25 MG/ML
25 INJECTION INTRAMUSCULAR; INTRAVENOUS; SUBCUTANEOUS EVERY 30 MIN PRN
Status: CANCELLED | OUTPATIENT
Start: 2022-07-27

## 2022-07-26 RX ORDER — ERTAPENEM 1 G/1
1 INJECTION, POWDER, LYOPHILIZED, FOR SOLUTION INTRAMUSCULAR; INTRAVENOUS ONCE
Status: CANCELLED | OUTPATIENT
Start: 2022-07-27 | End: 2022-07-27

## 2022-07-26 RX ORDER — EPINEPHRINE 1 MG/ML
0.3 INJECTION, SOLUTION INTRAMUSCULAR; SUBCUTANEOUS EVERY 5 MIN PRN
Status: CANCELLED | OUTPATIENT
Start: 2022-07-27

## 2022-07-26 RX ORDER — HEPARIN SODIUM,PORCINE 10 UNIT/ML
5 VIAL (ML) INTRAVENOUS
Status: CANCELLED | OUTPATIENT
Start: 2022-07-27

## 2022-07-26 RX ORDER — DIPHENHYDRAMINE HYDROCHLORIDE 50 MG/ML
50 INJECTION INTRAMUSCULAR; INTRAVENOUS
Status: CANCELLED
Start: 2022-07-27

## 2022-07-26 RX ORDER — METHYLPREDNISOLONE SODIUM SUCCINATE 125 MG/2ML
125 INJECTION, POWDER, LYOPHILIZED, FOR SOLUTION INTRAMUSCULAR; INTRAVENOUS
Status: CANCELLED
Start: 2022-07-27

## 2022-07-26 RX ORDER — ERTAPENEM 1 G/1
1 INJECTION, POWDER, LYOPHILIZED, FOR SOLUTION INTRAMUSCULAR; INTRAVENOUS ONCE
Status: COMPLETED | OUTPATIENT
Start: 2022-07-26 | End: 2022-07-26

## 2022-07-26 RX ORDER — HEPARIN SODIUM (PORCINE) LOCK FLUSH IV SOLN 100 UNIT/ML 100 UNIT/ML
5 SOLUTION INTRAVENOUS
Status: CANCELLED | OUTPATIENT
Start: 2022-07-27

## 2022-07-26 RX ADMIN — ERTAPENEM SODIUM 1 G: 1 INJECTION, POWDER, LYOPHILIZED, FOR SOLUTION INTRAMUSCULAR; INTRAVENOUS at 14:36

## 2022-07-26 ASSESSMENT — PAIN SCALES - GENERAL: PAINLEVEL: NO PAIN (0)

## 2022-07-26 NOTE — LETTER
7/26/2022         RE: Caro Palmer  [address]  [place] [address]        Dear Colleague,    Thank you for referring your patient, Caro Palmer, to the [facility]. Please see a copy of my visit note below.      Assessment & Plan   Problem List Items Addressed This Visit    None     Visit Diagnoses     History of diverticulitis    -  Primary    Intra-abdominal abscess (H)        History of hernia repair        BMI 31.0-31.9,adult             61-year-old female status post hospitalization in [place] for intra-abdominal abscess.  Analyzed that this abscess was not a mesh infection.  Currently working with infectious disease and has a PICC line for IV antibiotic to help continued third of management of this abscess.  She has an upcoming CT scan with ID at [place].  He is to ask for a disc and to send it over to [place] so I can take a look at this.  Surgical intervention.  This abscess is able to improve and resolve without any drainage or surgical intervention that would be best for her as this will not expose the mesh to an active infection.  She is to see me as needed.  She does not have obvious recurrent hernia on exam.  Overall doing well.    [facility]  25 Time spent doing chart review, history and exam, documentation and further activities per the note           No follow-ups on file.    Glenn Nicolas MD  [facility]    Subjective   Caro Palmer is a 61 year old, presenting for the following health issues:  Surgical Followup      History of laparoscopic-assisted open incisional hernia repair with mesh in March 2022.  Recently discharge from hospital in [place] for intra-abdominal abscess.  And that this is not mesh related.  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  Seeing ID  in [place] next week.  She is doing fine overall.  Minimal pain.  No fevers.  Having bowel movement and eating well.    She has a PICC line in place.  She has been getting daily Invanz for the next 4 days per recommendation from ID.       Review of Systems   Constitutional, HEENT, cardiovascular, pulmonary, gi and gu systems are negative, except as otherwise noted.      Objective    /70   Temp 97.3  F (36.3  C) (Temporal)   Wt 75.8 kg (167 lb)   LMP 11/22/2012   BMI 31.04 kg/m    Body mass index is 31.04 kg/m .  Physical Exam   Abdomen:  Incision healed very well.  No obvious hernia on exam today.                  .  ..      Again, thank you for allowing me to participate in the care of your patient.        Sincerely,        Glenn Nicolas MD

## 2022-07-26 NOTE — PATIENT INSTRUCTIONS
Pt to return tomorrow for Invanz. Copies of medication list and upcoming appointments given prior to discharge.

## 2022-07-26 NOTE — PROGRESS NOTES
Infusion Nursing Note:  Caro Palmer presents today for Invanz.    Patient seen by provider today: Yes:    present during visit today: Not Applicable.    Note: N/A.    Intravenous Access:  PICC.    Treatment Conditions:  Not Applicable.    Post Infusion Assessment:  Patient tolerated infusion without incident.  Patient observed for 10 minutes post infusion per protocol.  Blood return noted pre and post infusion.  Site patent and intact, free from redness, edema or discomfort.  No evidence of extravasations.     Discharge Plan:   Discharge instructions reviewed with: Patient.  Patient and/or family verbalized understanding of discharge instructions and all questions answered.  Copy of AVS reviewed with patient and/or family.  Patient will return tomorrow for next appointment.  Patient discharged in stable condition accompanied by: self.  Departure Mode: Ambulatory.      Siomara Grijalva RN

## 2022-07-27 ENCOUNTER — INFUSION THERAPY VISIT (OUTPATIENT)
Dept: INFUSION THERAPY | Facility: CLINIC | Age: 62
End: 2022-07-27
Attending: FAMILY MEDICINE
Payer: COMMERCIAL

## 2022-07-27 VITALS
TEMPERATURE: 97.7 F | SYSTOLIC BLOOD PRESSURE: 121 MMHG | HEART RATE: 99 BPM | OXYGEN SATURATION: 96 % | RESPIRATION RATE: 18 BRPM | DIASTOLIC BLOOD PRESSURE: 72 MMHG

## 2022-07-27 DIAGNOSIS — T81.49XA ABDOMINAL WALL ABSCESS AT SITE OF SURGICAL WOUND: Primary | ICD-10-CM

## 2022-07-27 PROCEDURE — 96365 THER/PROPH/DIAG IV INF INIT: CPT

## 2022-07-27 PROCEDURE — 250N000011 HC RX IP 250 OP 636: Performed by: FAMILY MEDICINE

## 2022-07-27 RX ORDER — METHYLPREDNISOLONE SODIUM SUCCINATE 125 MG/2ML
125 INJECTION, POWDER, LYOPHILIZED, FOR SOLUTION INTRAMUSCULAR; INTRAVENOUS
Status: CANCELLED
Start: 2022-07-28

## 2022-07-27 RX ORDER — HEPARIN SODIUM (PORCINE) LOCK FLUSH IV SOLN 100 UNIT/ML 100 UNIT/ML
5 SOLUTION INTRAVENOUS
Status: CANCELLED | OUTPATIENT
Start: 2022-07-28

## 2022-07-27 RX ORDER — EPINEPHRINE 1 MG/ML
0.3 INJECTION, SOLUTION INTRAMUSCULAR; SUBCUTANEOUS EVERY 5 MIN PRN
Status: CANCELLED | OUTPATIENT
Start: 2022-07-28

## 2022-07-27 RX ORDER — ERTAPENEM 1 G/1
1 INJECTION, POWDER, LYOPHILIZED, FOR SOLUTION INTRAMUSCULAR; INTRAVENOUS ONCE
Status: CANCELLED | OUTPATIENT
Start: 2022-07-28 | End: 2022-07-28

## 2022-07-27 RX ORDER — HEPARIN SODIUM,PORCINE 10 UNIT/ML
5 VIAL (ML) INTRAVENOUS
Status: CANCELLED | OUTPATIENT
Start: 2022-07-28

## 2022-07-27 RX ORDER — MEPERIDINE HYDROCHLORIDE 25 MG/ML
25 INJECTION INTRAMUSCULAR; INTRAVENOUS; SUBCUTANEOUS EVERY 30 MIN PRN
Status: CANCELLED | OUTPATIENT
Start: 2022-07-28

## 2022-07-27 RX ORDER — ERTAPENEM 1 G/1
1 INJECTION, POWDER, LYOPHILIZED, FOR SOLUTION INTRAMUSCULAR; INTRAVENOUS ONCE
Status: COMPLETED | OUTPATIENT
Start: 2022-07-27 | End: 2022-07-27

## 2022-07-27 RX ORDER — ALBUTEROL SULFATE 0.83 MG/ML
2.5 SOLUTION RESPIRATORY (INHALATION)
Status: CANCELLED | OUTPATIENT
Start: 2022-07-28

## 2022-07-27 RX ORDER — ALBUTEROL SULFATE 90 UG/1
1-2 AEROSOL, METERED RESPIRATORY (INHALATION)
Status: CANCELLED
Start: 2022-07-28

## 2022-07-27 RX ORDER — DIPHENHYDRAMINE HYDROCHLORIDE 50 MG/ML
50 INJECTION INTRAMUSCULAR; INTRAVENOUS
Status: CANCELLED
Start: 2022-07-28

## 2022-07-27 RX ADMIN — ERTAPENEM SODIUM 1 G: 1 INJECTION, POWDER, LYOPHILIZED, FOR SOLUTION INTRAMUSCULAR; INTRAVENOUS at 14:09

## 2022-07-27 NOTE — PROGRESS NOTES
Infusion Nursing Note:  Caro Palmer presents today for Invanz.    Patient seen by provider today: No   present during visit today: Not Applicable.    Note: N/A.    Intravenous Access:  PICC.    Treatment Conditions:  Not Applicable.    Post Infusion Assessment:  Patient tolerated infusion without incident.  Blood return noted pre and post infusion.     Discharge Plan:   Discharge instructions reviewed with: Patient.  Patient discharged in stable condition accompanied by: self.  Departure Mode: Ambulatory.      Nannette Wang RN

## 2022-07-28 ENCOUNTER — INFUSION THERAPY VISIT (OUTPATIENT)
Dept: INFUSION THERAPY | Facility: CLINIC | Age: 62
End: 2022-07-28
Attending: FAMILY MEDICINE
Payer: COMMERCIAL

## 2022-07-28 VITALS
RESPIRATION RATE: 20 BRPM | OXYGEN SATURATION: 97 % | DIASTOLIC BLOOD PRESSURE: 64 MMHG | SYSTOLIC BLOOD PRESSURE: 111 MMHG | HEART RATE: 90 BPM | TEMPERATURE: 97.8 F

## 2022-07-28 DIAGNOSIS — T81.49XA ABDOMINAL WALL ABSCESS AT SITE OF SURGICAL WOUND: Primary | ICD-10-CM

## 2022-07-28 PROCEDURE — 96365 THER/PROPH/DIAG IV INF INIT: CPT

## 2022-07-28 PROCEDURE — 250N000011 HC RX IP 250 OP 636: Performed by: FAMILY MEDICINE

## 2022-07-28 RX ORDER — ALBUTEROL SULFATE 90 UG/1
1-2 AEROSOL, METERED RESPIRATORY (INHALATION)
Status: CANCELLED
Start: 2022-07-29

## 2022-07-28 RX ORDER — HEPARIN SODIUM (PORCINE) LOCK FLUSH IV SOLN 100 UNIT/ML 100 UNIT/ML
5 SOLUTION INTRAVENOUS
Status: CANCELLED | OUTPATIENT
Start: 2022-07-29

## 2022-07-28 RX ORDER — EPINEPHRINE 1 MG/ML
0.3 INJECTION, SOLUTION INTRAMUSCULAR; SUBCUTANEOUS EVERY 5 MIN PRN
Status: CANCELLED | OUTPATIENT
Start: 2022-07-29

## 2022-07-28 RX ORDER — DIPHENHYDRAMINE HYDROCHLORIDE 50 MG/ML
50 INJECTION INTRAMUSCULAR; INTRAVENOUS
Status: CANCELLED
Start: 2022-07-29

## 2022-07-28 RX ORDER — HEPARIN SODIUM,PORCINE 10 UNIT/ML
5 VIAL (ML) INTRAVENOUS
Status: CANCELLED | OUTPATIENT
Start: 2022-07-29

## 2022-07-28 RX ORDER — ERTAPENEM 1 G/1
1 INJECTION, POWDER, LYOPHILIZED, FOR SOLUTION INTRAMUSCULAR; INTRAVENOUS ONCE
Status: COMPLETED | OUTPATIENT
Start: 2022-07-28 | End: 2022-07-28

## 2022-07-28 RX ORDER — ALBUTEROL SULFATE 0.83 MG/ML
2.5 SOLUTION RESPIRATORY (INHALATION)
Status: CANCELLED | OUTPATIENT
Start: 2022-07-29

## 2022-07-28 RX ORDER — ERTAPENEM 1 G/1
1 INJECTION, POWDER, LYOPHILIZED, FOR SOLUTION INTRAMUSCULAR; INTRAVENOUS ONCE
Status: CANCELLED | OUTPATIENT
Start: 2022-07-29 | End: 2022-07-29

## 2022-07-28 RX ORDER — METHYLPREDNISOLONE SODIUM SUCCINATE 125 MG/2ML
125 INJECTION, POWDER, LYOPHILIZED, FOR SOLUTION INTRAMUSCULAR; INTRAVENOUS
Status: CANCELLED
Start: 2022-07-29

## 2022-07-28 RX ORDER — MEPERIDINE HYDROCHLORIDE 25 MG/ML
25 INJECTION INTRAMUSCULAR; INTRAVENOUS; SUBCUTANEOUS EVERY 30 MIN PRN
Status: CANCELLED | OUTPATIENT
Start: 2022-07-29

## 2022-07-28 RX ADMIN — ERTAPENEM SODIUM 1 G: 1 INJECTION, POWDER, LYOPHILIZED, FOR SOLUTION INTRAMUSCULAR; INTRAVENOUS at 14:39

## 2022-07-28 ASSESSMENT — PAIN SCALES - GENERAL: PAINLEVEL: NO PAIN (0)

## 2022-07-28 NOTE — PROGRESS NOTES
Infusion Nursing Note:  Caro Palmer presents today for Ertapenem.    Patient seen by provider today: No   present during visit today: Not Applicable.    Note: Patient denies new symptoms or concerns today. VSS, afebrile.     Intravenous Access:  PICC.  Good blood return, flushes easily.   Site WDL.    Treatment Conditions:  Not Applicable.    Post Infusion Assessment:  Patient tolerated infusion without incident.  Blood return noted pre and post infusion.  Site patent and intact, free from redness, edema or discomfort.  No evidence of extravasations.  Access discontinued per protocol.     Discharge Plan:   AVS to patient via MYCHART.  Patient will return 7/29 for next appointment.   Patient discharged in stable condition accompanied by: self.  Departure Mode: Ambulatory.      Jannie Betancourt RN

## 2022-07-28 NOTE — PROGRESS NOTES
Assessment & Plan   Problem List Items Addressed This Visit    None     Visit Diagnoses     History of diverticulitis    -  Primary    Intra-abdominal abscess (H)        History of hernia repair        BMI 31.0-31.9,adult             61-year-old female status post hospitalization in [place] for intra-abdominal abscess.  Analyzed that this abscess was not a mesh infection.  Currently working with infectious disease and has a PICC line for IV antibiotic to help continued third of management of this abscess.  She has an upcoming CT scan with ID at [place].  He is to ask for a disc and to send it over to [place] so I can take a look at this.  Surgical intervention.  This abscess is able to improve and resolve without any drainage or surgical intervention that would be best for her as this will not expose the mesh to an active infection.  She is to see me as needed.  She does not have obvious recurrent hernia on exam.  Overall doing well.    Review of prior external note(s) from - [facility] from [facility] reviewed  25 Time spent doing chart review, history and exam, documentation and further activities per the note           No follow-ups on file.    Glenn Nicolas MD  [facility]   Caro Palmer is a 61 year old, presenting for the following health issues:  Surgical Followup      History of laparoscopic-assisted open incisional hernia repair with mesh in March 2022.  Recently discharge from hospital in [place] for intra-abdominal abscess.  And that this is not mesh related.  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  Seeing ID in [place] next week.  She is doing fine overall.  Minimal pain.  No fevers.  Having bowel movement and eating well.    She has a PICC line in place.  She has been getting daily Invanz for the next 4 days per recommendation from ID.       Review of  Systems   Constitutional, HEENT, cardiovascular, pulmonary, gi and gu systems are negative, except as otherwise noted.      Objective    /70   Temp 97.3  F (36.3  C) (Temporal)   Wt 75.8 kg (167 lb)   LMP 11/22/2012   BMI 31.04 kg/m    Body mass index is 31.04 kg/m .  Physical Exam   Abdomen:  Incision healed very well.  No obvious hernia on exam today.                  .  ..

## 2022-07-29 ENCOUNTER — INFUSION THERAPY VISIT (OUTPATIENT)
Dept: INFUSION THERAPY | Facility: CLINIC | Age: 62
End: 2022-07-29
Attending: FAMILY MEDICINE
Payer: COMMERCIAL

## 2022-07-29 VITALS
HEART RATE: 92 BPM | DIASTOLIC BLOOD PRESSURE: 67 MMHG | OXYGEN SATURATION: 98 % | TEMPERATURE: 98.8 F | RESPIRATION RATE: 18 BRPM | SYSTOLIC BLOOD PRESSURE: 134 MMHG

## 2022-07-29 DIAGNOSIS — T81.49XA ABDOMINAL WALL ABSCESS AT SITE OF SURGICAL WOUND: Primary | ICD-10-CM

## 2022-07-29 PROCEDURE — 250N000011 HC RX IP 250 OP 636: Performed by: FAMILY MEDICINE

## 2022-07-29 PROCEDURE — 96365 THER/PROPH/DIAG IV INF INIT: CPT

## 2022-07-29 RX ORDER — ALBUTEROL SULFATE 0.83 MG/ML
2.5 SOLUTION RESPIRATORY (INHALATION)
Status: CANCELLED | OUTPATIENT
Start: 2022-07-30

## 2022-07-29 RX ORDER — HEPARIN SODIUM (PORCINE) LOCK FLUSH IV SOLN 100 UNIT/ML 100 UNIT/ML
5 SOLUTION INTRAVENOUS
Status: CANCELLED | OUTPATIENT
Start: 2022-07-30

## 2022-07-29 RX ORDER — HEPARIN SODIUM (PORCINE) LOCK FLUSH IV SOLN 100 UNIT/ML 100 UNIT/ML
5 SOLUTION INTRAVENOUS
Status: DISCONTINUED | OUTPATIENT
Start: 2022-07-29 | End: 2022-07-29 | Stop reason: HOSPADM

## 2022-07-29 RX ORDER — METHYLPREDNISOLONE SODIUM SUCCINATE 125 MG/2ML
125 INJECTION, POWDER, LYOPHILIZED, FOR SOLUTION INTRAMUSCULAR; INTRAVENOUS
Status: CANCELLED
Start: 2022-07-30

## 2022-07-29 RX ORDER — DIPHENHYDRAMINE HYDROCHLORIDE 50 MG/ML
50 INJECTION INTRAMUSCULAR; INTRAVENOUS
Status: CANCELLED
Start: 2022-07-30

## 2022-07-29 RX ORDER — MEPERIDINE HYDROCHLORIDE 25 MG/ML
25 INJECTION INTRAMUSCULAR; INTRAVENOUS; SUBCUTANEOUS EVERY 30 MIN PRN
Status: CANCELLED | OUTPATIENT
Start: 2022-07-30

## 2022-07-29 RX ORDER — ERTAPENEM 1 G/1
1 INJECTION, POWDER, LYOPHILIZED, FOR SOLUTION INTRAMUSCULAR; INTRAVENOUS ONCE
Status: COMPLETED | OUTPATIENT
Start: 2022-07-29 | End: 2022-07-29

## 2022-07-29 RX ORDER — HEPARIN SODIUM,PORCINE 10 UNIT/ML
5 VIAL (ML) INTRAVENOUS
Status: DISCONTINUED | OUTPATIENT
Start: 2022-07-29 | End: 2022-07-29 | Stop reason: HOSPADM

## 2022-07-29 RX ORDER — ALBUTEROL SULFATE 90 UG/1
1-2 AEROSOL, METERED RESPIRATORY (INHALATION)
Status: CANCELLED
Start: 2022-07-30

## 2022-07-29 RX ORDER — EPINEPHRINE 1 MG/ML
0.3 INJECTION, SOLUTION INTRAMUSCULAR; SUBCUTANEOUS EVERY 5 MIN PRN
Status: CANCELLED | OUTPATIENT
Start: 2022-07-30

## 2022-07-29 RX ORDER — HEPARIN SODIUM,PORCINE 10 UNIT/ML
5 VIAL (ML) INTRAVENOUS
Status: CANCELLED | OUTPATIENT
Start: 2022-07-30

## 2022-07-29 RX ORDER — ERTAPENEM 1 G/1
1 INJECTION, POWDER, LYOPHILIZED, FOR SOLUTION INTRAMUSCULAR; INTRAVENOUS ONCE
Status: CANCELLED | OUTPATIENT
Start: 2022-07-30 | End: 2022-07-30

## 2022-07-29 RX ADMIN — ERTAPENEM SODIUM 1 G: 1 INJECTION, POWDER, LYOPHILIZED, FOR SOLUTION INTRAMUSCULAR; INTRAVENOUS at 14:35

## 2022-07-29 RX ADMIN — Medication 5 ML: at 15:08

## 2022-07-29 NOTE — PROGRESS NOTES
Infusion Nursing Note:  Caro Palmer presents today for IV Invanz.    Patient seen by provider today: No   present during visit today: Not Applicable.    Note: Pt will be seen in ER over weekend for treatment. States understanding. Pt is scheduled for CT and MD followup appt this coming Monday to determine future treatments.    Intravenous Access:  PICC.    Treatment Conditions:  Not Applicable.    Post Infusion Assessment:     Patient tolerated infusion without incident.  Blood return noted pre and post infusion.    Discharge Plan:   Discharge instructions reviewed with: Patient.  Patient and/or family verbalized understanding of discharge instructions and all questions answered.  Patient discharged in stable condition accompanied by: self.  Departure Mode: Ambulatory.      Emily Powers RN

## 2022-07-30 ENCOUNTER — HOSPITAL ENCOUNTER (EMERGENCY)
Facility: CLINIC | Age: 62
Discharge: HOME | End: 2022-07-30
Admitting: EMERGENCY MEDICINE
Payer: COMMERCIAL

## 2022-07-30 VITALS
DIASTOLIC BLOOD PRESSURE: 93 MMHG | TEMPERATURE: 98.6 F | SYSTOLIC BLOOD PRESSURE: 141 MMHG | RESPIRATION RATE: 18 BRPM | HEART RATE: 68 BPM | OXYGEN SATURATION: 98 %

## 2022-07-30 DIAGNOSIS — T81.49XA ABDOMINAL WALL ABSCESS AT SITE OF SURGICAL WOUND: ICD-10-CM

## 2022-07-30 PROCEDURE — 999N000104 HC STATISTIC NO CHARGE: Performed by: EMERGENCY MEDICINE

## 2022-07-30 PROCEDURE — 250N000011 HC RX IP 250 OP 636: Performed by: FAMILY MEDICINE

## 2022-07-30 PROCEDURE — 96365 THER/PROPH/DIAG IV INF INIT: CPT | Performed by: EMERGENCY MEDICINE

## 2022-07-30 RX ORDER — METHYLPREDNISOLONE SODIUM SUCCINATE 125 MG/2ML
125 INJECTION, POWDER, LYOPHILIZED, FOR SOLUTION INTRAMUSCULAR; INTRAVENOUS
Status: DISCONTINUED | OUTPATIENT
Start: 2022-07-30 | End: 2022-07-30 | Stop reason: HOSPADM

## 2022-07-30 RX ORDER — HEPARIN SODIUM (PORCINE) LOCK FLUSH IV SOLN 100 UNIT/ML 100 UNIT/ML
5 SOLUTION INTRAVENOUS
Status: DISCONTINUED | OUTPATIENT
Start: 2022-07-30 | End: 2022-07-30 | Stop reason: HOSPADM

## 2022-07-30 RX ORDER — HEPARIN SODIUM,PORCINE 10 UNIT/ML
5 VIAL (ML) INTRAVENOUS
Status: DISCONTINUED | OUTPATIENT
Start: 2022-07-30 | End: 2022-07-30 | Stop reason: HOSPADM

## 2022-07-30 RX ORDER — ALBUTEROL SULFATE 90 UG/1
1-2 AEROSOL, METERED RESPIRATORY (INHALATION)
Status: DISCONTINUED | OUTPATIENT
Start: 2022-07-30 | End: 2022-07-30 | Stop reason: HOSPADM

## 2022-07-30 RX ORDER — DIPHENHYDRAMINE HYDROCHLORIDE 50 MG/ML
50 INJECTION INTRAMUSCULAR; INTRAVENOUS
Status: CANCELLED
Start: 2022-07-31

## 2022-07-30 RX ORDER — ERTAPENEM 1 G/1
1 INJECTION, POWDER, LYOPHILIZED, FOR SOLUTION INTRAMUSCULAR; INTRAVENOUS ONCE
Status: COMPLETED | OUTPATIENT
Start: 2022-07-30 | End: 2022-07-30

## 2022-07-30 RX ORDER — METHYLPREDNISOLONE SODIUM SUCCINATE 125 MG/2ML
125 INJECTION, POWDER, LYOPHILIZED, FOR SOLUTION INTRAMUSCULAR; INTRAVENOUS
Status: CANCELLED
Start: 2022-07-31

## 2022-07-30 RX ORDER — ALBUTEROL SULFATE 0.83 MG/ML
2.5 SOLUTION RESPIRATORY (INHALATION)
Status: DISCONTINUED | OUTPATIENT
Start: 2022-07-30 | End: 2022-07-30 | Stop reason: HOSPADM

## 2022-07-30 RX ORDER — ALBUTEROL SULFATE 90 UG/1
1-2 AEROSOL, METERED RESPIRATORY (INHALATION)
Status: CANCELLED
Start: 2022-07-31

## 2022-07-30 RX ORDER — HEPARIN SODIUM,PORCINE 10 UNIT/ML
5 VIAL (ML) INTRAVENOUS
Status: CANCELLED | OUTPATIENT
Start: 2022-07-31

## 2022-07-30 RX ORDER — HEPARIN SODIUM (PORCINE) LOCK FLUSH IV SOLN 100 UNIT/ML 100 UNIT/ML
5 SOLUTION INTRAVENOUS
Status: CANCELLED | OUTPATIENT
Start: 2022-07-31

## 2022-07-30 RX ORDER — ALBUTEROL SULFATE 0.83 MG/ML
2.5 SOLUTION RESPIRATORY (INHALATION)
Status: CANCELLED | OUTPATIENT
Start: 2022-07-31

## 2022-07-30 RX ORDER — DIPHENHYDRAMINE HYDROCHLORIDE 50 MG/ML
50 INJECTION INTRAMUSCULAR; INTRAVENOUS
Status: DISCONTINUED | OUTPATIENT
Start: 2022-07-30 | End: 2022-07-30 | Stop reason: HOSPADM

## 2022-07-30 RX ORDER — EPINEPHRINE 1 MG/ML
0.3 INJECTION, SOLUTION INTRAMUSCULAR; SUBCUTANEOUS EVERY 5 MIN PRN
Status: DISCONTINUED | OUTPATIENT
Start: 2022-07-30 | End: 2022-07-30 | Stop reason: HOSPADM

## 2022-07-30 RX ORDER — ERTAPENEM 1 G/1
1 INJECTION, POWDER, LYOPHILIZED, FOR SOLUTION INTRAMUSCULAR; INTRAVENOUS ONCE
Status: CANCELLED | OUTPATIENT
Start: 2022-07-31 | End: 2022-07-31

## 2022-07-30 RX ORDER — EPINEPHRINE 1 MG/ML
0.3 INJECTION, SOLUTION INTRAMUSCULAR; SUBCUTANEOUS EVERY 5 MIN PRN
Status: CANCELLED | OUTPATIENT
Start: 2022-07-31

## 2022-07-30 RX ORDER — MEPERIDINE HYDROCHLORIDE 25 MG/ML
25 INJECTION INTRAMUSCULAR; INTRAVENOUS; SUBCUTANEOUS EVERY 30 MIN PRN
Status: CANCELLED | OUTPATIENT
Start: 2022-07-31

## 2022-07-30 RX ORDER — MEPERIDINE HYDROCHLORIDE 25 MG/ML
25 INJECTION INTRAMUSCULAR; INTRAVENOUS; SUBCUTANEOUS EVERY 30 MIN PRN
Status: DISCONTINUED | OUTPATIENT
Start: 2022-07-30 | End: 2022-07-30 | Stop reason: HOSPADM

## 2022-07-30 RX ADMIN — ERTAPENEM SODIUM 1 G: 1 INJECTION, POWDER, LYOPHILIZED, FOR SOLUTION INTRAMUSCULAR; INTRAVENOUS at 14:50

## 2022-07-30 NOTE — ED NOTES
Abx completed. Flushed PICC with 10 ML NS. Pt appears well and tolerated infusion well. discharge ambulatory.

## 2022-07-31 ENCOUNTER — HOSPITAL ENCOUNTER (EMERGENCY)
Facility: CLINIC | Age: 62
Discharge: HOME | End: 2022-07-31
Admitting: FAMILY MEDICINE
Payer: COMMERCIAL

## 2022-07-31 VITALS
RESPIRATION RATE: 16 BRPM | WEIGHT: 167 LBS | HEART RATE: 104 BPM | TEMPERATURE: 98.2 F | BODY MASS INDEX: 31.04 KG/M2 | OXYGEN SATURATION: 96 % | SYSTOLIC BLOOD PRESSURE: 137 MMHG | DIASTOLIC BLOOD PRESSURE: 104 MMHG

## 2022-07-31 DIAGNOSIS — T81.49XA ABDOMINAL WALL ABSCESS AT SITE OF SURGICAL WOUND: ICD-10-CM

## 2022-07-31 PROCEDURE — 96365 THER/PROPH/DIAG IV INF INIT: CPT

## 2022-07-31 PROCEDURE — 250N000011 HC RX IP 250 OP 636: Performed by: FAMILY MEDICINE

## 2022-07-31 PROCEDURE — 999N000104 HC STATISTIC NO CHARGE

## 2022-07-31 RX ORDER — ALBUTEROL SULFATE 0.83 MG/ML
2.5 SOLUTION RESPIRATORY (INHALATION)
Status: CANCELLED | OUTPATIENT
Start: 2022-08-01

## 2022-07-31 RX ORDER — HEPARIN SODIUM (PORCINE) LOCK FLUSH IV SOLN 100 UNIT/ML 100 UNIT/ML
5 SOLUTION INTRAVENOUS
Status: DISCONTINUED | OUTPATIENT
Start: 2022-07-31 | End: 2022-07-31 | Stop reason: HOSPADM

## 2022-07-31 RX ORDER — HEPARIN SODIUM,PORCINE 10 UNIT/ML
5 VIAL (ML) INTRAVENOUS
Status: DISCONTINUED | OUTPATIENT
Start: 2022-07-31 | End: 2022-07-31 | Stop reason: HOSPADM

## 2022-07-31 RX ORDER — METHYLPREDNISOLONE SODIUM SUCCINATE 125 MG/2ML
125 INJECTION, POWDER, LYOPHILIZED, FOR SOLUTION INTRAMUSCULAR; INTRAVENOUS
Status: CANCELLED
Start: 2022-08-01

## 2022-07-31 RX ORDER — HEPARIN SODIUM (PORCINE) LOCK FLUSH IV SOLN 100 UNIT/ML 100 UNIT/ML
5 SOLUTION INTRAVENOUS
Status: CANCELLED | OUTPATIENT
Start: 2022-08-01

## 2022-07-31 RX ORDER — EPINEPHRINE 1 MG/ML
0.3 INJECTION, SOLUTION INTRAMUSCULAR; SUBCUTANEOUS EVERY 5 MIN PRN
Status: CANCELLED | OUTPATIENT
Start: 2022-08-01

## 2022-07-31 RX ORDER — MEPERIDINE HYDROCHLORIDE 25 MG/ML
25 INJECTION INTRAMUSCULAR; INTRAVENOUS; SUBCUTANEOUS EVERY 30 MIN PRN
Status: CANCELLED | OUTPATIENT
Start: 2022-08-01

## 2022-07-31 RX ORDER — ALBUTEROL SULFATE 90 UG/1
1-2 AEROSOL, METERED RESPIRATORY (INHALATION)
Status: CANCELLED
Start: 2022-08-01

## 2022-07-31 RX ORDER — HEPARIN SODIUM,PORCINE 10 UNIT/ML
5 VIAL (ML) INTRAVENOUS
Status: CANCELLED | OUTPATIENT
Start: 2022-08-01

## 2022-07-31 RX ORDER — ERTAPENEM 1 G/1
1 INJECTION, POWDER, LYOPHILIZED, FOR SOLUTION INTRAMUSCULAR; INTRAVENOUS ONCE
Status: COMPLETED | OUTPATIENT
Start: 2022-07-31 | End: 2022-07-31

## 2022-07-31 RX ORDER — ERTAPENEM 1 G/1
1 INJECTION, POWDER, LYOPHILIZED, FOR SOLUTION INTRAMUSCULAR; INTRAVENOUS ONCE
Status: CANCELLED | OUTPATIENT
Start: 2022-08-01 | End: 2022-08-01

## 2022-07-31 RX ORDER — DIPHENHYDRAMINE HYDROCHLORIDE 50 MG/ML
50 INJECTION INTRAMUSCULAR; INTRAVENOUS
Status: CANCELLED
Start: 2022-08-01

## 2022-07-31 RX ADMIN — ERTAPENEM SODIUM 1 G: 1 INJECTION, POWDER, LYOPHILIZED, FOR SOLUTION INTRAMUSCULAR; INTRAVENOUS at 14:32

## 2022-08-02 ENCOUNTER — INFUSION THERAPY VISIT (OUTPATIENT)
Dept: INFUSION THERAPY | Facility: CLINIC | Age: 62
End: 2022-08-02
Attending: FAMILY MEDICINE
Payer: COMMERCIAL

## 2022-08-02 VITALS
TEMPERATURE: 97.8 F | RESPIRATION RATE: 20 BRPM | HEART RATE: 90 BPM | SYSTOLIC BLOOD PRESSURE: 146 MMHG | DIASTOLIC BLOOD PRESSURE: 74 MMHG | OXYGEN SATURATION: 98 %

## 2022-08-02 DIAGNOSIS — T81.49XA ABDOMINAL WALL ABSCESS AT SITE OF SURGICAL WOUND: Primary | ICD-10-CM

## 2022-08-02 PROCEDURE — 250N000011 HC RX IP 250 OP 636: Performed by: FAMILY MEDICINE

## 2022-08-02 PROCEDURE — 96365 THER/PROPH/DIAG IV INF INIT: CPT

## 2022-08-02 RX ORDER — ALBUTEROL SULFATE 0.83 MG/ML
2.5 SOLUTION RESPIRATORY (INHALATION)
Status: CANCELLED | OUTPATIENT
Start: 2022-08-03

## 2022-08-02 RX ORDER — MEPERIDINE HYDROCHLORIDE 25 MG/ML
25 INJECTION INTRAMUSCULAR; INTRAVENOUS; SUBCUTANEOUS EVERY 30 MIN PRN
Status: CANCELLED | OUTPATIENT
Start: 2022-08-03

## 2022-08-02 RX ORDER — HEPARIN SODIUM (PORCINE) LOCK FLUSH IV SOLN 100 UNIT/ML 100 UNIT/ML
5 SOLUTION INTRAVENOUS
Status: CANCELLED | OUTPATIENT
Start: 2022-08-03

## 2022-08-02 RX ORDER — ERTAPENEM 1 G/1
1 INJECTION, POWDER, LYOPHILIZED, FOR SOLUTION INTRAMUSCULAR; INTRAVENOUS ONCE
Status: COMPLETED | OUTPATIENT
Start: 2022-08-02 | End: 2022-08-02

## 2022-08-02 RX ORDER — EPINEPHRINE 1 MG/ML
0.3 INJECTION, SOLUTION INTRAMUSCULAR; SUBCUTANEOUS EVERY 5 MIN PRN
Status: CANCELLED | OUTPATIENT
Start: 2022-08-03

## 2022-08-02 RX ORDER — ERTAPENEM 1 G/1
1 INJECTION, POWDER, LYOPHILIZED, FOR SOLUTION INTRAMUSCULAR; INTRAVENOUS ONCE
Status: CANCELLED | OUTPATIENT
Start: 2022-08-03 | End: 2022-08-03

## 2022-08-02 RX ORDER — HEPARIN SODIUM,PORCINE 10 UNIT/ML
5 VIAL (ML) INTRAVENOUS
Status: CANCELLED | OUTPATIENT
Start: 2022-08-03

## 2022-08-02 RX ORDER — ALBUTEROL SULFATE 90 UG/1
1-2 AEROSOL, METERED RESPIRATORY (INHALATION)
Status: CANCELLED
Start: 2022-08-03

## 2022-08-02 RX ORDER — METHYLPREDNISOLONE SODIUM SUCCINATE 125 MG/2ML
125 INJECTION, POWDER, LYOPHILIZED, FOR SOLUTION INTRAMUSCULAR; INTRAVENOUS
Status: CANCELLED
Start: 2022-08-03

## 2022-08-02 RX ORDER — DIPHENHYDRAMINE HYDROCHLORIDE 50 MG/ML
50 INJECTION INTRAMUSCULAR; INTRAVENOUS
Status: CANCELLED
Start: 2022-08-03

## 2022-08-02 RX ADMIN — ERTAPENEM SODIUM 1 G: 1 INJECTION, POWDER, LYOPHILIZED, FOR SOLUTION INTRAMUSCULAR; INTRAVENOUS at 09:24

## 2022-08-02 ASSESSMENT — PAIN SCALES - GENERAL: PAINLEVEL: NO PAIN (0)

## 2022-08-02 NOTE — PROGRESS NOTES
Infusion Nursing Note:  Caro Palmer presents today for IV Invanz.    Patient seen by provider today: No   present during visit today: Not Applicable.    Note: We were contacted by [facility] yesterday to add 2 more days to treatment plan.  They will re-evaluate at that time.     Intravenous Access:  PICC.    Treatment Conditions:  Not Applicable.    Post Infusion Assessment:  Patient tolerated infusion without incident.  Blood return noted pre and post infusion.  Site patent and intact, free from redness, edema or discomfort.  No evidence of extravasations.     Discharge Plan:   Discharge instructions reviewed with: Patient.  Patient discharged in stable condition accompanied by: self.  Departure Mode: Ambulatory.      Amanda Partida RN

## 2022-08-03 ENCOUNTER — INFUSION THERAPY VISIT (OUTPATIENT)
Dept: INFUSION THERAPY | Facility: CLINIC | Age: 62
End: 2022-08-03
Attending: FAMILY MEDICINE
Payer: COMMERCIAL

## 2022-08-03 VITALS
OXYGEN SATURATION: 97 % | HEART RATE: 94 BPM | DIASTOLIC BLOOD PRESSURE: 77 MMHG | RESPIRATION RATE: 18 BRPM | TEMPERATURE: 98 F | SYSTOLIC BLOOD PRESSURE: 133 MMHG

## 2022-08-03 DIAGNOSIS — T81.49XA ABDOMINAL WALL ABSCESS AT SITE OF SURGICAL WOUND: Primary | ICD-10-CM

## 2022-08-03 PROCEDURE — 250N000011 HC RX IP 250 OP 636: Performed by: FAMILY MEDICINE

## 2022-08-03 PROCEDURE — 96365 THER/PROPH/DIAG IV INF INIT: CPT

## 2022-08-03 RX ORDER — MEPERIDINE HYDROCHLORIDE 25 MG/ML
25 INJECTION INTRAMUSCULAR; INTRAVENOUS; SUBCUTANEOUS EVERY 30 MIN PRN
Status: CANCELLED | OUTPATIENT
Start: 2022-08-04

## 2022-08-03 RX ORDER — ALBUTEROL SULFATE 90 UG/1
1-2 AEROSOL, METERED RESPIRATORY (INHALATION)
Status: CANCELLED
Start: 2022-08-04

## 2022-08-03 RX ORDER — EPINEPHRINE 1 MG/ML
0.3 INJECTION, SOLUTION INTRAMUSCULAR; SUBCUTANEOUS EVERY 5 MIN PRN
Status: CANCELLED | OUTPATIENT
Start: 2022-08-04

## 2022-08-03 RX ORDER — ERTAPENEM 1 G/1
1 INJECTION, POWDER, LYOPHILIZED, FOR SOLUTION INTRAMUSCULAR; INTRAVENOUS ONCE
Status: CANCELLED | OUTPATIENT
Start: 2022-08-04 | End: 2022-08-04

## 2022-08-03 RX ORDER — METHYLPREDNISOLONE SODIUM SUCCINATE 125 MG/2ML
125 INJECTION, POWDER, LYOPHILIZED, FOR SOLUTION INTRAMUSCULAR; INTRAVENOUS
Status: CANCELLED
Start: 2022-08-04

## 2022-08-03 RX ORDER — DIPHENHYDRAMINE HYDROCHLORIDE 50 MG/ML
50 INJECTION INTRAMUSCULAR; INTRAVENOUS
Status: CANCELLED
Start: 2022-08-04

## 2022-08-03 RX ORDER — HEPARIN SODIUM (PORCINE) LOCK FLUSH IV SOLN 100 UNIT/ML 100 UNIT/ML
5 SOLUTION INTRAVENOUS
Status: CANCELLED | OUTPATIENT
Start: 2022-08-04

## 2022-08-03 RX ORDER — ERTAPENEM 1 G/1
1 INJECTION, POWDER, LYOPHILIZED, FOR SOLUTION INTRAMUSCULAR; INTRAVENOUS ONCE
Status: COMPLETED | OUTPATIENT
Start: 2022-08-03 | End: 2022-08-03

## 2022-08-03 RX ORDER — HEPARIN SODIUM,PORCINE 10 UNIT/ML
5 VIAL (ML) INTRAVENOUS
Status: CANCELLED | OUTPATIENT
Start: 2022-08-04

## 2022-08-03 RX ORDER — ALBUTEROL SULFATE 0.83 MG/ML
2.5 SOLUTION RESPIRATORY (INHALATION)
Status: CANCELLED | OUTPATIENT
Start: 2022-08-04

## 2022-08-03 RX ADMIN — ERTAPENEM SODIUM 1 G: 1 INJECTION, POWDER, LYOPHILIZED, FOR SOLUTION INTRAMUSCULAR; INTRAVENOUS at 11:12

## 2022-08-03 ASSESSMENT — PAIN SCALES - GENERAL: PAINLEVEL: NO PAIN (0)

## 2022-08-03 NOTE — PROGRESS NOTES
Infusion Nursing Note:  Caro Palmer presents today for Ertapenem.    Patient seen by provider today: No   present during visit today: Not Applicable.    Note: Patient denies new symptoms or concerns today including pain. VSS, afebrile.     Intravenous Access:  PICC.  Good blood return, flushes easily.   Site WDL.    Treatment Conditions:  Not Applicable.    Post Infusion Assessment:  Patient tolerated infusion without incident.  Blood return noted pre and post infusion.  Site patent and intact, free from redness, edema or discomfort.  No evidence of extravasations.  Access discontinued per protocol. PICC line flushed with NS.     Discharge Plan:   AVS to patient via MYCHART.  Patient will return 8/4 for next appointment.   Patient discharged in stable condition accompanied by: self.  Departure Mode: Ambulatory.      Jannie Betancourt RN

## 2022-08-04 ENCOUNTER — INFUSION THERAPY VISIT (OUTPATIENT)
Dept: INFUSION THERAPY | Facility: CLINIC | Age: 62
End: 2022-08-04
Payer: COMMERCIAL

## 2022-08-04 VITALS
TEMPERATURE: 98 F | SYSTOLIC BLOOD PRESSURE: 123 MMHG | OXYGEN SATURATION: 94 % | HEART RATE: 96 BPM | DIASTOLIC BLOOD PRESSURE: 69 MMHG | RESPIRATION RATE: 16 BRPM

## 2022-08-04 DIAGNOSIS — T81.49XA ABDOMINAL WALL ABSCESS AT SITE OF SURGICAL WOUND: Primary | ICD-10-CM

## 2022-08-04 PROCEDURE — 96365 THER/PROPH/DIAG IV INF INIT: CPT

## 2022-08-04 PROCEDURE — 250N000011 HC RX IP 250 OP 636: Performed by: FAMILY MEDICINE

## 2022-08-04 RX ORDER — ERTAPENEM 1 G/1
1 INJECTION, POWDER, LYOPHILIZED, FOR SOLUTION INTRAMUSCULAR; INTRAVENOUS EVERY 24 HOURS
Status: CANCELLED
Start: 2022-08-05

## 2022-08-04 RX ORDER — ERTAPENEM 1 G/1
1 INJECTION, POWDER, LYOPHILIZED, FOR SOLUTION INTRAMUSCULAR; INTRAVENOUS EVERY 24 HOURS
Status: CANCELLED
Start: 2022-08-04

## 2022-08-04 RX ORDER — ERTAPENEM 1 G/1
1 INJECTION, POWDER, LYOPHILIZED, FOR SOLUTION INTRAMUSCULAR; INTRAVENOUS ONCE
Status: CANCELLED | OUTPATIENT
Start: 2022-08-05 | End: 2022-08-05

## 2022-08-04 RX ORDER — ALBUTEROL SULFATE 90 UG/1
1-2 AEROSOL, METERED RESPIRATORY (INHALATION)
Status: CANCELLED
Start: 2022-08-05

## 2022-08-04 RX ORDER — METHYLPREDNISOLONE SODIUM SUCCINATE 125 MG/2ML
125 INJECTION, POWDER, LYOPHILIZED, FOR SOLUTION INTRAMUSCULAR; INTRAVENOUS
Status: CANCELLED
Start: 2022-08-05

## 2022-08-04 RX ORDER — HEPARIN SODIUM (PORCINE) LOCK FLUSH IV SOLN 100 UNIT/ML 100 UNIT/ML
5 SOLUTION INTRAVENOUS
Status: CANCELLED | OUTPATIENT
Start: 2022-08-05

## 2022-08-04 RX ORDER — ALBUTEROL SULFATE 0.83 MG/ML
2.5 SOLUTION RESPIRATORY (INHALATION)
Status: CANCELLED | OUTPATIENT
Start: 2022-08-05

## 2022-08-04 RX ORDER — ERTAPENEM 1 G/1
1 INJECTION, POWDER, LYOPHILIZED, FOR SOLUTION INTRAMUSCULAR; INTRAVENOUS ONCE
Status: COMPLETED | OUTPATIENT
Start: 2022-08-04 | End: 2022-08-04

## 2022-08-04 RX ORDER — MEPERIDINE HYDROCHLORIDE 25 MG/ML
25 INJECTION INTRAMUSCULAR; INTRAVENOUS; SUBCUTANEOUS EVERY 30 MIN PRN
Status: CANCELLED | OUTPATIENT
Start: 2022-08-05

## 2022-08-04 RX ORDER — HEPARIN SODIUM,PORCINE 10 UNIT/ML
5 VIAL (ML) INTRAVENOUS
Status: CANCELLED | OUTPATIENT
Start: 2022-08-05

## 2022-08-04 RX ORDER — DIPHENHYDRAMINE HYDROCHLORIDE 50 MG/ML
50 INJECTION INTRAMUSCULAR; INTRAVENOUS
Status: CANCELLED
Start: 2022-08-05

## 2022-08-04 RX ORDER — EPINEPHRINE 1 MG/ML
0.3 INJECTION, SOLUTION INTRAMUSCULAR; SUBCUTANEOUS EVERY 5 MIN PRN
Status: CANCELLED | OUTPATIENT
Start: 2022-08-05

## 2022-08-04 RX ADMIN — ERTAPENEM SODIUM 1 G: 1 INJECTION, POWDER, LYOPHILIZED, FOR SOLUTION INTRAMUSCULAR; INTRAVENOUS at 14:34

## 2022-08-04 ASSESSMENT — PAIN SCALES - GENERAL: PAINLEVEL: NO PAIN (0)

## 2022-08-05 ENCOUNTER — INFUSION THERAPY VISIT (OUTPATIENT)
Dept: INFUSION THERAPY | Facility: CLINIC | Age: 62
End: 2022-08-05
Payer: COMMERCIAL

## 2022-08-05 VITALS
RESPIRATION RATE: 18 BRPM | TEMPERATURE: 98 F | SYSTOLIC BLOOD PRESSURE: 122 MMHG | HEART RATE: 96 BPM | OXYGEN SATURATION: 98 % | DIASTOLIC BLOOD PRESSURE: 66 MMHG

## 2022-08-05 DIAGNOSIS — T81.49XA ABDOMINAL WALL ABSCESS AT SITE OF SURGICAL WOUND: Primary | ICD-10-CM

## 2022-08-05 PROCEDURE — 250N000011 HC RX IP 250 OP 636: Performed by: FAMILY MEDICINE

## 2022-08-05 PROCEDURE — 96365 THER/PROPH/DIAG IV INF INIT: CPT

## 2022-08-05 RX ORDER — ALBUTEROL SULFATE 0.83 MG/ML
2.5 SOLUTION RESPIRATORY (INHALATION)
Status: CANCELLED | OUTPATIENT
Start: 2022-08-06

## 2022-08-05 RX ORDER — METHYLPREDNISOLONE SODIUM SUCCINATE 125 MG/2ML
125 INJECTION, POWDER, LYOPHILIZED, FOR SOLUTION INTRAMUSCULAR; INTRAVENOUS
Status: CANCELLED
Start: 2022-08-06

## 2022-08-05 RX ORDER — HEPARIN SODIUM (PORCINE) LOCK FLUSH IV SOLN 100 UNIT/ML 100 UNIT/ML
5 SOLUTION INTRAVENOUS
Status: DISCONTINUED | OUTPATIENT
Start: 2022-08-05 | End: 2022-08-05 | Stop reason: HOSPADM

## 2022-08-05 RX ORDER — ERTAPENEM 1 G/1
1 INJECTION, POWDER, LYOPHILIZED, FOR SOLUTION INTRAMUSCULAR; INTRAVENOUS EVERY 24 HOURS
Status: CANCELLED
Start: 2022-08-06

## 2022-08-05 RX ORDER — HEPARIN SODIUM,PORCINE 10 UNIT/ML
5 VIAL (ML) INTRAVENOUS
Status: CANCELLED | OUTPATIENT
Start: 2022-08-06

## 2022-08-05 RX ORDER — DIPHENHYDRAMINE HYDROCHLORIDE 50 MG/ML
50 INJECTION INTRAMUSCULAR; INTRAVENOUS
Status: CANCELLED
Start: 2022-08-06

## 2022-08-05 RX ORDER — MEPERIDINE HYDROCHLORIDE 25 MG/ML
25 INJECTION INTRAMUSCULAR; INTRAVENOUS; SUBCUTANEOUS EVERY 30 MIN PRN
Status: CANCELLED | OUTPATIENT
Start: 2022-08-06

## 2022-08-05 RX ORDER — HEPARIN SODIUM (PORCINE) LOCK FLUSH IV SOLN 100 UNIT/ML 100 UNIT/ML
5 SOLUTION INTRAVENOUS
Status: CANCELLED | OUTPATIENT
Start: 2022-08-06

## 2022-08-05 RX ORDER — ERTAPENEM 1 G/1
1 INJECTION, POWDER, LYOPHILIZED, FOR SOLUTION INTRAMUSCULAR; INTRAVENOUS EVERY 24 HOURS
Status: DISCONTINUED | OUTPATIENT
Start: 2022-08-05 | End: 2022-08-05 | Stop reason: HOSPADM

## 2022-08-05 RX ORDER — ALBUTEROL SULFATE 90 UG/1
1-2 AEROSOL, METERED RESPIRATORY (INHALATION)
Status: CANCELLED
Start: 2022-08-06

## 2022-08-05 RX ORDER — EPINEPHRINE 1 MG/ML
0.3 INJECTION, SOLUTION INTRAMUSCULAR; SUBCUTANEOUS EVERY 5 MIN PRN
Status: CANCELLED | OUTPATIENT
Start: 2022-08-06

## 2022-08-05 RX ADMIN — Medication 5 ML: at 14:41

## 2022-08-05 RX ADMIN — ERTAPENEM SODIUM 1 G: 1 INJECTION, POWDER, LYOPHILIZED, FOR SOLUTION INTRAMUSCULAR; INTRAVENOUS at 14:06

## 2022-08-05 NOTE — PROGRESS NOTES
Infusion Nursing Note:  Caro Palmer presents today for Invanz.    Patient seen by provider today: No   present during visit today: Not Applicable.    Note:Pt is arranged to receive treatment over weekend in ED.    Intravenous Access:  PICC.    Treatment Conditions:  Not Applicable.    Post Infusion Assessment:  Patient tolerated infusion without incident.  Blood return noted pre and post infusion.  Site patent and intact, free from redness, edema or discomfort.  No evidence of extravasations.  Access flushed per protocol.     Discharge Plan:   Discharge instructions reviewed with: Patient.  Patient and/or family verbalized understanding of discharge instructions and all questions answered.  Patient discharged in stable condition accompanied by: self.  Departure Mode: Ambulatory.      Emily Powers RN

## 2022-08-05 NOTE — PROGRESS NOTES
Infusion Nursing Note:  Caro Palmer presents today for IV Invanz.    Patient seen by provider today: No   present during visit today: Not Applicable.    Note: N/A.    Intravenous Access:  Implanted Port.    Treatment Conditions:  Not Applicable.    Post Infusion Assessment:  Patient tolerated infusion without incident.  Blood return noted pre and post infusion.  Site patent and intact, free from redness, edema or discomfort.  No evidence of extravasations.     Discharge Plan:   Discharge instructions reviewed with: Patient.  Patient discharged in stable condition accompanied by: self.  Departure Mode: Ambulatory.      Amanda Partida RN

## 2022-08-06 ENCOUNTER — HOSPITAL ENCOUNTER (EMERGENCY)
Facility: CLINIC | Age: 62
Discharge: HOME | End: 2022-08-06
Admitting: FAMILY MEDICINE
Payer: COMMERCIAL

## 2022-08-06 VITALS
RESPIRATION RATE: 18 BRPM | SYSTOLIC BLOOD PRESSURE: 136 MMHG | OXYGEN SATURATION: 96 % | BODY MASS INDEX: 30.49 KG/M2 | TEMPERATURE: 98 F | HEART RATE: 90 BPM | DIASTOLIC BLOOD PRESSURE: 89 MMHG | WEIGHT: 164 LBS

## 2022-08-06 DIAGNOSIS — T81.49XA ABDOMINAL WALL ABSCESS AT SITE OF SURGICAL WOUND: ICD-10-CM

## 2022-08-06 PROCEDURE — 250N000011 HC RX IP 250 OP 636: Performed by: FAMILY MEDICINE

## 2022-08-06 PROCEDURE — 96365 THER/PROPH/DIAG IV INF INIT: CPT

## 2022-08-06 PROCEDURE — 999N000104 HC STATISTIC NO CHARGE

## 2022-08-06 RX ORDER — MEPERIDINE HYDROCHLORIDE 25 MG/ML
25 INJECTION INTRAMUSCULAR; INTRAVENOUS; SUBCUTANEOUS EVERY 30 MIN PRN
Status: DISCONTINUED | OUTPATIENT
Start: 2022-08-06 | End: 2022-08-06 | Stop reason: HOSPADM

## 2022-08-06 RX ORDER — METHYLPREDNISOLONE SODIUM SUCCINATE 125 MG/2ML
125 INJECTION, POWDER, LYOPHILIZED, FOR SOLUTION INTRAMUSCULAR; INTRAVENOUS
Status: DISCONTINUED | OUTPATIENT
Start: 2022-08-06 | End: 2022-08-06 | Stop reason: HOSPADM

## 2022-08-06 RX ORDER — ERTAPENEM 1 G/1
1 INJECTION, POWDER, LYOPHILIZED, FOR SOLUTION INTRAMUSCULAR; INTRAVENOUS EVERY 24 HOURS
Status: DISCONTINUED | OUTPATIENT
Start: 2022-08-06 | End: 2022-08-06 | Stop reason: HOSPADM

## 2022-08-06 RX ORDER — ALBUTEROL SULFATE 90 UG/1
1-2 AEROSOL, METERED RESPIRATORY (INHALATION)
Status: CANCELLED
Start: 2022-08-07

## 2022-08-06 RX ORDER — HEPARIN SODIUM (PORCINE) LOCK FLUSH IV SOLN 100 UNIT/ML 100 UNIT/ML
5 SOLUTION INTRAVENOUS
Status: DISCONTINUED | OUTPATIENT
Start: 2022-08-06 | End: 2022-08-06 | Stop reason: HOSPADM

## 2022-08-06 RX ORDER — DIPHENHYDRAMINE HYDROCHLORIDE 50 MG/ML
50 INJECTION INTRAMUSCULAR; INTRAVENOUS
Status: CANCELLED
Start: 2022-08-07

## 2022-08-06 RX ORDER — EPINEPHRINE 1 MG/ML
0.3 INJECTION, SOLUTION INTRAMUSCULAR; SUBCUTANEOUS EVERY 5 MIN PRN
Status: DISCONTINUED | OUTPATIENT
Start: 2022-08-06 | End: 2022-08-06 | Stop reason: HOSPADM

## 2022-08-06 RX ORDER — ALBUTEROL SULFATE 0.83 MG/ML
2.5 SOLUTION RESPIRATORY (INHALATION)
Status: DISCONTINUED | OUTPATIENT
Start: 2022-08-06 | End: 2022-08-06 | Stop reason: HOSPADM

## 2022-08-06 RX ORDER — ERTAPENEM 1 G/1
1 INJECTION, POWDER, LYOPHILIZED, FOR SOLUTION INTRAMUSCULAR; INTRAVENOUS EVERY 24 HOURS
Status: CANCELLED
Start: 2022-08-07

## 2022-08-06 RX ORDER — DIPHENHYDRAMINE HYDROCHLORIDE 50 MG/ML
50 INJECTION INTRAMUSCULAR; INTRAVENOUS
Status: DISCONTINUED | OUTPATIENT
Start: 2022-08-06 | End: 2022-08-06 | Stop reason: HOSPADM

## 2022-08-06 RX ORDER — ALBUTEROL SULFATE 90 UG/1
1-2 AEROSOL, METERED RESPIRATORY (INHALATION)
Status: DISCONTINUED | OUTPATIENT
Start: 2022-08-06 | End: 2022-08-06 | Stop reason: HOSPADM

## 2022-08-06 RX ORDER — HEPARIN SODIUM,PORCINE 10 UNIT/ML
5 VIAL (ML) INTRAVENOUS
Status: DISCONTINUED | OUTPATIENT
Start: 2022-08-06 | End: 2022-08-06 | Stop reason: HOSPADM

## 2022-08-06 RX ORDER — ALBUTEROL SULFATE 0.83 MG/ML
2.5 SOLUTION RESPIRATORY (INHALATION)
Status: CANCELLED | OUTPATIENT
Start: 2022-08-07

## 2022-08-06 RX ORDER — HEPARIN SODIUM,PORCINE 10 UNIT/ML
5 VIAL (ML) INTRAVENOUS
Status: CANCELLED | OUTPATIENT
Start: 2022-08-07

## 2022-08-06 RX ORDER — METHYLPREDNISOLONE SODIUM SUCCINATE 125 MG/2ML
125 INJECTION, POWDER, LYOPHILIZED, FOR SOLUTION INTRAMUSCULAR; INTRAVENOUS
Status: CANCELLED
Start: 2022-08-07

## 2022-08-06 RX ORDER — HEPARIN SODIUM (PORCINE) LOCK FLUSH IV SOLN 100 UNIT/ML 100 UNIT/ML
5 SOLUTION INTRAVENOUS
Status: CANCELLED | OUTPATIENT
Start: 2022-08-07

## 2022-08-06 RX ORDER — EPINEPHRINE 1 MG/ML
0.3 INJECTION, SOLUTION INTRAMUSCULAR; SUBCUTANEOUS EVERY 5 MIN PRN
Status: CANCELLED | OUTPATIENT
Start: 2022-08-07

## 2022-08-06 RX ORDER — MEPERIDINE HYDROCHLORIDE 25 MG/ML
25 INJECTION INTRAMUSCULAR; INTRAVENOUS; SUBCUTANEOUS EVERY 30 MIN PRN
Status: CANCELLED | OUTPATIENT
Start: 2022-08-07

## 2022-08-06 RX ADMIN — ERTAPENEM SODIUM 1 G: 1 INJECTION, POWDER, LYOPHILIZED, FOR SOLUTION INTRAMUSCULAR; INTRAVENOUS at 14:30

## 2022-08-06 NOTE — ED TRIAGE NOTES
IVO       Triage Assessment     Row Name 08/06/22 5091       Triage Assessment (Adult)    Airway WDL WDL       Respiratory WDL    Respiratory WDL WDL       Skin Circulation/Temperature WDL    Skin Circulation/Temperature WDL WDL       Cardiac WDL    Cardiac WDL WDL       Peripheral/Neurovascular WDL    Peripheral Neurovascular WDL WDL       Cognitive/Neuro/Behavioral WDL    Cognitive/Neuro/Behavioral WDL WDL

## 2022-08-07 ENCOUNTER — HOSPITAL ENCOUNTER (EMERGENCY)
Facility: CLINIC | Age: 62
Discharge: HOME | End: 2022-08-07
Admitting: FAMILY MEDICINE
Payer: COMMERCIAL

## 2022-08-07 VITALS
HEART RATE: 89 BPM | WEIGHT: 166 LBS | DIASTOLIC BLOOD PRESSURE: 78 MMHG | RESPIRATION RATE: 18 BRPM | TEMPERATURE: 98.3 F | BODY MASS INDEX: 30.86 KG/M2 | OXYGEN SATURATION: 95 % | SYSTOLIC BLOOD PRESSURE: 117 MMHG

## 2022-08-07 DIAGNOSIS — T81.49XA ABDOMINAL WALL ABSCESS AT SITE OF SURGICAL WOUND: ICD-10-CM

## 2022-08-07 PROCEDURE — 250N000011 HC RX IP 250 OP 636: Performed by: FAMILY MEDICINE

## 2022-08-07 PROCEDURE — 999N000104 HC STATISTIC NO CHARGE

## 2022-08-07 PROCEDURE — 96365 THER/PROPH/DIAG IV INF INIT: CPT

## 2022-08-07 RX ORDER — EPINEPHRINE 1 MG/ML
0.3 INJECTION, SOLUTION INTRAMUSCULAR; SUBCUTANEOUS EVERY 5 MIN PRN
Status: DISCONTINUED | OUTPATIENT
Start: 2022-08-07 | End: 2022-08-07 | Stop reason: HOSPADM

## 2022-08-07 RX ORDER — ERTAPENEM 1 G/1
1 INJECTION, POWDER, LYOPHILIZED, FOR SOLUTION INTRAMUSCULAR; INTRAVENOUS EVERY 24 HOURS
Status: DISCONTINUED | OUTPATIENT
Start: 2022-08-07 | End: 2022-08-07 | Stop reason: HOSPADM

## 2022-08-07 RX ORDER — ERTAPENEM 1 G/1
1 INJECTION, POWDER, LYOPHILIZED, FOR SOLUTION INTRAMUSCULAR; INTRAVENOUS EVERY 24 HOURS
Status: CANCELLED
Start: 2022-08-08

## 2022-08-07 RX ORDER — HEPARIN SODIUM (PORCINE) LOCK FLUSH IV SOLN 100 UNIT/ML 100 UNIT/ML
5 SOLUTION INTRAVENOUS
Status: DISCONTINUED | OUTPATIENT
Start: 2022-08-07 | End: 2022-08-07 | Stop reason: HOSPADM

## 2022-08-07 RX ORDER — ALBUTEROL SULFATE 0.83 MG/ML
2.5 SOLUTION RESPIRATORY (INHALATION)
Status: CANCELLED | OUTPATIENT
Start: 2022-08-08

## 2022-08-07 RX ORDER — MEPERIDINE HYDROCHLORIDE 25 MG/ML
25 INJECTION INTRAMUSCULAR; INTRAVENOUS; SUBCUTANEOUS EVERY 30 MIN PRN
Status: CANCELLED | OUTPATIENT
Start: 2022-08-08

## 2022-08-07 RX ORDER — EPINEPHRINE 1 MG/ML
0.3 INJECTION, SOLUTION INTRAMUSCULAR; SUBCUTANEOUS EVERY 5 MIN PRN
Status: CANCELLED | OUTPATIENT
Start: 2022-08-08

## 2022-08-07 RX ORDER — HEPARIN SODIUM,PORCINE 10 UNIT/ML
5 VIAL (ML) INTRAVENOUS
Status: DISCONTINUED | OUTPATIENT
Start: 2022-08-07 | End: 2022-08-07 | Stop reason: HOSPADM

## 2022-08-07 RX ORDER — DIPHENHYDRAMINE HYDROCHLORIDE 50 MG/ML
50 INJECTION INTRAMUSCULAR; INTRAVENOUS
Status: DISCONTINUED | OUTPATIENT
Start: 2022-08-07 | End: 2022-08-07 | Stop reason: HOSPADM

## 2022-08-07 RX ORDER — DIPHENHYDRAMINE HYDROCHLORIDE 50 MG/ML
50 INJECTION INTRAMUSCULAR; INTRAVENOUS
Status: CANCELLED
Start: 2022-08-08

## 2022-08-07 RX ORDER — METHYLPREDNISOLONE SODIUM SUCCINATE 125 MG/2ML
125 INJECTION, POWDER, LYOPHILIZED, FOR SOLUTION INTRAMUSCULAR; INTRAVENOUS
Status: CANCELLED
Start: 2022-08-08

## 2022-08-07 RX ORDER — ALBUTEROL SULFATE 90 UG/1
1-2 AEROSOL, METERED RESPIRATORY (INHALATION)
Status: DISCONTINUED | OUTPATIENT
Start: 2022-08-07 | End: 2022-08-07 | Stop reason: HOSPADM

## 2022-08-07 RX ORDER — METHYLPREDNISOLONE SODIUM SUCCINATE 125 MG/2ML
125 INJECTION, POWDER, LYOPHILIZED, FOR SOLUTION INTRAMUSCULAR; INTRAVENOUS
Status: DISCONTINUED | OUTPATIENT
Start: 2022-08-07 | End: 2022-08-07 | Stop reason: HOSPADM

## 2022-08-07 RX ORDER — MEPERIDINE HYDROCHLORIDE 25 MG/ML
25 INJECTION INTRAMUSCULAR; INTRAVENOUS; SUBCUTANEOUS EVERY 30 MIN PRN
Status: DISCONTINUED | OUTPATIENT
Start: 2022-08-07 | End: 2022-08-07 | Stop reason: HOSPADM

## 2022-08-07 RX ORDER — HEPARIN SODIUM (PORCINE) LOCK FLUSH IV SOLN 100 UNIT/ML 100 UNIT/ML
5 SOLUTION INTRAVENOUS
Status: CANCELLED | OUTPATIENT
Start: 2022-08-08

## 2022-08-07 RX ORDER — HEPARIN SODIUM,PORCINE 10 UNIT/ML
5 VIAL (ML) INTRAVENOUS
Status: CANCELLED | OUTPATIENT
Start: 2022-08-08

## 2022-08-07 RX ORDER — ALBUTEROL SULFATE 90 UG/1
1-2 AEROSOL, METERED RESPIRATORY (INHALATION)
Status: CANCELLED
Start: 2022-08-08

## 2022-08-07 RX ORDER — ALBUTEROL SULFATE 0.83 MG/ML
2.5 SOLUTION RESPIRATORY (INHALATION)
Status: DISCONTINUED | OUTPATIENT
Start: 2022-08-07 | End: 2022-08-07 | Stop reason: HOSPADM

## 2022-08-07 RX ADMIN — ERTAPENEM SODIUM 1 G: 1 INJECTION, POWDER, LYOPHILIZED, FOR SOLUTION INTRAMUSCULAR; INTRAVENOUS at 14:10

## 2022-08-07 NOTE — TELEPHONE ENCOUNTER
RECORDS RECEIVED FROM: Self / Internal   DATE RECEIVED: 08.31.2022   NOTES (Gather within 2 years) STATUS DETAILS   OFFICE NOTE from referring provider       OFFICE NOTE from other specialist Care Everywhere 08.01.2022 [facility]   DISCHARGE SUMMARY from hospital Care Everywhere 07.09.2022 [facility]    DISCHARGE REPORT from the ER     LABS (any labs) Internal / CE    MEDICATION LIST Internal / CE    IMAGING  (NEED IMAGES AND REPORTS)     Osteomyelitis: Foot imaging      Liver Abscess: Abdominal imaging     Other (anything related to diagnoses Care Everywhere 08.01.2022 CT ABD AND PELVIS WITH     07.09.2022 CT ABDOMEN PELVIS       Action 08.06.2022 RM   Action Taken Pending image     Action 08.23.2022 RM   Action Taken Called Jeremy at [phone] to get CT from 8/1 pushed called Bakari to get CT from 7/9 pushed over, images received from [facility], pending from [facility]

## 2022-08-08 ENCOUNTER — INFUSION THERAPY VISIT (OUTPATIENT)
Dept: INFUSION THERAPY | Facility: CLINIC | Age: 62
End: 2022-08-08
Payer: COMMERCIAL

## 2022-08-08 VITALS
RESPIRATION RATE: 20 BRPM | HEART RATE: 100 BPM | DIASTOLIC BLOOD PRESSURE: 84 MMHG | TEMPERATURE: 97 F | OXYGEN SATURATION: 95 % | SYSTOLIC BLOOD PRESSURE: 151 MMHG

## 2022-08-08 DIAGNOSIS — T81.49XA ABDOMINAL WALL ABSCESS AT SITE OF SURGICAL WOUND: Primary | ICD-10-CM

## 2022-08-08 PROCEDURE — 250N000011 HC RX IP 250 OP 636: Performed by: FAMILY MEDICINE

## 2022-08-08 PROCEDURE — 96365 THER/PROPH/DIAG IV INF INIT: CPT

## 2022-08-08 RX ORDER — ALBUTEROL SULFATE 90 UG/1
1-2 AEROSOL, METERED RESPIRATORY (INHALATION)
Status: CANCELLED
Start: 2022-08-09

## 2022-08-08 RX ORDER — HEPARIN SODIUM,PORCINE 10 UNIT/ML
5 VIAL (ML) INTRAVENOUS
Status: CANCELLED | OUTPATIENT
Start: 2022-08-09

## 2022-08-08 RX ORDER — METHYLPREDNISOLONE SODIUM SUCCINATE 125 MG/2ML
125 INJECTION, POWDER, LYOPHILIZED, FOR SOLUTION INTRAMUSCULAR; INTRAVENOUS
Status: CANCELLED
Start: 2022-08-09

## 2022-08-08 RX ORDER — HEPARIN SODIUM (PORCINE) LOCK FLUSH IV SOLN 100 UNIT/ML 100 UNIT/ML
5 SOLUTION INTRAVENOUS
Status: CANCELLED | OUTPATIENT
Start: 2022-08-09

## 2022-08-08 RX ORDER — EPINEPHRINE 1 MG/ML
0.3 INJECTION, SOLUTION INTRAMUSCULAR; SUBCUTANEOUS EVERY 5 MIN PRN
Status: CANCELLED | OUTPATIENT
Start: 2022-08-09

## 2022-08-08 RX ORDER — DIPHENHYDRAMINE HYDROCHLORIDE 50 MG/ML
50 INJECTION INTRAMUSCULAR; INTRAVENOUS
Status: CANCELLED
Start: 2022-08-09

## 2022-08-08 RX ORDER — ALBUTEROL SULFATE 0.83 MG/ML
2.5 SOLUTION RESPIRATORY (INHALATION)
Status: CANCELLED | OUTPATIENT
Start: 2022-08-09

## 2022-08-08 RX ORDER — ERTAPENEM 1 G/1
1 INJECTION, POWDER, LYOPHILIZED, FOR SOLUTION INTRAMUSCULAR; INTRAVENOUS EVERY 24 HOURS
Status: CANCELLED
Start: 2022-08-09

## 2022-08-08 RX ORDER — ERTAPENEM 1 G/1
1 INJECTION, POWDER, LYOPHILIZED, FOR SOLUTION INTRAMUSCULAR; INTRAVENOUS EVERY 24 HOURS
Status: DISCONTINUED | OUTPATIENT
Start: 2022-08-08 | End: 2022-08-08 | Stop reason: HOSPADM

## 2022-08-08 RX ORDER — MEPERIDINE HYDROCHLORIDE 25 MG/ML
25 INJECTION INTRAMUSCULAR; INTRAVENOUS; SUBCUTANEOUS EVERY 30 MIN PRN
Status: CANCELLED | OUTPATIENT
Start: 2022-08-09

## 2022-08-08 RX ADMIN — ERTAPENEM SODIUM 1 G: 1 INJECTION, POWDER, LYOPHILIZED, FOR SOLUTION INTRAMUSCULAR; INTRAVENOUS at 14:23

## 2022-08-08 ASSESSMENT — PAIN SCALES - GENERAL: PAINLEVEL: NO PAIN (0)

## 2022-08-08 NOTE — PROGRESS NOTES
Infusion Nursing Note:  Caro Palmer presents today for Invanz.    Patient seen by provider today: No   present during visit today: Not Applicable.    Note: Patient waiting to here from Radiology here at [place] for follow up CT per Jeremy, last infusion tomorrow. Patient has a little uneasy stomach today but does not feel its related to abscess. Otherwise patient doing well, no new complaints.    Intravenous Access:  PICC.    Treatment Conditions:  Not Applicable.    Post Infusion Assessment:  Patient tolerated infusion without incident.  Blood return noted pre and post infusion.  Site patent and intact, free from redness, edema or discomfort.  No evidence of extravasations.     Discharge Plan:   Discharge instructions reviewed with: Patient.  Patient and/or family verbalized understanding of discharge instructions and all questions answered.  Patient discharged in stable condition accompanied by: self.  Departure Mode: Ambulatory.      Evette Eastman RN

## 2022-08-09 ENCOUNTER — INFUSION THERAPY VISIT (OUTPATIENT)
Dept: INFUSION THERAPY | Facility: CLINIC | Age: 62
End: 2022-08-09
Payer: COMMERCIAL

## 2022-08-09 VITALS
HEART RATE: 98 BPM | DIASTOLIC BLOOD PRESSURE: 93 MMHG | TEMPERATURE: 97.9 F | RESPIRATION RATE: 18 BRPM | OXYGEN SATURATION: 97 % | SYSTOLIC BLOOD PRESSURE: 132 MMHG

## 2022-08-09 DIAGNOSIS — T81.49XA ABDOMINAL WALL ABSCESS AT SITE OF SURGICAL WOUND: Primary | ICD-10-CM

## 2022-08-09 PROCEDURE — 96365 THER/PROPH/DIAG IV INF INIT: CPT

## 2022-08-09 PROCEDURE — 250N000011 HC RX IP 250 OP 636: Performed by: FAMILY MEDICINE

## 2022-08-09 RX ORDER — DIPHENHYDRAMINE HYDROCHLORIDE 50 MG/ML
50 INJECTION INTRAMUSCULAR; INTRAVENOUS
Status: CANCELLED
Start: 2022-08-10

## 2022-08-09 RX ORDER — EPINEPHRINE 1 MG/ML
0.3 INJECTION, SOLUTION INTRAMUSCULAR; SUBCUTANEOUS EVERY 5 MIN PRN
Status: CANCELLED | OUTPATIENT
Start: 2022-08-10

## 2022-08-09 RX ORDER — HEPARIN SODIUM (PORCINE) LOCK FLUSH IV SOLN 100 UNIT/ML 100 UNIT/ML
5 SOLUTION INTRAVENOUS
Status: DISCONTINUED | OUTPATIENT
Start: 2022-08-09 | End: 2022-08-09 | Stop reason: HOSPADM

## 2022-08-09 RX ORDER — HEPARIN SODIUM,PORCINE 10 UNIT/ML
5 VIAL (ML) INTRAVENOUS
Status: DISCONTINUED | OUTPATIENT
Start: 2022-08-09 | End: 2022-08-09 | Stop reason: HOSPADM

## 2022-08-09 RX ORDER — HEPARIN SODIUM (PORCINE) LOCK FLUSH IV SOLN 100 UNIT/ML 100 UNIT/ML
5 SOLUTION INTRAVENOUS
Status: CANCELLED | OUTPATIENT
Start: 2022-08-10

## 2022-08-09 RX ORDER — METHYLPREDNISOLONE SODIUM SUCCINATE 125 MG/2ML
125 INJECTION, POWDER, LYOPHILIZED, FOR SOLUTION INTRAMUSCULAR; INTRAVENOUS
Status: CANCELLED
Start: 2022-08-10

## 2022-08-09 RX ORDER — ALBUTEROL SULFATE 0.83 MG/ML
2.5 SOLUTION RESPIRATORY (INHALATION)
Status: CANCELLED | OUTPATIENT
Start: 2022-08-10

## 2022-08-09 RX ORDER — ERTAPENEM 1 G/1
1 INJECTION, POWDER, LYOPHILIZED, FOR SOLUTION INTRAMUSCULAR; INTRAVENOUS EVERY 24 HOURS
Status: CANCELLED
Start: 2022-08-10

## 2022-08-09 RX ORDER — HEPARIN SODIUM,PORCINE 10 UNIT/ML
5 VIAL (ML) INTRAVENOUS
Status: CANCELLED | OUTPATIENT
Start: 2022-08-10

## 2022-08-09 RX ORDER — ERTAPENEM 1 G/1
1 INJECTION, POWDER, LYOPHILIZED, FOR SOLUTION INTRAMUSCULAR; INTRAVENOUS EVERY 24 HOURS
Status: DISCONTINUED | OUTPATIENT
Start: 2022-08-09 | End: 2022-08-09 | Stop reason: HOSPADM

## 2022-08-09 RX ORDER — ALBUTEROL SULFATE 90 UG/1
1-2 AEROSOL, METERED RESPIRATORY (INHALATION)
Status: CANCELLED
Start: 2022-08-10

## 2022-08-09 RX ORDER — MEPERIDINE HYDROCHLORIDE 25 MG/ML
25 INJECTION INTRAMUSCULAR; INTRAVENOUS; SUBCUTANEOUS EVERY 30 MIN PRN
Status: CANCELLED | OUTPATIENT
Start: 2022-08-10

## 2022-08-09 RX ADMIN — Medication 5 ML: at 14:26

## 2022-08-09 RX ADMIN — ERTAPENEM SODIUM 1 G: 1 INJECTION, POWDER, LYOPHILIZED, FOR SOLUTION INTRAMUSCULAR; INTRAVENOUS at 13:43

## 2022-08-09 NOTE — PROGRESS NOTES
Infusion Nursing Note:  Caro Palmer presents today for invanz.    Patient seen by provider today: No   present during visit today: Not Applicable.    Note: N/A.    Intravenous Access:  PICC.  Dressing change done today.    Treatment Conditions:  Not Applicable.    Post Infusion Assessment:  Patient tolerated infusion without incident.  Blood return noted pre and post infusion.  Site patent and intact, free from redness, edema or discomfort.  No evidence of extravasations.     Discharge Plan:   Discharge instructions reviewed with: Patient.  Patient and/or family verbalized understanding of discharge instructions and all questions answered.  Patient discharged in stable condition accompanied by: self.  Departure Mode: Ambulatory.      Emily Powers RN

## 2022-08-10 ENCOUNTER — INFUSION THERAPY VISIT (OUTPATIENT)
Dept: INFUSION THERAPY | Facility: CLINIC | Age: 62
End: 2022-08-10
Attending: FAMILY MEDICINE
Payer: COMMERCIAL

## 2022-08-10 ENCOUNTER — HOSPITAL ENCOUNTER (OUTPATIENT)
Dept: CT IMAGING | Facility: CLINIC | Age: 62
Discharge: HOME | End: 2022-08-10
Attending: INTERNAL MEDICINE
Payer: COMMERCIAL

## 2022-08-10 VITALS
RESPIRATION RATE: 12 BRPM | DIASTOLIC BLOOD PRESSURE: 75 MMHG | SYSTOLIC BLOOD PRESSURE: 129 MMHG | OXYGEN SATURATION: 96 % | TEMPERATURE: 98.2 F | HEART RATE: 54 BPM

## 2022-08-10 DIAGNOSIS — T81.49XA ABDOMINAL WALL ABSCESS AT SITE OF SURGICAL WOUND: Primary | ICD-10-CM

## 2022-08-10 DIAGNOSIS — F41.1 GENERALIZED ANXIETY DISORDER: ICD-10-CM

## 2022-08-10 DIAGNOSIS — L02.91 ABSCESS: ICD-10-CM

## 2022-08-10 LAB
ANION GAP SERPL CALCULATED.3IONS-SCNC: 5 MMOL/L (ref 3–14)
BASOPHILS # BLD AUTO: 0.1 10E3/UL (ref 0–0.2)
BASOPHILS NFR BLD AUTO: 1 %
BUN SERPL-MCNC: 18 MG/DL (ref 7–30)
CALCIUM SERPL-MCNC: 8.9 MG/DL (ref 8.5–10.1)
CHLORIDE BLD-SCNC: 108 MMOL/L (ref 94–109)
CO2 SERPL-SCNC: 28 MMOL/L (ref 20–32)
CREAT SERPL-MCNC: 0.63 MG/DL (ref 0.52–1.04)
CRP SERPL-MCNC: 3.8 MG/L (ref 0–8)
EOSINOPHIL # BLD AUTO: 0.1 10E3/UL (ref 0–0.7)
EOSINOPHIL NFR BLD AUTO: 2 %
ERYTHROCYTE [DISTWIDTH] IN BLOOD BY AUTOMATED COUNT: 14.1 % (ref 10–15)
ERYTHROCYTE [SEDIMENTATION RATE] IN BLOOD BY WESTERGREN METHOD: 13 MM/HR (ref 0–30)
GFR SERPL CREATININE-BSD FRML MDRD: >90 ML/MIN/1.73M2
GLUCOSE BLD-MCNC: 115 MG/DL (ref 70–99)
HCT VFR BLD AUTO: 34.9 % (ref 35–47)
HGB BLD-MCNC: 11.7 G/DL (ref 11.7–15.7)
IMM GRANULOCYTES # BLD: 0 10E3/UL
IMM GRANULOCYTES NFR BLD: 0 %
LYMPHOCYTES # BLD AUTO: 1.8 10E3/UL (ref 0.8–5.3)
LYMPHOCYTES NFR BLD AUTO: 30 %
MCH RBC QN AUTO: 29.9 PG (ref 26.5–33)
MCHC RBC AUTO-ENTMCNC: 33.5 G/DL (ref 31.5–36.5)
MCV RBC AUTO: 89 FL (ref 78–100)
MONOCYTES # BLD AUTO: 0.5 10E3/UL (ref 0–1.3)
MONOCYTES NFR BLD AUTO: 9 %
NEUTROPHILS # BLD AUTO: 3.5 10E3/UL (ref 1.6–8.3)
NEUTROPHILS NFR BLD AUTO: 58 %
NRBC # BLD AUTO: 0 10E3/UL
NRBC BLD AUTO-RTO: 0 /100
PLATELET # BLD AUTO: 202 10E3/UL (ref 150–450)
POTASSIUM BLD-SCNC: 4 MMOL/L (ref 3.4–5.3)
RBC # BLD AUTO: 3.91 10E6/UL (ref 3.8–5.2)
SODIUM SERPL-SCNC: 141 MMOL/L (ref 133–144)
WBC # BLD AUTO: 5.9 10E3/UL (ref 4–11)

## 2022-08-10 PROCEDURE — 36592 COLLECT BLOOD FROM PICC: CPT

## 2022-08-10 PROCEDURE — 250N000011 HC RX IP 250 OP 636: Performed by: FAMILY MEDICINE

## 2022-08-10 PROCEDURE — 258N000003 HC RX IP 258 OP 636: Performed by: FAMILY MEDICINE

## 2022-08-10 PROCEDURE — 250N000009 HC RX 250

## 2022-08-10 PROCEDURE — 74177 CT ABD & PELVIS W/CONTRAST: CPT

## 2022-08-10 PROCEDURE — 96365 THER/PROPH/DIAG IV INF INIT: CPT | Mod: 59

## 2022-08-10 PROCEDURE — 86140 C-REACTIVE PROTEIN: CPT

## 2022-08-10 PROCEDURE — 80048 BASIC METABOLIC PNL TOTAL CA: CPT

## 2022-08-10 PROCEDURE — 85652 RBC SED RATE AUTOMATED: CPT

## 2022-08-10 PROCEDURE — 250N000011 HC RX IP 250 OP 636

## 2022-08-10 PROCEDURE — 85025 COMPLETE CBC W/AUTO DIFF WBC: CPT

## 2022-08-10 RX ORDER — EPINEPHRINE 1 MG/ML
0.3 INJECTION, SOLUTION INTRAMUSCULAR; SUBCUTANEOUS EVERY 5 MIN PRN
Status: CANCELLED | OUTPATIENT
Start: 2022-08-11

## 2022-08-10 RX ORDER — HEPARIN SODIUM,PORCINE 10 UNIT/ML
5 VIAL (ML) INTRAVENOUS
Status: CANCELLED | OUTPATIENT
Start: 2022-08-11

## 2022-08-10 RX ORDER — ALBUTEROL SULFATE 90 UG/1
1-2 AEROSOL, METERED RESPIRATORY (INHALATION)
Status: CANCELLED
Start: 2022-08-11

## 2022-08-10 RX ORDER — HEPARIN SODIUM (PORCINE) LOCK FLUSH IV SOLN 100 UNIT/ML 100 UNIT/ML
5 SOLUTION INTRAVENOUS
Status: DISCONTINUED | OUTPATIENT
Start: 2022-08-10 | End: 2022-08-10 | Stop reason: HOSPADM

## 2022-08-10 RX ORDER — IOPAMIDOL 755 MG/ML
500 INJECTION, SOLUTION INTRAVASCULAR ONCE
Status: COMPLETED | OUTPATIENT
Start: 2022-08-10 | End: 2022-08-10

## 2022-08-10 RX ORDER — DIPHENHYDRAMINE HYDROCHLORIDE 50 MG/ML
50 INJECTION INTRAMUSCULAR; INTRAVENOUS
Status: CANCELLED
Start: 2022-08-11

## 2022-08-10 RX ORDER — MEPERIDINE HYDROCHLORIDE 25 MG/ML
25 INJECTION INTRAMUSCULAR; INTRAVENOUS; SUBCUTANEOUS EVERY 30 MIN PRN
Status: CANCELLED | OUTPATIENT
Start: 2022-08-11

## 2022-08-10 RX ORDER — METHYLPREDNISOLONE SODIUM SUCCINATE 125 MG/2ML
125 INJECTION, POWDER, LYOPHILIZED, FOR SOLUTION INTRAMUSCULAR; INTRAVENOUS
Status: CANCELLED
Start: 2022-08-11

## 2022-08-10 RX ORDER — ALBUTEROL SULFATE 0.83 MG/ML
2.5 SOLUTION RESPIRATORY (INHALATION)
Status: CANCELLED | OUTPATIENT
Start: 2022-08-11

## 2022-08-10 RX ORDER — ERTAPENEM 1 G/1
1 INJECTION, POWDER, LYOPHILIZED, FOR SOLUTION INTRAMUSCULAR; INTRAVENOUS EVERY 24 HOURS
Status: DISCONTINUED | OUTPATIENT
Start: 2022-08-10 | End: 2022-08-10 | Stop reason: HOSPADM

## 2022-08-10 RX ORDER — HEPARIN SODIUM (PORCINE) LOCK FLUSH IV SOLN 100 UNIT/ML 100 UNIT/ML
5 SOLUTION INTRAVENOUS
Status: CANCELLED | OUTPATIENT
Start: 2022-08-11

## 2022-08-10 RX ADMIN — SODIUM CHLORIDE 60 ML: 9 INJECTION, SOLUTION INTRAVENOUS at 12:39

## 2022-08-10 RX ADMIN — ERTAPENEM SODIUM 1 G: 1 INJECTION, POWDER, LYOPHILIZED, FOR SOLUTION INTRAMUSCULAR; INTRAVENOUS at 15:23

## 2022-08-10 RX ADMIN — SODIUM CHLORIDE 250 ML: 9 INJECTION, SOLUTION INTRAVENOUS at 15:18

## 2022-08-10 RX ADMIN — IOPAMIDOL 80 ML: 755 INJECTION, SOLUTION INTRAVENOUS at 12:39

## 2022-08-10 RX ADMIN — Medication 5 ML: at 16:05

## 2022-08-10 ASSESSMENT — PAIN SCALES - GENERAL: PAINLEVEL: NO PAIN (0)

## 2022-08-10 NOTE — PROGRESS NOTES
Infusion Nursing Note:  Caro Palmer presents today for Invanz Infusion.    Patient seen by provider today: No   present during visit today: Not Applicable.    Note: Pt states she is doing well.  She denies any new questions or concerns.  Pt states she had an abdominal CT scan today.  Labs drawn and will fax to Dr. Chaudhary at [facility].      Intravenous Access:  PICC.    Treatment Conditions:  Not Applicable.    Post Infusion Assessment:  Patient tolerated infusion without incident.  Blood return noted pre and post infusion.  Site patent and intact, free from redness, edema or discomfort.  No evidence of extravasations.     Discharge Plan:   Discharge instructions reviewed with: Patient.  Patient and/or family verbalized understanding of discharge instructions and all questions answered.  Patient discharged in stable condition accompanied by: self.  Departure Mode: Ambulatory.      Sybil Dorsey RN

## 2022-08-11 RX ORDER — ALPRAZOLAM 0.25 MG
TABLET ORAL
Qty: 30 TABLET | Refills: 0 | Status: SHIPPED | OUTPATIENT
Start: 2022-08-11 | End: 2022-09-26

## 2022-08-11 NOTE — TELEPHONE ENCOUNTER
Requested Prescriptions   Pending Prescriptions Disp Refills     ALPRAZolam (XANAX) 0.25 MG tablet [Pharmacy Med Name: ALPRAZOLAM 0.25MG TABS] 30 tablet 0     Sig: TAKE ONE TABLET BY MOUTH THREE TIMES A DAY AS NEEDED FOR ANXIETY     Last Written Prescription Date:  06/29/2022  Last Fill Quantity: 30,   # refills: 0  Last Office Visit: 07/08/2022  Future Office visit:       Routing refill request to provider for review/approval because:  Drug not on the [facility], P or [facility] refill protocol or controlled substance

## 2022-08-12 ENCOUNTER — INFUSION THERAPY VISIT (OUTPATIENT)
Dept: INFUSION THERAPY | Facility: CLINIC | Age: 62
End: 2022-08-12
Attending: FAMILY MEDICINE
Payer: COMMERCIAL

## 2022-08-12 DIAGNOSIS — T81.49XA ABDOMINAL WALL ABSCESS AT SITE OF SURGICAL WOUND: Primary | ICD-10-CM

## 2022-08-12 PROCEDURE — 96523 IRRIG DRUG DELIVERY DEVICE: CPT

## 2022-08-12 RX ORDER — ALBUTEROL SULFATE 90 UG/1
1-2 AEROSOL, METERED RESPIRATORY (INHALATION)
Status: CANCELLED
Start: 2022-08-13

## 2022-08-12 RX ORDER — MEPERIDINE HYDROCHLORIDE 25 MG/ML
25 INJECTION INTRAMUSCULAR; INTRAVENOUS; SUBCUTANEOUS EVERY 30 MIN PRN
Status: CANCELLED | OUTPATIENT
Start: 2022-08-13

## 2022-08-12 RX ORDER — HEPARIN SODIUM,PORCINE 10 UNIT/ML
5 VIAL (ML) INTRAVENOUS
Status: CANCELLED | OUTPATIENT
Start: 2022-08-13

## 2022-08-12 RX ORDER — DIPHENHYDRAMINE HYDROCHLORIDE 50 MG/ML
50 INJECTION INTRAMUSCULAR; INTRAVENOUS
Status: CANCELLED
Start: 2022-08-13

## 2022-08-12 RX ORDER — METHYLPREDNISOLONE SODIUM SUCCINATE 125 MG/2ML
125 INJECTION, POWDER, LYOPHILIZED, FOR SOLUTION INTRAMUSCULAR; INTRAVENOUS
Status: CANCELLED
Start: 2022-08-13

## 2022-08-12 RX ORDER — ALBUTEROL SULFATE 0.83 MG/ML
2.5 SOLUTION RESPIRATORY (INHALATION)
Status: CANCELLED | OUTPATIENT
Start: 2022-08-13

## 2022-08-12 RX ORDER — HEPARIN SODIUM (PORCINE) LOCK FLUSH IV SOLN 100 UNIT/ML 100 UNIT/ML
5 SOLUTION INTRAVENOUS
Status: CANCELLED | OUTPATIENT
Start: 2022-08-13

## 2022-08-12 RX ORDER — EPINEPHRINE 1 MG/ML
0.3 INJECTION, SOLUTION INTRAMUSCULAR; SUBCUTANEOUS EVERY 5 MIN PRN
Status: CANCELLED | OUTPATIENT
Start: 2022-08-13

## 2022-08-12 NOTE — PROGRESS NOTES
Infusion Nursing Note:  Caro Palmer presents today for PICC removal.    Patient seen by provider today: No   present during visit today: Not Applicable.    Note: Patient instructed to keep vaseline/gauze/tegaderm dressing on for 24 hours. Tolerated removal well. PICC length 36cm. RT.  Arm circumference 34 cm. No bleeding, pressure applied for 5 min. Patient to do no lifting with right arm X 24 HOURS. Any bleeding to go to ER.     Intravenous Access:  PICC.    Treatment Conditions:  Not Applicable.    Post Infusion Assessment:  Access discontinued per protocol.     Discharge Plan:   Patient discharged in stable condition accompanied by: self.  Departure Mode: Ambulatory.      Nannette Wang RN

## 2022-08-16 ENCOUNTER — HOSPITAL ENCOUNTER (OUTPATIENT)
Dept: MAMMOGRAPHY | Facility: CLINIC | Age: 62
Discharge: HOME | End: 2022-08-16
Attending: FAMILY MEDICINE | Admitting: FAMILY MEDICINE
Payer: COMMERCIAL

## 2022-08-16 DIAGNOSIS — Z12.31 VISIT FOR SCREENING MAMMOGRAM: ICD-10-CM

## 2022-08-16 PROCEDURE — 77067 SCR MAMMO BI INCL CAD: CPT

## 2022-08-25 ENCOUNTER — HOSPITAL ENCOUNTER (OUTPATIENT)
Dept: CT IMAGING | Facility: CLINIC | Age: 62
Discharge: HOME | End: 2022-08-25
Attending: INTERNAL MEDICINE | Admitting: INTERNAL MEDICINE
Payer: COMMERCIAL

## 2022-08-25 DIAGNOSIS — L02.91 ABSCESS: ICD-10-CM

## 2022-08-25 PROCEDURE — 250N000009 HC RX 250: Performed by: RADIOLOGY

## 2022-08-25 PROCEDURE — 74177 CT ABD & PELVIS W/CONTRAST: CPT

## 2022-08-25 PROCEDURE — 250N000011 HC RX IP 250 OP 636: Performed by: RADIOLOGY

## 2022-08-25 RX ORDER — IOPAMIDOL 755 MG/ML
500 INJECTION, SOLUTION INTRAVASCULAR ONCE
Status: COMPLETED | OUTPATIENT
Start: 2022-08-25 | End: 2022-08-25

## 2022-08-25 RX ADMIN — SODIUM CHLORIDE 60 ML: 9 INJECTION, SOLUTION INTRAVENOUS at 18:44

## 2022-08-25 RX ADMIN — IOPAMIDOL 78 ML: 755 INJECTION, SOLUTION INTRAVENOUS at 18:43

## 2022-08-31 ENCOUNTER — PRE VISIT (OUTPATIENT)
Dept: INFECTIOUS DISEASES | Facility: CLINIC | Age: 62
End: 2022-08-31

## 2022-09-22 ENCOUNTER — HOSPITAL ENCOUNTER (OUTPATIENT)
Dept: CT IMAGING | Facility: CLINIC | Age: 62
Discharge: HOME | End: 2022-09-22
Attending: INTERNAL MEDICINE | Admitting: INTERNAL MEDICINE
Payer: COMMERCIAL

## 2022-09-22 DIAGNOSIS — L02.91 ABSCESS: ICD-10-CM

## 2022-09-22 DIAGNOSIS — F41.1 GENERALIZED ANXIETY DISORDER: ICD-10-CM

## 2022-09-22 PROCEDURE — 82565 ASSAY OF CREATININE: CPT

## 2022-09-22 PROCEDURE — 250N000011 HC RX IP 250 OP 636: Performed by: FAMILY MEDICINE

## 2022-09-22 PROCEDURE — 250N000009 HC RX 250: Performed by: FAMILY MEDICINE

## 2022-09-22 PROCEDURE — 74177 CT ABD & PELVIS W/CONTRAST: CPT

## 2022-09-22 RX ORDER — IOPAMIDOL 755 MG/ML
500 INJECTION, SOLUTION INTRAVASCULAR ONCE
Status: COMPLETED | OUTPATIENT
Start: 2022-09-22 | End: 2022-09-22

## 2022-09-22 RX ADMIN — IOPAMIDOL 80 ML: 755 INJECTION, SOLUTION INTRAVENOUS at 16:34

## 2022-09-22 RX ADMIN — SODIUM CHLORIDE 60 ML: 9 INJECTION, SOLUTION INTRAVENOUS at 16:33

## 2022-09-23 NOTE — TELEPHONE ENCOUNTER
Requested Prescriptions   Pending Prescriptions Disp Refills    ALPRAZolam (XANAX) 0.25 MG tablet [Pharmacy Med Name: ALPRAZOLAM 0.25MG TABS] 30 tablet 0     Sig: TAKE ONE TABLET BY MOUTH THREE TIMES A DAY AS NEEDED FOR ANXIETY        There is no refill protocol information for this order             Sybil Caldwell, MAGDALENAN, RN

## 2022-09-26 RX ORDER — ALPRAZOLAM 0.25 MG
TABLET ORAL
Qty: 30 TABLET | Refills: 0 | Status: SHIPPED | OUTPATIENT
Start: 2022-09-26 | End: 2022-11-02

## 2022-10-01 DIAGNOSIS — J45.20 MILD INTERMITTENT ASTHMA WITHOUT COMPLICATION: ICD-10-CM

## 2022-10-03 RX ORDER — ALBUTEROL SULFATE 90 UG/1
AEROSOL, METERED RESPIRATORY (INHALATION)
Qty: 18 G | Refills: 0 | Status: SHIPPED | OUTPATIENT
Start: 2022-10-03 | End: 2023-01-10

## 2022-10-05 LAB
CREAT BLD-MCNC: 0.7 MG/DL (ref 0.5–1)
GFR SERPL CREATININE-BSD FRML MDRD: >60 ML/MIN/1.73M2

## 2022-10-09 ENCOUNTER — HEALTH MAINTENANCE LETTER (OUTPATIENT)
Age: 62
End: 2022-10-09

## 2022-10-20 DIAGNOSIS — I10 ESSENTIAL HYPERTENSION WITH GOAL BLOOD PRESSURE LESS THAN 140/90: ICD-10-CM

## 2022-10-22 RX ORDER — HYDROCHLOROTHIAZIDE 12.5 MG/1
TABLET ORAL
Qty: 90 TABLET | Refills: 3 | Status: SHIPPED | OUTPATIENT
Start: 2022-10-22 | End: 2023-10-16

## 2022-10-22 RX ORDER — LOSARTAN POTASSIUM 25 MG/1
TABLET ORAL
Qty: 180 TABLET | Refills: 2 | Status: SHIPPED | OUTPATIENT
Start: 2022-10-22 | End: 2023-07-18

## 2022-10-23 NOTE — TELEPHONE ENCOUNTER
Prescription approved per [facility] Refill Protocol.    Yeni Green,BSN, RN

## 2022-11-01 DIAGNOSIS — F41.1 GENERALIZED ANXIETY DISORDER: ICD-10-CM

## 2022-11-02 RX ORDER — ALPRAZOLAM 0.25 MG
TABLET ORAL
Qty: 30 TABLET | Refills: 0 | Status: SHIPPED | OUTPATIENT
Start: 2022-11-02 | End: 2022-12-17

## 2022-11-26 ENCOUNTER — HEALTH MAINTENANCE LETTER (OUTPATIENT)
Age: 62
End: 2022-11-26

## 2022-12-17 DIAGNOSIS — F41.1 GENERALIZED ANXIETY DISORDER: ICD-10-CM

## 2022-12-17 RX ORDER — ALPRAZOLAM 0.25 MG
TABLET ORAL
Qty: 30 TABLET | Refills: 0 | Status: SHIPPED | OUTPATIENT
Start: 2022-12-17 | End: 2023-01-16

## 2023-01-02 ENCOUNTER — MYC MEDICAL ADVICE (OUTPATIENT)
Dept: FAMILY MEDICINE | Facility: CLINIC | Age: 63
End: 2023-01-02

## 2023-01-02 ENCOUNTER — LAB (OUTPATIENT)
Dept: LAB | Facility: CLINIC | Age: 63
End: 2023-01-02
Payer: COMMERCIAL

## 2023-01-02 ENCOUNTER — VIRTUAL VISIT (OUTPATIENT)
Dept: FAMILY MEDICINE | Facility: CLINIC | Age: 63
End: 2023-01-02
Payer: COMMERCIAL

## 2023-01-02 DIAGNOSIS — E11.9 TYPE 2 DIABETES MELLITUS WITHOUT COMPLICATION, WITHOUT LONG-TERM CURRENT USE OF INSULIN (H): ICD-10-CM

## 2023-01-02 DIAGNOSIS — E66.01 MORBID OBESITY (H): ICD-10-CM

## 2023-01-02 DIAGNOSIS — T81.49XA ABDOMINAL WALL ABSCESS AT SITE OF SURGICAL WOUND: ICD-10-CM

## 2023-01-02 DIAGNOSIS — F41.1 GENERALIZED ANXIETY DISORDER: ICD-10-CM

## 2023-01-02 DIAGNOSIS — J31.0 CHRONIC RHINITIS: ICD-10-CM

## 2023-01-02 DIAGNOSIS — I10 ESSENTIAL HYPERTENSION WITH GOAL BLOOD PRESSURE LESS THAN 140/90: ICD-10-CM

## 2023-01-02 DIAGNOSIS — R10.31 ABDOMINAL PAIN, RIGHT LOWER QUADRANT: ICD-10-CM

## 2023-01-02 DIAGNOSIS — E11.69 TYPE 2 DIABETES MELLITUS WITH OTHER SPECIFIED COMPLICATION, WITHOUT LONG-TERM CURRENT USE OF INSULIN (H): ICD-10-CM

## 2023-01-02 DIAGNOSIS — R10.31 ABDOMINAL PAIN, RIGHT LOWER QUADRANT: Primary | ICD-10-CM

## 2023-01-02 DIAGNOSIS — J45.20 MILD INTERMITTENT ASTHMA WITHOUT COMPLICATION: ICD-10-CM

## 2023-01-02 LAB
ALBUMIN SERPL-MCNC: 4.2 G/DL (ref 3.4–5)
ALP SERPL-CCNC: 60 U/L (ref 40–150)
ALT SERPL W P-5'-P-CCNC: 34 U/L (ref 0–50)
ANION GAP SERPL CALCULATED.3IONS-SCNC: 5 MMOL/L (ref 3–14)
AST SERPL W P-5'-P-CCNC: 31 U/L (ref 0–45)
BASOPHILS # BLD AUTO: 0.1 10E3/UL (ref 0–0.2)
BASOPHILS NFR BLD AUTO: 1 %
BILIRUB SERPL-MCNC: 0.8 MG/DL (ref 0.2–1.3)
BUN SERPL-MCNC: 11 MG/DL (ref 7–30)
CALCIUM SERPL-MCNC: 9.2 MG/DL (ref 8.5–10.1)
CHLORIDE BLD-SCNC: 107 MMOL/L (ref 94–109)
CO2 SERPL-SCNC: 28 MMOL/L (ref 20–32)
CREAT SERPL-MCNC: 0.74 MG/DL (ref 0.52–1.04)
EOSINOPHIL # BLD AUTO: 0.1 10E3/UL (ref 0–0.7)
EOSINOPHIL NFR BLD AUTO: 1 %
ERYTHROCYTE [DISTWIDTH] IN BLOOD BY AUTOMATED COUNT: 13 % (ref 10–15)
GFR SERPL CREATININE-BSD FRML MDRD: >90 ML/MIN/1.73M2
GLUCOSE BLD-MCNC: 117 MG/DL (ref 70–99)
HBA1C MFR BLD: 6.7 % (ref 0–5.6)
HCT VFR BLD AUTO: 39.7 % (ref 35–47)
HGB BLD-MCNC: 13.3 G/DL (ref 11.7–15.7)
IMM GRANULOCYTES # BLD: 0 10E3/UL
IMM GRANULOCYTES NFR BLD: 0 %
LYMPHOCYTES # BLD AUTO: 3 10E3/UL (ref 0.8–5.3)
LYMPHOCYTES NFR BLD AUTO: 35 %
MCH RBC QN AUTO: 30.7 PG (ref 26.5–33)
MCHC RBC AUTO-ENTMCNC: 33.5 G/DL (ref 31.5–36.5)
MCV RBC AUTO: 92 FL (ref 78–100)
MONOCYTES # BLD AUTO: 0.6 10E3/UL (ref 0–1.3)
MONOCYTES NFR BLD AUTO: 7 %
NEUTROPHILS # BLD AUTO: 4.8 10E3/UL (ref 1.6–8.3)
NEUTROPHILS NFR BLD AUTO: 56 %
NRBC # BLD AUTO: 0 10E3/UL
NRBC BLD AUTO-RTO: 0 /100
PLATELET # BLD AUTO: 298 10E3/UL (ref 150–450)
POTASSIUM BLD-SCNC: 3.6 MMOL/L (ref 3.4–5.3)
PROT SERPL-MCNC: 7.9 G/DL (ref 6.8–8.8)
RBC # BLD AUTO: 4.33 10E6/UL (ref 3.8–5.2)
SODIUM SERPL-SCNC: 140 MMOL/L (ref 133–144)
WBC # BLD AUTO: 8.5 10E3/UL (ref 4–11)

## 2023-01-02 PROCEDURE — 85025 COMPLETE CBC W/AUTO DIFF WBC: CPT

## 2023-01-02 PROCEDURE — 80053 COMPREHEN METABOLIC PANEL: CPT

## 2023-01-02 PROCEDURE — 99214 OFFICE O/P EST MOD 30 MIN: CPT | Mod: 95 | Performed by: FAMILY MEDICINE

## 2023-01-02 PROCEDURE — 36415 COLL VENOUS BLD VENIPUNCTURE: CPT

## 2023-01-02 PROCEDURE — 83036 HEMOGLOBIN GLYCOSYLATED A1C: CPT

## 2023-01-02 ASSESSMENT — ASTHMA QUESTIONNAIRES
QUESTION_4 LAST FOUR WEEKS HOW OFTEN HAVE YOU USED YOUR RESCUE INHALER OR NEBULIZER MEDICATION (SUCH AS ALBUTEROL): TWO OR THREE TIMES PER WEEK
ACT_TOTALSCORE: 17
QUESTION_5 LAST FOUR WEEKS HOW WOULD YOU RATE YOUR ASTHMA CONTROL: SOMEWHAT CONTROLLED
ACT_TOTALSCORE: 17
QUESTION_1 LAST FOUR WEEKS HOW MUCH OF THE TIME DID YOUR ASTHMA KEEP YOU FROM GETTING AS MUCH DONE AT WORK, SCHOOL OR AT HOME: SOME OF THE TIME
QUESTION_3 LAST FOUR WEEKS HOW OFTEN DID YOUR ASTHMA SYMPTOMS (WHEEZING, COUGHING, SHORTNESS OF BREATH, CHEST TIGHTNESS OR PAIN) WAKE YOU UP AT NIGHT OR EARLIER THAN USUAL IN THE MORNING: ONCE OR TWICE
QUESTION_2 LAST FOUR WEEKS HOW OFTEN HAVE YOU HAD SHORTNESS OF BREATH: ONCE OR TWICE A WEEK

## 2023-01-02 NOTE — PROGRESS NOTES
Caro is a 62 year old who is being evaluated via a billable video visit.      How would you like to obtain your AVS? MyChart  If the video visit is dropped, the invitation should be resent by: Text to cell phone: [phone]  Will anyone else be joining your video visit? No        Assessment & Plan     Abdominal pain, right lower quadrant  Abdominal wall abscess at site of surgical wound  History of abscess last summer and was sent to [facility] as the closest available to manage her issue with abdominal wall abscess.  She is now having symptoms suggesting the abscess might be recurring based on some self assessed asymmetry of her abdomen as well as tenderness/discomfort on the lower right side.  We had to default to a phone visit today so could not do an exam.  Will start with blood work to assess and she will try to get into lab today if possible but for sure within 24 hours.  Based on lab results, we may or may not want/need to pursue an outpatient CT scan.  Will monitor for lab results and get back to Constance with a plan.        Type 2 diabetes mellitus without complication, without long-term current use of insulin (H)  Currently we are not aware of how well or poorly she might be doing as there simply has not been any monitoring and is not taking metformin.  Will need to get labs to determine current status and also will need a referral for diabetes education and get her set up with a glucometer, etc.       Generalized anxiety disorder  Lots of things going on in her life but typically makes do with just 30 tabs of xanax 0.25 mg each month.  Will monitor.      Essential hypertension with goal blood pressure less than 140/90  On hydrochlorothiazide and losartan; due for monitoring labs and does need to have recorded blood pressures assessed.      Mild intermittent asthma without complication  Using albuterol a little more frequently as needed for cold air exposure induced bronchospasm.      Chronic  "rhinitis  Reports using 5 mg of OTC loratidine with good results.      Morbid Obesity:  will address this at her wellness visit at the end of February.     Return in about 2 months (around 3/2/2023) for Routine preventive.    Gregory G. Schoen, MD  [facility]          Farhad Elizondo is a 62 year old, presenting for the following health issues:  Diabetes      History of Present Illness       Reason for visit:  Diabetes/ past infection in intestines, bowel symptoms    She eats 0-1 servings of fruits and vegetables daily.She consumes 0 sweetened beverage(s) daily.She exercises with enough effort to increase her heart rate 10 to 19 minutes per day.  She exercises with enough effort to increase her heart rate 4 days per week.   She is taking medications regularly.       Diabetes Follow-up      How often are you checking your blood sugar? Not at all    What concerns do you have today about your diabetes? None and Getting infections often     Do you have any of these symptoms? (Select all that apply)  Blurry vision  Constance states she is concerned that her abdominal abscess is coming back.  She is getting some abdominal pains/pressure and a pulling sensation.  Denies fever or chills but did have a break out of \"impetigo\" which she says she gets when she gets sick, starting about two weeks ago.  She is concerned about the diabetes diagnosis that was made when she had her abdominal abscess and feels like she is getting low sugars now.  She was given a prescription for metformin and stopped that after two weeks due to episodes of vertigo and blurred vision as well as diarrhea.  The symptoms did go away after stopping the medication.  Over the past couple of weeks she has had some vision changes as well.      Bowel movements are better when she eats enough things like salad but tends to be constipated at times.  However, she feels this is more than constipation.  She thinks the right side of her " abdomen is inflamed and that's where she recalls her abscess was (abdominal wall).   She is not eating much sugar, drinks only sugar free beverages and feels she is managing her blood sugars without meds. She is limiting caffeine intake.  She never really followed up for diabetic education as after discharge from [facility] with her abscess (no local beds available) last fall there were a number of things going on with her family that required attention on them and not on herself.  She has been doing okay with stress but has been taking half a xanax 0.25 on most days.      We did a medication review and she is taking a number of supplements/vitamins as listed and for prescriptions is on 50 mg losartan (25mg x 2) daily, hydrochlorothiazide 12.5mg daily with OTC potassium supplement and xanax.  She is using ventolin a little be more with the colder weather to make her breathing easier but not every day.     BP Readings from Last 2 Encounters:   08/10/22 129/75   08/09/22 (!) 132/93     Hemoglobin A1C (%)   Date Value   07/08/2022 7.2 (H)   03/21/2022 7.1 (H)   07/26/2018 5.3     LDL Cholesterol Calculated (mg/dL)   Date Value   07/08/2022 72   08/26/2021 102 (H)   08/11/2020 100 (H)   07/12/2019 114 (H)           Review of Systems   CONSTITUTIONAL: NEGATIVE for fever, chills, change in weight  INTEGUMENTARY/SKIN: NEGATIVE for worrisome rashes, moles or lesions  EYES: blurred vision as noted above in the past few weeks.   ENT/MOUTH: NEGATIVE for ear, mouth and throat problems  RESP: NEGATIVE for significant cough or SOB  CV: NEGATIVE for chest pain, palpitations or peripheral edema  GI: as above; no nausea or diarrhea  : NEGATIVE for frequency, dysuria, or hematuria  NEURO: NEGATIVE for weakness, dizziness or paresthesias but vision changes as noted above.   ENDOCRINE: no polyuria or polydypsia   PSYCHIATRIC: anxiety as above      Objective             No vitals were obtained today due to telephone visit.  Alert,  oriented, somewhat anxious and speaking rapidly, which is not uncommon from her baseline.    Able to speak sentences at a rapid pace without dyspnea and no indication of being in pain.                   aVideo-Visit Details    Type of service:  Phone Visit       Originating Location (pt. Location): Home  Distant Location (provider location):  Off-site  Platform used for Video Visit: Other: Ended up being a telephone visit due to lack of video connectivity.

## 2023-01-02 NOTE — TELEPHONE ENCOUNTER
Central scheduling called who received a call from pt who is very unhappy about her appt being cancelled and doesn't want to end up in the hospital. Next opening isnt until 2/3.  Pt disconnected from scheduling prior to being transferred. Routing to team/pcp

## 2023-01-03 DIAGNOSIS — E11.9 TYPE 2 DIABETES MELLITUS WITHOUT COMPLICATION, WITHOUT LONG-TERM CURRENT USE OF INSULIN (H): ICD-10-CM

## 2023-01-03 DIAGNOSIS — T81.49XA ABDOMINAL WALL ABSCESS AT SITE OF SURGICAL WOUND: ICD-10-CM

## 2023-01-03 DIAGNOSIS — R10.31 ABDOMINAL PAIN, RIGHT LOWER QUADRANT: Primary | ICD-10-CM

## 2023-01-04 ENCOUNTER — TELEPHONE (OUTPATIENT)
Dept: FAMILY MEDICINE | Facility: CLINIC | Age: 63
End: 2023-01-04

## 2023-01-04 NOTE — TELEPHONE ENCOUNTER
Diabetes Education Scheduling Outreach #1:    Call to patient to schedule. Left message with phone number to call to schedule.    Also sent [employer] message for second attempt. Requested patient to call to schedule.    Antonieta Garcia OnCall  Diabetes and Nutrition Scheduling

## 2023-01-06 ENCOUNTER — HOSPITAL ENCOUNTER (OUTPATIENT)
Dept: CT IMAGING | Facility: CLINIC | Age: 63
Discharge: HOME | End: 2023-01-06
Attending: FAMILY MEDICINE | Admitting: FAMILY MEDICINE
Payer: COMMERCIAL

## 2023-01-06 DIAGNOSIS — R10.31 ABDOMINAL PAIN, RIGHT LOWER QUADRANT: ICD-10-CM

## 2023-01-06 DIAGNOSIS — T81.49XA ABDOMINAL WALL ABSCESS AT SITE OF SURGICAL WOUND: ICD-10-CM

## 2023-01-06 PROCEDURE — 74177 CT ABD & PELVIS W/CONTRAST: CPT

## 2023-01-06 PROCEDURE — 250N000009 HC RX 250: Performed by: FAMILY MEDICINE

## 2023-01-06 PROCEDURE — 250N000011 HC RX IP 250 OP 636: Performed by: FAMILY MEDICINE

## 2023-01-06 RX ORDER — IOPAMIDOL 755 MG/ML
500 INJECTION, SOLUTION INTRAVASCULAR ONCE
Status: COMPLETED | OUTPATIENT
Start: 2023-01-06 | End: 2023-01-06

## 2023-01-06 RX ADMIN — SODIUM CHLORIDE 60 ML: 9 INJECTION, SOLUTION INTRAVENOUS at 15:42

## 2023-01-06 RX ADMIN — IOPAMIDOL 80 ML: 755 INJECTION, SOLUTION INTRAVENOUS at 15:41

## 2023-01-09 DIAGNOSIS — J45.20 MILD INTERMITTENT ASTHMA WITHOUT COMPLICATION: ICD-10-CM

## 2023-01-10 RX ORDER — ALBUTEROL SULFATE 90 UG/1
AEROSOL, METERED RESPIRATORY (INHALATION)
Qty: 18 G | Refills: 0 | Status: SHIPPED | OUTPATIENT
Start: 2023-01-10 | End: 2023-03-07

## 2023-01-10 NOTE — TELEPHONE ENCOUNTER
"Requested Prescriptions   Pending Prescriptions Disp Refills    VENTOLIN  (90 Base) MCG/ACT inhaler [Pharmacy Med Name: VENTOLIN HFA 108MCG/ACT AERS] 18 g 0     Sig: INHALE 2 PUFFS INTO THE LUNGS EVERY 6 HOURS AS NEEDED FOR SHORTNESS OF BREATH, DIFFICULTY BREATHING OR WHEEZING.       Asthma Maintenance Inhalers - Anticholinergics Failed - 1/9/2023  7:13 PM        Failed - Asthma control assessment score within normal limits in last 6 months     Please review ACT score.           Passed - Patient is age 12 years or older        Passed - Medication is active on med list        Passed - Recent (6 mo) or future (30 days) visit within the authorizing provider's specialty     Patient had office visit in the last 6 months or has a visit in the next 30 days with authorizing provider or within the authorizing provider's specialty.  See \"Patient Info\" tab in inbasket, or \"Choose Columns\" in Meds & Orders section of the refill encounter.           Short-Acting Beta Agonist Inhalers Protocol  Failed - 1/9/2023  7:13 PM        Failed - Asthma control assessment score within normal limits in last 6 months     Please review ACT score.           Passed - Patient is age 12 or older        Passed - Medication is active on med list        Passed - Recent (6 mo) or future (30 days) visit within the authorizing provider's specialty     Patient had office visit in the last 6 months or has a visit in the next 30 days with authorizing provider or within the authorizing provider's specialty.  See \"Patient Info\" tab in inbasket, or \"Choose Columns\" in Meds & Orders section of the refill encounter.                 "

## 2023-01-13 ENCOUNTER — VIRTUAL VISIT (OUTPATIENT)
Dept: EDUCATION SERVICES | Facility: CLINIC | Age: 63
End: 2023-01-13
Payer: COMMERCIAL

## 2023-01-13 ENCOUNTER — TELEPHONE (OUTPATIENT)
Dept: EDUCATION SERVICES | Facility: CLINIC | Age: 63
End: 2023-01-13

## 2023-01-13 DIAGNOSIS — E11.9 TYPE 2 DIABETES MELLITUS WITHOUT COMPLICATION, WITHOUT LONG-TERM CURRENT USE OF INSULIN (H): ICD-10-CM

## 2023-01-13 PROCEDURE — G0108 DIAB MANAGE TRN  PER INDIV: HCPCS | Mod: 93 | Performed by: DIETITIAN, REGISTERED

## 2023-01-13 RX ORDER — LANCETS
EACH MISCELLANEOUS
Qty: 100 EACH | Refills: 6 | Status: SHIPPED | OUTPATIENT
Start: 2023-01-13 | End: 2024-07-09

## 2023-01-13 NOTE — PROGRESS NOTES
"Diabetes Self-Management Education & Support    Presents for: Initial Assessment for new diagnosis    Type of Service: Telephone Visit    Originating Location (Patient Location): Home  Distant Location (Provider Location): Home  Mode of Communication:  Telephone    Telephone Visit Start Time: 11:00 am  Telephone Visit End Time (telephone visit stop time): 12:00 pm    How would patient like to obtain AVS? MyChart      ASSESSMENT:    Reviewed principles of diabetes self care (nutrition, SMBG, exercise, etc. Pt often skips meals, but does try to make healthier choices. Also exercises regularly. \"busy with grand kids\". Pt receptive, verbalized good understanding and agreeable to 1 month follow up.     Patient's most recent   Lab Results   Component Value Date    A1C 6.7 01/02/2023    A1C 5.3 07/26/2018     is meeting goal of <7.0    Diabetes knowledge and skills assessment:   Patient is knowledgeable in diabetes management concepts related to: Being Active    Continue education with the following diabetes management concepts: Healthy Eating, Being Active, Monitoring, Taking Medication, Problem Solving, Reducing Risks and Healthy Coping    Based on learning assessment above, most appropriate setting for further diabetes education would be: Individual setting.      PLAN    Topics to cover at upcoming visits: Monitoring, Taking Medication and Problem Solving    Follow-up: 1 month    See Care Plan for co-developed, patient-state behavior change goals.  AVS provided for patient today.    Education Materials Provided:   [employer] [facility] Understanding Diabetes Booklet, BG Log Sheet and Carbohydrate Counting      SUBJECTIVE/OBJECTIVE:  Presents for: Initial Assessment for new diagnosis  Accompanied by: Self  Diabetes education in the past 24mo: No  Focus of Visit: Healthy Eating  Diabetes type: Type 2  Diabetes management related comments/concerns: I just want to know how to take care of this and stay healthy  Other concerns:: " "None  Cultural Influences/Ethnic Background:  Not  or       Diabetes Symptoms & Complications:  Fatigue: No  Neuropathy: No  Polydipsia: No  Polyphagia: No  Polyuria: No  Symptom course: Stable  Weight trend: Stable  Complications assessed today?: No    Patient Problem List and Family Medical History reviewed for relevant medical history, current medical status, and diabetes risk factors.    Vitals:  [facility] 11/22/2012   Estimated body mass index is 30.86 kg/m  as calculated from the following:    Height as of 4/1/22: 1.562 m (5' 1.5\").    Weight as of 8/7/22: 75.3 kg (166 lb).   Last 3 BP:   BP Readings from Last 3 Encounters:   08/10/22 129/75   08/09/22 (!) 132/93   08/08/22 (!) 151/84       History   Smoking Status     Never   Smokeless Tobacco     Never       Labs:  Lab Results   Component Value Date    A1C 6.7 01/02/2023    A1C 5.3 07/26/2018     Lab Results   Component Value Date     01/02/2023     08/11/2020     Lab Results   Component Value Date    LDL 72 07/08/2022     08/11/2020     HDL Cholesterol   Date Value Ref Range Status   08/11/2020 58 >49 mg/dL Final     Direct Measure HDL   Date Value Ref Range Status   07/08/2022 45 (L) >=50 mg/dL Final   ]  GFR Estimate   Date Value Ref Range Status   01/02/2023 >90 >60 mL/min/1.73m2 Final     Comment:     Effective December 21, 2021 eGFRcr in adults is calculated using the 2021 CKD-EPI creatinine equation which includes age and gender (Roshni elena al., NEJM, DOI: 10.1056/OHLUgj7964955)   08/11/2020 90 >60 mL/min/[1.73_m2] Final     Comment:     Non  GFR Calc  Starting 12/18/2018, serum creatinine based estimated GFR (eGFR) will be   calculated using the Chronic Kidney Disease Epidemiology Collaboration   (CKD-EPI) equation.       GFR, ESTIMATED POCT   Date Value Ref Range Status   09/22/2022 >60 >60 mL/min/1.73m2 Final     GFR Estimate If Black   Date Value Ref Range Status   08/11/2020 >90 >60 mL/min/[1.73_m2] " Final     Comment:      GFR Calc  Starting 12/18/2018, serum creatinine based estimated GFR (eGFR) will be   calculated using the Chronic Kidney Disease Epidemiology Collaboration   (CKD-EPI) equation.       Lab Results   Component Value Date    CR 0.74 01/02/2023    CR 0.73 08/11/2020     No results found for: MICROALBUMIN    Healthy Eating:  Healthy Eating Assessed Today: Yes  Meal planning/habits: Avoiding sweets, Low carb  Meals include: Dinner  Breakfast: skips  Lunch: skips  Dinner: plant based noodles with low salt tomatoes, venison burger, keto bread and keto taco shells or meatloaf or chicken, salad  Snacks: occasional cookie, limits fuit due to the sugar  Other: eats once per day, goes to gym, gets low BG  Beverages: Water, Coffee, Other (sugar free energy drinks)  Has patient met with a dietitian in the past?: No    Being Active:  Being Active Assessed Today: Yes  Exercise:: Yes  Days per week of moderate to strenuous exercise (like a brisk walk): 4  How intense was your typical exercise? : Moderate (like brisk walking)  Barrier to exercise: Other    Monitoring:  Monitoring Assessed Today: Yes  Did patient bring glucose meter to appointment? : No  Times checking blood sugar at home (number): Never    Taking Medications:  Diabetes Medication(s)     Biguanides       metFORMIN (GLUCOPHAGE-XR) 500 MG 24 hr tablet    Take 1 tablet (500 mg) by mouth daily (with dinner)          Taking Medication Assessed Today: Yes  Current Treatments: Oral Medication (taken by mouth)    Problem Solving:  Problem Solving Assessed Today: No  Is the patient at risk for hypoglycemia?: No              Reducing Risks:  Reducing Risks Assessed Today: Yes  CAD Risks: Diabetes Mellitus, Obesity, Hypertension, Post-menopausal    Healthy Coping:  Healthy Coping Assessed Today: Yes  Emotional response to diabetes: Ready to learn, Concern for health and well-being  Informal Support system:: Friends  Stage of change: ACTION  (Actively working towards change)  Support resources: Websites  Patient Activation Measure Survey Score:  JOHN Score (Last Two) 12/14/2012   JOHN Raw Score 52   Activation Score 100   JOHN Level 4         Care Plan and Education Provided:  Care Plan: Diabetes   Updates made by Bárbara Dyer RD since 1/17/2023 12:00 AM      Problem: HbA1C Not In Goal       Goal: Establish Regular Follow-Ups with PCP       Task: Discuss with PCP the recommended timing for patient's next follow up visit(s)    Responsible User: Bárbara Dyer RD      Task: Discuss schedule for PCP visits with patient    Responsible User: Bárbara Dyer RD      Goal: Get HbA1C Level in Goal       Task: Educate patient on diabetes education self-management topics    Responsible User: Bárbara Dyer RD      Task: Educate patient on benefits of regular glucose monitoring    Responsible User: Bárbara Dyer RD      Task: Refer patient to appropriate extended care team member, as needed (Medication Therapy Management, Behavioral Health, Physical Therapy, etc.)    Responsible User: Bárbara Dyer RD      Task: Discuss diabetes treatment plan with patient    Responsible User: Bárbara Dyer RD      Problem: Diabetes Self-Management Education Needed to Optimize Self-Care Behaviors       Goal: Understand diabetes pathophysiology and disease progression       Task: Provide education on diabetes pathophysiology and disease progression specfic to patient's diabetes type    Responsible User: Bárbara Dyer RD      Goal: Healthy Eating - follow a healthy eating pattern for diabetes       Task: Provide education on portion control and consistency in amount, composition and timing of food intake Completed 1/17/2023   Responsible User: Bárbara Dyer RD      Task: Provide education on managing carbohydrate intake (carbohydrate counting, plate planning method, etc.)    Responsible User: Bárbara Dyer RD      Task: Provide education on weight management  Completed 1/17/2023   Responsible User: Bárbara Dyer RD      Task: Provide education on heart healthy eating    Responsible User: Bárbara Dyer RD      Task: Provide education on eating out    Responsible User: Bárbara Dyer RD      Task: Develop individualized healthy eating plan with patient    Responsible User: Bárbara Dyer RD      Goal: Being Active - get regular physical activity, working up to at least 150 minutes per week       Task: Provide education on relationship of activity to glucose and precautions to take if at risk for low glucose Completed 1/17/2023   Responsible User: Bárbara Dyer RD      Task: Discuss barriers to physical activity with patient    Responsible User: Bárbara Dyer RD      Task: Develop physical activity plan with patient    Responsible User: Bárbara Dyer RD      Task: Explore community resources including walking groups, assistance programs, and home videos    Responsible User: Bárbara Dyer RD      Goal: Monitoring - monitor glucose and ketones as directed       Task: Provide education on blood glucose monitoring (purpose, proper technique, frequency, glucose targets, interpreting results, when to use glucose control solution, sharps disposal) Completed 1/17/2023   Responsible User: Bárbara Dyer RD      Task: Provide education on continuous glucose monitoring (sensor placement, use of negro or /reader, understanding glucose trends, alerts and alarms, differences between sensor glucose and blood glucose)    Responsible User: Bárbara Dyer RD      Task: Provide education on ketone monitoring (when to monitor, frequency, etc.)    Responsible User: Bárbara Dyer RD      Goal: Taking Medication - patient is consistently taking medications as directed       Task: Provide education on action of prescribed medication, including when to take and possible side effects    Responsible User: Bárbara Dyer RD      Task: Provide education on insulin  and injectable diabetes medications, including administration, storage, site selection and rotation for injection sites    Responsible User: Bárbara Dyer RD      Task: Discuss barriers to medication adherence with patient and provide management technique ideas as appropriate    Responsible User: Bárbara Dyer RD      Task: Provide education on frequency and refill details of medications    Responsible User: Bárbara Dyer RD      Goal: Problem Solving - know how to prevent and manage short-term diabetes complications       Task: Provide education on high blood glucose - causes, signs/symptoms, prevention and treatment Completed 1/17/2023   Responsible User: Bárbara Dyer RD      Task: Provide education on low blood glucose - causes, signs/symptoms, prevention, treatment, carrying a carbohydrate source at all times, and medical identification    Responsible User: Bárbara Dyer RD      Task: Provide education on safe travel with diabetes    Responsible User: Bárbara Dyer RD      Task: Provide education on how to care for diabetes on sick days    Responsible User: Bárbara Dyer RD      Task: Provide education on when to call a health care provider    Responsible User: Bárbara Dyer RD      Goal: Reducing Risks - know how to prevent and treat long-term diabetes complications       Task: Provide education on major complications of diabetes, prevention, early diagnostic measures and treatment of complications    Responsible User: Bárbara Dyer RD      Task: Provide education on recommended care for dental, eye and foot health    Responsible User: Bárbara Dyer RD      Task: Provide education on Hemoglobin A1c - goals and relationship to blood glucose levels Completed 1/17/2023   Responsible User: Bárbara Dyer RD      Task: Provide education on recommendations for heart health - lipid levels and goals, blood pressure and goals, and aspirin therapy, if indicated    Responsible User:  Bárbara Dyer RD      Task: Provide education on tobacco cessation    Responsible User: Bárbara Dyer RD      Goal: Healthy Coping - use available resources to cope with the challenges of managing diabetes       Task: Discuss recognizing feelings about having diabetes Completed 1/17/2023   Responsible User: Bárbara Dyer RD      Task: Provide education on the benefits of making appropriate lifestyle changes    Responsible User: Bárbara Dyer RD      Task: Provide education on benefits of utilizing support systems    Responsible User: Bárbara Dyer RD      Task: Discuss methods for coping with stress    Responsible User: Bárbara Dyer RD      Task: Provide education on when to seek professional counseling    Responsible User: Bárbara Dyer RD Mary Spencer RD, [facility]  Diabetes       Time Spent: 60 minutes  Encounter Type: Individual    Any diabetes medication dose changes were made via the CDE Protocol per the patient's primary care provider. A copy of this encounter was shared with the provider.

## 2023-01-13 NOTE — LETTER
"    1/13/2023         RE: Caro Palmer  [address]  [facility] [address]        Dear Colleague,    Thank you for referring your patient, Caro Palmer, to the [facility]. Please see a copy of my visit note below.    Diabetes Self-Management Education & Support    Presents for: Initial Assessment for new diagnosis    Type of Service: Telephone Visit    Originating Location (Patient Location): Home  Distant Location (Provider Location): Home  Mode of Communication:  Telephone    Telephone Visit Start Time: 11:00 am  Telephone Visit End Time (telephone visit stop time): 12:00 pm    How would patient like to obtain AVS? MyChart      ASSESSMENT:    Reviewed principles of diabetes self care (nutrition, SMBG, exercise, etc. Pt often skips meals, but does try to make healthier choices. Also exercises regularly. \"busy with grand kids\". Pt receptive, verbalized good understanding and agreeable to 1 month follow up.     Patient's most recent   Lab Results   Component Value Date    A1C 6.7 01/02/2023    A1C 5.3 07/26/2018     is meeting goal of <7.0    Diabetes knowledge and skills assessment:   Patient is knowledgeable in diabetes management concepts related to: Being Active    Continue education with the following diabetes management concepts: Healthy Eating, Being Active, Monitoring, Taking Medication, Problem Solving, Reducing Risks and Healthy Coping    Based on learning assessment above, most appropriate setting for further diabetes education would be: Individual setting.      PLAN    Topics to cover at upcoming visits: Monitoring, Taking Medication and Problem Solving    Follow-up: 1 month    See Care Plan for co-developed, patient-state behavior change goals.  AVS provided for patient today.    Education Materials Provided:  [facility] Understanding Diabetes Booklet, BG Log Sheet and Carbohydrate Counting      SUBJECTIVE/OBJECTIVE:  Presents for: Initial Assessment for " "new diagnosis  Accompanied by: Self  Diabetes education in the past 24mo: No  Focus of Visit: Healthy Eating  Diabetes type: Type 2  Diabetes management related comments/concerns: I just want to know how to take care of this and stay healthy  Other concerns:: None  Cultural Influences/Ethnic Background:  Not  or       Diabetes Symptoms & Complications:  Fatigue: No  Neuropathy: No  Polydipsia: No  Polyphagia: No  Polyuria: No  Symptom course: Stable  Weight trend: Stable  Complications assessed today?: No    Patient Problem List and Family Medical History reviewed for relevant medical history, current medical status, and diabetes risk factors.    Vitals:  LMP 11/22/2012   Estimated body mass index is 30.86 kg/m  as calculated from the following:    Height as of 4/1/22: 1.562 m (5' 1.5\").    Weight as of 8/7/22: 75.3 kg (166 lb).   Last 3 BP:   BP Readings from Last 3 Encounters:   08/10/22 129/75   08/09/22 (!) 132/93   08/08/22 (!) 151/84       History   Smoking Status     Never   Smokeless Tobacco     Never       Labs:  Lab Results   Component Value Date    A1C 6.7 01/02/2023    A1C 5.3 07/26/2018     Lab Results   Component Value Date     01/02/2023     08/11/2020     Lab Results   Component Value Date    LDL 72 07/08/2022     08/11/2020     HDL Cholesterol   Date Value Ref Range Status   08/11/2020 58 >49 mg/dL Final     Direct Measure HDL   Date Value Ref Range Status   07/08/2022 45 (L) >=50 mg/dL Final   ]  GFR Estimate   Date Value Ref Range Status   01/02/2023 >90 >60 mL/min/1.73m2 Final     Comment:     Effective December 21, 2021 eGFRcr in adults is calculated using the 2021 CKD-EPI creatinine equation which includes age and gender (Roshni elena al., NEJM, DOI: 10.1056/AHYDis5568565)   08/11/2020 90 >60 mL/min/[1.73_m2] Final     Comment:     Non  GFR Calc  Starting 12/18/2018, serum creatinine based estimated GFR (eGFR) will be   calculated using the Chronic " Kidney Disease Epidemiology Collaboration   (CKD-EPI) equation.       GFR, ESTIMATED POCT   Date Value Ref Range Status   09/22/2022 >60 >60 mL/min/1.73m2 Final     GFR Estimate If Black   Date Value Ref Range Status   08/11/2020 >90 >60 mL/min/[1.73_m2] Final     Comment:      GFR Calc  Starting 12/18/2018, serum creatinine based estimated GFR (eGFR) will be   calculated using the Chronic Kidney Disease Epidemiology Collaboration   (CKD-EPI) equation.       Lab Results   Component Value Date    CR 0.74 01/02/2023    CR 0.73 08/11/2020     No results found for: MICROALBUMIN    Healthy Eating:  Healthy Eating Assessed Today: Yes  Meal planning/habits: Avoiding sweets, Low carb  Meals include: Dinner  Breakfast: skips  Lunch: skips  Dinner: plant based noodles with low salt tomatoes, venison burger, keto bread and keto taco shells or meatloaf or chicken, salad  Snacks: occasional cookie, limits fuit due to the sugar  Other: eats once per day, goes to gym, gets low BG  Beverages: Water, Coffee, Other (sugar free energy drinks)  Has patient met with a dietitian in the past?: No    Being Active:  Being Active Assessed Today: Yes  Exercise:: Yes  Days per week of moderate to strenuous exercise (like a brisk walk): 4  How intense was your typical exercise? : Moderate (like brisk walking)  Barrier to exercise: Other    Monitoring:  Monitoring Assessed Today: Yes  Did patient bring glucose meter to appointment? : No  Times checking blood sugar at home (number): Never    Taking Medications:  Diabetes Medication(s)     Biguanides       metFORMIN (GLUCOPHAGE-XR) 500 MG 24 hr tablet    Take 1 tablet (500 mg) by mouth daily (with dinner)          Taking Medication Assessed Today: Yes  Current Treatments: Oral Medication (taken by mouth)    Problem Solving:  Problem Solving Assessed Today: No  Is the patient at risk for hypoglycemia?: No              Reducing Risks:  Reducing Risks Assessed Today: Yes  CAD Risks:  Diabetes Mellitus, Obesity, Hypertension, Post-menopausal    Healthy Coping:  Healthy Coping Assessed Today: Yes  Emotional response to diabetes: Ready to learn, Concern for health and well-being  Informal Support system:: Friends  Stage of change: ACTION (Actively working towards change)  Support resources: Websites  Patient Activation Measure Survey Score:  JOHN Score (Last Two) 12/14/2012   JOHN Raw Score 52   Activation Score 100   JOHN Level 4         Care Plan and Education Provided:  Care Plan: Diabetes   Updates made by Bárbara Dyer RD since 1/17/2023 12:00 AM      Problem: HbA1C Not In Goal       Goal: Establish Regular Follow-Ups with PCP       Task: Discuss with PCP the recommended timing for patient's next follow up visit(s)    Responsible User: Bárbara Dyer RD      Task: Discuss schedule for PCP visits with patient    Responsible User: Bárbara Dyer RD      Goal: Get HbA1C Level in Goal       Task: Educate patient on diabetes education self-management topics    Responsible User: Bárbara Dyer RD      Task: Educate patient on benefits of regular glucose monitoring    Responsible User: Bárbara Dyer RD      Task: Refer patient to appropriate extended care team member, as needed (Medication Therapy Management, Behavioral Health, Physical Therapy, etc.)    Responsible User: Bárbara Dyer RD      Task: Discuss diabetes treatment plan with patient    Responsible User: Bárbara Dyer RD      Problem: Diabetes Self-Management Education Needed to Optimize Self-Care Behaviors       Goal: Understand diabetes pathophysiology and disease progression       Task: Provide education on diabetes pathophysiology and disease progression specfic to patient's diabetes type    Responsible User: Bárbara Dyer RD      Goal: Healthy Eating - follow a healthy eating pattern for diabetes       Task: Provide education on portion control and consistency in amount, composition and timing of food intake Completed  1/17/2023   Responsible User: Bárbara Dyer RD      Task: Provide education on managing carbohydrate intake (carbohydrate counting, plate planning method, etc.)    Responsible User: Bárbara Dyer RD      Task: Provide education on weight management Completed 1/17/2023   Responsible User: Bárbara Dyer RD      Task: Provide education on heart healthy eating    Responsible User: Bárbara Dyer RD      Task: Provide education on eating out    Responsible User: Bárbara Dyer RD      Task: Develop individualized healthy eating plan with patient    Responsible User: Bárbara Dyer RD      Goal: Being Active - get regular physical activity, working up to at least 150 minutes per week       Task: Provide education on relationship of activity to glucose and precautions to take if at risk for low glucose Completed 1/17/2023   Responsible User: Bárbara Dyer RD      Task: Discuss barriers to physical activity with patient    Responsible User: Bárbara Dyer RD      Task: Develop physical activity plan with patient    Responsible User: Bárbara Dyer RD      Task: Explore community resources including walking groups, assistance programs, and home videos    Responsible User: Bárbara Dyer RD      Goal: Monitoring - monitor glucose and ketones as directed       Task: Provide education on blood glucose monitoring (purpose, proper technique, frequency, glucose targets, interpreting results, when to use glucose control solution, sharps disposal) Completed 1/17/2023   Responsible User: Bárbara Dyer RD      Task: Provide education on continuous glucose monitoring (sensor placement, use of negro or /reader, understanding glucose trends, alerts and alarms, differences between sensor glucose and blood glucose)    Responsible User: Bárbara Dyer RD      Task: Provide education on ketone monitoring (when to monitor, frequency, etc.)    Responsible User: Bárbara Dyer RD      Goal: Taking Medication  - patient is consistently taking medications as directed       Task: Provide education on action of prescribed medication, including when to take and possible side effects    Responsible User: Bárbara Dyer RD      Task: Provide education on insulin and injectable diabetes medications, including administration, storage, site selection and rotation for injection sites    Responsible User: Bárbara Dyer RD      Task: Discuss barriers to medication adherence with patient and provide management technique ideas as appropriate    Responsible User: Bárbara Dyer RD      Task: Provide education on frequency and refill details of medications    Responsible User: Bárbara Dyer RD      Goal: Problem Solving - know how to prevent and manage short-term diabetes complications       Task: Provide education on high blood glucose - causes, signs/symptoms, prevention and treatment Completed 1/17/2023   Responsible User: Bárbara Dyer RD      Task: Provide education on low blood glucose - causes, signs/symptoms, prevention, treatment, carrying a carbohydrate source at all times, and medical identification    Responsible User: Bárbara Dyer RD      Task: Provide education on safe travel with diabetes    Responsible User: Bárbara Dyer RD      Task: Provide education on how to care for diabetes on sick days    Responsible User: Bárbara Dyer RD      Task: Provide education on when to call a health care provider    Responsible User: Bárbara Dyer RD      Goal: Reducing Risks - know how to prevent and treat long-term diabetes complications       Task: Provide education on major complications of diabetes, prevention, early diagnostic measures and treatment of complications    Responsible User: Bárbara Dyer RD      Task: Provide education on recommended care for dental, eye and foot health    Responsible User: Bárbara Dyer RD      Task: Provide education on Hemoglobin A1c - goals and relationship to blood  glucose levels Completed 1/17/2023   Responsible User: Bárbara Dyer RD      Task: Provide education on recommendations for heart health - lipid levels and goals, blood pressure and goals, and aspirin therapy, if indicated    Responsible User: Bárbara Dyer RD      Task: Provide education on tobacco cessation    Responsible User: Bárbara Dyer RD      Goal: Healthy Coping - use available resources to cope with the challenges of managing diabetes       Task: Discuss recognizing feelings about having diabetes Completed 1/17/2023   Responsible User: Bárbara Dyer RD      Task: Provide education on the benefits of making appropriate lifestyle changes    Responsible User: Bárbara Dyer RD      Task: Provide education on benefits of utilizing support systems    Responsible User: Bárbara Dyer RD      Task: Discuss methods for coping with stress    Responsible User: Bárbara Dyer RD      Task: Provide education on when to seek professional counseling    Responsible User: Bárbara Dyer RD Mary Spencer RD, [facility] [place]  Diabetes       Time Spent: 60 minutes  Encounter Type: Individual    Any diabetes medication dose changes were made via the CDE Protocol per the patient's primary care provider. A copy of this encounter was shared with the provider.

## 2023-01-16 DIAGNOSIS — F41.1 GENERALIZED ANXIETY DISORDER: ICD-10-CM

## 2023-01-16 RX ORDER — ALPRAZOLAM 0.25 MG
TABLET ORAL
Qty: 30 TABLET | Refills: 0 | Status: SHIPPED | OUTPATIENT
Start: 2023-01-16 | End: 2023-03-23

## 2023-02-03 ENCOUNTER — VIRTUAL VISIT (OUTPATIENT)
Dept: EDUCATION SERVICES | Facility: CLINIC | Age: 63
End: 2023-02-03
Payer: COMMERCIAL

## 2023-02-03 DIAGNOSIS — E11.9 DIABETES MELLITUS, TYPE 2 (H): Primary | ICD-10-CM

## 2023-02-03 PROCEDURE — G0108 DIAB MANAGE TRN  PER INDIV: HCPCS | Mod: 95 | Performed by: DIETITIAN, REGISTERED

## 2023-02-03 NOTE — LETTER
"    2/3/2023         RE: Caro Palmer  [address]  [facility] [address]        Dear Colleague,    Thank you for referring your patient, Caro Palmer, to the [facility]. Please see a copy of my visit note below.    Diabetes Self-Management Education & Support    Presents for: Follow-up    Type of Service: Telephone Visit    Originating Location (Patient Location): Home  Distant Location (Provider Location): Home  Mode of Communication:  Telephone    Telephone Visit Start Time: 1:00  Telephone Visit End Time (telephone visit stop time): 1:30    How would patient like to obtain AVS? [employer]      BG log:    Breakfast  Lunch  Dinner  Bedtime   Before After Before After Before After    147         114     164       125   107   130    126          189  After exercise    97         92              119          ASSESSMENT:    Pt reports doing well, though was concerned that her \"blood sugars were jumping around too much\". Explained that it is expected that numbers will vary somewhat, but noted that current blood sugars are 90% in target. Discussed glycemic effects of different factors including initial BG rise after exercise. Pt relieved to learn this. Pt has been working on trying to be more consistent with diet, eg trying to eat breakfast and have more balance at meal time. Does continue to eat low carb, but \"tries to get some, because I know my body needs fuel\". Pt also continues to go to the gym regularly, is busy with grandchildren, etc. Reports feeling well, not currently taking DM medication (Metformin). Has PCP follow up planned. Commended pt on progress.     Patient's most recent   Lab Results   Component Value Date    A1C 6.7 01/02/2023    A1C 5.3 07/26/2018     is meeting goal of <7.0    Diabetes knowledge and skills assessment:   Patient is knowledgeable in diabetes management concepts related to: Healthy Eating, Being Active, Reducing Risks and Healthy " "Coping    Continue education with the following diabetes management concepts: Healthy Eating, Being Active, Monitoring and Problem Solving    Based on learning assessment above, most appropriate setting for further diabetes education would be: Individual setting.      PLAN    Topics to cover at upcoming visits: Healthy Eating, Monitoring and Problem Solving    Follow-up: annually, sooner if needed    See Care Plan for co-developed, patient-state behavior change goals.  AVS provided for patient today.    Education Materials Provided:  No new materials provided today      SUBJECTIVE/OBJECTIVE:  Presents for: Follow-up  Accompanied by: Self  Diabetes education in the past 24mo: No  Focus of Visit: Healthy Eating, Monitoring  Diabetes type: Type 2  Date of diagnosis: 2022  Diabetes management related comments/concerns: \"my blood sugars kind of jump around\"  Transportation concerns: No  Other concerns:: None  Cultural Influences/Ethnic Background:  Not  or       Diabetes Symptoms & Complications:  Fatigue: No  Neuropathy: No  Polydipsia: No  Polyphagia: No  Polyuria: No  Symptom course: Stable  Weight trend: Stable  Complications assessed today?: No    Patient Problem List and Family Medical History reviewed for relevant medical history, current medical status, and diabetes risk factors.    Vitals:  [facility] 11/22/2012   Estimated body mass index is 30.86 kg/m  as calculated from the following:    Height as of 4/1/22: 1.562 m (5' 1.5\").    Weight as of 8/7/22: 75.3 kg (166 lb).   Last 3 BP:   BP Readings from Last 3 Encounters:   08/10/22 129/75   08/09/22 (!) 132/93   08/08/22 (!) 151/84       History   Smoking Status     Never   Smokeless Tobacco     Never       Labs:  Lab Results   Component Value Date    A1C 6.7 01/02/2023    A1C 5.3 07/26/2018     Lab Results   Component Value Date     01/02/2023     08/11/2020     Lab Results   Component Value Date    LDL 72 07/08/2022     08/11/2020 "     HDL Cholesterol   Date Value Ref Range Status   08/11/2020 58 >49 mg/dL Final     Direct Measure HDL   Date Value Ref Range Status   07/08/2022 45 (L) >=50 mg/dL Final   ]  GFR Estimate   Date Value Ref Range Status   01/02/2023 >90 >60 mL/min/1.73m2 Final     Comment:     Effective December 21, 2021 eGFRcr in adults is calculated using the 2021 CKD-EPI creatinine equation which includes age and gender (Roshni et al., NE, DOI: 10.1056/YRMHvx8105594)   08/11/2020 90 >60 mL/min/[1.73_m2] Final     Comment:     Non  GFR Calc  Starting 12/18/2018, serum creatinine based estimated GFR (eGFR) will be   calculated using the Chronic Kidney Disease Epidemiology Collaboration   (CKD-EPI) equation.       GFR, ESTIMATED POCT   Date Value Ref Range Status   09/22/2022 >60 >60 mL/min/1.73m2 Final     GFR Estimate If Black   Date Value Ref Range Status   08/11/2020 >90 >60 mL/min/[1.73_m2] Final     Comment:      GFR Calc  Starting 12/18/2018, serum creatinine based estimated GFR (eGFR) will be   calculated using the Chronic Kidney Disease Epidemiology Collaboration   (CKD-EPI) equation.       Lab Results   Component Value Date    CR 0.74 01/02/2023    CR 0.73 08/11/2020     No results found for: MICROALBUMIN    Healthy Eating:  Healthy Eating Assessed Today: Yes  Meal planning/habits: Avoiding sweets, Low carb  Meals include: Dinner, Breakfast, Lunch  Breakfast: skips or cheese stick, tomatoes  Lunch: skips or rice cakes with cream cheese or peanut butter  Dinner: plant based noodles with low salt tomatoes, venison burger, keto bread and keto taco shells or meatloaf or chicken, salad, sweet potato  Snacks: occasional cookie, limits fuit due to the sugar  Other: previously was eating once per day, goes to gym, sometimes gets low BG  Beverages: Water, Coffee, Other (sugar free energy drinks)  Has patient met with a dietitian in the past?: No    Being Active:  Being Active Assessed Today:  Yes  Exercise:: Yes  Days per week of moderate to strenuous exercise (like a brisk walk): 4  How intense was your typical exercise? : Moderate (like brisk walking)  Barrier to exercise: Other    Monitoring:  Monitoring Assessed Today: Yes  Did patient bring glucose meter to appointment? : Yes  Times checking blood sugar at home (number): 1  Times checking blood sugar at home (per): Day    Taking Medications:  Diabetes Medication(s)     Biguanides       metFORMIN (GLUCOPHAGE-XR) 500 MG 24 hr tablet    Take 1 tablet (500 mg) by mouth daily (with dinner)  - not taking        Taking Medication Assessed Today: Yes  Current Treatments: None, Diet    Problem Solving:  Problem Solving Assessed Today: No  Is the patient at risk for hypoglycemia?: No    Reducing Risks:  Reducing Risks Assessed Today: No  CAD Risks: Diabetes Mellitus, Obesity, Hypertension, Post-menopausal    Healthy Coping:  Healthy Coping Assessed Today: Yes  Emotional response to diabetes: Ready to learn, Concern for health and well-being  Informal Support system:: Friends  Stage of change: ACTION (Actively working towards change)  Support resources: Websites  Patient Activation Measure Survey Score:  JOHN Score (Last Two) 12/14/2012   JOHN Raw Score 52   Activation Score 100   JOHN Level 4       Care Plan and Education Provided:  Care Plan: Diabetes   Updates made by Bárbara Dyer RD since 2/3/2023 12:00 AM      Problem: HbA1C Not In Goal       Goal: Establish Regular Follow-Ups with PCP       Task: Discuss schedule for PCP visits with patient Completed 2/3/2023   Responsible User: Bárbara Dyer RD      Goal: Get HbA1C Level in Goal       Task: Educate patient on benefits of regular glucose monitoring Completed 2/3/2023   Responsible User: Bárbara Dyer RD      Task: Discuss diabetes treatment plan with patient Completed 2/3/2023   Responsible User: Bárbara Dyer RD      Problem: Diabetes Self-Management Education Needed to Optimize Self-Care  Behaviors       Goal: Healthy Eating - follow a healthy eating pattern for diabetes       Task: Provide education on managing carbohydrate intake (carbohydrate counting, plate planning method, etc.) Completed 2/3/2023   Responsible User: Bárbara Dyer RD   Note:    Have small, balanced, regular meals.       Goal: Problem Solving - know how to prevent and manage short-term diabetes complications       Task: Provide education on low blood glucose - causes, signs/symptoms, prevention, treatment, carrying a carbohydrate source at all times, and medical identification Completed 2/3/2023   Responsible User: Bárbara Dyer RD      Goal: Healthy Coping - use available resources to cope with the challenges of managing diabetes       Task: Provide education on the benefits of making appropriate lifestyle changes Completed 2/3/2023   Responsible User: Bárbara Dyer RD Mary Spencer RD, [facility] [place]  Diabetes       Time Spent: 30 minutes  Encounter Type: Individual    Any diabetes medication dose changes were made via the CDE Protocol per the patient's primary care provider. A copy of this encounter was shared with the provider.

## 2023-02-03 NOTE — PROGRESS NOTES
"Diabetes Self-Management Education & Support    Presents for: Follow-up    Type of Service: Telephone Visit    Originating Location (Patient Location): Home  Distant Location (Provider Location): Home  Mode of Communication:  Telephone    Telephone Visit Start Time: 1:00  Telephone Visit End Time (telephone visit stop time): 1:30    How would patient like to obtain AVS? [facility]      BG log:    Breakfast  Lunch  Dinner  Bedtime   Before After Before After Before After    147         114     164       125   107   130    126          189  After exercise    97         92              119          ASSESSMENT:    Pt reports doing well, though was concerned that her \"blood sugars were jumping around too much\". Explained that it is expected that numbers will vary somewhat, but noted that current blood sugars are 90% in target. Discussed glycemic effects of different factors including initial BG rise after exercise. Pt relieved to learn this. Pt has been working on trying to be more consistent with diet, eg trying to eat breakfast and have more balance at meal time. Does continue to eat low carb, but \"tries to get some, because I know my body needs fuel\". Pt also continues to go to the gym regularly, is busy with grandchildren, etc. Reports feeling well, not currently taking DM medication (Metformin). Has PCP follow up planned. Commended pt on progress.     Patient's most recent   Lab Results   Component Value Date    A1C 6.7 01/02/2023    A1C 5.3 07/26/2018     is meeting goal of <7.0    Diabetes knowledge and skills assessment:   Patient is knowledgeable in diabetes management concepts related to: Healthy Eating, Being Active, Reducing Risks and Healthy Coping    Continue education with the following diabetes management concepts: Healthy Eating, Being Active, Monitoring and Problem Solving    Based on learning assessment above, most appropriate setting for further diabetes education would be: Individual setting.  " "    PLAN    Topics to cover at upcoming visits: Healthy Eating, Monitoring and Problem Solving    Follow-up: annually, sooner if needed    See Care Plan for co-developed, patient-state behavior change goals.  AVS provided for patient today.    Education Materials Provided:  No new materials provided today      SUBJECTIVE/OBJECTIVE:  Presents for: Follow-up  Accompanied by: Self  Diabetes education in the past 24mo: No  Focus of Visit: Healthy Eating, Monitoring  Diabetes type: Type 2  Date of diagnosis: 2022  Diabetes management related comments/concerns: \"my blood sugars kind of jump around\"  Transportation concerns: No  Other concerns:: None  Cultural Influences/Ethnic Background:  Not  or       Diabetes Symptoms & Complications:  Fatigue: No  Neuropathy: No  Polydipsia: No  Polyphagia: No  Polyuria: No  Symptom course: Stable  Weight trend: Stable  Complications assessed today?: No    Patient Problem List and Family Medical History reviewed for relevant medical history, current medical status, and diabetes risk factors.    Vitals:  [facility] 11/22/2012   Estimated body mass index is 30.86 kg/m  as calculated from the following:    Height as of 4/1/22: 1.562 m (5' 1.5\").    Weight as of 8/7/22: 75.3 kg (166 lb).   Last 3 BP:   BP Readings from Last 3 Encounters:   08/10/22 129/75   08/09/22 (!) 132/93   08/08/22 (!) 151/84       History   Smoking Status     Never   Smokeless Tobacco     Never       Labs:  Lab Results   Component Value Date    A1C 6.7 01/02/2023    A1C 5.3 07/26/2018     Lab Results   Component Value Date     01/02/2023     08/11/2020     Lab Results   Component Value Date    LDL 72 07/08/2022     08/11/2020     HDL Cholesterol   Date Value Ref Range Status   08/11/2020 58 >49 mg/dL Final     Direct Measure HDL   Date Value Ref Range Status   07/08/2022 45 (L) >=50 mg/dL Final   ]  GFR Estimate   Date Value Ref Range Status   01/02/2023 >90 >60 mL/min/1.73m2 Final     " Comment:     Effective December 21, 2021 eGFRcr in adults is calculated using the 2021 CKD-EPI creatinine equation which includes age and gender (Roshni elena al., NEJM, DOI: 10.1056/ECKBzv7400736)   08/11/2020 90 >60 mL/min/[1.73_m2] Final     Comment:     Non  GFR Calc  Starting 12/18/2018, serum creatinine based estimated GFR (eGFR) will be   calculated using the Chronic Kidney Disease Epidemiology Collaboration   (CKD-EPI) equation.       GFR, ESTIMATED POCT   Date Value Ref Range Status   09/22/2022 >60 >60 mL/min/1.73m2 Final     GFR Estimate If Black   Date Value Ref Range Status   08/11/2020 >90 >60 mL/min/[1.73_m2] Final     Comment:      GFR Calc  Starting 12/18/2018, serum creatinine based estimated GFR (eGFR) will be   calculated using the Chronic Kidney Disease Epidemiology Collaboration   (CKD-EPI) equation.       Lab Results   Component Value Date    CR 0.74 01/02/2023    CR 0.73 08/11/2020     No results found for: MICROALBUMIN    Healthy Eating:  Healthy Eating Assessed Today: Yes  Meal planning/habits: Avoiding sweets, Low carb  Meals include: Dinner, Breakfast, Lunch  Breakfast: skips or cheese stick, tomatoes  Lunch: skips or rice cakes with cream cheese or peanut butter  Dinner: plant based noodles with low salt tomatoes, venison burger, keto bread and keto taco shells or meatloaf or chicken, salad, sweet potato  Snacks: occasional cookie, limits fuit due to the sugar  Other: previously was eating once per day, goes to gym, sometimes gets low BG  Beverages: Water, Coffee, Other (sugar free energy drinks)  Has patient met with a dietitian in the past?: No    Being Active:  Being Active Assessed Today: Yes  Exercise:: Yes  Days per week of moderate to strenuous exercise (like a brisk walk): 4  How intense was your typical exercise? : Moderate (like brisk walking)  Barrier to exercise: Other    Monitoring:  Monitoring Assessed Today: Yes  Did patient bring glucose meter  to appointment? : Yes  Times checking blood sugar at home (number): 1  Times checking blood sugar at home (per): Day    Taking Medications:  Diabetes Medication(s)     Biguanides       metFORMIN (GLUCOPHAGE-XR) 500 MG 24 hr tablet    Take 1 tablet (500 mg) by mouth daily (with dinner)  - not taking        Taking Medication Assessed Today: Yes  Current Treatments: None, Diet    Problem Solving:  Problem Solving Assessed Today: No  Is the patient at risk for hypoglycemia?: No    Reducing Risks:  Reducing Risks Assessed Today: No  CAD Risks: Diabetes Mellitus, Obesity, Hypertension, Post-menopausal    Healthy Coping:  Healthy Coping Assessed Today: Yes  Emotional response to diabetes: Ready to learn, Concern for health and well-being  Informal Support system:: Friends  Stage of change: ACTION (Actively working towards change)  Support resources: Websites  Patient Activation Measure Survey Score:  JOHN Score (Last Two) 12/14/2012   JONH Raw Score 52   Activation Score 100   JOHN Level 4       Care Plan and Education Provided:  Care Plan: Diabetes   Updates made by Bárbara Dyer RD since 2/3/2023 12:00 AM      Problem: HbA1C Not In Goal       Goal: Establish Regular Follow-Ups with PCP       Task: Discuss schedule for PCP visits with patient Completed 2/3/2023   Responsible User: Bárbara Dyer RD      Goal: Get HbA1C Level in Goal       Task: Educate patient on benefits of regular glucose monitoring Completed 2/3/2023   Responsible User: Bárbara Dyer RD      Task: Discuss diabetes treatment plan with patient Completed 2/3/2023   Responsible User: Bárbara Dyer RD      Problem: Diabetes Self-Management Education Needed to Optimize Self-Care Behaviors       Goal: Healthy Eating - follow a healthy eating pattern for diabetes       Task: Provide education on managing carbohydrate intake (carbohydrate counting, plate planning method, etc.) Completed 2/3/2023   Responsible User: Bárbara Dyer RD   Note:    Have  small, balanced, regular meals.       Goal: Problem Solving - know how to prevent and manage short-term diabetes complications       Task: Provide education on low blood glucose - causes, signs/symptoms, prevention, treatment, carrying a carbohydrate source at all times, and medical identification Completed 2/3/2023   Responsible User: Bárbara Dyer RD      Goal: Healthy Coping - use available resources to cope with the challenges of managing diabetes       Task: Provide education on the benefits of making appropriate lifestyle changes Completed 2/3/2023   Responsible User: Bárbara Dyer RD Mary Spencer RD, [facility]  Diabetes       Time Spent: 30 minutes  Encounter Type: Individual    Any diabetes medication dose changes were made via the CDE Protocol per the patient's primary care provider. A copy of this encounter was shared with the provider.

## 2023-02-03 NOTE — PATIENT INSTRUCTIONS
Keep up the great work with your blood sugar control.    Continue to work on having regular, small, balanced meals.     Send [employer] message with any questions or concerns, or call [phone].    Follow up 1 year, or sooner if needed.    Bárbara Dyer RD, [facility]  Diabetes

## 2023-02-24 ENCOUNTER — OFFICE VISIT (OUTPATIENT)
Dept: FAMILY MEDICINE | Facility: CLINIC | Age: 63
End: 2023-02-24
Payer: COMMERCIAL

## 2023-02-24 VITALS
HEIGHT: 60 IN | TEMPERATURE: 97.5 F | OXYGEN SATURATION: 97 % | RESPIRATION RATE: 16 BRPM | DIASTOLIC BLOOD PRESSURE: 76 MMHG | SYSTOLIC BLOOD PRESSURE: 124 MMHG | BODY MASS INDEX: 32.42 KG/M2 | HEART RATE: 88 BPM

## 2023-02-24 DIAGNOSIS — E11.69 TYPE 2 DIABETES MELLITUS WITH OTHER SPECIFIED COMPLICATION, WITHOUT LONG-TERM CURRENT USE OF INSULIN (H): ICD-10-CM

## 2023-02-24 DIAGNOSIS — Z12.4 CERVICAL CANCER SCREENING: ICD-10-CM

## 2023-02-24 DIAGNOSIS — I10 ESSENTIAL HYPERTENSION WITH GOAL BLOOD PRESSURE LESS THAN 140/90: ICD-10-CM

## 2023-02-24 DIAGNOSIS — Z90.49 S/P PARTIAL RESECTION OF COLON: ICD-10-CM

## 2023-02-24 DIAGNOSIS — F41.1 GENERALIZED ANXIETY DISORDER: ICD-10-CM

## 2023-02-24 DIAGNOSIS — Z80.0 FAMILY HISTORY OF PANCREATIC CANCER: ICD-10-CM

## 2023-02-24 DIAGNOSIS — Z01.419 WELL FEMALE EXAM WITH ROUTINE GYNECOLOGICAL EXAM: Primary | ICD-10-CM

## 2023-02-24 DIAGNOSIS — J45.20 MILD INTERMITTENT ASTHMA WITHOUT COMPLICATION: ICD-10-CM

## 2023-02-24 PROCEDURE — 87624 HPV HI-RISK TYP POOLED RSLT: CPT | Performed by: FAMILY MEDICINE

## 2023-02-24 PROCEDURE — G0145 SCR C/V CYTO,THINLAYER,RESCR: HCPCS | Performed by: FAMILY MEDICINE

## 2023-02-24 PROCEDURE — 99396 PREV VISIT EST AGE 40-64: CPT | Performed by: FAMILY MEDICINE

## 2023-02-24 ASSESSMENT — ENCOUNTER SYMPTOMS
NAUSEA: 0
ABDOMINAL PAIN: 0
DIARRHEA: 0
FEVER: 0
JOINT SWELLING: 0
BREAST MASS: 0
PARESTHESIAS: 0
HEMATOCHEZIA: 0
DYSURIA: 0
HEADACHES: 0
ARTHRALGIAS: 0
CONSTIPATION: 0
COUGH: 0
HEMATURIA: 0
MYALGIAS: 0
DIZZINESS: 0
WEAKNESS: 0
SHORTNESS OF BREATH: 0
EYE PAIN: 0
HEARTBURN: 0
NERVOUS/ANXIOUS: 0
SORE THROAT: 0
PALPITATIONS: 0
CHILLS: 0
FREQUENCY: 0

## 2023-02-24 ASSESSMENT — ASTHMA QUESTIONNAIRES
QUESTION_1 LAST FOUR WEEKS HOW MUCH OF THE TIME DID YOUR ASTHMA KEEP YOU FROM GETTING AS MUCH DONE AT WORK, SCHOOL OR AT HOME: NONE OF THE TIME
QUESTION_2 LAST FOUR WEEKS HOW OFTEN HAVE YOU HAD SHORTNESS OF BREATH: THREE TO SIX TIMES A WEEK
QUESTION_5 LAST FOUR WEEKS HOW WOULD YOU RATE YOUR ASTHMA CONTROL: WELL CONTROLLED
ACT_TOTALSCORE: 20
QUESTION_4 LAST FOUR WEEKS HOW OFTEN HAVE YOU USED YOUR RESCUE INHALER OR NEBULIZER MEDICATION (SUCH AS ALBUTEROL): TWO OR THREE TIMES PER WEEK
ACT_TOTALSCORE: 20
QUESTION_3 LAST FOUR WEEKS HOW OFTEN DID YOUR ASTHMA SYMPTOMS (WHEEZING, COUGHING, SHORTNESS OF BREATH, CHEST TIGHTNESS OR PAIN) WAKE YOU UP AT NIGHT OR EARLIER THAN USUAL IN THE MORNING: NOT AT ALL

## 2023-02-24 ASSESSMENT — PAIN SCALES - GENERAL: PAINLEVEL: NO PAIN (0)

## 2023-02-24 NOTE — PROGRESS NOTES
SUBJECTIVE:   CC: Caro is an 62 year old who presents for preventive health visit.       Healthy Habits:     Getting at least 3 servings of Calcium per day:  Yes    Bi-annual eye exam:  Yes    Dental care twice a year:  Yes    Sleep apnea or symptoms of sleep apnea:  None    Diet:  Low salt, Low fat/cholesterol, Carbohydrate counting and Breakfast skipped    Frequency of exercise:  4-5 days/week    Duration of exercise:  45-60 minutes    Taking medications regularly:  Yes    Medication side effects:  None    PHQ-2 Total Score: 0    Additional concerns today:  No    Life is busy for her. Lots of family issues with health problems and was under a lot of emotional stress but getting better. She is busy with a young grandchild and working full time.  Her prior abdominal pain has resolved since last seen and we did the CT scan of her abdomen.     She has met with the diabetes nurse educator and has been checking some sugars.  She is happy with the information that she received. She is trying to keep her sugars in the lower 100s-120.  She does not want to go on medicine if she can avoid it.  She did take metformin for short period and had side effects that were undesirable.  Lab Results   Component Value Date    A1C 6.7 01/02/2023    A1C 5.3 07/26/2018         Last mammogram was 8/16/22.    Last pap was 7/12/19      Today's PHQ-2 Score:   PHQ-2 ( 1999 Pfizer) 2/24/2023   Q1: Little interest or pleasure in doing things 0   Q2: Feeling down, depressed or hopeless 0   PHQ-2 Score 0   PHQ-2 Total Score (12-17 Years)- Positive if 3 or more points; Administer PHQ-A if positive -   Q1: Little interest or pleasure in doing things Not at all   Q2: Feeling down, depressed or hopeless Not at all   PHQ-2 Score 0           Social History     Tobacco Use     Smoking status: Never     Smokeless tobacco: Never   Substance Use Topics     Alcohol use: Not Currently     Alcohol/week: 0.0 standard drinks     Comment: 1-2 drinks monthly          Alcohol Use 2/24/2023   Prescreen: >3 drinks/day or >7 drinks/week? Not Applicable   Prescreen: >3 drinks/day or >7 drinks/week? -       Breast Cancer Screening:    Breast CA Risk Assessment (FHS-7) 9/28/2021   Do you have a family history of breast, colon, or ovarian cancer? No / Unknown         Mammogram Screening: Recommended mammography every 1-2 years with patient discussion and risk factor consideration  Pertinent mammograms are reviewed under the imaging tab.    History of abnormal Pap smear: NO - age 30- 65 PAP every 3 years recommended  PAP / HPV Latest Ref Rng & Units 7/12/2019 7/6/2018 6/19/2017   PAP (Historical) - NIL NIL NIL   HPV16 NEG:Negative Negative Negative Negative   HPV18 NEG:Negative Negative Negative Negative   HRHPV NEG:Negative Negative Negative Negative     Reviewed and updated as needed this visit by clinical staff                  Reviewed and updated as needed this visit by Provider                 Current Outpatient Medications   Medication Sig Dispense Refill     ALLERGY RELIEF/NASAL DECONGEST  MG 24 hr tablet TAKE ONE TABLET BY MOUTH ONCE DAILY 30 tablet 3     ALPRAZolam (XANAX) 0.25 MG tablet TAKE ONE TABLET BY MOUTH THREE TIMES A DAY AS NEEDED FOR ANXIETY 30 tablet 0     ASPIRIN 81 MG OR TABS ONE DAILY 100 3     blood glucose (NO BRAND SPECIFIED) test strip Use to test blood sugar 3 times daily or as directed. To accompany: Blood Glucose Monitor Brands: per insurance. 100 strip 6     blood glucose monitoring (NO BRAND SPECIFIED) meter device kit Use to test blood sugar 3 times daily or as directed. Preferred blood glucose meter OR supplies to accompany: Blood Glucose Monitor Brands: per insurance. 1 kit 0     CHLOROPHYLL PO Take 15 mLs by mouth daily       Collagen-Vitamin C-Biotin (COLLAGEN 1500/C) 500-50-0.8 MG CAPS Take by mouth daily       fish oil-omega-3 fatty acids 1000 MG capsule Take 1 g by mouth 2 times daily        Flaxseed, Linseed, (FLAXSEED OIL) 1000 MG  CAPS Take 1,000 mg by mouth 3 times daily        GELATIN 650 MG OR CAPS 2 daily  0     GLUCOSAMINE CHONDRO COMPLEX OR 2 tablets x 2 daily  0     hydrochlorothiazide (HYDRODIURIL) 12.5 MG tablet TAKE ONE TABLET BY MOUTH ONCE DAILY 90 tablet 3     losartan (COZAAR) 25 MG tablet TAKE TWO TABLETS (50MG)  BY MOUTH ONCE DAILY 180 tablet 2     Lysine 1000 MG TABS Take 1,000 mg by mouth daily.       magnesium 250 MG tablet Take 1 tablet by mouth daily       Multiple Vitamins-Minerals (WOMENS MULTI VITAMIN & MINERAL) TABS Take 1 tablet by mouth daily       OVER-THE-COUNTER 1 tablet daily Citracal 500 D and Calcium 600       potassium 99 MG TABS        thin (NO BRAND SPECIFIED) lancets Use with lanceting device. To accompany: Blood Glucose Monitor Brands: per insurance. 100 each 6     Turmeric 500 MG TABS        VENTOLIN  (90 Base) MCG/ACT inhaler INHALE 2 PUFFS INTO THE LUNGS EVERY 6 HOURS AS NEEDED FOR SHORTNESS OF BREATH, DIFFICULTY BREATHING OR WHEEZING. 18 g 0     VITAMIN E 400 UNIT OR CAPS ONE CAPSULE DAILY 3 MONTHS 1 YEAR     acetaminophen (TYLENOL) 500 MG tablet Take 500-1,000 mg by mouth every 6 hours as needed for mild pain       ibuprofen (ADVIL/MOTRIN) 200 MG tablet Take 600 mg by mouth every 6 hours as needed for mild pain       metFORMIN (GLUCOPHAGE-XR) 500 MG 24 hr tablet Take 1 tablet (500 mg) by mouth daily (with dinner) 90 tablet 0     STATIN NOT PRESCRIBED (INTENTIONAL) Please choose reason not prescribed from choices below. (Patient not taking: Reported on 2/24/2023)           Review of Systems   Constitutional: Negative for chills and fever.   HENT: Negative for congestion, ear pain, hearing loss and sore throat.    Eyes: Negative for pain and visual disturbance.   Respiratory: Negative for cough and shortness of breath.    Cardiovascular: Negative for chest pain, palpitations and peripheral edema.   Gastrointestinal: Negative for abdominal pain, constipation, diarrhea, heartburn, hematochezia and  nausea.   Breasts:  Negative for tenderness, breast mass and discharge.   Genitourinary: Negative for dysuria, frequency, genital sores, hematuria, pelvic pain, urgency, vaginal bleeding and vaginal discharge.   Musculoskeletal: Negative for arthralgias, joint swelling and myalgias.   Skin: Negative for rash.   Neurological: Negative for dizziness, weakness, headaches and paresthesias.   Psychiatric/Behavioral: Negative for mood changes. The patient is not nervous/anxious.           OBJECTIVE:   LMP 11/22/2012   Physical Exam  GENERAL APPEARANCE: healthy, alert and no distress  EYES: Eyes grossly normal to inspection, PERRL and conjunctivae and sclerae normal  HENT: ear canals and TM's normal, nose and mouth without ulcers or lesions, oropharynx clear and oral mucous membranes moist  NECK: no adenopathy, no asymmetry, masses, or scars and thyroid normal to palpation  RESP: lungs clear to auscultation - no rales, rhonchi or wheezes  CV: regular rate and rhythm, normal S1 S2, no S3 or S4, no murmur, click or rub, no peripheral edema and peripheral pulses strong  ABDOMEN: soft, nontender, no hepatosplenomegaly, no masses and bowel sounds normal  Pelvic - Performed with chaperone in room.  Normal external genitalia and BUS without lesions.  Vaginal mucosa normal in appearance.  Cervix appears atrophic but otherwise normal.   Pap Smear was performed using thinprep brush and broom with some friability noted.   MS: no musculoskeletal defects are noted and gait is age appropriate without ataxia  SKIN: no suspicious lesions or rashes  NEURO: Normal strength aand tone, sensory exam grossly normal mentation intact and speech normal  PSYCH: very talkative, energetic today      ASSESSMENT/PLAN:   (Z01.419) Well female exam with routine gynecological exam  (primary encounter diagnosis)  Comment: Normal exam today.  Recent labs done in past few months reviewed and unremarkable.  Last lipid profile 7 months ago and very normal with  LDL 72, HDL 45 and total 135.  She is UTD with mammograms and had pap today.  See discussion below regarding colon cancer screening.  Care gaps reviewed and declines all vaccines offered.   Plan: Annual well exams.  Follow up of DM, HTN, etc in 6 months    (E11.69) Type 2 diabetes mellitus with other specified complication, without long-term current use of insulin (H)  Comment: Did not tolerate metformin so stopped.  She is monitoring sugars and diet and appears stable.  Most recent A1c was 6.7% with diet control  Plan: Continue to manage with diet and monitor at least every 6 months.     (Z12.4) Cervical cancer screening  Comment: History of abnormal pap in remote past.  Should be on a 3 year co-test monitoring at this time; is a little past due for pap, which was done today.   Plan: Pap Screen with HPV - recommended age 30 - 65         years        Follow up pending results with 3 year interval for negative co-test    (I10) Essential hypertension with goal blood pressure less than 140/90  Comment: BP is well controlled on losartan 50mg and hydrochlorothiazide 12.5 mg daily    Plan: No change in meds.  Normal renal function and electrolytes    (J45.20) Mild intermittent asthma without complication  Comment: Currently her ACT was 19; not using inhaler too often but stable   Plan: Continue with prn albuterol inhaler.  Frequency of use doesn't warrant addition of intermittent use of steroid inhaler.     Generalized anxiety disorder  Dealing with a lot of stressful issues but doing okay with prn use of xanax, getting by with 30 tabs per month; no change in plan indicated.     Family history of pancreatic and ovarian cancer  Typically gets a CA-125 and CA 19-9 in July/August, so will catch that when we see her back in 6 months.     S/P partial resection of colon  Last complete colonscopy was in 2012.  In 2017 with her bout of diverticulitis and partial bowel resection a colonoscopy was attempted but not able to pass the  "sigmoid stricture, which is now surgically removed.  She also had an ACBE to complete colon evaluation but that was also suboptimal, so last complete colon evaluation really was back in 2012. Will discuss with surgery but likely is due for colonoscopy at this time and patient is fine proceeding with that.           COUNSELING:  Reviewed preventive health counseling, as reflected in patient instructions       Regular exercise       Healthy diet/nutrition      BMI:   Estimated body mass index is 30.86 kg/m  as calculated from the following:    Height as of 4/1/22: 1.562 m (5' 1.5\").    Weight as of 8/7/22: 75.3 kg (166 lb).   Weight management plan: Discussed healthy diet and exercise guidelines      She reports that she has never smoked. She has never used smokeless tobacco.      Gregory G. Schoen, MD  [facility]  "

## 2023-02-25 PROBLEM — T81.49XA ABDOMINAL WALL ABSCESS AT SITE OF SURGICAL WOUND: Status: RESOLVED | Noted: 2022-07-18 | Resolved: 2023-02-25

## 2023-02-25 PROBLEM — E66.01 MORBID OBESITY (H): Status: RESOLVED | Noted: 2022-03-01 | Resolved: 2023-02-25

## 2023-02-25 PROBLEM — E11.9 DIABETES MELLITUS, TYPE 2 (H): Status: RESOLVED | Noted: 2022-04-07 | Resolved: 2023-02-25

## 2023-03-01 LAB
BKR LAB AP GYN ADEQUACY: NORMAL
BKR LAB AP GYN INTERPRETATION: NORMAL
BKR LAB AP HPV REFLEX: NORMAL
BKR LAB AP PREVIOUS ABNORMAL: NORMAL
PATH REPORT.COMMENTS IMP SPEC: NORMAL
PATH REPORT.COMMENTS IMP SPEC: NORMAL
PATH REPORT.RELEVANT HX SPEC: NORMAL

## 2023-03-03 LAB
HUMAN PAPILLOMA VIRUS 16 DNA: NEGATIVE
HUMAN PAPILLOMA VIRUS 18 DNA: NEGATIVE
HUMAN PAPILLOMA VIRUS FINAL DIAGNOSIS: NORMAL
HUMAN PAPILLOMA VIRUS OTHER HR: NEGATIVE

## 2023-03-04 DIAGNOSIS — J45.20 MILD INTERMITTENT ASTHMA WITHOUT COMPLICATION: ICD-10-CM

## 2023-03-07 RX ORDER — ALBUTEROL SULFATE 90 UG/1
AEROSOL, METERED RESPIRATORY (INHALATION)
Qty: 18 G | Refills: 3 | Status: SHIPPED | OUTPATIENT
Start: 2023-03-07 | End: 2023-12-26

## 2023-03-07 NOTE — TELEPHONE ENCOUNTER
Ventolin  Prescription approved per [facility] Refill Protocol.

## 2023-03-21 DIAGNOSIS — F41.1 GENERALIZED ANXIETY DISORDER: ICD-10-CM

## 2023-03-23 RX ORDER — ALPRAZOLAM 0.25 MG
TABLET ORAL
Qty: 30 TABLET | Refills: 0 | Status: SHIPPED | OUTPATIENT
Start: 2023-03-23 | End: 2023-04-25

## 2023-04-24 DIAGNOSIS — F41.1 GENERALIZED ANXIETY DISORDER: ICD-10-CM

## 2023-04-25 RX ORDER — ALPRAZOLAM 0.5 MG
TABLET ORAL
Qty: 15 TABLET | Refills: 0 | Status: SHIPPED | OUTPATIENT
Start: 2023-04-25 | End: 2023-05-22

## 2023-04-26 DIAGNOSIS — J31.0 CHRONIC RHINITIS: ICD-10-CM

## 2023-04-26 RX ORDER — LORATADINE AND PSEUDOEPHEDRINE SULFATE 10; 240 MG/1; MG/1
TABLET, FILM COATED, EXTENDED RELEASE ORAL
Qty: 30 TABLET | Refills: 3 | Status: SHIPPED | OUTPATIENT
Start: 2023-04-26 | End: 2023-12-26

## 2023-05-21 ENCOUNTER — HEALTH MAINTENANCE LETTER (OUTPATIENT)
Age: 63
End: 2023-05-21

## 2023-05-22 DIAGNOSIS — F41.1 GENERALIZED ANXIETY DISORDER: ICD-10-CM

## 2023-05-22 RX ORDER — ALPRAZOLAM 0.5 MG
TABLET ORAL
Qty: 15 TABLET | Refills: 0 | Status: SHIPPED | OUTPATIENT
Start: 2023-05-22 | End: 2023-06-26

## 2023-06-25 DIAGNOSIS — F41.1 GENERALIZED ANXIETY DISORDER: ICD-10-CM

## 2023-06-26 RX ORDER — ALPRAZOLAM 0.5 MG
TABLET ORAL
Qty: 15 TABLET | Refills: 0 | Status: SHIPPED | OUTPATIENT
Start: 2023-06-26 | End: 2023-07-24

## 2023-07-10 ENCOUNTER — TELEPHONE (OUTPATIENT)
Dept: FAMILY MEDICINE | Facility: CLINIC | Age: 63
End: 2023-07-10
Payer: COMMERCIAL

## 2023-07-10 DIAGNOSIS — T81.49XA ABDOMINAL WALL ABSCESS AT SITE OF SURGICAL WOUND: Primary | ICD-10-CM

## 2023-07-10 NOTE — TELEPHONE ENCOUNTER
Please call Constance and triage her symptoms.  She has a history of bowel perforation and this was spontaneous, as well as a subsequent abdominal wall abscess, so always concerned it could happen again.  I will order a CBC and CRP to assess but if she is having any significant symptoms that suggest the need to be seen, we should make sure that happens.   Electronically signed by Greg Schoen, MD

## 2023-07-10 NOTE — TELEPHONE ENCOUNTER
Patient would like to know if Dr. Schoen can just put in a order for lab work for her to get her white blood cell count checked instead of coming in to see him first.  She states usually when she's not feeling well, which she is not feeling well at the moment, Dr. Schoen usually puts in that lab order for her, and if the results calls for concern, she will schedule a visit with the doctor.     Please call [phone]- Okay to leave a msg.

## 2023-07-11 NOTE — TELEPHONE ENCOUNTER
RN Triage    Patient Contact    Attempt # 1    Was call answered?  No.  Left message on voicemail with information to call me back.    Ludivina Blackwell RN on 7/11/2023 at 7:47 AM

## 2023-07-12 NOTE — TELEPHONE ENCOUNTER
RN Triage    Patient Contact    Attempt # 2    Was call answered?  No.  Left message on voicemail with information to call me back.    Ludivina Blackwell RN on 7/12/2023 at 1:45 PM

## 2023-07-13 ENCOUNTER — TELEPHONE (OUTPATIENT)
Dept: FAMILY MEDICINE | Facility: CLINIC | Age: 63
End: 2023-07-13
Payer: COMMERCIAL

## 2023-07-13 NOTE — TELEPHONE ENCOUNTER
Labs were ordered as requested on 7/10/23 and it appears that we called her twice to let her know without success and messages to call back were left.  Please inform patient she can come in for labs now  if she still has concerns about infection in her abdomen.   Electronically signed by Greg Schoen, MD

## 2023-07-13 NOTE — TELEPHONE ENCOUNTER
Called and LM for patient to call back. Please relay below and help schedule lab   Sarah Zamudio MA

## 2023-07-13 NOTE — TELEPHONE ENCOUNTER
Reason for Call:  Other call back    Detailed comments: patient had called requesting labs. She said she will wait for her appt in August.     Phone Number Patient can be reached at: Cell number on file:    Telephone Information:   Mobile [phone]       Best Time: Any     Can we leave a detailed message on this number? YES    Call taken on 7/13/2023 at 7:43 AM by Nimo Shetty

## 2023-07-14 ENCOUNTER — LAB (OUTPATIENT)
Dept: LAB | Facility: CLINIC | Age: 63
End: 2023-07-14
Payer: COMMERCIAL

## 2023-07-14 DIAGNOSIS — T81.49XA ABDOMINAL WALL ABSCESS AT SITE OF SURGICAL WOUND: ICD-10-CM

## 2023-07-14 LAB
BASOPHILS # BLD AUTO: 0.1 10E3/UL (ref 0–0.2)
BASOPHILS NFR BLD AUTO: 1 %
CRP SERPL-MCNC: 3.93 MG/L
EOSINOPHIL # BLD AUTO: 0.2 10E3/UL (ref 0–0.7)
EOSINOPHIL NFR BLD AUTO: 3 %
ERYTHROCYTE [DISTWIDTH] IN BLOOD BY AUTOMATED COUNT: 12.7 % (ref 10–15)
HCT VFR BLD AUTO: 39.5 % (ref 35–47)
HGB BLD-MCNC: 13.3 G/DL (ref 11.7–15.7)
IMM GRANULOCYTES # BLD: 0 10E3/UL
IMM GRANULOCYTES NFR BLD: 0 %
LYMPHOCYTES # BLD AUTO: 1.8 10E3/UL (ref 0.8–5.3)
LYMPHOCYTES NFR BLD AUTO: 29 %
MCH RBC QN AUTO: 30.8 PG (ref 26.5–33)
MCHC RBC AUTO-ENTMCNC: 33.7 G/DL (ref 31.5–36.5)
MCV RBC AUTO: 91 FL (ref 78–100)
MONOCYTES # BLD AUTO: 0.4 10E3/UL (ref 0–1.3)
MONOCYTES NFR BLD AUTO: 7 %
NEUTROPHILS # BLD AUTO: 3.7 10E3/UL (ref 1.6–8.3)
NEUTROPHILS NFR BLD AUTO: 60 %
NRBC # BLD AUTO: 0 10E3/UL
NRBC BLD AUTO-RTO: 0 /100
PLATELET # BLD AUTO: 281 10E3/UL (ref 150–450)
RBC # BLD AUTO: 4.32 10E6/UL (ref 3.8–5.2)
WBC # BLD AUTO: 6.2 10E3/UL (ref 4–11)

## 2023-07-14 PROCEDURE — 85025 COMPLETE CBC W/AUTO DIFF WBC: CPT

## 2023-07-14 PROCEDURE — 86140 C-REACTIVE PROTEIN: CPT

## 2023-07-14 PROCEDURE — 36415 COLL VENOUS BLD VENIPUNCTURE: CPT

## 2023-07-17 DIAGNOSIS — I10 ESSENTIAL HYPERTENSION WITH GOAL BLOOD PRESSURE LESS THAN 140/90: ICD-10-CM

## 2023-07-17 NOTE — TELEPHONE ENCOUNTER
Patient had labs drawn. See comment from Dr. Schoen on lab results:        Hi Caro,  The test results are normal for evidence of bacterial infection or significant inflammation.  It is therefore not likely there is a recurrence of abscess or need for antibiotics to treat infection at this time.  If your symptoms are of concern, we can find a way to work you in sooner than your scheduled appointment.   Electronically signed by Greg Schoen, MD        Closing encounter.

## 2023-07-18 RX ORDER — LOSARTAN POTASSIUM 25 MG/1
TABLET ORAL
Qty: 180 TABLET | Refills: 2 | Status: SHIPPED | OUTPATIENT
Start: 2023-07-18 | End: 2024-04-19

## 2023-07-22 DIAGNOSIS — F41.1 GENERALIZED ANXIETY DISORDER: ICD-10-CM

## 2023-07-24 RX ORDER — ALPRAZOLAM 0.5 MG
TABLET ORAL
Qty: 15 TABLET | Refills: 0 | Status: SHIPPED | OUTPATIENT
Start: 2023-07-24 | End: 2023-08-21

## 2023-08-21 ENCOUNTER — TELEPHONE (OUTPATIENT)
Dept: FAMILY MEDICINE | Facility: CLINIC | Age: 63
End: 2023-08-21
Payer: COMMERCIAL

## 2023-08-21 DIAGNOSIS — F41.1 GENERALIZED ANXIETY DISORDER: ICD-10-CM

## 2023-08-21 RX ORDER — ALPRAZOLAM 0.5 MG
TABLET ORAL
Qty: 15 TABLET | Refills: 0 | Status: SHIPPED | OUTPATIENT
Start: 2023-08-21 | End: 2023-09-21

## 2023-08-21 NOTE — TELEPHONE ENCOUNTER
Reason for Call:  Appointment Request    Patient requesting this type of appt:  Hernia     Requested provider: Schoen, Gregory G    Reason patient unable to be scheduled:  Only scheduled via TC    When does patient want to be seen/preferred time:  asap    Comments none    Could we send this information to you in [facility] or would you prefer to receive a phone call?:   No preference   Okay to leave a detailed message?: Yes at Cell number on file:    Telephone Information:   Mobile [phone]       Call taken on 8/21/2023 at 10:20 AM by Dora Landis

## 2023-08-22 ENCOUNTER — HOSPITAL ENCOUNTER (OUTPATIENT)
Dept: MAMMOGRAPHY | Facility: CLINIC | Age: 63
Discharge: HOME | End: 2023-08-22
Attending: FAMILY MEDICINE | Admitting: FAMILY MEDICINE
Payer: COMMERCIAL

## 2023-08-22 DIAGNOSIS — Z12.31 VISIT FOR SCREENING MAMMOGRAM: ICD-10-CM

## 2023-08-22 PROCEDURE — 77067 SCR MAMMO BI INCL CAD: CPT

## 2023-08-23 ASSESSMENT — ASTHMA QUESTIONNAIRES: ACT_TOTALSCORE: 17

## 2023-08-25 ENCOUNTER — OFFICE VISIT (OUTPATIENT)
Dept: FAMILY MEDICINE | Facility: CLINIC | Age: 63
End: 2023-08-25
Payer: COMMERCIAL

## 2023-08-25 VITALS
SYSTOLIC BLOOD PRESSURE: 148 MMHG | HEART RATE: 95 BPM | OXYGEN SATURATION: 98 % | HEIGHT: 62 IN | DIASTOLIC BLOOD PRESSURE: 82 MMHG | TEMPERATURE: 97.1 F | RESPIRATION RATE: 18 BRPM | BODY MASS INDEX: 30.86 KG/M2

## 2023-08-25 DIAGNOSIS — I10 ESSENTIAL HYPERTENSION WITH GOAL BLOOD PRESSURE LESS THAN 140/90: ICD-10-CM

## 2023-08-25 DIAGNOSIS — E11.69 TYPE 2 DIABETES MELLITUS WITH OTHER SPECIFIED COMPLICATION, WITHOUT LONG-TERM CURRENT USE OF INSULIN (H): ICD-10-CM

## 2023-08-25 DIAGNOSIS — K43.9 VENTRAL HERNIA WITHOUT OBSTRUCTION OR GANGRENE: Primary | ICD-10-CM

## 2023-08-25 DIAGNOSIS — F41.1 GENERALIZED ANXIETY DISORDER: ICD-10-CM

## 2023-08-25 PROCEDURE — 99214 OFFICE O/P EST MOD 30 MIN: CPT | Performed by: FAMILY MEDICINE

## 2023-08-25 NOTE — PROGRESS NOTES
Assessment/Plan:     Ventral hernia without obstruction or gangrene  Although I am not able to palpate a specific defect, she clearly has prominent diastasis that she states is new and progressive over the past 4-5 months and especially over the last 5-6 weeks.  Her daily activities require her to lift and tries to do so with her legs and reduce strain on her back and abdomen but that is likely a contributing factor.  Given her complex history of bowel resection for diverticulitis, prior ventral hernia repair with mesh and subsequent intra-abdominal abscess, there is high likelihood of scar tissue and could be a very complex case for repair.  She will start using the abdominal binder that she has and I will consult with Dr. Dailey, who did one of her prior surgeries, regarding having him see her or refer to [facility].  Will also discuss if he would like imaging studies.        Essential hypertension with goal blood pressure less than 140/90  BP is up a bit today but notes she is a bit anxious.  No changes in meds but will monitor.        Type 2 diabetes mellitus with other specified complication, without long-term current use of insulin (H)  Diet controlled with A1c in mid 6 range.  Last A1c 6 months ago was 6.7.  We did not get labs today but due for A1c and basic profile and will notify her of that and have her come in for labs.        Generalized anxiety disorder  Doing okay overall but a bit more anxious today, worried about her possible hernia and need for more surgery.      Follow up pending response from Dr. Dailey.   Greater than 30 minutes spent including time with patient, reviewing outside records from the [facility] as well as prior surgeries and ER visits here.      Electronically signed by Greg Schoen, MD          Farhad Elizondo is a 63 year old, presenting for the following health issues:  Hernia (Possible hernia)      8/25/2023     4:05 PM   Additional Questions   Roomed by Angela        History of Present Illness       Reason for visit:  Check hernia in stomach  Symptom onset:  More than a month  Symptoms include:  Hernia  Symptom intensity:  Moderate  Symptom progression:  Worsening  Had these symptoms before:  Yes  Has tried/received treatment for these symptoms:  Yes  Previous treatment was successful:  Yes  Prior treatment description:  Surgery  What makes it worse:  No  What makes it better:  No    She eats 0-1 servings of fruits and vegetables daily.She consumes 0 sweetened beverage(s) daily.She exercises with enough effort to increase her heart rate 30 to 60 minutes per day.  She exercises with enough effort to increase her heart rate 3 or less days per week.   She is taking medications regularly.     Possible hernia    Worried about recurrent ventral hernia. Prior hernia was with mesh in 3/24/22 secondary to prior surgery 3/23/17 for partial colectomy for diverticulitis. Subsequent to the hernia repair, she presented on 7/8/22 with 2 weeks of abdominal pain and was found to have a 6 cm abscess.  Due to bed availability issues, rick was transferred to [place], [place] for inpatient management of that, which included IV antibiotics for a prolonged period but no surgical or IR intervention.     She has been noting some evolution of midline protrusion over the past 3-4 months, feeling that her ventral hernia has been recurring.    She does a lot of lifting of her grandkids, in particular helping assist her 76 pound grandson who cannot walk so she helps him transfer by him putting his arms around her neck and he lifts his weight as he can with his arms but she does bear some of his weight load.  She has been trying to avoid using her stomach muscles but over the past 4-5 weeks she has in particular been noting more discomfort in the abdominal wall.  No issues with BMs, no fevers or chills.   She does have an abdominal binder from prior surgery at home but to date has not been using that.   "    Review of Systems   Const: no fevers, chills  HEENT: neg  RESP: neg  CVR: neg  GI: as above, no signs of obstruction or intraabdominal infection  : neg  MSK: neg except abd discomfort  Neuro: neg  PSYCH: anxiety is stable.       Objective    BP (!) 148/82 (Cuff Size: Adult Large)   Pulse 95   Temp 97.1  F (36.2  C) (Temporal)   Resp 18   Ht 1.562 m (5' 1.5\")   LMP 11/22/2012   SpO2 98%   BMI 30.86 kg/m    Body mass index is 30.86 kg/m .  Physical Exam   Alert and oriented, in no acute distress.  Lungs are clear.  Heart is regular without murmurs.  Abdomen with normal bowel sounds.  She has very prominent diastasis of the midline when lying back.  I am not able to palpate any specific hernia defect, although there is thickening and some tenderness in the supra-umbilical area.           "

## 2023-09-19 DIAGNOSIS — F41.1 GENERALIZED ANXIETY DISORDER: ICD-10-CM

## 2023-09-21 RX ORDER — ALPRAZOLAM 0.5 MG
TABLET ORAL
Qty: 15 TABLET | Refills: 0 | Status: SHIPPED | OUTPATIENT
Start: 2023-09-21 | End: 2023-10-24

## 2023-10-16 DIAGNOSIS — I10 ESSENTIAL HYPERTENSION WITH GOAL BLOOD PRESSURE LESS THAN 140/90: ICD-10-CM

## 2023-10-16 RX ORDER — HYDROCHLOROTHIAZIDE 12.5 MG/1
TABLET ORAL
Qty: 90 TABLET | Refills: 3 | Status: SHIPPED | OUTPATIENT
Start: 2023-10-16

## 2023-10-24 DIAGNOSIS — F41.1 GENERALIZED ANXIETY DISORDER: ICD-10-CM

## 2023-10-24 RX ORDER — ALPRAZOLAM 0.5 MG
TABLET ORAL
Qty: 15 TABLET | Refills: 0 | Status: SHIPPED | OUTPATIENT
Start: 2023-10-24 | End: 2023-11-26

## 2023-10-28 ENCOUNTER — HEALTH MAINTENANCE LETTER (OUTPATIENT)
Age: 63
End: 2023-10-28

## 2023-11-10 NOTE — PATIENT INSTRUCTIONS
Your blood pressure is elevated at today's visit.  You should follow up with your primary provider regarding possible hypertension if your recheck are greater than 140/90.  Check your blood pressure several times in the next week or so. You can do this at local pharmacies, grocery stores or with the float nurse at the [facility]. Record your readings and take them with you to your appointment.  Goal BP <140/90  Do not take decongestants - they can raise your BP.  If you have chest pain, unusual headaches, vision changes or any sign or symptoms of stroke seek prompt medical attention.    /85 (BP Location: Right arm, Patient Position: Chair, Cuff Size: Adult Regular)  Pulse 104  Temp 98.4  F (36.9  C) (Tympanic)  LMP 11/22/2012  SpO2 96%    ...........................      Please FOLLOW UP at primary care clinic if not improving, new symptoms, worse or this does not resolve.  [facility]  [phone]    .......................  Increase fluid intake.  Increase rest.  Stay in clean air environment.  Salt water gargles. Throat lozenges if soothing.  Try Mucinex if thick nasal or chest congestion - take in AM.  Robitussin DM or Delsym may help decrease your cough at night.  Saline nose spray may help with nasal and sinus congestion.  Try acetominophen or Ibuprofen (with food) for fever and pain.    If you are unable to swallow or are having difficulty breathing seek prompt medical attention - go to the emergency department.      
Go for blood tests as directed. Your doctor will do lab tests at regular visits to monitor the effects of this medicine. Please follow up with your doctor and keep your health care provider appointments.

## 2023-11-26 DIAGNOSIS — F41.1 GENERALIZED ANXIETY DISORDER: ICD-10-CM

## 2023-11-26 RX ORDER — ALPRAZOLAM 0.5 MG
TABLET ORAL
Qty: 15 TABLET | Refills: 0 | Status: SHIPPED | OUTPATIENT
Start: 2023-11-26 | End: 2023-12-26

## 2023-12-22 DIAGNOSIS — F41.1 GENERALIZED ANXIETY DISORDER: ICD-10-CM

## 2023-12-22 DIAGNOSIS — J31.0 CHRONIC RHINITIS: ICD-10-CM

## 2023-12-22 DIAGNOSIS — J45.20 MILD INTERMITTENT ASTHMA WITHOUT COMPLICATION: ICD-10-CM

## 2023-12-26 RX ORDER — LORATADINE AND PSEUDOEPHEDRINE SULFATE 10; 240 MG/1; MG/1
1 TABLET, FILM COATED, EXTENDED RELEASE ORAL DAILY
Qty: 30 TABLET | Refills: 3 | Status: SHIPPED | OUTPATIENT
Start: 2023-12-26

## 2023-12-26 RX ORDER — ALPRAZOLAM 0.5 MG
TABLET ORAL
Qty: 15 TABLET | Refills: 0 | Status: SHIPPED | OUTPATIENT
Start: 2023-12-26 | End: 2024-01-20

## 2023-12-26 RX ORDER — ALBUTEROL SULFATE 90 UG/1
AEROSOL, METERED RESPIRATORY (INHALATION)
Qty: 18 G | Refills: 3 | Status: SHIPPED | OUTPATIENT
Start: 2023-12-26

## 2024-01-20 DIAGNOSIS — F41.1 GENERALIZED ANXIETY DISORDER: ICD-10-CM

## 2024-01-20 RX ORDER — ALPRAZOLAM 0.5 MG
TABLET ORAL
Qty: 15 TABLET | Refills: 0 | Status: SHIPPED | OUTPATIENT
Start: 2024-01-20 | End: 2024-02-20

## 2024-01-25 ENCOUNTER — PATIENT OUTREACH (OUTPATIENT)
Dept: CARE COORDINATION | Facility: CLINIC | Age: 64
End: 2024-01-25
Payer: COMMERCIAL

## 2024-02-08 ENCOUNTER — PATIENT OUTREACH (OUTPATIENT)
Dept: CARE COORDINATION | Facility: CLINIC | Age: 64
End: 2024-02-08
Payer: COMMERCIAL

## 2024-02-20 DIAGNOSIS — F41.1 GENERALIZED ANXIETY DISORDER: ICD-10-CM

## 2024-02-20 RX ORDER — ALPRAZOLAM 0.5 MG
TABLET ORAL
Qty: 15 TABLET | Refills: 0 | Status: SHIPPED | OUTPATIENT
Start: 2024-02-20 | End: 2024-03-22

## 2024-03-01 ENCOUNTER — NURSE TRIAGE (OUTPATIENT)
Dept: NURSING | Facility: CLINIC | Age: 64
End: 2024-03-01
Payer: COMMERCIAL

## 2024-03-01 NOTE — TELEPHONE ENCOUNTER
Patient was seen 7-8 weeks ago and treated for a sinus infection. Patient calling today with concerns that she has another sinus infection. Patient reports that her eyes hurt, her ears are clogged and itchy, and her head hurts. Patient also has vertigo when she lays on her right side and in certain positions. Symptoms started over a week ago but have gotten much worse in the last 2-3 days. Denies fever. Sinus pain rating 6-7/10, headache 7/10.   Patient having ear pain in both ears  Protocol recommends be seen today in office  Care advice given.   If not able to get appointment in clinic patient will present to urgent care. Patient will call back with any worsening symptoms.   Rebecca Ware RN   03/01/24 10:39 AM  [facility] Nurse Advisor        Reason for Disposition   Earache    Additional Information   Negative: Sounds like a life-threatening emergency to the triager   Negative: Difficulty breathing, and not from stuffy nose (e.g., not relieved by cleaning out the nose)   Negative: SEVERE headache and has fever   Negative: Patient sounds very sick or weak to the triager   Negative: SEVERE sinus pain   Negative: SEVERE headache   Negative: Redness or swelling on the cheek, forehead, or around the eye   Negative: Fever > 103 F (39.4 C)   Negative: Fever > 101 F (38.3 C) and over 60 years of age   Negative: Fever > 100.0 F (37.8 C) and has diabetes mellitus or a weak immune system (e.g., HIV positive, cancer chemotherapy, organ transplant, splenectomy, chronic steroids)   Negative: Fever > 100.0 F (37.8 C) and bedridden (e.g., CVA, chronic illness, recovering from surgery)   Negative: Fever present > 3 days (72 hours)   Negative: Fever returns after gone for over 24 hours and symptoms worse or not improved   Negative: Sinus pain (not just congestion) and fever    Protocols used: Sinus Pain or Congestion-A-[place]

## 2024-03-16 ENCOUNTER — HEALTH MAINTENANCE LETTER (OUTPATIENT)
Age: 64
End: 2024-03-16

## 2024-03-20 DIAGNOSIS — F41.1 GENERALIZED ANXIETY DISORDER: ICD-10-CM

## 2024-03-22 RX ORDER — ALPRAZOLAM 0.5 MG
TABLET ORAL
Qty: 15 TABLET | Refills: 0 | Status: SHIPPED | OUTPATIENT
Start: 2024-03-22 | End: 2024-04-19

## 2024-04-16 DIAGNOSIS — F41.1 GENERALIZED ANXIETY DISORDER: ICD-10-CM

## 2024-04-16 DIAGNOSIS — I10 ESSENTIAL HYPERTENSION WITH GOAL BLOOD PRESSURE LESS THAN 140/90: ICD-10-CM

## 2024-04-19 RX ORDER — ALPRAZOLAM 0.5 MG
TABLET ORAL
Qty: 15 TABLET | Refills: 0 | Status: SHIPPED | OUTPATIENT
Start: 2024-04-22 | End: 2024-05-21

## 2024-04-19 RX ORDER — LOSARTAN POTASSIUM 25 MG/1
TABLET ORAL
Qty: 180 TABLET | Refills: 2 | Status: SHIPPED | OUTPATIENT
Start: 2024-04-19

## 2024-04-29 ENCOUNTER — TELEPHONE (OUTPATIENT)
Dept: FAMILY MEDICINE | Facility: CLINIC | Age: 64
End: 2024-04-29
Payer: COMMERCIAL

## 2024-04-29 ENCOUNTER — MYC MEDICAL ADVICE (OUTPATIENT)
Dept: FAMILY MEDICINE | Facility: CLINIC | Age: 64
End: 2024-04-29
Payer: COMMERCIAL

## 2024-04-29 ENCOUNTER — NURSE TRIAGE (OUTPATIENT)
Dept: NURSING | Facility: CLINIC | Age: 64
End: 2024-04-29
Payer: COMMERCIAL

## 2024-04-29 DIAGNOSIS — E11.65 TYPE 2 DIABETES MELLITUS WITH HYPERGLYCEMIA, WITHOUT LONG-TERM CURRENT USE OF INSULIN (H): ICD-10-CM

## 2024-04-29 DIAGNOSIS — Z80.41 FAMILY HISTORY OF MALIGNANT NEOPLASM OF OVARY: ICD-10-CM

## 2024-04-29 DIAGNOSIS — Z80.0 FAMILY HISTORY OF PANCREATIC CANCER: Primary | ICD-10-CM

## 2024-04-29 NOTE — TELEPHONE ENCOUNTER
RN Triage    Patient Contact    Attempt # 1    Was call answered?  No.  Left message on voicemail with information to call me back.    Upon callback please triage blood sugar levels.     ROMAINE Parish, RN  [facility] ~ Registered Nurse  Clinic Triage  April 29, 2024

## 2024-04-29 NOTE — TELEPHONE ENCOUNTER
Nurse Triage SBAR    Situation: High blood sugar    Background: Patient calling. Pt is returning a call.  this morning around 10 am after she has something to eat.     Assessment: Current blood sugar is 310. She had some oyster crackers and a banana about 1 - 1.5 hour ago. Some blurred vision. She states she has been drinking more water and having more frequency of urination. Feeling more tired. No vomiting.     Protocol Recommended Disposition: Emergency Department (Or PCP Triage)    Recommendation: According to the protocol, Patient should go to the ED now (Or PCP Triage). Advised Patient that the patient needs to wait for a call-back after nurse speaks with the on-call Provider. Care advice given. Patient verbalizes understanding and agrees with plan of care. Reviewed concerning symptoms and when to call back. Pt was put on hold while RN spoke with provider. Pt hung up.     Paging on call Dr Timothy Pemberton at 6:20pm. Per MD - if she has a headache, dizziness, flu like symptoms, or worsening urination frequency then she needs to be seen in ED. Otherwise, the Pt should be seen within the next week or 2.     Provider Recommendation Follow Up:   Unable to reach patient/caregiver. Left message to return call to [facility]. Upon return call please notify caller of provider's recommendations.    Sarah Ray, [facility] Nursing Advisor 4/29/2024 6:34 PM     Reason for Disposition   Patient sounds very sick or weak to the triager    Additional Information   Negative: Unconscious or difficult to awaken   Negative: Acting confused (e.g., disoriented, slurred speech)   Negative: Very weak (e.g., can't stand)   Negative: Sounds like a life-threatening emergency to the triager   Negative: [1] Vomiting AND [2] signs of dehydration (e.g., very dry mouth, lightheaded, dark urine)   Negative: [1] Blood glucose > 240 mg/dL (13.3 mmol/L) AND [2] rapid breathing   Negative: Blood glucose > 500 mg/dL (27.8 mmol/L)   Negative: [1] Blood  glucose > 240 mg/dL (13.3 mmol/L) AND [2] blood ketones > 1.4 mmol/L   Negative: [1] Blood glucose > 240 mg/dL (13.3 mmol/L) AND [2] vomiting AND [3] unable to check for ketones (in blood or urine)   Negative: [1] Blood glucose > 240 mg/dL (13.3 mmol/L) AND [2] urine ketones moderate-large (or more than 1+)   Negative: [1] New-onset diabetes suspected (e.g., frequent urination, weak, weight loss) AND [2] vomiting or rapid breathing   Negative: Vomiting lasts > 4 hours    Protocols used: Diabetes - High Blood Sugar-A-AH

## 2024-04-29 NOTE — TELEPHONE ENCOUNTER
General Call    Contacts         Type Contact Phone/Fax    04/29/2024 04:03 PM CDT Phone (Incoming) Caro Palmer (Self) [phone] (M)          Reason for Call:  blood glucose level    What are your questions or concerns:  The patient has been taking her blood glucose level, it has been running between 330-440  does she need a lab appointment? If so could we get lab orders    Date of last appointment with provider: 8/23/23    Could we send this information to you in ELI[facility] or would you prefer to receive a phone call?:   Patient would prefer a phone call   Okay to leave a detailed message?: Yes at Home number on file [phone] (home)

## 2024-04-30 ENCOUNTER — LAB (OUTPATIENT)
Dept: LAB | Facility: CLINIC | Age: 64
End: 2024-04-30
Payer: COMMERCIAL

## 2024-04-30 DIAGNOSIS — E11.69 TYPE 2 DIABETES MELLITUS WITH OTHER SPECIFIED COMPLICATION, WITHOUT LONG-TERM CURRENT USE OF INSULIN (H): ICD-10-CM

## 2024-04-30 DIAGNOSIS — Z80.0 FAMILY HISTORY OF PANCREATIC CANCER: ICD-10-CM

## 2024-04-30 DIAGNOSIS — I10 ESSENTIAL HYPERTENSION WITH GOAL BLOOD PRESSURE LESS THAN 140/90: ICD-10-CM

## 2024-04-30 DIAGNOSIS — Z80.41 FAMILY HISTORY OF MALIGNANT NEOPLASM OF OVARY: ICD-10-CM

## 2024-04-30 LAB
ANION GAP SERPL CALCULATED.3IONS-SCNC: 15 MMOL/L (ref 7–15)
BUN SERPL-MCNC: 15.5 MG/DL (ref 8–23)
CALCIUM SERPL-MCNC: 9.2 MG/DL (ref 8.8–10.2)
CHLORIDE SERPL-SCNC: 99 MMOL/L (ref 98–107)
CREAT SERPL-MCNC: 0.58 MG/DL (ref 0.51–0.95)
DEPRECATED HCO3 PLAS-SCNC: 21 MMOL/L (ref 22–29)
EGFRCR SERPLBLD CKD-EPI 2021: >90 ML/MIN/1.73M2
GLUCOSE SERPL-MCNC: 371 MG/DL (ref 70–99)
HBA1C MFR BLD: 14.6 %
POTASSIUM SERPL-SCNC: 3.9 MMOL/L (ref 3.4–5.3)
SODIUM SERPL-SCNC: 135 MMOL/L (ref 135–145)

## 2024-04-30 PROCEDURE — 83036 HEMOGLOBIN GLYCOSYLATED A1C: CPT

## 2024-04-30 PROCEDURE — 36415 COLL VENOUS BLD VENIPUNCTURE: CPT

## 2024-04-30 PROCEDURE — 99000 SPECIMEN HANDLING OFFICE-LAB: CPT

## 2024-04-30 PROCEDURE — 80048 BASIC METABOLIC PNL TOTAL CA: CPT

## 2024-04-30 PROCEDURE — 86301 IMMUNOASSAY TUMOR CA 19-9: CPT | Mod: 90

## 2024-04-30 PROCEDURE — 86304 IMMUNOASSAY TUMOR CA 125: CPT

## 2024-04-30 NOTE — TELEPHONE ENCOUNTER
There are already standing lab orders for A1c and basic profile in Epic.  Please call and have come in for lab. I will also mychart message her.  Electronically signed by Greg Schoen, MD

## 2024-04-30 NOTE — TELEPHONE ENCOUNTER
Pt called back from previous triage call. Given instructions as per provider recommendations.       Reason for Disposition   [1] Follow-up call to recent contact AND [2] information only call, no triage required    Protocols used: Information Only Call - No Triage-A-

## 2024-04-30 NOTE — TELEPHONE ENCOUNTER
Pt called back from previous triage call. Given instructions as per provider recommendations. Pt stated she will make an appointment to be seen in the next one to two weeks.

## 2024-05-01 LAB — CANCER AG125 SERPL-ACNC: 17 U/ML
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
## 2024-05-03 LAB — CANCER AG19-9 SERPL IA-ACNC: 15 U/ML

## 2024-05-06 RX ORDER — METFORMIN HCL 500 MG
1000 TABLET, EXTENDED RELEASE 24 HR ORAL 2 TIMES DAILY WITH MEALS
Qty: 120 TABLET | Refills: 11 | Status: SHIPPED | OUTPATIENT
Start: 2024-05-06

## 2024-05-06 NOTE — TELEPHONE ENCOUNTER
Please set up as a virtual visit with patricia Elizondo if able, otherwise phone, at 5:40 tomorrow, Tuesday May 7th.  Electronically signed by Greg Schoen, MD

## 2024-05-06 NOTE — TELEPHONE ENCOUNTER
Attempted to reach patient, lm to call back. Appointment has been scheduled as video, but can change to telephone if need be.

## 2024-05-07 ENCOUNTER — VIRTUAL VISIT (OUTPATIENT)
Dept: FAMILY MEDICINE | Facility: CLINIC | Age: 64
End: 2024-05-07
Payer: COMMERCIAL

## 2024-05-07 DIAGNOSIS — E11.69 TYPE 2 DIABETES MELLITUS WITH OTHER SPECIFIED COMPLICATION, WITHOUT LONG-TERM CURRENT USE OF INSULIN (H): Primary | ICD-10-CM

## 2024-05-07 DIAGNOSIS — I10 ESSENTIAL HYPERTENSION WITH GOAL BLOOD PRESSURE LESS THAN 140/90: ICD-10-CM

## 2024-05-07 PROCEDURE — 99215 OFFICE O/P EST HI 40 MIN: CPT | Mod: 95 | Performed by: FAMILY MEDICINE

## 2024-05-07 ASSESSMENT — ASTHMA QUESTIONNAIRES
QUESTION_2 LAST FOUR WEEKS HOW OFTEN HAVE YOU HAD SHORTNESS OF BREATH: THREE TO SIX TIMES A WEEK
ACT_TOTALSCORE: 18
ACT_TOTALSCORE: 18
QUESTION_5 LAST FOUR WEEKS HOW WOULD YOU RATE YOUR ASTHMA CONTROL: SOMEWHAT CONTROLLED
QUESTION_3 LAST FOUR WEEKS HOW OFTEN DID YOUR ASTHMA SYMPTOMS (WHEEZING, COUGHING, SHORTNESS OF BREATH, CHEST TIGHTNESS OR PAIN) WAKE YOU UP AT NIGHT OR EARLIER THAN USUAL IN THE MORNING: NOT AT ALL
QUESTION_4 LAST FOUR WEEKS HOW OFTEN HAVE YOU USED YOUR RESCUE INHALER OR NEBULIZER MEDICATION (SUCH AS ALBUTEROL): TWO OR THREE TIMES PER WEEK
QUESTION_1 LAST FOUR WEEKS HOW MUCH OF THE TIME DID YOUR ASTHMA KEEP YOU FROM GETTING AS MUCH DONE AT WORK, SCHOOL OR AT HOME: A LITTLE OF THE TIME

## 2024-05-07 NOTE — PROGRESS NOTES
"Caro is a 63 year old who is being evaluated via a billable video visit.        Instructions Relayed to Patient by Virtual Roomer:     Patient is active on [employer]:   Relayed following to patient: \"It looks like you are active on [employer], are you able to join the visit this way? If not, do you need us to send you a link now or would you like your provider to send a link via text or email when they are ready to initiate the visit?\"      Patient Confirmed they will join visit via: Text Link to Cell Phone  Reminded patient to ensure they were logged on to virtual visit by arrival time listed.   Asked if patient has flexibility to initiate visit sooner than arrival time: patient is unable to initiate visit earlier than arrival time     If pediatric virtual visit, ensured pediatric patient along with parent/guardian will be present for video visit.     Patient offered the website [email or web address] and/or phone number to [employer] Help line: [phone] How would you like to obtain your AVS? [employer]  If the video visit is dropped, the invitation should be resent by: Text to cell phone: [phone]  Will anyone else be joining your video visit? No      Assessment & Plan     Type 2 diabetes mellitus with other specified complication, without long-term current use of insulin (H)  See discussion regarding significant elevation of sugars after years of diet controlled diabetes.  She is highly motivated to get this under control as she is the primary caregiver for a couple of grandchildren.  Referrals placed as we start her on metformin.  She can titrate up as tolerated over 3-4 weeks to 1000 mg bid.  With the significant change, it would seem that she has developed islet dysfunction and she may have very little insulin.  A sudden significant shift in control would be less suggestive if insulin resistance being a primary issue.  Will see how she does on diet and metformin before consideration of other meds. "  There is no pressing secondary diagnosis other than hypertension, for which she is already taking Cozaar 50mg daily and hydrochlorothiazide 12.5mg daily.  In absence of any kidney or coronary issues, GLP-1 agonists or SGLT-2 inhibitors are not essential options to consider, although she would benefit from a 15-20 pound reduction in weight.  We will have those conversations down the line once she is on a full dose of metformin.    - Adult Eye  Referral; Future  - Adult Diabetes Education  Referral; Future      No LOS data to display   Time spent by me doing chart review, history and exam, documentation and further activities per the note 50 minutes.         FUTURE APPOINTMENTS:       - Follow-up visit in one month.     Electronically signed by Greg Schoen, MD          Farhad Elizondo is a 63 year old, presenting for the following health issues:  Diabetes        5/7/2024     4:16 PM   Additional Questions   Roomed by Ian LUJAN       Video Start Time: 5:34 PM    History of Present Illness       Reason for visit:  New symptoms/ diabetics  Symptom onset:  3-4 weeks ago  Symptoms include:  High glucose  Symptom intensity:  Moderate  Symptom progression:  Worsening  Had these symptoms before:  No  What makes it worse:  Symptoms  What makes it better:  Resting    She eats 2-3 servings of fruits and vegetables daily.She consumes 0 sweetened beverage(s) daily.She exercises with enough effort to increase her heart rate 30 to 60 minutes per day.  She exercises with enough effort to increase her heart rate 3 or less days per week.   She is taking medications regularly.         Diabetes Follow-up    How often are you checking your blood sugar? Three times daily  Blood sugar testing frequency justification:   concerns  What time of day are you checking your blood sugars (select all that apply)?  Before and after meals  Have you had any blood sugars above 200?  Yes   Have you had any blood sugars below 70?   No  What symptoms do you notice when your blood sugar is low?  None  What concerns do you have today about your diabetes? None and Blood sugar is often over 200   Do you have any of these symptoms? (Select all that apply)  No numbness or tingling in feet.  No redness, sores or blisters on feet.  No complaints of excessive thirst.  No reports of blurry vision.  No significant changes to weight.  Have you had a diabetic eye exam in the last 12 months? No    Was not feeling well, thirsty, tired, achy and started checking sugars 3-4 times a day and has not seen any sugars under 300.  She did  and start metformin last night, 500 mg bid and is tolerating that so far.  She is anxious to meet with the diabetes educator to discuss diet.  She knows her eating habits regarding time of eating will have to change but doesn't eat a lot of sugars/sweets.  Her mother had a history of diabetes and also pancreatic cancer as well as her brother and her biological father.  There are others with diabetes in the family as well. She is happy to see that her pancreatic tumor marker was normal, fearing that the sudden onset of poorly controlled sugars was a sign of pancreatic cancer.  She has been a diet controlled diabetic for a couple of years with A1c in the upper 6% to low 7% range but this was an unexpected significant jump to an A1c of 14.6 along with symptoms. She is highly motivated to modify her diet, exercise and do whatever it takes to get things under control.     The pharmacist who filled her meds advised taking just one tab metformin in the AM only for week one, then bid week two, then 2 in pm and one in am, then two bid.  I did suggest she can start bid in week one if she tolerates without side effects.     Discussed mechanism of diabetes and importance of glucose for cell metabolism as well as insulin levels and insulin resistance.     BP Readings from Last 2 Encounters:   08/25/23 (!) 148/82   02/24/23 124/76      Hemoglobin A1C (%)   Date Value   04/30/2024 14.6 (H)   01/02/2023 6.7 (H)   07/26/2018 5.3     LDL Cholesterol Calculated (mg/dL)   Date Value   07/08/2022 72   08/26/2021 102 (H)   08/11/2020 100 (H)   07/12/2019 114 (H)           Review of Systems  Constitutional, HEENT, cardiovascular, pulmonary, gi and gu systems are negative, except as otherwise noted.      Objective           Vitals:  No vitals were obtained today due to virtual visit.    Physical Exam   GENERAL: alert and no distress  EYES: Eyes grossly normal to inspection.  No discharge or erythema, or obvious scleral/conjunctival abnormalities.  RESP: No audible wheeze, cough, or visible cyanosis.    SKIN: Visible skin clear. No significant rash, abnormal pigmentation or lesions.  NEURO: Cranial nerves grossly intact.  Mentation and speech appropriate for age.  PSYCH: Appropriate affect, tone, and pace of words    Component      Latest Ref Rng 7/8/2022  4:20 PM 4/30/2024  1:16 PM   Sodium      135 - 145 mmol/L  135    Potassium      3.4 - 5.3 mmol/L  3.9    Chloride      98 - 107 mmol/L  99    Carbon Dioxide (CO2)      22 - 29 mmol/L  21 (L)    Anion Gap      7 - 15 mmol/L  15    Urea Nitrogen      8.0 - 23.0 mg/dL  15.5    Creatinine      0.51 - 0.95 mg/dL  0.58    GFR Estimate      >60 mL/min/1.73m2  >90    Calcium      8.8 - 10.2 mg/dL  9.2    Glucose      70 - 99 mg/dL  371 (H)    Cholesterol      <200 mg/dL 135     Triglycerides      <150 mg/dL 90     HDL Cholesterol      >=50 mg/dL 45 (L)     LDL Cholesterol Calculated      <=100 mg/dL 72     Non HDL Cholesterol      <130 mg/dL 90     Patient Fasting? Unknown     Hemoglobin A1C      <5.7 %  14.6 (H)       Legend:  (L) Low    (H) High      Video-Visit Details    Type of service:  Video Visit   Video End Time:6:01 PM  Originating Location (pt. Location): Home    Distant Location (provider location):  On-site  Platform used for Video Visit: Kavitha  Signed Electronically by: Gregory G. Schoen,  MD

## 2024-05-19 DIAGNOSIS — F41.1 GENERALIZED ANXIETY DISORDER: ICD-10-CM

## 2024-05-20 ENCOUNTER — VIRTUAL VISIT (OUTPATIENT)
Dept: EDUCATION SERVICES | Facility: CLINIC | Age: 64
End: 2024-05-20
Attending: FAMILY MEDICINE
Payer: COMMERCIAL

## 2024-05-20 DIAGNOSIS — E11.69 TYPE 2 DIABETES MELLITUS WITH OTHER SPECIFIED COMPLICATION, WITHOUT LONG-TERM CURRENT USE OF INSULIN (H): Primary | ICD-10-CM

## 2024-05-20 PROCEDURE — G0108 DIAB MANAGE TRN  PER INDIV: HCPCS | Mod: 93 | Performed by: DIETITIAN, REGISTERED

## 2024-05-20 NOTE — LETTER
5/20/2024         RE: Caro Palmer  [address]  [facility] [address]        Dear Colleague,    Thank you for referring your patient, Caro Palmer, to the [facility]. Please see a copy of my visit note below.    Diabetes Self-Management Education & Support    Presents for: Individual review    Type of Service: Telephone Visit    Originating Location (Patient Location): Home  Distant Location (Provider Location): Offsite  Mode of Communication:  Telephone    Telephone Visit Start Time:  10:32  Telephone Visit End Time (telephone visit stop time): 11:04    How would patient like to obtain AVS? MyChart      ASSESSMENT:  Caro was diagnosed with DM a few years ago but recently she started having s/s of elevated BG had A1C done and it was 14.6%. Reports strong family hx of diabetes. Provided education on pathophysiology. Reviewed how diabetes education works including availability for 1:1 education and zoom group classes (these cover the 7 self-care behaviors).She is interested in taking the 3 zoom classes and also would like 1:1 individual meeting with educator given her A1C. Scheduled appts and sent diabetes education materials via email. She has glucometer.      Patient's most recent   Lab Results   Component Value Date    A1C 14.6 04/30/2024    A1C 5.3 07/26/2018     is not meeting goal of <7.0    Diabetes knowledge and skills assessment:   Patient is knowledgeable in diabetes management concepts related to:     Continue education with the following diabetes management concepts: Healthy Eating, Being Active, Monitoring, Taking Medication, Problem Solving, Reducing Risks, and Healthy Coping    Based on learning assessment above, most appropriate setting for further diabetes education would be: Individual setting.      PLAN  Diabetes Ed negro and zoom classes     Topics to cover at upcoming visits: Healthy Eating, Being Active, Monitoring, Taking Medication, Problem Solving,  "Reducing Risks, and Healthy Coping    Follow-up: Tomorrow    See Care Plan for co-developed, patient-state behavior change goals.  AVS provided for patient today.    Education Materials Provided:   [employer] Understanding Diabetes Booklet, BG Log Sheet, My Plate Planner, types of diabetes medicines, diabetes distress and diabetes care goals.    SUBJECTIVE/OBJECTIVE:  Presents for: Individual review  Accompanied by: Self  Diabetes type: Type 2  Date of diagnosis: diagnosed a few years ago  Disease course: Worsening  Cultural Influences/Ethnic Background:  Not  or       Diabetes Symptoms & Complications:  Diabetes Related Symptoms: Fatigue, Polydipsia (increased thirst), Visual change  Symptom course: Improving (since starting metformin)  Disease course: Worsening       Patient Problem List and Family Medical History reviewed for relevant medical history, current medical status, and diabetes risk factors.    Vitals:  [facility] 11/22/2012   Estimated body mass index is 30.86 kg/m  as calculated from the following:    Height as of 8/25/23: 1.562 m (5' 1.5\").    Weight as of 8/7/22: 75.3 kg (166 lb).   Last 3 BP:   BP Readings from Last 3 Encounters:   08/25/23 (!) 148/82   02/24/23 124/76   08/10/22 129/75       History   Smoking Status     Never   Smokeless Tobacco     Never       Labs:  Lab Results   Component Value Date    A1C 14.6 04/30/2024    A1C 5.3 07/26/2018     Lab Results   Component Value Date     04/30/2024     01/02/2023     08/11/2020     Lab Results   Component Value Date    LDL 72 07/08/2022     08/11/2020     HDL Cholesterol   Date Value Ref Range Status   08/11/2020 58 >49 mg/dL Final     Direct Measure HDL   Date Value Ref Range Status   07/08/2022 45 (L) >=50 mg/dL Final   ]  GFR Estimate   Date Value Ref Range Status   04/30/2024 >90 >60 mL/min/1.73m2 Final   08/11/2020 90 >60 mL/min/[1.73_m2] Final     Comment:     Non  GFR Calc  Starting " "12/18/2018, serum creatinine based estimated GFR (eGFR) will be   calculated using the Chronic Kidney Disease Epidemiology Collaboration   (CKD-EPI) equation.       GFR, ESTIMATED POCT   Date Value Ref Range Status   09/22/2022 >60 >60 mL/min/1.73m2 Final     GFR Estimate If Black   Date Value Ref Range Status   08/11/2020 >90 >60 mL/min/[1.73_m2] Final     Comment:      GFR Calc  Starting 12/18/2018, serum creatinine based estimated GFR (eGFR) will be   calculated using the Chronic Kidney Disease Epidemiology Collaboration   (CKD-EPI) equation.       Lab Results   Component Value Date    CR 0.58 04/30/2024    CR 0.73 08/11/2020     No results found for: \"MICROALBUMIN\"    Healthy Eating:  Healthy Eating Assessed Today: No    Being Active:  Being Active Assessed Today: No    Monitoring:  Monitoring Assessed Today: Yes  Date Breakfast  Lunch  Dinner  Bedtime    Before After Before After Before After    5/18 5/19      184-later in day    5/20 272             Taking Medications:  Diabetes Medication(s)       Biguanides       metFORMIN (GLUCOPHAGE XR) 500 MG 24 hr tablet Take 2 tablets (1,000 mg) by mouth 2 times daily (with meals)            Taking Medication Assessed Today: Yes  Current Treatments: Diet, Oral Medication (taken by mouth)  Problems taking diabetes medications regularly?: No    Problem Solving:  Problem Solving Assessed Today: No              Reducing Risks:  Reducing Risks Assessed Today: No    Healthy Coping:  Healthy Coping Assessed Today: Yes  Emotional response to diabetes: Ready to learn  Stage of change: PREPARATION (Decided to change - considering how)  Patient Activation Measure Survey Score:      12/14/2012     3:00 PM   JOHN Score (Last Two)   JOHN Raw Score 52   Activation Score 100   JOHN Level 4       Angeles Banda RD, LD, [facility]      Time Spent: 32 minutes  Encounter Type: Individual    Any diabetes medication dose changes were made via the CDE Protocol per the " patient's primary care provider. A copy of this encounter was shared with the provider.

## 2024-05-20 NOTE — PATIENT INSTRUCTIONS
You have an appt with Lashell tomorrow @8am    You are signed up for upcoming virtual type 2 classes. Class topics and dates are listed below:   Problem Solving, Reducing Risks & Healthy Coping - 6/12 at 12pm  Healthy Eating & Being Active - 6/19 at 12pm  Monitoring & Medication - 6/26 at 12pm      You will find the classes listed on your appointment schedule on [employer]. On the day of your class, you will enter class via [employer] and will be taken to the [employer] platform.   Please try to log in ~10 minutes before the class start to ensure you have appropriate software or in case you have any issues.

## 2024-05-20 NOTE — PROGRESS NOTES
Diabetes Self-Management Education & Support    Presents for: Individual review    Type of Service: Telephone Visit    Originating Location (Patient Location): Home  Distant Location (Provider Location): Offsite  Mode of Communication:  Telephone    Telephone Visit Start Time:  10:32  Telephone Visit End Time (telephone visit stop time): 11:04    How would patient like to obtain AVS? Norma      ASSESSMENT:  Caro was diagnosed with DM a few years ago but recently she started having s/s of elevated BG had A1C done and it was 14.6%. Reports strong family hx of diabetes. Provided education on pathophysiology. Reviewed how diabetes education works including availability for 1:1 education and zoom group classes (these cover the 7 self-care behaviors).She is interested in taking the 3 zoom classes and also would like 1:1 individual meeting with educator given her A1C. Scheduled appts and sent diabetes education materials via email. She has glucometer.      Patient's most recent   Lab Results   Component Value Date    A1C 14.6 04/30/2024    A1C 5.3 07/26/2018     is not meeting goal of <7.0    Diabetes knowledge and skills assessment:   Patient is knowledgeable in diabetes management concepts related to:     Continue education with the following diabetes management concepts: Healthy Eating, Being Active, Monitoring, Taking Medication, Problem Solving, Reducing Risks, and Healthy Coping    Based on learning assessment above, most appropriate setting for further diabetes education would be: Individual setting.      PLAN  Diabetes Ed negro and zoom classes     Topics to cover at upcoming visits: Healthy Eating, Being Active, Monitoring, Taking Medication, Problem Solving, Reducing Risks, and Healthy Coping    Follow-up: Tomorrow    See Care Plan for co-developed, patient-state behavior change goals.  AVS provided for patient today.    Education Materials Provided:   [employer] [place] Understanding Diabetes Booklet, BG Log  "Sheet, My Plate Planner, types of diabetes medicines, diabetes distress and diabetes care goals.    SUBJECTIVE/OBJECTIVE:  Presents for: Individual review  Accompanied by: Self  Diabetes type: Type 2  Date of diagnosis: diagnosed a few years ago  Disease course: Worsening  Cultural Influences/Ethnic Background:  Not  or       Diabetes Symptoms & Complications:  Diabetes Related Symptoms: Fatigue, Polydipsia (increased thirst), Visual change  Symptom course: Improving (since starting metformin)  Disease course: Worsening       Patient Problem List and Family Medical History reviewed for relevant medical history, current medical status, and diabetes risk factors.    Vitals:  [facility] 11/22/2012   Estimated body mass index is 30.86 kg/m  as calculated from the following:    Height as of 8/25/23: 1.562 m (5' 1.5\").    Weight as of 8/7/22: 75.3 kg (166 lb).   Last 3 BP:   BP Readings from Last 3 Encounters:   08/25/23 (!) 148/82   02/24/23 124/76   08/10/22 129/75       History   Smoking Status    Never   Smokeless Tobacco    Never       Labs:  Lab Results   Component Value Date    A1C 14.6 04/30/2024    A1C 5.3 07/26/2018     Lab Results   Component Value Date     04/30/2024     01/02/2023     08/11/2020     Lab Results   Component Value Date    LDL 72 07/08/2022     08/11/2020     HDL Cholesterol   Date Value Ref Range Status   08/11/2020 58 >49 mg/dL Final     Direct Measure HDL   Date Value Ref Range Status   07/08/2022 45 (L) >=50 mg/dL Final   ]  GFR Estimate   Date Value Ref Range Status   04/30/2024 >90 >60 mL/min/1.73m2 Final   08/11/2020 90 >60 mL/min/[1.73_m2] Final     Comment:     Non  GFR Calc  Starting 12/18/2018, serum creatinine based estimated GFR (eGFR) will be   calculated using the Chronic Kidney Disease Epidemiology Collaboration   (CKD-EPI) equation.       GFR, ESTIMATED POCT   Date Value Ref Range Status   09/22/2022 >60 >60 mL/min/1.73m2 Final " "    GFR Estimate If Black   Date Value Ref Range Status   08/11/2020 >90 >60 mL/min/[1.73_m2] Final     Comment:      GFR Calc  Starting 12/18/2018, serum creatinine based estimated GFR (eGFR) will be   calculated using the Chronic Kidney Disease Epidemiology Collaboration   (CKD-EPI) equation.       Lab Results   Component Value Date    CR 0.58 04/30/2024    CR 0.73 08/11/2020     No results found for: \"MICROALBUMIN\"    Healthy Eating:  Healthy Eating Assessed Today: No    Being Active:  Being Active Assessed Today: No    Monitoring:  Monitoring Assessed Today: Yes  Date Breakfast  Lunch  Dinner  Bedtime    Before After Before After Before After    5/18 5/19      184-later in day    5/20 272             Taking Medications:  Diabetes Medication(s)       Biguanides       metFORMIN (GLUCOPHAGE XR) 500 MG 24 hr tablet Take 2 tablets (1,000 mg) by mouth 2 times daily (with meals)            Taking Medication Assessed Today: Yes  Current Treatments: Diet, Oral Medication (taken by mouth)  Problems taking diabetes medications regularly?: No    Problem Solving:  Problem Solving Assessed Today: No              Reducing Risks:  Reducing Risks Assessed Today: No    Healthy Coping:  Healthy Coping Assessed Today: Yes  Emotional response to diabetes: Ready to learn  Stage of change: PREPARATION (Decided to change - considering how)  Patient Activation Measure Survey Score:      12/14/2012     3:00 PM   JOHN Score (Last Two)   JOHN Raw Score 52   Activation Score 100   JOHN Level 4       Angeles Banda RD, LD, [facility]      Time Spent: 32 minutes  Encounter Type: Individual    Any diabetes medication dose changes were made via the CDE Protocol per the patient's primary care provider. A copy of this encounter was shared with the provider.          "

## 2024-05-20 NOTE — Clinical Note
MONA Lobo! I added her to your 8am tomorrow. She is also going to start the new type 2 classes. She was on my schedule for 30 min new diabetes but she actually has hx of DM but her BG are quite elevated so wanted her to be seen sooner than later! Thanks so much! Angeles Banda RD, LD, [facility]

## 2024-05-21 ENCOUNTER — VIRTUAL VISIT (OUTPATIENT)
Dept: EDUCATION SERVICES | Facility: CLINIC | Age: 64
End: 2024-05-21
Payer: COMMERCIAL

## 2024-05-21 DIAGNOSIS — E11.9 TYPE 2 DIABETES MELLITUS WITHOUT COMPLICATION, WITHOUT LONG-TERM CURRENT USE OF INSULIN (H): Primary | ICD-10-CM

## 2024-05-21 PROCEDURE — G0108 DIAB MANAGE TRN  PER INDIV: HCPCS | Mod: 93 | Performed by: PHARMACIST

## 2024-05-21 RX ORDER — ALPRAZOLAM 0.5 MG
TABLET ORAL
Qty: 15 TABLET | Refills: 0 | Status: SHIPPED | OUTPATIENT
Start: 2024-05-21 | End: 2024-06-23

## 2024-05-21 NOTE — LETTER
5/21/2024         RE: Caro Palmer  [address]  [facility] [address]        Dear Colleague,    Thank you for referring your patient, Caro Palmer, to the [facility]. Please see a copy of my visit note below.    Diabetes Self-Management Education & Support    Presents for: Individual review    Type of Service: Telephone Visit    Originating Location (Patient Location): Home  Distant Location (Provider Location): [facility]  Mode of Communication:  Telephone    Telephone Visit Start Time:  8am  Telephone Visit End Time (telephone visit stop time): 900am    How would patient like to obtain AVS? MyChart      ASSESSMENT:  Very busy helping grandkids, one with CP and with work as a .  Gets up in the middle of the night, 230am to help her daughter with him.  Sometimes works out before this- at the local gym- 3 to 5 times a week in the past.  Now has been 1 to 2 times a week but working way back up to 3 to 5.  Sleeps until about 6am (230am to 6am at daughters house) and helps him get ready for the day.  Then goes to work for a while then helps after school again, then back to work at 345pm into the evening.  Friday is day off.  Sleeps at home Friday and Saturday.  Never gets 7 to 8 hours of sleep.    Metformin is 2 with breakfast and 2 with supper (1000 mg BID).  Titrated up to this dose over the past weeks and is doing fine with GI symptoms.      Constance does not eat regular meals, usually only dinner.  At her visit yesterday with LAMONTE, she learned she can take drink protein shakes in place of a meal. She is going to start doing that when she doesn't have time to eat a meal.  Admits she gets full fast and gets tired so she thinks this will work well.  Drinks a lot of water already.  One energy drink per day- no carbs, just caffeine.  Uses keto bread and taco shells (1g and 2g each).  Suggested if she continues to use  this bread/shells, that she have some source of carb- she does like fruit.    Has glucose tablets with her.  Takes one when she feels bad and it helps for a hour.  Discussed how she may just be hungry.  Suggested carbs + protein as I think she is just hungry.  She needs to consume more food in general, including carbs.  Talked about aiming for 30g of carb for meals to start, and 15-30g at snacks.  This is lower than normal, but she is coming from minimal to no carb intake.    Cannot eat nuts, seeds or popcorn due to diverticulitis. Peanut butter causes constipation.    She is testing blood sugars, 2-3 times a day. First thing in the morning has been in the 200's now.  Lowest 189.   Just started full dose metformin this week.  Was in the 300-400's.  Only tested a few weeks ago because she was extremely tired and this triggered her to do a BG check and she saw she was extremely high so she scheduled a MD appt.    She is extremely stressed, over worked and doesn't get enough sleep.  I understand her priority is helping her daughter with her children but we discussed the importance of trying to de-stress a bit, get sleep when able.    Will continue education and review BS at next visit.  May need more medication?  GLP1 vs SGLT2 vs WEEKS    Patient's most recent   Lab Results   Component Value Date    A1C 14.6 04/30/2024    A1C 5.3 07/26/2018     is not meeting goal of <7.0    Diabetes knowledge and skills assessment:   Patient is knowledgeable in diabetes management concepts related to: Being Active, Monitoring, and Taking Medication        Based on learning assessment above, most appropriate setting for further diabetes education would be: Group class or Individual setting.      PLAN    Schedule eye exam.  Focus on 3 meals per day and small carb + protein snacks in between, especially if hungry.  Try to do 30g of carbs with meals and 15g of carbs with snacks.  Focus on YOU!  De-stress, relaxation.     Focus on more  "quality sleep.  Healthy living with diabetes book and myplate map will be mailed.  Attend group classes as scheduled.  BG goals  before meals and less than 180 2 hours after a meal.    Topics to cover at upcoming visits: Healthy Eating, Problem Solving, Reducing Risks, and Healthy Coping    Follow-up: 1 month    See Care Plan for co-developed, patient-state behavior change goals.  AVS provided for patient today.    Education Materials Provided:  [employer] Healthy Living with Diabetes Book and My Plate Planner      SUBJECTIVE/OBJECTIVE:  Presents for: Individual review  Accompanied by: Self  Diabetes education in the past 24mo: Yes  Focus of Visit: Diabetes Pathophysiology  Diabetes type: Type 2  Date of diagnosis: years ago  Disease course: Improving  How confident are you filling out medical forms by yourself:: Extremely  Transportation concerns: No  Cultural Influences/Ethnic Background:  Not  or       Diabetes Symptoms & Complications:  Diabetes Related Symptoms: Fatigue, Polydipsia (increased thirst), Polyuria (increased urination), Yeast infection, Visual change, Slow healing wounds  Weight trend: Decreasing  Symptom course: Improving  Disease course: Improving       Patient Problem List and Family Medical History reviewed for relevant medical history, current medical status, and diabetes risk factors.    Vitals:  [facility] 11/22/2012   Estimated body mass index is 30.86 kg/m  as calculated from the following:    Height as of 8/25/23: 1.562 m (5' 1.5\").    Weight as of 8/7/22: 75.3 kg (166 lb).   Last 3 BP:   BP Readings from Last 3 Encounters:   08/25/23 (!) 148/82   02/24/23 124/76   08/10/22 129/75       History   Smoking Status     Never   Smokeless Tobacco     Never       Labs:  Lab Results   Component Value Date    A1C 14.6 04/30/2024    A1C 5.3 07/26/2018     Lab Results   Component Value Date     04/30/2024     01/02/2023     08/11/2020     Lab Results " "  Component Value Date    LDL 72 07/08/2022     08/11/2020     HDL Cholesterol   Date Value Ref Range Status   08/11/2020 58 >49 mg/dL Final     Direct Measure HDL   Date Value Ref Range Status   07/08/2022 45 (L) >=50 mg/dL Final   ]  GFR Estimate   Date Value Ref Range Status   04/30/2024 >90 >60 mL/min/1.73m2 Final   08/11/2020 90 >60 mL/min/[1.73_m2] Final     Comment:     Non  GFR Calc  Starting 12/18/2018, serum creatinine based estimated GFR (eGFR) will be   calculated using the Chronic Kidney Disease Epidemiology Collaboration   (CKD-EPI) equation.       GFR, ESTIMATED POCT   Date Value Ref Range Status   09/22/2022 >60 >60 mL/min/1.73m2 Final     GFR Estimate If Black   Date Value Ref Range Status   08/11/2020 >90 >60 mL/min/[1.73_m2] Final     Comment:      GFR Calc  Starting 12/18/2018, serum creatinine based estimated GFR (eGFR) will be   calculated using the Chronic Kidney Disease Epidemiology Collaboration   (CKD-EPI) equation.       Lab Results   Component Value Date    CR 0.58 04/30/2024    CR 0.73 08/11/2020     No results found for: \"MICROALBUMIN\"    Healthy Eating:  Healthy Eating Assessed Today: Yes  Cultural/Mosque diet restrictions?: No  Meal planning/habits: Avoiding sweets, Low carb  Who cooks/prepares meals for you?: Self  Who purchases food in  your home?: Self  How many times a week on average do you eat food made away from home (restaurant/take-out)?: 0  Meals include: Dinner, Breakfast, Lunch  Breakfast: now going to have a protein shake (mixed with almond milk)- this is new 5/21/24  Lunch: now going to have a protein shake (mixed with almond milk)- this is new 5/21/24  Dinner: chicken or other meat, salad, potatoes.  Hotdishes.  Frozen waffles- with eggs.  Egg salad sandwich.  Low carb bread or a wrap.  Tuna Salad sandwich.  Rarely pasta but uses soy pasta, with homemade sauce.  Snacks: cheese sticks, crackers, grapefruit in the evening  Other: " rarely iced coffee with a little bit of milk  Beverages: Water, Energy drinks    Being Active:  Exercise:: Yes  Days per week of moderate to strenuous exercise (like a brisk walk): 3  On average, minutes per day of exercise at this level: 30  How intense was your typical exercise? : Heavy (like jogging or swimming)  Exercise Minutes per Week: 90  Barrier to exercise: None    Monitoring:  Monitoring Assessed Today: Yes  Did patient bring glucose meter to appointment? : No  Blood Glucose Meter: Accu-chek  Times checking blood sugar at home (number): 3  Times checking blood sugar at home (per): Day  Blood glucose trend: Decreasing        Taking Medications:  Diabetes Medication(s)       Biguanides       metFORMIN (GLUCOPHAGE XR) 500 MG 24 hr tablet Take 2 tablets (1,000 mg) by mouth 2 times daily (with meals)            Taking Medication Assessed Today: Yes  Current Treatments: Oral Medication (taken by mouth)  Problems taking diabetes medications regularly?: No  Diabetes medication side effects?: No    Problem Solving:  Problem Solving Assessed Today: Yes  Is the patient at risk for hypoglycemia?: No  Is the patient at risk for DKA?: No  Does patient have severe weather/disaster plan for diabetes management?: Not Needed  Does patient have sick day plan for diabetes management?: Not Needed              Reducing Risks:  Reducing Risks Assessed Today: Yes  Diabetes Risks: Age over 45 years, Family History  Has dilated eye exam at least once a year?: No  Sees dentist every 6 months?: Yes  Feet checked by healthcare provider in the last year?: No    Healthy Coping:  Emotional response to diabetes: Ready to learn, Confidence diabetes can be controlled, Concern for health and well-being  Informal Support system:: Children, Emily based, Family  Stage of change: ACTION (Actively working towards change)  Patient Activation Measure Survey Score:      12/14/2012     3:00 PM   JOHN Score (Last Two)   JOHN Raw Score 52   Activation  Score 100   JOHN Level 4         Care Plan and Education Provided:  Healthy Eating: Balanced meals, Consistency in amount and timing of carbohydrate intake, Label reading, and Plate planning method, Being Active: Amount recommended (150 minutes moderate or 75 minutes vigorous activity and 2-3 days strength training per week), Monitoring: Frequency of monitoring, Individual glucose targets, and Log and interpret results, Taking Medication: Action of prescribed medication(s), Side effects of prescribed medication(s), and When to take medication(s), and Reducing Risks: Eye care and Goal for A1c, how it relates to glucose and how often to check    Lashell Gramajo, PharmD, [facility] [place] [facility]      Time Spent: 60 minutes  Encounter Type: Individual    Any diabetes medication dose changes were made via the CDE Protocol per the patient's referring provider. A copy of this encounter was shared with the provider.

## 2024-05-21 NOTE — PATIENT INSTRUCTIONS
Schedule eye exam.  Focus on 3 meals per day and small carb + protein snacks in between, especially if hungry.  Try to do 30g of carbs with meals and 15g of carbs with snacks.  Focus on YOU!  De-stress, relaxation.     Focus on more quality sleep.  Healthy living with diabetes book and myplate map will be mailed.  Attend group classes as scheduled.  Morning fasting blood sugars and before meals, your blood sugar goal is .  2 hours after meals should be less than 180.

## 2024-05-21 NOTE — PROGRESS NOTES
Diabetes Self-Management Education & Support    Presents for: Individual review    Type of Service: Telephone Visit    Originating Location (Patient Location): Home  Distant Location (Provider Location):  [employer] [place] [facility] [place]  Mode of Communication:  Telephone    Telephone Visit Start Time:  8am  Telephone Visit End Time (telephone visit stop time): 900am    How would patient like to obtain AVS? Norma      ASSESSMENT:  Very busy helping jocelyn, one with CP and with work as a .  Gets up in the middle of the night, 230am to help her daughter with him.  Sometimes works out before this- at the local gym- 3 to 5 times a week in the past.  Now has been 1 to 2 times a week but working way back up to 3 to 5.  Sleeps until about 6am (230am to 6am at daughters house) and helps him get ready for the day.  Then goes to work for a while then helps after school again, then back to work at 345pm into the evening.  Friday is day off.  Sleeps at home Friday and Saturday.  Never gets 7 to 8 hours of sleep.    Metformin is 2 with breakfast and 2 with supper (1000 mg BID).  Titrated up to this dose over the past weeks and is doing fine with GI symptoms.      Constance does not eat regular meals, usually only dinner.  At her visit yesterday with LAMONTE, she learned she can take drink protein shakes in place of a meal. She is going to start doing that when she doesn't have time to eat a meal.  Admits she gets full fast and gets tired so she thinks this will work well.  Drinks a lot of water already.  One energy drink per day- no carbs, just caffeine.  Uses keto bread and taco shells (1g and 2g each).  Suggested if she continues to use this bread/shells, that she have some source of carb- she does like fruit.    Has glucose tablets with her.  Takes one when she feels bad and it helps for a hour.  Discussed how she may just be hungry.  Suggested carbs + protein as I think she is just hungry.  She needs  to consume more food in general, including carbs.  Talked about aiming for 30g of carb for meals to start, and 15-30g at snacks.  This is lower than normal, but she is coming from minimal to no carb intake.    Cannot eat nuts, seeds or popcorn due to diverticulitis. Peanut butter causes constipation.    She is testing blood sugars, 2-3 times a day. First thing in the morning has been in the 200's now.  Lowest 189.   Just started full dose metformin this week.  Was in the 300-400's.  Only tested a few weeks ago because she was extremely tired and this triggered her to do a BG check and she saw she was extremely high so she scheduled a MD appt.    She is extremely stressed, over worked and doesn't get enough sleep.  I understand her priority is helping her daughter with her children but we discussed the importance of trying to de-stress a bit, get sleep when able.    Will continue education and review BS at next visit.  May need more medication?  GLP1 vs SGLT2 vs WEEKS    Patient's most recent   Lab Results   Component Value Date    A1C 14.6 04/30/2024    A1C 5.3 07/26/2018     is not meeting goal of <7.0    Diabetes knowledge and skills assessment:   Patient is knowledgeable in diabetes management concepts related to: Being Active, Monitoring, and Taking Medication        Based on learning assessment above, most appropriate setting for further diabetes education would be: Group class or Individual setting.      PLAN    Schedule eye exam.  Focus on 3 meals per day and small carb + protein snacks in between, especially if hungry.  Try to do 30g of carbs with meals and 15g of carbs with snacks.  Focus on YOU!  De-stress, relaxation.     Focus on more quality sleep.  Healthy living with diabetes book and myplate map will be mailed.  Attend group classes as scheduled.  BG goals  before meals and less than 180 2 hours after a meal.    Topics to cover at upcoming visits: Healthy Eating, Problem Solving, Reducing Risks,  "and Healthy Coping    Follow-up: 1 month    See Care Plan for co-developed, patient-state behavior change goals.  AVS provided for patient today.    Education Materials Provided:  [employer] Healthy Living with Diabetes Book and My Plate Planner      SUBJECTIVE/OBJECTIVE:  Presents for: Individual review  Accompanied by: Self  Diabetes education in the past 24mo: Yes  Focus of Visit: Diabetes Pathophysiology  Diabetes type: Type 2  Date of diagnosis: years ago  Disease course: Improving  How confident are you filling out medical forms by yourself:: Extremely  Transportation concerns: No  Cultural Influences/Ethnic Background:  Not  or       Diabetes Symptoms & Complications:  Diabetes Related Symptoms: Fatigue, Polydipsia (increased thirst), Polyuria (increased urination), Yeast infection, Visual change, Slow healing wounds  Weight trend: Decreasing  Symptom course: Improving  Disease course: Improving       Patient Problem List and Family Medical History reviewed for relevant medical history, current medical status, and diabetes risk factors.    Vitals:  [facility] 11/22/2012   Estimated body mass index is 30.86 kg/m  as calculated from the following:    Height as of 8/25/23: 1.562 m (5' 1.5\").    Weight as of 8/7/22: 75.3 kg (166 lb).   Last 3 BP:   BP Readings from Last 3 Encounters:   08/25/23 (!) 148/82   02/24/23 124/76   08/10/22 129/75       History   Smoking Status    Never   Smokeless Tobacco    Never       Labs:  Lab Results   Component Value Date    A1C 14.6 04/30/2024    A1C 5.3 07/26/2018     Lab Results   Component Value Date     04/30/2024     01/02/2023     08/11/2020     Lab Results   Component Value Date    LDL 72 07/08/2022     08/11/2020     HDL Cholesterol   Date Value Ref Range Status   08/11/2020 58 >49 mg/dL Final     Direct Measure HDL   Date Value Ref Range Status   07/08/2022 45 (L) >=50 mg/dL Final   ]  GFR Estimate   Date Value Ref Range Status " "  04/30/2024 >90 >60 mL/min/1.73m2 Final   08/11/2020 90 >60 mL/min/[1.73_m2] Final     Comment:     Non  GFR Calc  Starting 12/18/2018, serum creatinine based estimated GFR (eGFR) will be   calculated using the Chronic Kidney Disease Epidemiology Collaboration   (CKD-EPI) equation.       GFR, ESTIMATED POCT   Date Value Ref Range Status   09/22/2022 >60 >60 mL/min/1.73m2 Final     GFR Estimate If Black   Date Value Ref Range Status   08/11/2020 >90 >60 mL/min/[1.73_m2] Final     Comment:      GFR Calc  Starting 12/18/2018, serum creatinine based estimated GFR (eGFR) will be   calculated using the Chronic Kidney Disease Epidemiology Collaboration   (CKD-EPI) equation.       Lab Results   Component Value Date    CR 0.58 04/30/2024    CR 0.73 08/11/2020     No results found for: \"MICROALBUMIN\"    Healthy Eating:  Healthy Eating Assessed Today: Yes  Cultural/Restoration diet restrictions?: No  Meal planning/habits: Avoiding sweets, Low carb  Who cooks/prepares meals for you?: Self  Who purchases food in  your home?: Self  How many times a week on average do you eat food made away from home (restaurant/take-out)?: 0  Meals include: Dinner, Breakfast, Lunch  Breakfast: now going to have a protein shake (mixed with almond milk)- this is new 5/21/24  Lunch: now going to have a protein shake (mixed with almond milk)- this is new 5/21/24  Dinner: chicken or other meat, salad, potatoes.  Hotdishes.  Frozen waffles- with eggs.  Egg salad sandwich.  Low carb bread or a wrap.  Tuna Salad sandwich.  Rarely pasta but uses soy pasta, with homemade sauce.  Snacks: cheese sticks, crackers, grapefruit in the evening  Other: rarely iced coffee with a little bit of milk  Beverages: Water, Energy drinks    Being Active:  Exercise:: Yes  Days per week of moderate to strenuous exercise (like a brisk walk): 3  On average, minutes per day of exercise at this level: 30  How intense was your typical exercise? : " Heavy (like jogging or swimming)  Exercise Minutes per Week: 90  Barrier to exercise: None    Monitoring:  Monitoring Assessed Today: Yes  Did patient bring glucose meter to appointment? : No  Blood Glucose Meter: Accu-chek  Times checking blood sugar at home (number): 3  Times checking blood sugar at home (per): Day  Blood glucose trend: Decreasing        Taking Medications:  Diabetes Medication(s)       Biguanides       metFORMIN (GLUCOPHAGE XR) 500 MG 24 hr tablet Take 2 tablets (1,000 mg) by mouth 2 times daily (with meals)            Taking Medication Assessed Today: Yes  Current Treatments: Oral Medication (taken by mouth)  Problems taking diabetes medications regularly?: No  Diabetes medication side effects?: No    Problem Solving:  Problem Solving Assessed Today: Yes  Is the patient at risk for hypoglycemia?: No  Is the patient at risk for DKA?: No  Does patient have severe weather/disaster plan for diabetes management?: Not Needed  Does patient have sick day plan for diabetes management?: Not Needed              Reducing Risks:  Reducing Risks Assessed Today: Yes  Diabetes Risks: Age over 45 years, Family History  Has dilated eye exam at least once a year?: No  Sees dentist every 6 months?: Yes  Feet checked by healthcare provider in the last year?: No    Healthy Coping:  Emotional response to diabetes: Ready to learn, Confidence diabetes can be controlled, Concern for health and well-being  Informal Support system:: Children, Emily based, Family  Stage of change: ACTION (Actively working towards change)  Patient Activation Measure Survey Score:      12/14/2012     3:00 PM   JOHN Score (Last Two)   JOHN Raw Score 52   Activation Score 100   JOHN Level 4         Care Plan and Education Provided:  Healthy Eating: Balanced meals, Consistency in amount and timing of carbohydrate intake, Label reading, and Plate planning method, Being Active: Amount recommended (150 minutes moderate or 75 minutes vigorous activity  and 2-3 days strength training per week), Monitoring: Frequency of monitoring, Individual glucose targets, and Log and interpret results, Taking Medication: Action of prescribed medication(s), Side effects of prescribed medication(s), and When to take medication(s), and Reducing Risks: Eye care and Goal for A1c, how it relates to glucose and how often to check    Lashell Gramajo, PharmD, [facility] [place] [facility]      Time Spent: 60 minutes  Encounter Type: Individual    Any diabetes medication dose changes were made via the CDE Protocol per the patient's referring provider. A copy of this encounter was shared with the provider.

## 2024-05-25 ENCOUNTER — HEALTH MAINTENANCE LETTER (OUTPATIENT)
Age: 64
End: 2024-05-25

## 2024-06-07 DIAGNOSIS — E11.9 TYPE 2 DIABETES MELLITUS WITHOUT COMPLICATION, WITHOUT LONG-TERM CURRENT USE OF INSULIN (H): ICD-10-CM

## 2024-06-21 DIAGNOSIS — F41.1 GENERALIZED ANXIETY DISORDER: ICD-10-CM

## 2024-06-23 RX ORDER — ALPRAZOLAM 0.5 MG
TABLET ORAL
Qty: 15 TABLET | Refills: 0 | Status: SHIPPED | OUTPATIENT
Start: 2024-06-23 | End: 2024-07-24

## 2024-07-08 DIAGNOSIS — E11.9 TYPE 2 DIABETES MELLITUS WITHOUT COMPLICATION, WITHOUT LONG-TERM CURRENT USE OF INSULIN (H): ICD-10-CM

## 2024-07-08 NOTE — TELEPHONE ENCOUNTER
Patient is requesting a refill for accu chek lancets. Looks like she hasn't filled these in quite awhile     Thank you,   Joanna Valentin,   Pharm Tech,  [facility]

## 2024-07-09 NOTE — TELEPHONE ENCOUNTER
Not on current med list. Last prescribed 01/12/2023. Routing to primary care provider to advise.    Pended old prescription order.

## 2024-07-10 RX ORDER — LANCETS
EACH MISCELLANEOUS
Qty: 100 EACH | Refills: 6 | Status: SHIPPED | OUTPATIENT
Start: 2024-07-10

## 2024-07-23 DIAGNOSIS — F41.1 GENERALIZED ANXIETY DISORDER: ICD-10-CM

## 2024-07-24 RX ORDER — ALPRAZOLAM 0.5 MG
TABLET ORAL
Qty: 15 TABLET | Refills: 0 | Status: SHIPPED | OUTPATIENT
Start: 2024-07-24 | End: 2024-08-22

## 2024-08-03 ENCOUNTER — HEALTH MAINTENANCE LETTER (OUTPATIENT)
Age: 64
End: 2024-08-03

## 2024-08-22 ENCOUNTER — PATIENT OUTREACH (OUTPATIENT)
Dept: CARE COORDINATION | Facility: CLINIC | Age: 64
End: 2024-08-22
Payer: COMMERCIAL

## 2024-08-22 DIAGNOSIS — F41.1 GENERALIZED ANXIETY DISORDER: ICD-10-CM

## 2024-08-22 RX ORDER — ALPRAZOLAM 0.5 MG
TABLET ORAL
Qty: 15 TABLET | Refills: 0 | Status: SHIPPED | OUTPATIENT
Start: 2024-08-22 | End: 2024-09-20

## 2024-09-19 DIAGNOSIS — F41.1 GENERALIZED ANXIETY DISORDER: ICD-10-CM

## 2024-09-20 RX ORDER — ALPRAZOLAM 0.5 MG
TABLET ORAL
Qty: 15 TABLET | Refills: 0 | Status: SHIPPED | OUTPATIENT
Start: 2024-09-20

## 2024-10-05 DIAGNOSIS — J45.20 MILD INTERMITTENT ASTHMA WITHOUT COMPLICATION: ICD-10-CM

## 2024-10-08 DIAGNOSIS — I10 ESSENTIAL HYPERTENSION WITH GOAL BLOOD PRESSURE LESS THAN 140/90: ICD-10-CM

## 2024-10-08 RX ORDER — HYDROCHLOROTHIAZIDE 12.5 MG/1
12.5 TABLET ORAL DAILY
Qty: 90 TABLET | Refills: 3 | Status: SHIPPED | OUTPATIENT
Start: 2024-10-08

## 2024-10-08 RX ORDER — ALBUTEROL SULFATE 90 UG/1
AEROSOL, METERED RESPIRATORY (INHALATION)
Qty: 18 G | Refills: 3 | Status: SHIPPED | OUTPATIENT
Start: 2024-10-08

## 2024-10-15 DIAGNOSIS — J31.0 CHRONIC RHINITIS: ICD-10-CM

## 2024-10-16 RX ORDER — LORATADINE AND PSEUDOEPHEDRINE 10; 240 MG/1; MG/1
1 TABLET, EXTENDED RELEASE ORAL DAILY
Qty: 30 TABLET | Refills: 3 | Status: SHIPPED | OUTPATIENT
Start: 2024-10-16

## 2024-10-18 DIAGNOSIS — F41.1 GENERALIZED ANXIETY DISORDER: ICD-10-CM

## 2024-10-18 RX ORDER — ALPRAZOLAM 0.5 MG
TABLET ORAL
Qty: 15 TABLET | Refills: 0 | Status: SHIPPED | OUTPATIENT
Start: 2024-10-18

## 2024-11-18 DIAGNOSIS — F41.1 GENERALIZED ANXIETY DISORDER: ICD-10-CM

## 2024-11-19 RX ORDER — ALPRAZOLAM 0.5 MG
TABLET ORAL
Qty: 15 TABLET | Refills: 0 | Status: SHIPPED | OUTPATIENT
Start: 2024-11-19

## 2024-12-21 ENCOUNTER — HEALTH MAINTENANCE LETTER (OUTPATIENT)
Age: 64
End: 2024-12-21

## 2025-01-10 DIAGNOSIS — I10 ESSENTIAL HYPERTENSION WITH GOAL BLOOD PRESSURE LESS THAN 140/90: ICD-10-CM

## 2025-01-13 RX ORDER — LOSARTAN POTASSIUM 25 MG/1
50 TABLET ORAL DAILY
Qty: 180 TABLET | Refills: 3 | Status: SHIPPED | OUTPATIENT
Start: 2025-01-13

## 2025-02-07 ENCOUNTER — LAB (OUTPATIENT)
Dept: LAB | Facility: CLINIC | Age: 65
End: 2025-02-07
Payer: COMMERCIAL

## 2025-02-07 DIAGNOSIS — E11.69 TYPE 2 DIABETES MELLITUS WITH OTHER SPECIFIED COMPLICATION, WITHOUT LONG-TERM CURRENT USE OF INSULIN (H): Primary | ICD-10-CM

## 2025-02-09 LAB — CANCER AG19-9 SERPL IA-ACNC: 15 U/ML

## 2025-02-17 DIAGNOSIS — F41.1 GENERALIZED ANXIETY DISORDER: ICD-10-CM

## 2025-02-17 RX ORDER — ALPRAZOLAM 0.5 MG
TABLET ORAL
Qty: 6 TABLET | Refills: 0 | Status: SHIPPED | OUTPATIENT
Start: 2025-02-17

## 2025-02-17 NOTE — TELEPHONE ENCOUNTER
Patient last physically seen by any [place] primary care provider in August 2023.  Will need to make an appointment with a provider for any further refills.    Koko

## 2025-03-02 SDOH — HEALTH STABILITY: PHYSICAL HEALTH: ON AVERAGE, HOW MANY DAYS PER WEEK DO YOU ENGAGE IN MODERATE TO STRENUOUS EXERCISE (LIKE A BRISK WALK)?: 3 DAYS

## 2025-03-02 SDOH — HEALTH STABILITY: PHYSICAL HEALTH: ON AVERAGE, HOW MANY MINUTES DO YOU ENGAGE IN EXERCISE AT THIS LEVEL?: 40 MIN

## 2025-03-02 ASSESSMENT — ASTHMA QUESTIONNAIRES
QUESTION_1 LAST FOUR WEEKS HOW MUCH OF THE TIME DID YOUR ASTHMA KEEP YOU FROM GETTING AS MUCH DONE AT WORK, SCHOOL OR AT HOME: A LITTLE OF THE TIME
QUESTION_5 LAST FOUR WEEKS HOW WOULD YOU RATE YOUR ASTHMA CONTROL: SOMEWHAT CONTROLLED
QUESTION_4 LAST FOUR WEEKS HOW OFTEN HAVE YOU USED YOUR RESCUE INHALER OR NEBULIZER MEDICATION (SUCH AS ALBUTEROL): TWO OR THREE TIMES PER WEEK
QUESTION_2 LAST FOUR WEEKS HOW OFTEN HAVE YOU HAD SHORTNESS OF BREATH: THREE TO SIX TIMES A WEEK
ACT_TOTALSCORE: 18
ACT_TOTALSCORE: 18
QUESTION_3 LAST FOUR WEEKS HOW OFTEN DID YOUR ASTHMA SYMPTOMS (WHEEZING, COUGHING, SHORTNESS OF BREATH, CHEST TIGHTNESS OR PAIN) WAKE YOU UP AT NIGHT OR EARLIER THAN USUAL IN THE MORNING: NOT AT ALL

## 2025-03-02 ASSESSMENT — SOCIAL DETERMINANTS OF HEALTH (SDOH): HOW OFTEN DO YOU GET TOGETHER WITH FRIENDS OR RELATIVES?: MORE THAN THREE TIMES A WEEK

## 2025-03-03 ENCOUNTER — TELEPHONE (OUTPATIENT)
Dept: FAMILY MEDICINE | Facility: CLINIC | Age: 65
End: 2025-03-03
Payer: COMMERCIAL

## 2025-03-03 ENCOUNTER — MYC MEDICAL ADVICE (OUTPATIENT)
Dept: FAMILY MEDICINE | Facility: CLINIC | Age: 65
End: 2025-03-03
Payer: COMMERCIAL

## 2025-03-03 NOTE — TELEPHONE ENCOUNTER
Please ask patient to change appointment from Friday at 310 to Tuesday 3/4 at 210pm Per GS  Thanks

## 2025-03-07 ENCOUNTER — OFFICE VISIT (OUTPATIENT)
Dept: FAMILY MEDICINE | Facility: CLINIC | Age: 65
End: 2025-03-07
Payer: COMMERCIAL

## 2025-03-07 VITALS
RESPIRATION RATE: 25 BRPM | OXYGEN SATURATION: 96 % | BODY MASS INDEX: 30.86 KG/M2 | HEART RATE: 105 BPM | DIASTOLIC BLOOD PRESSURE: 82 MMHG | TEMPERATURE: 96.8 F | HEIGHT: 61 IN | SYSTOLIC BLOOD PRESSURE: 136 MMHG

## 2025-03-07 DIAGNOSIS — J45.20 MILD INTERMITTENT ASTHMA WITHOUT COMPLICATION: ICD-10-CM

## 2025-03-07 DIAGNOSIS — Z00.00 WELL FEMALE EXAM WITHOUT GYNECOLOGICAL EXAM: Primary | ICD-10-CM

## 2025-03-07 DIAGNOSIS — E11.69 TYPE 2 DIABETES MELLITUS WITH OTHER SPECIFIED COMPLICATION, WITHOUT LONG-TERM CURRENT USE OF INSULIN (H): ICD-10-CM

## 2025-03-07 DIAGNOSIS — F41.1 GENERALIZED ANXIETY DISORDER: ICD-10-CM

## 2025-03-07 DIAGNOSIS — I10 ESSENTIAL HYPERTENSION WITH GOAL BLOOD PRESSURE LESS THAN 140/90: ICD-10-CM

## 2025-03-07 PROCEDURE — 99396 PREV VISIT EST AGE 40-64: CPT | Performed by: FAMILY MEDICINE

## 2025-03-07 RX ORDER — ALPRAZOLAM 0.5 MG
0.25 TABLET ORAL 3 TIMES DAILY PRN
Qty: 30 TABLET | Refills: 0 | Status: SHIPPED | OUTPATIENT
Start: 2025-03-07

## 2025-03-07 ASSESSMENT — PAIN SCALES - GENERAL: PAINLEVEL_OUTOF10: NO PAIN (0)

## 2025-03-07 NOTE — PROGRESS NOTES
dPreventive Care Visit  [facility]  Gregory G. Schoen, MD, Family Medicine  Mar 7, 2025      Assessment & Plan     Well female exam without gynecological exam  Up to date on appropriate cancer screening. Declines vaccines offered today. Annual exams recommended.  Reviewed labs regarding family risk for ovarian and pancreatic cancer and these tumor marker levels were normal.     Type 2 diabetes mellitus with other specified complication, without long-term current use of insulin (H)  A1c was elevated and recommended more aggressive dietary management and exercise, otherwise remain on metformin 1000 mg bid and recheck labs in 2-3 months.       Essential hypertension with goal blood pressure less than 140/90  Blood pressure controlled with hydrochlorothiazide, losartan; normal renal function and electrolytes.  No change, recheck in 6 months       Generalized anxiety disorder  Stable on current use of alprazolam.  Having some issues with current pills partially disintegrating when she splits them.  Suggested she ask pharmacy to proved a different generic formulation (not an issue with prior pills) or cut them for her.        Mild intermittent asthma without complication  No concerns; rare use of inhaler.  No change.        Electronically signed by Greg Schoen, MD              Subjective   Caro is a 64 year old, presenting for the following:  Recheck Medication and Physical        3/7/2025     3:11 PM   Additional Questions   Roomed by Kita         3/7/2025   Declines Weight   Did patient decline having their weight taken? Yes         3/7/2025     3:11 PM   Patient Reported Additional Medications   Patient reports taking the following new medications vitamin k1, vitmain b12, vitamin C          HPI  States is getting better understanding of diabetes, working on diet management. On metformin and diet control.  Feels her numbers are getting better.  Sugars range between 110s and 180,  rarely above.  Life is hectic helping out with grandkids and that becomes hectic. 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 She has an elliptical at home and also goes three times a week to the gym.  Sleeps only about three hours at night, naps about 1-2 hours during the AM/day or evening.  Feels she doesn't need more.  Has found that B12 supplement really helps.      Asks about tanning harvey. She has done that for many years and she has been too busy this past year and it gives her pause about the healthcare risks of that.  Discussed that I would not recommend use of a tanning harvey related to cancer risks as well as skin damage, in general.     Insurance offers personal health  and wonders if that is a good idea.  I readily advised her to do that.     Using inhaler more this past year, sometimes a couple of times a day.  Taking claritin frequently for URI symptoms as well.      Anxiety seems to be stable.  She has been using alprazolam 0.25 mg one or two times daily and that keeps things under control. She does have issue with her last batch of pills that although scored, when she would snap them in half (0.5 to get a 0.25mg dose) she notes one half always seems to crumble a bit and then a less potent dose.  I advised her to talk to the pharmacy and ask about a different generic and/or have them cut them for her.        Advance Care Planning  Patient does not have a Health Care Directive: Discussed advance care planning with patient; information given to patient to review.      3/2/2025   General Health   How would you rate your overall physical health? (!) FAIR   Feel stress (tense, anxious, or unable to sleep) To some extent   (!) STRESS CONCERN      3/2/2025   Nutrition   Three or more servings of calcium each day? Yes   Diet: Low salt    Low fat/cholesterol    Diabetic    Carbohydrate counting   How many servings of fruit and vegetables per day? (!) 2-3   How many sweetened beverages each day?  0-1       Multiple values from one day are sorted in reverse-chronological order         3/2/2025   Exercise   Days per week of moderate/strenous exercise 3 days   Average minutes spent exercising at this level 40 min         3/2/2025   Social Factors   Frequency of gathering with friends or relatives More than three times a week   Worry food won't last until get money to buy more No   Food not last or not have enough money for food? No   Do you have housing? (Housing is defined as stable permanent housing and does not include staying ouside in a car, in a tent, in an abandoned building, in an overnight shelter, or couch-surfing.) Yes   Are you worried about losing your housing? No   Lack of transportation? No   Unable to get utilities (heat,electricity)? No         3/2/2025   Fall Risk   Fallen 2 or more times in the past year? No   Trouble with walking or balance? No          3/2/2025   Dental   Dentist two times every year? Yes            Today's PHQ-2 Score:       3/7/2025     3:17 PM   PHQ-2 ( 1999 [employer])   Q1: Little interest or pleasure in doing things 1    Q2: Feeling down, depressed or hopeless 0    PHQ-2 Score 1    Q1: Little interest or pleasure in doing things Several days   Q2: Feeling down, depressed or hopeless Not at all   PHQ-2 Score 1       Proxy-reported           3/2/2025   Substance Use   Alcohol more than 3/day or more than 7/wk No   Do you use any other substances recreationally? No     Social History     Tobacco Use    Smoking status: Never     Passive exposure: Never    Smokeless tobacco: Never   Vaping Use    Vaping status: Never Used   Substance Use Topics    Alcohol use: Not Currently     Comment: 1-2 drinks monthly    Drug use: No           8/22/2023   LAST FHS-7 RESULTS   1st degree relative breast or ovarian cancer No   Any relative bilateral breast cancer No   Any male have breast cancer No   Any ONE woman have BOTH breast AND ovarian cancer No   Any woman with breast cancer before  50yrs No   2 or more relatives with breast AND/OR ovarian cancer No   2 or more relatives with breast AND/OR bowel cancer No       Mammogram Screening - Mammogram every 1-2 years updated in Health Maintenance based on mutual decision making        3/2/2025   STI Screening   New sexual partner(s) since last STI/HIV test? No     History of abnormal Pap smear: Yes - TYSON 2/3 on biopsy - PAP/HPV at 6 months. If negative, then cotest annually X 3 years. If all negative, then cotest every 3 years X at least 25 years        Latest Ref Rng & Units 2023     3:48 PM 2019     5:20 PM 2019     5:00 PM   PAP / HPV   PAP  Negative for Intraepithelial Lesion or Malignancy (NILM)      PAP (Historical)   NIL     HPV 16 DNA Negative Negative   Negative    HPV 18 DNA Negative Negative   Negative    Other HR HPV Negative Negative   Negative      ASCVD Risk   The 10-year ASCVD risk score (Rosalie FONTANEZ, et al., 2019) is: 12.6%    Values used to calculate the score:      Age: 64 years      Sex: Female      Is Non- : No      Diabetic: Yes      Tobacco smoker: No      Systolic Blood Pressure: 145 mmHg      Is BP treated: Yes      HDL Cholesterol: 57 mg/dL      Total Cholesterol: 164 mg/dL           Reviewed and updated as needed this visit by Provider                    Family History   Problem Relation Age of Onset    Cancer Father         pancrea    Diabetes Father         insulin control    Respiratory Father     Hypertension Father     Heart Disease Mother         heart attack at age 63    Cancer Mother         pancrea    Diabetes Mother     Hypertension Mother     Other Cancer Mother     Heart Disease Maternal Grandmother          at age 62    Cancer Brother     Other Cancer Brother     Cancer Sister         half    Family History Negative No family hx of         has no hx of father or his family    Breast Cancer No family hx of            Review of Systems  Constitutional, HEENT,  "cardiovascular, pulmonary, GI, , musculoskeletal, neuro, skin, endocrine and psych systems are negative, except as otherwise noted.     Objective    Exam  BP (!) 145/85 (BP Location: Right arm, Patient Position: Sitting, Cuff Size: Adult Large)   Pulse 105   Temp 96.8  F (36  C) (Temporal)   Resp 25   Ht 1.562 m (5' 1.5\")   LMP 11/22/2012   SpO2 96%   BMI 30.86 kg/m     Estimated body mass index is 30.86 kg/m  as calculated from the following:    Height as of this encounter: 1.562 m (5' 1.5\").    Weight as of 8/7/22: 75.3 kg (166 lb).    Physical Exam  GENERAL: alert and no distress  EYES: Eyes grossly normal to inspection, PERRL and conjunctivae and sclerae normal  HENT: ear canals and TM's normal, nose and mouth without ulcers or lesions  NECK: no adenopathy, no asymmetry, masses, or scars  RESP: lungs clear to auscultation - no rales, rhonchi or wheezes  CV: regular rate and rhythm, normal S1 S2, no S3 or S4, no murmur, click or rub, no peripheral edema  ABDOMEN: soft, nontender, no hepatosplenomegaly, no masses and bowel sounds normal  MS: no gross musculoskeletal defects noted, no edema  SKIN: face, neck and arms are showing signs of solar elastosis changes. No obvious lesions of concern.     NEURO: Normal strength and tone, mentation intact and speech normal  PSYCH: mentation appears normal, affect normal/bright.  She speaks very rapidly, but no different from all the years that I have known her.  She is able to keep in track with her conversation and complete topics before moving to the next, but does it very rapid-paced.               Signed Electronically by: Gregory G. Schoen, MD    "

## 2025-03-09 PROBLEM — D72.829 LEUKOCYTOSIS: Status: RESOLVED | Noted: 2017-01-11 | Resolved: 2025-03-09

## 2025-03-26 DIAGNOSIS — E11.69 TYPE 2 DIABETES MELLITUS WITH OTHER SPECIFIED COMPLICATION, WITHOUT LONG-TERM CURRENT USE OF INSULIN (H): Primary | ICD-10-CM

## 2025-03-26 RX ORDER — FLASH GLUCOSE SENSOR
KIT MISCELLANEOUS
Qty: 2 EACH | Refills: 5 | Status: SHIPPED | OUTPATIENT
Start: 2025-03-26

## 2025-03-31 DIAGNOSIS — J45.20 MILD INTERMITTENT ASTHMA WITHOUT COMPLICATION: ICD-10-CM

## 2025-03-31 RX ORDER — ALBUTEROL SULFATE 90 UG/1
AEROSOL, METERED RESPIRATORY (INHALATION)
Qty: 18 G | Refills: 3 | Status: SHIPPED | OUTPATIENT
Start: 2025-03-31

## 2025-04-08 DIAGNOSIS — E11.9 TYPE 2 DIABETES MELLITUS WITHOUT COMPLICATION, WITHOUT LONG-TERM CURRENT USE OF INSULIN (H): ICD-10-CM

## 2025-04-08 RX ORDER — BLOOD-GLUCOSE METER
EACH MISCELLANEOUS
Qty: 1 KIT | Refills: 0 | Status: SHIPPED | OUTPATIENT
Start: 2025-04-08

## 2025-04-15 ENCOUNTER — TELEPHONE (OUTPATIENT)
Dept: FAMILY MEDICINE | Facility: CLINIC | Age: 65
End: 2025-04-15
Payer: COMMERCIAL

## 2025-04-15 ENCOUNTER — NURSE TRIAGE (OUTPATIENT)
Dept: FAMILY MEDICINE | Facility: CLINIC | Age: 65
End: 2025-04-15
Payer: COMMERCIAL

## 2025-04-15 DIAGNOSIS — E11.69 TYPE 2 DIABETES MELLITUS WITH OTHER SPECIFIED COMPLICATION, WITHOUT LONG-TERM CURRENT USE OF INSULIN (H): Primary | ICD-10-CM

## 2025-04-15 RX ORDER — GLIPIZIDE 5 MG/1
5 TABLET, FILM COATED, EXTENDED RELEASE ORAL DAILY
Qty: 30 TABLET | Refills: 3 | Status: SHIPPED | OUTPATIENT
Start: 2025-04-15

## 2025-04-15 NOTE — TELEPHONE ENCOUNTER
Provider called patient. See telephone encounter 4/15/25    Bina Forman RN on 4/15/2025 at 5:26 PM     - - -

## 2025-04-15 NOTE — TELEPHONE ENCOUNTER
Please place Constance on my schedule on Friday, May 9 at 4pm.  Patient is aware.       I opted to call Constance and she is very concerned about her blood sugars.  She is eating right, exercising and reports she was put on two different courses of prednisone bursts, 5 days each, most recently completed 5 days ago and has not seen them drop.  Current sugars are running as high as 400s, but has been ill with pneumonia as well.  Is now happy if down to 200.  She has been taking her metformin 1000 mg bid. She is checking her sugars frequently and has recorded a few in the 500s.  She is getting over her infections and feeling better in that regard.  We had a discussion but second level medications to consider in diabetes management including sulfonyl urea meds, TZDs, insulin, GLP-1, SGLT-2i, DPP4 and the simplest approach at this time is to start her on glipizide XL.  Since her sugars are essentially all over 200, the risk of hypoglycemia with 5mg starting dose is extremely low, but did discuss hypoglycemia risk with patient.  She will start taking this daily in the AM in addition to her metformin bid.  She will call with any concerns, otherwise, will see me in clinic on Friday, May 9 a 4pm.     Electronically signed by Greg Schoen, MD

## 2025-04-15 NOTE — TELEPHONE ENCOUNTER
Nurse Triage SBAR    Is this a 2nd Level Triage? YES, LICENSED PRACTITIONER REVIEW IS REQUIRED    Situation: Patient reports BG has been high lately,  this morning prior to eating    Background: Diabetes Type 2    Assessment: Patient has been sick over the past month with pneumonia and influenza. She was placed on Prednisone twice in the past month. She reports her BG has been on average over 300.     Protocol Recommended Disposition:   Discuss With PCP And Callback By Nurse Within 1 Hour    Recommendation: Please advise if patient needs to be seen in clinic or have a medication adjustment?     Routed to provider    Does the patient meet one of the following criteria for ADS visit consideration? No    Yeni Green RN on 4/15/2025 at 11:45 AM        Reason for Disposition   Blood glucose > 400 mg/dL (22.2 mmol/L)    Additional Information   Negative: Unconscious or difficult to awaken   Negative: Acting confused (e.g., disoriented, slurred speech)   Negative: Very weak (can't stand)   Negative: Sounds like a life-threatening emergency to the triager   Negative: Vomiting and signs of dehydration (e.g., very dry mouth, lightheaded, dark urine)   Negative: Blood glucose > 240 mg/dL (13.3 mmol/L) and rapid breathing   Negative: Blood glucose > 500 mg/dL (27.8 mmol/L)   Negative: Blood glucose > 240 mg/dL (13.3 mmol/L) AND urine ketones moderate-large (or more than 1+)   Negative: Blood glucose > 240 mg/dL (13.3 mmol/L) and blood ketones > 1.4 mmol/L   Negative: Blood glucose > 240 mg/dL (13.3 mmol/L) AND vomiting AND unable to check for ketones (in blood or urine)   Negative: Vomiting lasting > 4 hours   Negative: Patient sounds very sick or weak to the triager   Negative: Fever > 100.4 F (38.0 C)   Negative: Caller has URGENT medication or insulin pump question and triager unable to answer question    Protocols used: Diabetes - High Blood Sugar-A-OH

## 2025-04-23 DIAGNOSIS — F41.1 GENERALIZED ANXIETY DISORDER: ICD-10-CM

## 2025-04-23 RX ORDER — ALPRAZOLAM 0.5 MG
TABLET ORAL
Qty: 30 TABLET | Refills: 0 | Status: SHIPPED | OUTPATIENT
Start: 2025-04-23

## 2025-05-04 ASSESSMENT — ASTHMA QUESTIONNAIRES
ACT_TOTALSCORE: 14
QUESTION_4 LAST FOUR WEEKS HOW OFTEN HAVE YOU USED YOUR RESCUE INHALER OR NEBULIZER MEDICATION (SUCH AS ALBUTEROL): ONE OR TWO TIMES PER DAY
QUESTION_2 LAST FOUR WEEKS HOW OFTEN HAVE YOU HAD SHORTNESS OF BREATH: MORE THAN ONCE A DAY
QUESTION_5 LAST FOUR WEEKS HOW WOULD YOU RATE YOUR ASTHMA CONTROL: SOMEWHAT CONTROLLED
QUESTION_1 LAST FOUR WEEKS HOW MUCH OF THE TIME DID YOUR ASTHMA KEEP YOU FROM GETTING AS MUCH DONE AT WORK, SCHOOL OR AT HOME: SOME OF THE TIME
QUESTION_3 LAST FOUR WEEKS HOW OFTEN DID YOUR ASTHMA SYMPTOMS (WHEEZING, COUGHING, SHORTNESS OF BREATH, CHEST TIGHTNESS OR PAIN) WAKE YOU UP AT NIGHT OR EARLIER THAN USUAL IN THE MORNING: NOT AT ALL

## 2025-05-09 ENCOUNTER — OFFICE VISIT (OUTPATIENT)
Dept: FAMILY MEDICINE | Facility: CLINIC | Age: 65
End: 2025-05-09
Payer: COMMERCIAL

## 2025-05-09 VITALS
SYSTOLIC BLOOD PRESSURE: 137 MMHG | RESPIRATION RATE: 16 BRPM | TEMPERATURE: 97.3 F | HEART RATE: 98 BPM | OXYGEN SATURATION: 96 % | DIASTOLIC BLOOD PRESSURE: 85 MMHG

## 2025-05-09 DIAGNOSIS — E11.65 TYPE 2 DIABETES MELLITUS WITH HYPERGLYCEMIA, WITHOUT LONG-TERM CURRENT USE OF INSULIN (H): Primary | ICD-10-CM

## 2025-05-09 PROCEDURE — 99213 OFFICE O/P EST LOW 20 MIN: CPT | Performed by: FAMILY MEDICINE

## 2025-05-09 PROCEDURE — G2211 COMPLEX E/M VISIT ADD ON: HCPCS | Performed by: FAMILY MEDICINE

## 2025-05-09 RX ORDER — GLIPIZIDE 2.5 MG/1
2.5 TABLET, EXTENDED RELEASE ORAL DAILY
Qty: 30 TABLET | Refills: 3 | Status: SHIPPED | OUTPATIENT
Start: 2025-05-09

## 2025-05-09 ASSESSMENT — PAIN SCALES - GENERAL: PAINLEVEL_OUTOF10: NO PAIN (0)

## 2025-05-09 NOTE — PROGRESS NOTES
Assessment & Plan     Type 2 diabetes mellitus with hyperglycemia, without long-term current use of insulin (H)  Constance is doing much better since adding glipizide XL 5mg daily to metformin 1000 mg bid.  She has no side effects.  She is checking sugars at various times during the day, with fasting sugar average having dropped down from 222-148 and overall average sugar from 293-212.  No low sugars or concerns about hypoglycemia.      Will increase her glipizide from 5 - 7.5mg daily (adding 2.5 +5mg dose each am) and continue for two weeks and then report her 7 and 14 day am fasting and overall averages to me.  We will continue to adjust every few weeks based on her numbers with goal of 80% in range from  and remainder 181-220 with rare over 220 and below 70.  She will continue to work on diet and exercise.             Follow-up office visit in three months.     The longitudinal plan of care for the diagnosis(es)/condition(s) as documented were addressed during this visit. Due to the added complexity in care, I will continue to support Caro in the subsequent management and with ongoing continuity of care.    Electronically signed by Greg Schoen, MD                Subjective   Caro is a 64 year old, presenting for the following health issues:  Follow Up and Diabetes      5/9/2025     4:17 PM   Additional Questions   Roomed by Kita         5/9/2025   Declines Weight   Did patient decline having their weight taken? Yes         5/9/2025     4:17 PM   Patient Reported Additional Medications   Patient reports taking the following new medications none     History of Present Illness       Diabetes:   She presents for follow up of diabetes.  She is checking home blood glucose three times daily.   She checks blood glucose before and after meals.  Blood glucose is sometimes over 200 and never under 70. She is aware of hypoglycemia symptoms including other.   She is concerned about blood sugar frequently over 200.    She is having excessive thirst and blurry vision.            She eats 2-3 servings of fruits and vegetables daily.She consumes 0 sweetened beverage(s) daily.She exercises with enough effort to increase her heart rate 30 to 60 minutes per day.  She exercises with enough effort to increase her heart rate 3 or less days per week.   She is taking medications regularly.      Blood sugars have been improving.  She is doing finger poke blood checks but has them recorded on a monitor device from which we can run some simple reports.  Was not able to get a time in range percentage as machine says that is not activated and could not figure out how to activate during her visit.  General numbers are as follows, showing that she is heading in the right direction with the addition of 5mg glyburide XL to 1000mg metformin XR bid. Tolerating both meds without GI side effects.  She is feeling much better at this time.      Last 7 days:  overall 212,  14 days 225,  30 days 293    AM fasting 7d; 148   14d: 148  30d: 222      Review of Systems  Constitutional, HEENT, cardiovascular, pulmonary, gi and gu systems are negative, except as otherwise noted.      Objective    /85 (BP Location: Right arm, Patient Position: Sitting, Cuff Size: Adult Large)   Pulse 98   Temp 97.3  F (36.3  C) (Temporal)   Resp 16   LMP 11/22/2012   SpO2 96%   There is no height or weight on file to calculate BMI.  Physical Exam   Alert and oriented, in no acute distress.  PERRLA, EOMI, fundi are clear  The neck is supple and free of adenopathy or masses, the thyroid is normal without enlargement or nodules.  Chest is clear to auscultation.  Heart is regular without murmurs, clicks or rubs.     No labs today as basic, other than glucose was normal one month ago with glucose over 300 and her A1c was 10.0 a month ago and not adequate time to show an appreciable change.               Signed Electronically by: Gregory G. Schoen, MD

## 2025-05-09 NOTE — PATIENT INSTRUCTIONS
I will send a prescription for glipizide 2.5mg daily to add to the 5mg tab to take 7.5mg each am.   Stay on metformin.  In two weeks, send me a MyChart message regarding you fasting blood sugar averages over the prior 7 and 14 days.    Goal is to have 80% of sugars in between 70 and 180, with the other 20% in 180-220, with rare over 220 and rare under 70.

## 2025-05-11 DIAGNOSIS — J31.0 CHRONIC RHINITIS: ICD-10-CM

## 2025-05-12 PROBLEM — E11.69 TYPE 2 DIABETES MELLITUS WITH OTHER SPECIFIED COMPLICATION, WITHOUT LONG-TERM CURRENT USE OF INSULIN (H): Status: RESOLVED | Noted: 2023-01-02 | Resolved: 2025-05-12

## 2025-05-12 PROBLEM — E11.65 TYPE 2 DIABETES MELLITUS WITH HYPERGLYCEMIA, WITHOUT LONG-TERM CURRENT USE OF INSULIN (H): Status: ACTIVE | Noted: 2025-05-12

## 2025-05-12 RX ORDER — LORATADINE AND PSEUDOEPHEDRINE 10; 240 MG/1; MG/1
1 TABLET, EXTENDED RELEASE ORAL DAILY
Qty: 30 TABLET | Refills: 3 | Status: SHIPPED | OUTPATIENT
Start: 2025-05-12

## 2025-05-15 ENCOUNTER — DOCUMENTATION ONLY (OUTPATIENT)
Dept: OTHER | Facility: CLINIC | Age: 65
End: 2025-05-15
Payer: COMMERCIAL

## 2025-05-15 DIAGNOSIS — E11.65 TYPE 2 DIABETES MELLITUS WITH HYPERGLYCEMIA, WITHOUT LONG-TERM CURRENT USE OF INSULIN (H): ICD-10-CM

## 2025-05-15 RX ORDER — METFORMIN HYDROCHLORIDE 500 MG/1
1000 TABLET, EXTENDED RELEASE ORAL 2 TIMES DAILY WITH MEALS
Qty: 360 TABLET | Refills: 1 | Status: SHIPPED | OUTPATIENT
Start: 2025-05-15

## 2025-06-21 DIAGNOSIS — F41.1 GENERALIZED ANXIETY DISORDER: ICD-10-CM

## 2025-06-22 RX ORDER — ALPRAZOLAM 0.5 MG
TABLET ORAL
Qty: 30 TABLET | Refills: 0 | Status: SHIPPED | OUTPATIENT
Start: 2025-06-22

## 2025-07-14 DIAGNOSIS — J45.20 MILD INTERMITTENT ASTHMA WITHOUT COMPLICATION: ICD-10-CM

## 2025-07-14 RX ORDER — ALBUTEROL SULFATE 90 UG/1
2 AEROSOL, METERED RESPIRATORY (INHALATION) EVERY 6 HOURS PRN
Qty: 18 G | Refills: 3 | Status: SHIPPED | OUTPATIENT
Start: 2025-07-14

## 2025-07-23 ENCOUNTER — PATIENT OUTREACH (OUTPATIENT)
Dept: CARE COORDINATION | Facility: CLINIC | Age: 65
End: 2025-07-23
Payer: COMMERCIAL

## 2025-07-26 ENCOUNTER — HEALTH MAINTENANCE LETTER (OUTPATIENT)
Age: 65
End: 2025-07-26

## 2025-08-08 ENCOUNTER — HOSPITAL ENCOUNTER (OUTPATIENT)
Dept: MAMMOGRAPHY | Facility: CLINIC | Age: 65
Discharge: HOME | End: 2025-08-08
Attending: FAMILY MEDICINE | Admitting: FAMILY MEDICINE
Payer: COMMERCIAL

## 2025-08-08 DIAGNOSIS — Z12.31 VISIT FOR SCREENING MAMMOGRAM: ICD-10-CM

## 2025-08-08 PROCEDURE — 77063 BREAST TOMOSYNTHESIS BI: CPT

## 2025-08-21 DIAGNOSIS — F41.1 GENERALIZED ANXIETY DISORDER: ICD-10-CM

## 2025-08-21 RX ORDER — ALPRAZOLAM 0.5 MG
TABLET ORAL
Qty: 30 TABLET | Refills: 0 | Status: SHIPPED | OUTPATIENT
Start: 2025-08-21

## 2025-08-31 DIAGNOSIS — E11.9 TYPE 2 DIABETES MELLITUS WITHOUT COMPLICATION, WITHOUT LONG-TERM CURRENT USE OF INSULIN (H): ICD-10-CM

## 2025-09-02 RX ORDER — LANCETS
EACH MISCELLANEOUS
Qty: 300 EACH | Refills: 2 | Status: SHIPPED | OUTPATIENT
Start: 2025-09-02

## 2025-09-02 RX ORDER — BLOOD SUGAR DIAGNOSTIC
STRIP MISCELLANEOUS
Qty: 300 STRIP | Refills: 2 | Status: SHIPPED | OUTPATIENT
Start: 2025-09-02

## (undated) DEVICE — PACK GENERAL LAPAOSCOPY

## (undated) DEVICE — ENDO TROCAR SLEEVE KII Z-THREADED 05X100MM CTS02

## (undated) DEVICE — GRASPER LAPAROSCOPIC EPIX 5MMX35CM C4130

## (undated) DEVICE — SYR 10ML FINGER CONTROL W/O NDL 309695

## (undated) DEVICE — SU MONOCRYL 3-0 PS-2 18" UND Y497G

## (undated) DEVICE — DRSG PRIMAPORE 02X3" 7133

## (undated) DEVICE — DEVICE SUTURE GRASPER TROCAR CLOSURE 14GA PMITCSG

## (undated) DEVICE — SU PDS II 0 CT-1 36" Z346H

## (undated) DEVICE — STPL RELOAD 80 X 3.8MM GIA8038L

## (undated) DEVICE — TUBING C02 INSUFFLATION W/FILTER

## (undated) DEVICE — NDL INSUFFLATION 120MM VERRES 172015

## (undated) DEVICE — SYR BULB IRRIG DOVER 60 ML LATEX FREE 67000

## (undated) DEVICE — WIRE GLIDE 0.038"X150CM VASC

## (undated) DEVICE — GLOVE ESTEEM BLUE W/NEU-THERA 6.0  2D73PB60

## (undated) DEVICE — PREP DURAPREP REMOVER 4OZ 8611

## (undated) DEVICE — SU PROLENE 2-0 SH 30" 8833H

## (undated) DEVICE — SOL NACL 0.9% 30ML VIAL

## (undated) DEVICE — DRAPE MAYO STAND 23X54 8337

## (undated) DEVICE — SUCTION TIP POOLE K770

## (undated) DEVICE — COVER CLAMP FABRIC RADIOPAQUE 9.5X150MM 072002PBX

## (undated) DEVICE — STPL SKIN 35W APPOSE 8886803712

## (undated) DEVICE — SU STRATAFIX PDS PLUS CT-1 30CM SXPP1A435

## (undated) DEVICE — ANTIFOG SOLUTION W/FOAM PAD 31142527

## (undated) DEVICE — ESU SUCTION/IRRIGATION SYSTEM PISTOL GRIP

## (undated) DEVICE — STOCKING SLEEVE COMPRESSION CALF MED

## (undated) DEVICE — DEVICE FIXATION SECURESTRAP TACKER 5MM ABSORB STRAP25

## (undated) DEVICE — SYR 10ML PREFILLED 0.9% NACL INJ NOT STERILE 306500

## (undated) DEVICE — GLOVE ESTEEM POWDER FREE 5.5  2D72PL55

## (undated) DEVICE — ENDO TROCAR FIRST ENTRY KII FIOS Z-THRD 11X100MM CTF33

## (undated) DEVICE — SOL WATER INJ 2000ML BAG 07118-07

## (undated) DEVICE — Device

## (undated) DEVICE — ADH SKIN CLOSURE PREMIERPRO EXOFIN 1.0ML 3470

## (undated) DEVICE — SU VICRYL 0 CT-1 36" J346H

## (undated) DEVICE — ESU LIGASURE IMPACT OPEN SEALER/DVDR CVD LG JAW 36MM LF4318

## (undated) DEVICE — SU VICRYL 3-0 SH 27" UND J416H

## (undated) DEVICE — DEVICE SUTURE PASSER 14GA WECK EFX EFXSP2

## (undated) DEVICE — SU SILK 3-0 TIE 24" SA74H

## (undated) DEVICE — SUCTION MANIFOLD NEPTUNE 2 SYS 4 PORT 0702-020-000

## (undated) DEVICE — SU MONOCRYL 4-0 PS-2 18" UND Y496G

## (undated) DEVICE — SU DERMABOND PROPEN .5ML DPP6

## (undated) DEVICE — SU VICRYL 0 UR-6 27" J603H

## (undated) DEVICE — SU SILK 2-0 SH 30" K833H

## (undated) DEVICE — BLADE KNIFE SURG 10 371110

## (undated) DEVICE — DRSG BANDAID 1X3" FABRIC

## (undated) DEVICE — NDL ECLIPSE 25GA 1.5"

## (undated) DEVICE — GLOVE PROTEXIS W/NEU-THERA 8.0  2D73TE80

## (undated) DEVICE — PACK GENERAL ENDOSCOPY 9120

## (undated) DEVICE — STPL CONTOUR CUT CVD GREEN CS40G

## (undated) DEVICE — NDL COUNTER 20CT 31142493

## (undated) DEVICE — GLOVE PROTEXIS W/NEU-THERA 7.5  2D73TE75

## (undated) DEVICE — BNDG ABDOMINAL BINDER 9X30-45" 79-89070

## (undated) DEVICE — ENDO TROCAR 05MM VERSAPORT BLADED W/FIX CANNULA 179094F

## (undated) DEVICE — TUBING CYSTO/BLADDER IRRIG SET 80" 06544-01

## (undated) DEVICE — LUBRICATING JELLY 4.25OZ

## (undated) DEVICE — BASIN SET MINOR DISP

## (undated) DEVICE — SUCTION TIP YANKAUER W/O VENT BULBOUS TIP

## (undated) DEVICE — BLADE KNIFE SURG 11 371111

## (undated) DEVICE — ENDO FIXATION CANNULA 05MM VERSAPORT 177092F

## (undated) DEVICE — GOWN IMPERVIOUS BREATHABLE 2XL/XLONG

## (undated) DEVICE — SU VICRYL 2-0 TIE 12X18" UND J111T

## (undated) DEVICE — ESU LIGASURE BLUNT TIP 5MM LF1537

## (undated) DEVICE — CATH TRAY FOLEY 16FR SIL

## (undated) DEVICE — SU MONOCRYL 3-0 PS-2 27" Y427H

## (undated) DEVICE — STPL 80 X 3.8MM GIA8038S

## (undated) DEVICE — DRSG PRIMAPORE 04X11 3/4"

## (undated) DEVICE — SOL NACL 0.9% INJ 1000ML BAG 07983-09

## (undated) DEVICE — SU VICRYL 3-0 TIE 12X18" UND J110T

## (undated) DEVICE — TUBING SUCTION 12"X1/4" N612

## (undated) DEVICE — STPL CIRCULAR DST 28MM W/TILT TIP EEA28

## (undated) DEVICE — SPONGE LAP 18X18" 1515

## (undated) DEVICE — ENDO TROCAR 05/11MM VERSAPORT V2 179095PF

## (undated) DEVICE — ESU HOOK TIP 5MM CONMED

## (undated) DEVICE — SU VICRYL 2-0 SH 27" J317H

## (undated) DEVICE — DRSG PRIMAPORE 03 1/8X6" 66000318

## (undated) DEVICE — PREP DURAPREP 26ML APL 8630

## (undated) RX ORDER — ONDANSETRON 2 MG/ML
INJECTION INTRAMUSCULAR; INTRAVENOUS
Status: DISPENSED
Start: 2022-03-24

## (undated) RX ORDER — LIDOCAINE HYDROCHLORIDE 20 MG/ML
INJECTION, SOLUTION EPIDURAL; INFILTRATION; INTRACAUDAL; PERINEURAL
Status: DISPENSED
Start: 2022-03-24

## (undated) RX ORDER — PROPOFOL 10 MG/ML
INJECTION, EMULSION INTRAVENOUS
Status: DISPENSED
Start: 2017-03-23

## (undated) RX ORDER — BUPIVACAINE HYDROCHLORIDE AND EPINEPHRINE 2.5; 5 MG/ML; UG/ML
INJECTION, SOLUTION EPIDURAL; INFILTRATION; INTRACAUDAL; PERINEURAL
Status: DISPENSED
Start: 2017-03-23

## (undated) RX ORDER — DEXAMETHASONE SODIUM PHOSPHATE 10 MG/ML
INJECTION, SOLUTION INTRAMUSCULAR; INTRAVENOUS
Status: DISPENSED
Start: 2022-03-24

## (undated) RX ORDER — BUPIVACAINE HYDROCHLORIDE 2.5 MG/ML
INJECTION, SOLUTION EPIDURAL; INFILTRATION; INTRACAUDAL
Status: DISPENSED
Start: 2022-03-24

## (undated) RX ORDER — HYDROMORPHONE HYDROCHLORIDE 1 MG/ML
INJECTION, SOLUTION INTRAMUSCULAR; INTRAVENOUS; SUBCUTANEOUS
Status: DISPENSED
Start: 2022-03-24

## (undated) RX ORDER — NEOSTIGMINE METHYLSULFATE 1 MG/ML
VIAL (ML) INJECTION
Status: DISPENSED
Start: 2017-03-23

## (undated) RX ORDER — LIDOCAINE HYDROCHLORIDE 10 MG/ML
INJECTION, SOLUTION EPIDURAL; INFILTRATION; INTRACAUDAL; PERINEURAL
Status: DISPENSED
Start: 2017-03-23

## (undated) RX ORDER — PROPOFOL 10 MG/ML
INJECTION, EMULSION INTRAVENOUS
Status: DISPENSED
Start: 2022-03-24

## (undated) RX ORDER — DEXMEDETOMIDINE HYDROCHLORIDE 100 UG/ML
INJECTION, SOLUTION INTRAVENOUS
Status: DISPENSED
Start: 2022-03-24

## (undated) RX ORDER — LIDOCAINE HYDROCHLORIDE 10 MG/ML
INJECTION, SOLUTION EPIDURAL; INFILTRATION; INTRACAUDAL; PERINEURAL
Status: DISPENSED
Start: 2017-03-01

## (undated) RX ORDER — FENTANYL CITRATE 50 UG/ML
INJECTION, SOLUTION INTRAMUSCULAR; INTRAVENOUS
Status: DISPENSED
Start: 2022-03-24

## (undated) RX ORDER — GLYCOPYRROLATE 0.2 MG/ML
INJECTION INTRAMUSCULAR; INTRAVENOUS
Status: DISPENSED
Start: 2017-03-23

## (undated) RX ORDER — BUPIVACAINE HYDROCHLORIDE AND EPINEPHRINE 2.5; 5 MG/ML; UG/ML
INJECTION, SOLUTION EPIDURAL; INFILTRATION; INTRACAUDAL; PERINEURAL
Status: DISPENSED
Start: 2022-03-24

## (undated) RX ORDER — FENTANYL CITRATE 50 UG/ML
INJECTION, SOLUTION INTRAMUSCULAR; INTRAVENOUS
Status: DISPENSED
Start: 2017-03-23